# Patient Record
Sex: MALE | Race: BLACK OR AFRICAN AMERICAN | NOT HISPANIC OR LATINO | Employment: OTHER | URBAN - METROPOLITAN AREA
[De-identification: names, ages, dates, MRNs, and addresses within clinical notes are randomized per-mention and may not be internally consistent; named-entity substitution may affect disease eponyms.]

---

## 2019-12-18 ENCOUNTER — HOSPITAL ENCOUNTER (OUTPATIENT)
Dept: SURGERY | Facility: HOSPITAL | Age: 74
Setting detail: HOSPITAL OUTPATIENT SURGERY
Discharge: HOME | End: 2019-12-18
Attending: OPHTHALMOLOGY

## 2022-06-09 ENCOUNTER — APPOINTMENT (OUTPATIENT)
Dept: GENERAL RADIOLOGY | Facility: HOSPITAL | Age: 77
End: 2022-06-09

## 2022-06-09 ENCOUNTER — HOSPITAL ENCOUNTER (EMERGENCY)
Facility: HOSPITAL | Age: 77
Discharge: HOME | End: 2022-06-09
Attending: EMERGENCY MEDICINE | Admitting: EMERGENCY MEDICINE

## 2022-06-09 VITALS
HEIGHT: 68 IN | HEART RATE: 101 BPM | BODY MASS INDEX: 20.52 KG/M2 | RESPIRATION RATE: 20 BRPM | OXYGEN SATURATION: 98 % | WEIGHT: 135.36 LBS | SYSTOLIC BLOOD PRESSURE: 167 MMHG | DIASTOLIC BLOOD PRESSURE: 85 MMHG | TEMPERATURE: 98.3 F

## 2022-06-09 DIAGNOSIS — J40 BRONCHITIS: Primary | ICD-10-CM

## 2022-06-09 LAB
ALBUMIN SERPL-MCNC: 4.6 G/DL (ref 3.5–5.2)
ALBUMIN/GLOB SERPL: 1.1 G/DL
ALP SERPL-CCNC: 108 U/L (ref 39–117)
ALT SERPL W P-5'-P-CCNC: 12 U/L (ref 1–41)
ANION GAP SERPL CALCULATED.3IONS-SCNC: 12.6 MMOL/L (ref 5–15)
AST SERPL-CCNC: 16 U/L (ref 1–40)
BASOPHILS # BLD AUTO: 0.04 10*3/MM3 (ref 0–0.2)
BASOPHILS NFR BLD AUTO: 0.6 % (ref 0–1.5)
BILIRUB SERPL-MCNC: 0.5 MG/DL (ref 0–1.2)
BUN SERPL-MCNC: 17 MG/DL (ref 8–23)
BUN/CREAT SERPL: 18.5 (ref 7–25)
CALCIUM SPEC-SCNC: 9.9 MG/DL (ref 8.6–10.5)
CHLORIDE SERPL-SCNC: 93 MMOL/L (ref 98–107)
CO2 SERPL-SCNC: 24.4 MMOL/L (ref 22–29)
CREAT SERPL-MCNC: 0.92 MG/DL (ref 0.76–1.27)
DEPRECATED RDW RBC AUTO: 43.7 FL (ref 37–54)
EGFRCR SERPLBLD CKD-EPI 2021: 85.1 ML/MIN/1.73
EOSINOPHIL # BLD AUTO: 0.01 10*3/MM3 (ref 0–0.4)
EOSINOPHIL NFR BLD AUTO: 0.2 % (ref 0.3–6.2)
ERYTHROCYTE [DISTWIDTH] IN BLOOD BY AUTOMATED COUNT: 13.4 % (ref 12.3–15.4)
FLUAV AG NPH QL: NEGATIVE
FLUBV AG NPH QL IA: NEGATIVE
GLOBULIN UR ELPH-MCNC: 4.3 GM/DL
GLUCOSE SERPL-MCNC: 225 MG/DL (ref 65–99)
HCT VFR BLD AUTO: 39.6 % (ref 37.5–51)
HGB BLD-MCNC: 13.4 G/DL (ref 13–17.7)
HOLD SPECIMEN: NORMAL
HOLD SPECIMEN: NORMAL
IMM GRANULOCYTES # BLD AUTO: 0.01 10*3/MM3 (ref 0–0.05)
IMM GRANULOCYTES NFR BLD AUTO: 0.2 % (ref 0–0.5)
LYMPHOCYTES # BLD AUTO: 0.82 10*3/MM3 (ref 0.7–3.1)
LYMPHOCYTES NFR BLD AUTO: 12.9 % (ref 19.6–45.3)
MCH RBC QN AUTO: 29.9 PG (ref 26.6–33)
MCHC RBC AUTO-ENTMCNC: 33.8 G/DL (ref 31.5–35.7)
MCV RBC AUTO: 88.4 FL (ref 79–97)
MONOCYTES # BLD AUTO: 0.45 10*3/MM3 (ref 0.1–0.9)
MONOCYTES NFR BLD AUTO: 7.1 % (ref 5–12)
NEUTROPHILS NFR BLD AUTO: 5.01 10*3/MM3 (ref 1.7–7)
NEUTROPHILS NFR BLD AUTO: 79 % (ref 42.7–76)
NRBC BLD AUTO-RTO: 0 /100 WBC (ref 0–0.2)
NT-PROBNP SERPL-MCNC: 503.9 PG/ML (ref 0–1800)
PLATELET # BLD AUTO: 179 10*3/MM3 (ref 140–450)
PMV BLD AUTO: 10.5 FL (ref 6–12)
POTASSIUM SERPL-SCNC: 4.7 MMOL/L (ref 3.5–5.2)
PROT SERPL-MCNC: 8.9 G/DL (ref 6–8.5)
QT INTERVAL: 330 MS
RBC # BLD AUTO: 4.48 10*6/MM3 (ref 4.14–5.8)
SARS-COV-2 RNA PNL SPEC NAA+PROBE: NOT DETECTED
SODIUM SERPL-SCNC: 130 MMOL/L (ref 136–145)
TROPONIN T SERPL-MCNC: <0.01 NG/ML (ref 0–0.03)
WBC NRBC COR # BLD: 6.34 10*3/MM3 (ref 3.4–10.8)
WHOLE BLOOD HOLD COAG: NORMAL
WHOLE BLOOD HOLD SPECIMEN: NORMAL

## 2022-06-09 PROCEDURE — C9803 HOPD COVID-19 SPEC COLLECT: HCPCS | Performed by: EMERGENCY MEDICINE

## 2022-06-09 PROCEDURE — 83880 ASSAY OF NATRIURETIC PEPTIDE: CPT | Performed by: EMERGENCY MEDICINE

## 2022-06-09 PROCEDURE — 80053 COMPREHEN METABOLIC PANEL: CPT | Performed by: EMERGENCY MEDICINE

## 2022-06-09 PROCEDURE — 93005 ELECTROCARDIOGRAM TRACING: CPT | Performed by: EMERGENCY MEDICINE

## 2022-06-09 PROCEDURE — 87804 INFLUENZA ASSAY W/OPTIC: CPT | Performed by: EMERGENCY MEDICINE

## 2022-06-09 PROCEDURE — 84484 ASSAY OF TROPONIN QUANT: CPT | Performed by: EMERGENCY MEDICINE

## 2022-06-09 PROCEDURE — 71045 X-RAY EXAM CHEST 1 VIEW: CPT

## 2022-06-09 PROCEDURE — 93005 ELECTROCARDIOGRAM TRACING: CPT

## 2022-06-09 PROCEDURE — 93010 ELECTROCARDIOGRAM REPORT: CPT | Performed by: INTERNAL MEDICINE

## 2022-06-09 PROCEDURE — 85025 COMPLETE CBC W/AUTO DIFF WBC: CPT | Performed by: EMERGENCY MEDICINE

## 2022-06-09 PROCEDURE — U0004 COV-19 TEST NON-CDC HGH THRU: HCPCS | Performed by: EMERGENCY MEDICINE

## 2022-06-09 PROCEDURE — 99283 EMERGENCY DEPT VISIT LOW MDM: CPT

## 2022-06-09 PROCEDURE — 36415 COLL VENOUS BLD VENIPUNCTURE: CPT | Performed by: EMERGENCY MEDICINE

## 2022-06-09 RX ORDER — MULTIPLE VITAMINS W/ MINERALS TAB 9MG-400MCG
1 TAB ORAL 2 TIMES WEEKLY
COMMUNITY

## 2022-06-09 RX ORDER — AMLODIPINE BESYLATE 5 MG/1
5 TABLET ORAL DAILY
Qty: 20 TABLET | Refills: 0 | Status: SHIPPED | OUTPATIENT
Start: 2022-06-09 | End: 2023-03-06

## 2022-06-09 RX ORDER — SODIUM CHLORIDE 0.9 % (FLUSH) 0.9 %
10 SYRINGE (ML) INJECTION AS NEEDED
Status: DISCONTINUED | OUTPATIENT
Start: 2022-06-09 | End: 2022-06-09 | Stop reason: HOSPADM

## 2022-06-09 RX ORDER — AZITHROMYCIN 250 MG/1
TABLET, FILM COATED ORAL
Qty: 6 TABLET | Refills: 0 | Status: SHIPPED | OUTPATIENT
Start: 2022-06-09 | End: 2022-06-14

## 2022-06-09 NOTE — ED PROVIDER NOTES
"Time: 3:54 AM EDT  Arrived by: private car  Chief Complaint: SOB  History provided by: pt   History is limited by: N/A     History of Present Illness:  Patient is a 76 y.o. male that presents to the emergency department with SOB today. Pt mentions he feel like \"food stuck\" in his throat and has been coughing with sputum. Pt reports associated symptoms of sneezing, nasal congestion, hiccups, diaphoresis, and chronic cough of 1 year. Pt denies vomiting or diarrhea.   Pt is not vaccinated for COVID.       Shortness of Breath  Severity:  Moderate  Onset quality:  Sudden  Duration:  1 day  Timing:  Constant  Progression:  Unchanged  Chronicity:  New  Relieved by:  None tried  Worsened by:  Coughing  Ineffective treatments:  None tried  Associated symptoms: cough, diaphoresis and sputum production    Associated symptoms: no abdominal pain, no chest pain, no fever, no headaches, no neck pain, no rash, no sore throat and no vomiting        Similar Symptoms Previously: No   Recently seen: pt was not seen in this ED recently      Patient Care Team  Primary Care Provider: Provider, No Known    Past Medical History:     No Known Allergies  History reviewed. No pertinent past medical history.  Past Surgical History:   Procedure Laterality Date   • EYE SURGERY       History reviewed. No pertinent family history.    Home Medications:  Prior to Admission medications    Medication Sig Start Date End Date Taking? Authorizing Provider   multivitamin with minerals (CENTRUM ADULTS PO) Take 1 tablet by mouth Daily.    Provider, MD Rene        Social History:   Social History     Tobacco Use   • Smoking status: Former Smoker   Substance Use Topics   • Alcohol use: Never   • Drug use: Never     Recent travel: not applicable     Review of Systems:  Review of Systems   Constitutional: Positive for diaphoresis. Negative for chills and fever.   HENT: Positive for congestion and sneezing. Negative for postnasal drip, rhinorrhea and sore " "throat.         Hiccups     Eyes: Negative for photophobia, pain and visual disturbance.   Respiratory: Positive for cough, sputum production and shortness of breath. Negative for apnea and chest tightness.    Cardiovascular: Negative for chest pain, palpitations and leg swelling.   Gastrointestinal: Negative for abdominal pain, diarrhea, nausea and vomiting.   Genitourinary: Negative for difficulty urinating, dysuria, flank pain, frequency, hematuria and urgency.   Musculoskeletal: Negative for joint swelling, myalgias, neck pain and neck stiffness.   Skin: Negative for color change, pallor and rash.   Neurological: Negative for dizziness, seizures, syncope, weakness, numbness and headaches.   Hematological: Negative for adenopathy. Does not bruise/bleed easily.   Psychiatric/Behavioral: Negative.    All other systems reviewed and are negative.       Physical Exam:  /85   Pulse 101   Temp 98.3 °F (36.8 °C) (Oral)   Resp 20   Ht 172.7 cm (68\")   Wt 61.4 kg (135 lb 5.8 oz)   SpO2 98%   BMI 20.58 kg/m²     Physical Exam  Vitals and nursing note reviewed.   Constitutional:       General: He is not in acute distress.     Appearance: Normal appearance. He is not toxic-appearing.   HENT:      Head: Normocephalic and atraumatic.      Jaw: There is normal jaw occlusion.   Eyes:      General: Lids are normal.      Extraocular Movements: Extraocular movements intact.      Conjunctiva/sclera: Conjunctivae normal.      Pupils: Pupils are equal, round, and reactive to light.   Cardiovascular:      Rate and Rhythm: Normal rate and regular rhythm.      Pulses: Normal pulses.      Heart sounds: Murmur heard.   Pulmonary:      Effort: Pulmonary effort is normal. No respiratory distress.      Breath sounds: Normal breath sounds. No wheezing or rhonchi.   Abdominal:      General: Abdomen is flat.      Palpations: Abdomen is soft.      Tenderness: There is no abdominal tenderness. There is no guarding or rebound. "   Musculoskeletal:         General: Normal range of motion.      Cervical back: Normal range of motion and neck supple.      Right lower leg: No edema.      Left lower leg: No edema.   Skin:     General: Skin is warm and dry.   Neurological:      Mental Status: He is alert and oriented to person, place, and time. Mental status is at baseline.   Psychiatric:         Mood and Affect: Mood normal.                Medications in the Emergency Department:  Medications   sodium chloride 0.9 % flush 10 mL (has no administration in time range)        Labs  Lab Results (last 24 hours)     Procedure Component Value Units Date/Time    Influenza Antigen, Rapid - Swab, Nasopharynx [[id number]]  (Normal) Collected: 06/09/22 0200    Specimen: Swab from Nasopharynx Updated: 06/09/22 0235     Influenza A Ag, EIA Negative     Influenza B Ag, EIA Negative    COVID-19,APTIMA PANTHER(AHSAN),[facility]/ [facility]ICOLE, NP/OP SWAB IN UTM/VTM/SALINE TRANSPORT MEDIA,24 HR TAT - Swab, Nasopharynx [[id number]] Collected: 06/09/22 0200    Specimen: Swab from Nasopharynx Updated: 06/09/22 0212    CBC & Differential [[id number]]  (Abnormal) Collected: 06/09/22 0203    Specimen: Blood Updated: 06/09/22 0216    Narrative:      The following orders were created for panel order CBC & Differential.  Procedure                               Abnormality         Status                     ---------                               -----------         ------                     CBC Auto Differential[[id number]]        Abnormal            Final result                 Please view results for these tests on the individual orders.    Comprehensive Metabolic Panel [[id number]]  (Abnormal) Collected: 06/09/22 0203    Specimen: Blood Updated: 06/09/22 0235     Glucose 225 mg/dL      BUN 17 mg/dL      Creatinine 0.92 mg/dL      Sodium 130 mmol/L      Potassium 4.7 mmol/L      Chloride 93 mmol/L      CO2 24.4 mmol/L      Calcium 9.9 mg/dL      Total Protein 8.9 g/dL      Albumin 4.60  g/dL      ALT (SGPT) 12 U/L      AST (SGOT) 16 U/L      Alkaline Phosphatase 108 U/L      Total Bilirubin 0.5 mg/dL      Globulin 4.3 gm/dL      A/G Ratio 1.1 g/dL      BUN/Creatinine Ratio 18.5     Anion Gap 12.6 mmol/L      eGFR 85.1 mL/min/1.73      Comment: National Kidney Foundation and American Society of Nephrology (ASN) Task Force recommended calculation based on the Chronic Kidney Disease Epidemiology Collaboration (CKD-EPI) equation refit without adjustment for race.       Narrative:      GFR Normal >60  Chronic Kidney Disease <60  Kidney Failure <15      BNP [[id number]]  (Normal) Collected: 06/09/22 0203    Specimen: Blood Updated: 06/09/22 0233     proBNP 503.9 pg/mL     Narrative:      Among patients with dyspnea, NT-proBNP is highly sensitive for the detection of acute congestive heart failure. In addition NT-proBNP of <300 pg/ml effectively rules out acute congestive heart failure with 99% negative predictive value.    Results may be falsely decreased if patient taking Biotin.      Troponin [[id number]]  (Normal) Collected: 06/09/22 0203    Specimen: Blood Updated: 06/09/22 0235     Troponin T <0.010 ng/mL     Narrative:      Troponin T Reference Range:  <= 0.03 ng/mL-   Negative for AMI  >0.03 ng/mL-     Abnormal for myocardial necrosis.  Clinicians would have to utilize clinical acumen, EKG, Troponin and serial changes to determine if it is an Acute Myocardial Infarction or myocardial injury due to an underlying chronic condition.       Results may be falsely decreased if patient taking Biotin.      CBC Auto Differential [[id number]]  (Abnormal) Collected: 06/09/22 0203    Specimen: Blood Updated: 06/09/22 0216     WBC 6.34 10*3/mm3      RBC 4.48 10*6/mm3      Hemoglobin 13.4 g/dL      Hematocrit 39.6 %      MCV 88.4 fL      MCH 29.9 pg      MCHC 33.8 g/dL      RDW 13.4 %      RDW-SD 43.7 fl      MPV 10.5 fL      Platelets 179 10*3/mm3      Neutrophil % 79.0 %      Lymphocyte % 12.9 %      Monocyte %  7.1 %      Eosinophil % 0.2 %      Basophil % 0.6 %      Immature Grans % 0.2 %      Neutrophils, Absolute 5.01 10*3/mm3      Lymphocytes, Absolute 0.82 10*3/mm3      Monocytes, Absolute 0.45 10*3/mm3      Eosinophils, Absolute 0.01 10*3/mm3      Basophils, Absolute 0.04 10*3/mm3      Immature Grans, Absolute 0.01 10*3/mm3      nRBC 0.0 /100 WBC            Imaging:  XR Chest 1 View    Result Date: 6/9/2022  PROCEDURE: XR CHEST 1 VW  COMPARISON: None.  INDICATIONS: CHEST TIGHTNESS; SHORTNESS OF BREATH X COUPLE HOURS.  FINDINGS: A single AP upright portable chest radiograph was performed.  There is age-indeterminate blunting of the right lateral costophrenic sulcus.  It may represent chronic pleural-parenchymal scarring.  A tiny right pleural effusion is possible.  No acute infiltrate is seen.  No cardiac enlargement.  There are degenerative changes of the imaged spine and the bilateral shoulders.  The thoracic aorta is atherosclerotic and ectatic.       No acute infiltrate is seen.  There is age-indeterminate mild blunting of the right lateral costophrenic sulcus, as discussed.  No cardiac enlargement.  No pneumothorax.      COMMENT:  Part of this note is an electronic transcription of spoken language to printed text. The electronic translation/transcription may permit erroneous, or at times, nonsensical (or even sensical) words or phrases to be inadvertently transcribed or omitted; this  has reviewed the note for such errors (as well as additional errors); however, some may still exist.  DEBI KING JR, MD       Electronically Signed and Approved By: DEBI KING JR, MD on 6/09/2022 at 2:39                Procedures:  Procedures    Progress  ED Course as of 06/09/22 0414   Thu Jun 09, 2022   0413 EKG:    Rhythm: sinus  Rate: 105  Axis: normal  Intervals: normal  ST Segment: no elevations    EKG Comparison: no previous    Interpreted by me   [BN]      ED Course User Index  [BN] Emerita Muñiz MD                             Medical Decision Making:  MDM  Number of Diagnoses or Management Options  Bronchitis  Diagnosis management comments: The patient´s CBC was reviewed and shows no abnormalities of critical concern. Of note, there is no anemia requiring a blood transfusion and the platelet count is acceptable.  The patient´s CMP was reviewed and shows no abnormalities of critical concern. Of note, the patient´s sodium and potassium are acceptable. The patient´s liver enzymes are unremarkable. The patient´s renal function (creatinine) is preserved. The patient has a normal anion gap.  Troponin is negative.  Influenza swab is negative.  Chest x-ray is negative for pneumonia.  The patient presented with a productive cough. The patient is well-appearing and in no respiratory distress. The patient has a normal mental status and is neurologically intact. The patient appears well and is able to tolerate food for fluid by mouth and there's no significant dehydration. There is no respiratory distress and no signs of systemic toxicity. The history, exam, diagnostic testing and current condition do not demonstrate an infectious process such as meningitis, severe pneumonia, retropharyngeal abscess, epiglottitis, sepsis or other serious bacterial infection requiring further testing, treatment, consultation, or admission at this time. The patient has no additional oxygen requirement nor are there any signs of respiratory distress. The patient has a chest x-ray that is negative for pneumonia. Asthma, URI, GERD are also considered.  Patient was found to have a murmur.  Son is at the bedside and I advised him to follow-up with Dr. Hinojsoa concerning this murmur.  Patient also awaiting COVID-19 results.  The vital signs have been stable and the patient has had no hypoxia. The patient's condition is stable and appropriate for discharge. The son will pursue further outpatient evaluation with the primary care physician, or  designated physician. The son will expressed a clear and thorough understanding and agreed to follow-up as instructed.       Amount and/or Complexity of Data Reviewed  Clinical lab tests: reviewed  Tests in the radiology section of CPT®: reviewed  Tests in the medicine section of CPT®: reviewed  Independent visualization of images, tracings, or specimens: yes    Risk of Complications, Morbidity, and/or Mortality  Presenting problems: moderate  Management options: moderate    Patient Progress  Patient progress: stable       Final diagnoses:   Bronchitis        Disposition:  ED Disposition     ED Disposition   Discharge    Condition   Stable    Comment   --             Documentation assistance provided by SOFIA ADEN acting as scribe for Emerita Muñiz MD. Information recorded by the scribe was done at my direction and has been verified and validated by me.          Sofia Aden  06/09/22 0401       Sofia Aden  06/09/22 0404       Emerita Muñiz MD  06/09/22 0414

## 2023-03-06 ENCOUNTER — APPOINTMENT (OUTPATIENT)
Dept: CT IMAGING | Facility: HOSPITAL | Age: 78
DRG: 853 | End: 2023-03-06
Payer: COMMERCIAL

## 2023-03-06 ENCOUNTER — APPOINTMENT (OUTPATIENT)
Dept: CARDIOLOGY | Facility: HOSPITAL | Age: 78
DRG: 853 | End: 2023-03-06
Payer: COMMERCIAL

## 2023-03-06 ENCOUNTER — APPOINTMENT (OUTPATIENT)
Dept: GENERAL RADIOLOGY | Facility: HOSPITAL | Age: 78
DRG: 853 | End: 2023-03-06
Payer: COMMERCIAL

## 2023-03-06 ENCOUNTER — HOSPITAL ENCOUNTER (INPATIENT)
Facility: HOSPITAL | Age: 78
LOS: 23 days | Discharge: NURSING, REHAB OR LONG-TERM CARE | DRG: 853 | End: 2023-03-29
Attending: EMERGENCY MEDICINE | Admitting: STUDENT IN AN ORGANIZED HEALTH CARE EDUCATION/TRAINING PROGRAM
Payer: COMMERCIAL

## 2023-03-06 DIAGNOSIS — R26.2 DIFFICULTY WALKING: ICD-10-CM

## 2023-03-06 DIAGNOSIS — Z78.9 DECREASED ACTIVITIES OF DAILY LIVING (ADL): ICD-10-CM

## 2023-03-06 DIAGNOSIS — J18.9 PNEUMONIA OF RIGHT LOWER LOBE DUE TO INFECTIOUS ORGANISM: Primary | ICD-10-CM

## 2023-03-06 DIAGNOSIS — R13.12 OROPHARYNGEAL DYSPHAGIA: ICD-10-CM

## 2023-03-06 DIAGNOSIS — J96.01 SEPSIS WITH ACUTE HYPOXIC RESPIRATORY FAILURE WITHOUT SEPTIC SHOCK, DUE TO UNSPECIFIED ORGANISM: ICD-10-CM

## 2023-03-06 DIAGNOSIS — A41.9 SEPSIS WITH ACUTE HYPOXIC RESPIRATORY FAILURE WITHOUT SEPTIC SHOCK, DUE TO UNSPECIFIED ORGANISM: ICD-10-CM

## 2023-03-06 DIAGNOSIS — R65.20 SEPSIS WITH ACUTE HYPOXIC RESPIRATORY FAILURE WITHOUT SEPTIC SHOCK, DUE TO UNSPECIFIED ORGANISM: ICD-10-CM

## 2023-03-06 DIAGNOSIS — J96.01 ACUTE RESPIRATORY FAILURE WITH HYPOXIA: ICD-10-CM

## 2023-03-06 LAB
ALBUMIN SERPL-MCNC: 3.8 G/DL (ref 3.5–5.2)
ALBUMIN/GLOB SERPL: 0.9 G/DL
ALP SERPL-CCNC: 91 U/L (ref 39–117)
ALT SERPL W P-5'-P-CCNC: 13 U/L (ref 1–41)
ANION GAP SERPL CALCULATED.3IONS-SCNC: 16 MMOL/L (ref 5–15)
APTT PPP: 27.3 SECONDS (ref 78–95.9)
ARTERIAL PATENCY WRIST A: POSITIVE
AST SERPL-CCNC: 26 U/L (ref 1–40)
BASE EXCESS BLDA CALC-SCNC: -0.5 MMOL/L (ref -2–2)
BASOPHILS # BLD AUTO: 0.08 10*3/MM3 (ref 0–0.2)
BASOPHILS NFR BLD AUTO: 0.6 % (ref 0–1.5)
BDY SITE: ABNORMAL
BILIRUB SERPL-MCNC: 0.7 MG/DL (ref 0–1.2)
BUN SERPL-MCNC: 26 MG/DL (ref 8–23)
BUN/CREAT SERPL: 29.5 (ref 7–25)
BURR CELLS BLD QL SMEAR: NORMAL
CA-I BLDA-SCNC: 1.15 MMOL/L (ref 1.13–1.32)
CALCIUM SPEC-SCNC: 9.4 MG/DL (ref 8.6–10.5)
CHLORIDE BLDA-SCNC: 97 MMOL/L (ref 98–106)
CHLORIDE SERPL-SCNC: 93 MMOL/L (ref 98–107)
CO2 SERPL-SCNC: 24 MMOL/L (ref 22–29)
COHGB MFR BLD: 0.5 % (ref 0–1.5)
CREAT SERPL-MCNC: 0.88 MG/DL (ref 0.76–1.27)
D-LACTATE SERPL-SCNC: 1.8 MMOL/L (ref 0.5–2)
D-LACTATE SERPL-SCNC: 2 MMOL/L (ref 0.5–2)
D-LACTATE SERPL-SCNC: 2.5 MMOL/L (ref 0.5–2)
D-LACTATE SERPL-SCNC: 3.7 MMOL/L (ref 0.5–2)
DEPRECATED RDW RBC AUTO: 43 FL (ref 37–54)
EGFRCR SERPLBLD CKD-EPI 2021: 87.5 ML/MIN/1.73
EOSINOPHIL # BLD AUTO: 0.81 10*3/MM3 (ref 0–0.4)
EOSINOPHIL NFR BLD AUTO: 6.3 % (ref 0.3–6.2)
ERYTHROCYTE [DISTWIDTH] IN BLOOD BY AUTOMATED COUNT: 13.2 % (ref 12.3–15.4)
FHHB: 6.8 % (ref 0–5)
FLUAV AG NPH QL: NEGATIVE
FLUBV AG NPH QL IA: NEGATIVE
GAS FLOW AIRWAY: 4 LPM
GEN 5 2HR TROPONIN T REFLEX: 75 NG/L
GLOBULIN UR ELPH-MCNC: 4.4 GM/DL
GLUCOSE BLDA-MCNC: 293 MG/DL (ref 70–99)
GLUCOSE SERPL-MCNC: 282 MG/DL (ref 65–99)
HCO3 BLDA-SCNC: 25.4 MMOL/L (ref 22–26)
HCT VFR BLD AUTO: 39.9 % (ref 37.5–51)
HGB BLD-MCNC: 13.4 G/DL (ref 13–17.7)
HGB BLDA-MCNC: 13.5 G/DL (ref 13.8–16.4)
IMM GRANULOCYTES # BLD AUTO: 0.04 10*3/MM3 (ref 0–0.05)
IMM GRANULOCYTES NFR BLD AUTO: 0.3 % (ref 0–0.5)
INHALED O2 CONCENTRATION: 36 %
INR PPP: 1.23 (ref 0.86–1.15)
LACTATE BLDA-SCNC: 3.65 MMOL/L (ref 0.5–2)
LYMPHOCYTES # BLD AUTO: 0.22 10*3/MM3 (ref 0.7–3.1)
LYMPHOCYTES NFR BLD AUTO: 1.7 % (ref 19.6–45.3)
MCH RBC QN AUTO: 30 PG (ref 26.6–33)
MCHC RBC AUTO-ENTMCNC: 33.6 G/DL (ref 31.5–35.7)
MCV RBC AUTO: 89.5 FL (ref 79–97)
METHGB BLD QL: 0.2 % (ref 0–1.5)
MODALITY: ABNORMAL
MONOCYTES # BLD AUTO: 0.79 10*3/MM3 (ref 0.1–0.9)
MONOCYTES NFR BLD AUTO: 6.1 % (ref 5–12)
MRSA DNA SPEC QL NAA+PROBE: NORMAL
NEUTROPHILS NFR BLD AUTO: 10.96 10*3/MM3 (ref 1.7–7)
NEUTROPHILS NFR BLD AUTO: 85 % (ref 42.7–76)
NOTE: ABNORMAL
NRBC BLD AUTO-RTO: 0 /100 WBC (ref 0–0.2)
OXYHGB MFR BLDV: 92.5 % (ref 94–99)
PCO2 BLDA: 46.8 MM HG (ref 35–45)
PH BLDA: 7.35 PH UNITS (ref 7.35–7.45)
PLATELET # BLD AUTO: 169 10*3/MM3 (ref 140–450)
PMV BLD AUTO: 11.3 FL (ref 6–12)
PO2 BLD: 198 MM[HG] (ref 0–500)
PO2 BLDA: 71.3 MM HG (ref 80–100)
POTASSIUM BLDA-SCNC: 4.54 MMOL/L (ref 3.5–5)
POTASSIUM SERPL-SCNC: 4.6 MMOL/L (ref 3.5–5.2)
PROCALCITONIN SERPL-MCNC: 3.5 NG/ML (ref 0–0.25)
PROT SERPL-MCNC: 8.2 G/DL (ref 6–8.5)
PROTHROMBIN TIME: 15.6 SECONDS (ref 11.8–14.9)
RBC # BLD AUTO: 4.46 10*6/MM3 (ref 4.14–5.8)
SAO2 % BLDCOA: 93.2 % (ref 95–99)
SARS-COV-2 RNA RESP QL NAA+PROBE: NOT DETECTED
SMALL PLATELETS BLD QL SMEAR: ADEQUATE
SODIUM BLDA-SCNC: 134.4 MMOL/L (ref 136–146)
SODIUM SERPL-SCNC: 133 MMOL/L (ref 136–145)
TROPONIN T DELTA: 25 NG/L
TROPONIN T SERPL HS-MCNC: 50 NG/L
WBC MORPH BLD: NORMAL
WBC NRBC COR # BLD: 12.9 10*3/MM3 (ref 3.4–10.8)

## 2023-03-06 PROCEDURE — 99223 1ST HOSP IP/OBS HIGH 75: CPT | Performed by: STUDENT IN AN ORGANIZED HEALTH CARE EDUCATION/TRAINING PROGRAM

## 2023-03-06 PROCEDURE — 87040 BLOOD CULTURE FOR BACTERIA: CPT | Performed by: EMERGENCY MEDICINE

## 2023-03-06 PROCEDURE — 36415 COLL VENOUS BLD VENIPUNCTURE: CPT

## 2023-03-06 PROCEDURE — 94799 UNLISTED PULMONARY SVC/PX: CPT

## 2023-03-06 PROCEDURE — 25010000002 CEFTRIAXONE PER 250 MG: Performed by: EMERGENCY MEDICINE

## 2023-03-06 PROCEDURE — 93005 ELECTROCARDIOGRAM TRACING: CPT | Performed by: EMERGENCY MEDICINE

## 2023-03-06 PROCEDURE — 83735 ASSAY OF MAGNESIUM: CPT | Performed by: STUDENT IN AN ORGANIZED HEALTH CARE EDUCATION/TRAINING PROGRAM

## 2023-03-06 PROCEDURE — 36415 COLL VENOUS BLD VENIPUNCTURE: CPT | Performed by: EMERGENCY MEDICINE

## 2023-03-06 PROCEDURE — 84145 PROCALCITONIN (PCT): CPT | Performed by: NURSE PRACTITIONER

## 2023-03-06 PROCEDURE — 83050 HGB METHEMOGLOBIN QUAN: CPT | Performed by: EMERGENCY MEDICINE

## 2023-03-06 PROCEDURE — 85610 PROTHROMBIN TIME: CPT | Performed by: EMERGENCY MEDICINE

## 2023-03-06 PROCEDURE — 82962 GLUCOSE BLOOD TEST: CPT

## 2023-03-06 PROCEDURE — 87804 INFLUENZA ASSAY W/OPTIC: CPT | Performed by: STUDENT IN AN ORGANIZED HEALTH CARE EDUCATION/TRAINING PROGRAM

## 2023-03-06 PROCEDURE — 85730 THROMBOPLASTIN TIME PARTIAL: CPT | Performed by: EMERGENCY MEDICINE

## 2023-03-06 PROCEDURE — 85007 BL SMEAR W/DIFF WBC COUNT: CPT | Performed by: EMERGENCY MEDICINE

## 2023-03-06 PROCEDURE — 84484 ASSAY OF TROPONIN QUANT: CPT | Performed by: EMERGENCY MEDICINE

## 2023-03-06 PROCEDURE — 83605 ASSAY OF LACTIC ACID: CPT | Performed by: EMERGENCY MEDICINE

## 2023-03-06 PROCEDURE — 70450 CT HEAD/BRAIN W/O DYE: CPT

## 2023-03-06 PROCEDURE — 80061 LIPID PANEL: CPT | Performed by: STUDENT IN AN ORGANIZED HEALTH CARE EDUCATION/TRAINING PROGRAM

## 2023-03-06 PROCEDURE — 0 IOHEXOL 300 MG/ML SOLUTION: Performed by: STUDENT IN AN ORGANIZED HEALTH CARE EDUCATION/TRAINING PROGRAM

## 2023-03-06 PROCEDURE — 84100 ASSAY OF PHOSPHORUS: CPT | Performed by: STUDENT IN AN ORGANIZED HEALTH CARE EDUCATION/TRAINING PROGRAM

## 2023-03-06 PROCEDURE — 25010000002 AZITHROMYCIN PER 500 MG: Performed by: EMERGENCY MEDICINE

## 2023-03-06 PROCEDURE — 99223 1ST HOSP IP/OBS HIGH 75: CPT | Performed by: INTERNAL MEDICINE

## 2023-03-06 PROCEDURE — 80053 COMPREHEN METABOLIC PANEL: CPT | Performed by: STUDENT IN AN ORGANIZED HEALTH CARE EDUCATION/TRAINING PROGRAM

## 2023-03-06 PROCEDURE — 82375 ASSAY CARBOXYHB QUANT: CPT | Performed by: EMERGENCY MEDICINE

## 2023-03-06 PROCEDURE — 36600 WITHDRAWAL OF ARTERIAL BLOOD: CPT | Performed by: EMERGENCY MEDICINE

## 2023-03-06 PROCEDURE — 80053 COMPREHEN METABOLIC PANEL: CPT | Performed by: EMERGENCY MEDICINE

## 2023-03-06 PROCEDURE — 93010 ELECTROCARDIOGRAM REPORT: CPT | Performed by: INTERNAL MEDICINE

## 2023-03-06 PROCEDURE — 71260 CT THORAX DX C+: CPT

## 2023-03-06 PROCEDURE — 87641 MR-STAPH DNA AMP PROBE: CPT | Performed by: STUDENT IN AN ORGANIZED HEALTH CARE EDUCATION/TRAINING PROGRAM

## 2023-03-06 PROCEDURE — 71045 X-RAY EXAM CHEST 1 VIEW: CPT

## 2023-03-06 PROCEDURE — 82805 BLOOD GASES W/O2 SATURATION: CPT | Performed by: EMERGENCY MEDICINE

## 2023-03-06 PROCEDURE — 99285 EMERGENCY DEPT VISIT HI MDM: CPT

## 2023-03-06 PROCEDURE — 85025 COMPLETE CBC W/AUTO DIFF WBC: CPT | Performed by: EMERGENCY MEDICINE

## 2023-03-06 PROCEDURE — U0004 COV-19 TEST NON-CDC HGH THRU: HCPCS | Performed by: STUDENT IN AN ORGANIZED HEALTH CARE EDUCATION/TRAINING PROGRAM

## 2023-03-06 PROCEDURE — 25010000002 ENOXAPARIN PER 10 MG: Performed by: STUDENT IN AN ORGANIZED HEALTH CARE EDUCATION/TRAINING PROGRAM

## 2023-03-06 RX ORDER — ALBUTEROL SULFATE 2.5 MG/3ML
2.5 SOLUTION RESPIRATORY (INHALATION) EVERY 6 HOURS PRN
Status: DISCONTINUED | OUTPATIENT
Start: 2023-03-06 | End: 2023-03-07

## 2023-03-06 RX ORDER — SODIUM CHLORIDE 9 MG/ML
40 INJECTION, SOLUTION INTRAVENOUS AS NEEDED
Status: DISCONTINUED | OUTPATIENT
Start: 2023-03-06 | End: 2023-03-29 | Stop reason: HOSPADM

## 2023-03-06 RX ORDER — SODIUM CHLORIDE 0.9 % (FLUSH) 0.9 %
10 SYRINGE (ML) INJECTION EVERY 12 HOURS SCHEDULED
Status: DISCONTINUED | OUTPATIENT
Start: 2023-03-06 | End: 2023-03-29 | Stop reason: HOSPADM

## 2023-03-06 RX ORDER — SODIUM CHLORIDE 0.9 % (FLUSH) 0.9 %
10 SYRINGE (ML) INJECTION AS NEEDED
Status: DISCONTINUED | OUTPATIENT
Start: 2023-03-06 | End: 2023-03-29 | Stop reason: HOSPADM

## 2023-03-06 RX ORDER — CEFTRIAXONE SODIUM 1 G/50ML
1 INJECTION, SOLUTION INTRAVENOUS EVERY 24 HOURS
Status: COMPLETED | OUTPATIENT
Start: 2023-03-07 | End: 2023-03-10

## 2023-03-06 RX ORDER — PANTOPRAZOLE SODIUM 40 MG/1
40 TABLET, DELAYED RELEASE ORAL
Status: DISCONTINUED | OUTPATIENT
Start: 2023-03-07 | End: 2023-03-07 | Stop reason: CLARIF

## 2023-03-06 RX ORDER — ENOXAPARIN SODIUM 100 MG/ML
1 INJECTION SUBCUTANEOUS EVERY 12 HOURS
Status: DISCONTINUED | OUTPATIENT
Start: 2023-03-06 | End: 2023-03-08

## 2023-03-06 RX ORDER — ASPIRIN 81 MG/1
81 TABLET ORAL DAILY
Status: DISCONTINUED | OUTPATIENT
Start: 2023-03-06 | End: 2023-03-07 | Stop reason: CLARIF

## 2023-03-06 RX ORDER — CEFTRIAXONE SODIUM 1 G/50ML
1 INJECTION, SOLUTION INTRAVENOUS ONCE
Status: COMPLETED | OUTPATIENT
Start: 2023-03-06 | End: 2023-03-06

## 2023-03-06 RX ORDER — HEPARIN SODIUM 5000 [USP'U]/ML
5000 INJECTION, SOLUTION INTRAVENOUS; SUBCUTANEOUS EVERY 8 HOURS SCHEDULED
Status: CANCELLED | OUTPATIENT
Start: 2023-03-06

## 2023-03-06 RX ADMIN — AZITHROMYCIN 500 MG: 500 INJECTION, POWDER, LYOPHILIZED, FOR SOLUTION INTRAVENOUS at 11:04

## 2023-03-06 RX ADMIN — ENOXAPARIN SODIUM 60 MG: 60 INJECTION SUBCUTANEOUS at 16:15

## 2023-03-06 RX ADMIN — CEFTRIAXONE SODIUM 1 G: 1 INJECTION, SOLUTION INTRAVENOUS at 10:44

## 2023-03-06 RX ADMIN — IOHEXOL 63 ML: 300 INJECTION, SOLUTION INTRAVENOUS at 17:51

## 2023-03-06 RX ADMIN — ASPIRIN 81 MG: 81 TABLET, COATED ORAL at 14:03

## 2023-03-06 RX ADMIN — Medication 10 ML: at 16:15

## 2023-03-06 RX ADMIN — SODIUM CHLORIDE, POTASSIUM CHLORIDE, SODIUM LACTATE AND CALCIUM CHLORIDE 1000 ML: 600; 310; 30; 20 INJECTION, SOLUTION INTRAVENOUS at 10:43

## 2023-03-06 RX ADMIN — Medication 10 ML: at 21:02

## 2023-03-06 NOTE — PAYOR COMM NOTE
"    Ref# [id number]. UPDATE MEDICAL ADMIT TELE BED     Kristine MARQUIS  [facility] ,[facility] [phone]  F [phone]    Comfort Lopez (77 y.o. Male)     Date of Birth   1945    Social Security Number       Address   [address] [place] [address]    Home Phone   [phone]    MRN   [id number]       Zoroastrianism   Adventism    Marital Status                               Admission Date   3/6/23    Admission Type   Emergency    Admitting Provider   Chandu Villanueva MD    Attending Provider   Chandu Villanueva MD    Department, Room/Bed   [facility] PROGRESSIVE CARE UNIT, 215/1       Discharge Date       Discharge Disposition       Discharge Destination                               Attending Provider: Chandu Villanueva MD    Allergies: No Known Allergies    Isolation: None   Infection: COVID Screen (preop/placement) (03/06/23)   Code Status: CPR    Ht: 177.8 cm (70\")   Wt: 61.2 kg (135 lb)    Admission Cmt: None   Principal Problem: Pneumonia of right lower lobe due to infectious organism [J18.9]                 Active Insurance as of 3/6/2023     Primary Coverage     Payor Plan Insurance Group Employer/Plan Group    [facility] BLUE CROSS [id number]     Payor Plan Address Payor Plan Phone Number Payor Plan Fax Number Effective Dates    [address]   1/1/2006 - None Entered    [place] [address]       Subscriber Name Subscriber Birth Date Member ID       COMFORT LOPEZ 1945 [id number]                 Emergency Contacts      (Rel.) Home Phone Work Phone Mobile Phone    orlando gandara (Son) -- -- [phone]              Physician Progress Notes (last 48 hours)  Notes from 03/04/23 1406 through 03/06/23 1406   No notes of this type exist for this encounter.       Consult Notes (last 48 hours)  Notes from 03/04/23 1406 through 03/06/23 1406   No notes of this type exist for this encounter.        Chandu Villanueva MD "   Physician  Hospitalist  H&P      Addendum  Date of Service:  23 1237  Creation Time:  23     Expand All Collapse All     [facility]   HOSPITALIST HISTORY AND PHYSICAL  Date: 3/6/2023   Patient Name: Buzz Lopez  : 1945  MRN: [id number]  Primary Care Physician:  Provider, No Known  Date of admission: 3/6/2023        Subjective []Expand by Default     Subjective      Chief Complaint: Shortness of breath, confusion, possible syncope     HPI:     Bzuz Lopez is a 77 y.o. male with no significant past medical history has been brought into the ED with concerns for confusion, possible syncope, shortness of breath.  Patient states that for the past 1 to 2 weeks patient has been having difficulty breathing and subjective fevers, it has got worse and today and he has called his son to take him to the ED as he is having difficulty breathing.  By the time patient's son has arrived at the home, patient appeared to be confused, generalized weakness and unable to put his clothes on.  While his son was helping him to get the clothes on, patient appeared to be very confused and had a possible syncope episode where he was unresponsive.  Patient's son has looked for the pulse and they could not feel radial pulse and started chest compressions, EMS was called.  By the time EMS has arrived patient was receiving chest compressions from the family members.  EMS has evaluated the patient and they could not feel a pulse.  Patient was in respiratory distress, saturating around 70% on room air.  Received nebulizer treatment by the EMS and the patient has been brought into the ED.    During my examination, patient is alert and oriented x3 and able to answer questions appropriately.  States that he does not remember how he came to the hospital.  Denies any chest pain, nausea, vomiting, focal weakness, numbness, tingling.  States that he has been having difficulty breathing for the past 1 to 2 weeks.  A month  ago he had some sore throat.  Associated with cough, productive sputum. Patient is thin and frail.  Chronically has a poor p.o. intake.  Admits that he has low appetite.  No previously diagnosed history of dementia.  As per the family members, patient takes a lot of over-the-counter fiber supplement review does not gain weight despite he eats well.     In the ED, vital signs temperature 98.5, pulse 118, respiratory 20, blood pressure 134/67 on 2 L of nasal cannula saturating around 92%.  Labs showed ABG 7.3 5/46/71 on 4 L of nasal cannula, initial troponin was elevated at 50, repeat troponin after 2 hours was 75 with a delta of 25, sodium 133, potassium 4.6, anion gap 16, BUN 26, creatinine 0.88, lactic acid was elevated at 3.7, INR 1.23, WBC elevated 12.9, rest of the CBC is normal.  EKG showed sinus tachycardia with no significant ST-T wave changes.  Blood cultures were sent.  Chest x-ray showed right lower lobe pneumonia, typical pattern of pulmonary edema is in the differential.  Right pleural fluid versus right pleural thickening was noted.  Received IV fluids and ceftriaxone azithromycin in the ED     Patient has been admitted for further evaluation and management of sepsis, acute hypoxic respiratory failure, right lower lobe pneumonia, possible syncope     Personal History      Medical History   History reviewed. No pertinent past medical history.           Surgical History         Past Surgical History:   Procedure Laterality Date   • EYE SURGERY                   History reviewed. No pertinent family history.        Social History   Social History           Socioeconomic History   • Marital status:    Tobacco Use   • Smoking status: Former   Substance and Sexual Activity   • Alcohol use: Never   • Drug use: Never               Home Medications:  multivitamin with minerals     Allergies:  No Known Allergies     Review of Systems   All other systems reviewed and negative except as mentioned above in  the [facility]           Objective      Objective      Vitals:   Temp:  [98.5 °F (36.9 °C)] 98.5 °F (36.9 °C)  Heart Rate:  [] 100  Resp:  [22] 22  BP: (108-134)/() 120/104  Flow (L/min):  [5] 5     Physical Exam                         Constitutional: Awake, alert, thin, frail, mild distress              Eyes: Pupils equal, sclerae anicteric, no conjunctival injection              HENT: NCAT, mucous membranes moist              Neck: Supple, no thyromegaly, no lymphadenopathy, trachea midline              Respiratory: Bilateral air entry present, mild rhonchi in the right lower lobe, nonlabored respirations               Cardiovascular: RRR, no murmurs, rubs, or gallops              Gastrointestinal: Positive bowel sounds, soft, nontender, nondistended              Musculoskeletal: No bilateral ankle edema, no clubbing or cyanosis to extremities              Psychiatric: Appropriate affect, cooperative              Neurologic: Oriented x 3, strength symmetric 5/5 bilateral upper and lower extremities, cranial nerves II through XII intact, speech clear (as per the family aids at baseline)              Skin: No rashes            Result Review       Result Review:  I have personally reviewed the results from the time of this admission to 3/6/2023 13:18 EST and agree with these findings:  [x]?  Laboratory  [x]?  Microbiology  [x]?  Radiology  [x]?  EKG/Telemetry   [x]?  Cardiology/Vascular   []?  Pathology  []?  Old records  []?  Other:                   Imaging Results (Last 24 Hours)      Procedure Component Value Units Date/Time     XR Chest 1 View [[id number]] Collected: 03/06/23 0910       Updated: 03/06/23 0913     Narrative:       PROCEDURE:        XR CHEST 1 VW     COMPARISON:        [facility], , XR CHEST 1 VW, 6/09/2022, 2:16.     INDICATIONS:        Shortness of breath     FINDINGS:          The heart size is normal.  The pulmonary vascular markings are normal.  There is increasing    infiltrate present in the right lung with pleural thickening or pleural fluid on the right.  A   transdermal pacing electrodes projected over the left lung base.        Impression:                 1. Interval development of increasing infiltrate in the right lung base suggestive of pneumonia.    An atypical pattern of pulmonary edema is in the differential.    2.  Right pleural fluid versus right pleural thickening.                    Ben Samuel MD         Electronically Signed and Approved By: Ben Samuel MD on 3/06/2023 at 9:10                              aspirin, 81 mg, Oral, Daily                 Assessment & Plan     Assessment / Plan      Assessment/Plan:   Sepsis due to right lower lobe pneumonia  Acute hypoxic respiratory failure  Questionable cardiac arrest episode  NSTEMI/acute myocardial injury  Possible syncope  Lactic acidosis  Malnutrition     Plan  Admit inpatient, telemetry  Cardiology consulted  Started on aspirin, therapeutic Lovenox for possible ACS.  Elevated troponin could be secondary to acute myocardial injury from the chest compressions.    Unsure if the patient had a cardiac arrest or patient's family might not have clearly checked the pulse before starting the chest compressions.  There is a possibility patient might had a syncope likely due to sepsis, hypotension.  Patient is currently hemodynamically stable, so admitted the patient to the telemetry.  ED physician has discussed the case with intensivist and suggested to admit to telemetry.  Follow-up on the CT chest with contrast to evaluate for pneumonia as well as to rule out PE  Follow-up on the cardiac echo  Continue ceftriaxone and azithromycin  Follow-up on the blood cultures, urine Legionella, urine strep, MRSA swab, influenza, COVID-19 testing, sputum cultures  Albuterol nebulizer every 4 hours as needed  Bronchodilator protocol  Check lactic acid every 4 hours  Nutritionist consult  PT OT consult     DVT prophylaxis:  No  DVT prophylaxis order currently exists.     CODE STATUS:    Level Of Support Discussed With: Patient  Code Status (Patient has no pulse and is not breathing): CPR (Attempt to Resuscitate)  Medical Interventions (Patient has pulse or is breathing): Full Support        Admission Status:  I believe this patient meets inpatient status.     Part of this note may be an electronic transcription/translation of spoken language to printed text using the Dragon Dictation System     Chandu Villanueva MD                             Revision History

## 2023-03-06 NOTE — H&P
[facility]  Date: 3/6/2023   Patient Name: Buzz Lopez  : 1945  MRN: [id number]  Primary Care Physician:  Provider, No Known  Date of admission: 3/6/2023    Subjective   Subjective     Chief Complaint: Shortness of breath, confusion, possible syncope    HPI:    Buzz Lopez is a 77 y.o. male with no significant past medical history has been brought into the ED with concerns for confusion, possible syncope, shortness of breath.  Patient states that for the past 1 to 2 weeks patient has been having difficulty breathing and subjective fevers, it has got worse and today and he has called his son to take him to the ED as he is having difficulty breathing.  By the time patient's son has arrived at the home, patient appeared to be confused, generalized weakness and unable to put his clothes on.  While his son was helping him to get the clothes on, patient appeared to be very confused and had a possible syncope episode where he was unresponsive.  Patient's son has looked for the pulse and they could not feel radial pulse and started chest compressions, EMS was called.  By the time EMS has arrived patient was receiving chest compressions from the family members.  EMS has evaluated the patient and they could not feel a pulse.  Patient was in respiratory distress, saturating around 70% on room air.  Received nebulizer treatment by the EMS and the patient has been brought into the ED.    During my examination, patient is alert and oriented x3 and able to answer questions appropriately.  States that he does not remember how he came to the hospital.  Denies any chest pain, nausea, vomiting, focal weakness, numbness, tingling.  States that he has been having difficulty breathing for the past 1 to 2 weeks.  A month ago he had some sore throat.  Associated with cough, productive sputum. Patient is thin and frail.  Chronically has a poor p.o. intake.  Admits that he has low  appetite.  No previously diagnosed history of dementia.  As per the family members, patient takes a lot of over-the-counter fiber supplement review does not gain weight despite he eats well.    In the ED, vital signs temperature 98.5, pulse 118, respiratory 20, blood pressure 134/67 on 2 L of nasal cannula saturating around 92%.  Labs showed ABG 7.3 5/46/71 on 4 L of nasal cannula, initial troponin was elevated at 50, repeat troponin after 2 hours was 75 with a delta of 25, sodium 133, potassium 4.6, anion gap 16, BUN 26, creatinine 0.88, lactic acid was elevated at 3.7, INR 1.23, WBC elevated 12.9, rest of the CBC is normal.  EKG showed sinus tachycardia with no significant ST-T wave changes.  Blood cultures were sent.  Chest x-ray showed right lower lobe pneumonia, typical pattern of pulmonary edema is in the differential.  Right pleural fluid versus right pleural thickening was noted.  Received IV fluids and ceftriaxone azithromycin in the ED    Patient has been admitted for further evaluation and management of sepsis, acute hypoxic respiratory failure, right lower lobe pneumonia, possible syncope    Personal History     History reviewed. No pertinent past medical history.      Past Surgical History:   Procedure Laterality Date   • EYE SURGERY           History reviewed. No pertinent family history.      Social History     Socioeconomic History   • Marital status:    Tobacco Use   • Smoking status: Former   Substance and Sexual Activity   • Alcohol use: Never   • Drug use: Never         Home Medications:  multivitamin with minerals    Allergies:  No Known Allergies    Review of Systems   All other systems reviewed and negative except as mentioned above in the HPI    Objective   Objective     Vitals:   Temp:  [98.5 °F (36.9 °C)] 98.5 °F (36.9 °C)  Heart Rate:  [] 100  Resp:  [22] 22  BP: (108-134)/() 120/104  Flow (L/min):  [5] 5    Physical Exam    Constitutional: Awake, alert, thin, frail,  mild distress   Eyes: Pupils equal, sclerae anicteric, no conjunctival injection   HENT: NCAT, mucous membranes moist   Neck: Supple, no thyromegaly, no lymphadenopathy, trachea midline   Respiratory: Bilateral air entry present, mild rhonchi in the right lower lobe, nonlabored respirations    Cardiovascular: RRR, no murmurs, rubs, or gallops   Gastrointestinal: Positive bowel sounds, soft, nontender, nondistended   Musculoskeletal: No bilateral ankle edema, no clubbing or cyanosis to extremities   Psychiatric: Appropriate affect, cooperative   Neurologic: Oriented x 3, strength symmetric 5/5 bilateral upper and lower extremities, cranial nerves II through XII intact, speech clear (as per the family aids at baseline)   Skin: No rashes     Result Review    Result Review:  I have personally reviewed the results from the time of this admission to 3/6/2023 13:18 EST and agree with these findings:  [x]  Laboratory  [x]  Microbiology  [x]  Radiology  [x]  EKG/Telemetry   [x]  Cardiology/Vascular   []  Pathology  []  Old records  []  Other:        Imaging Results (Last 24 Hours)     Procedure Component Value Units Date/Time    XR Chest 1 View [[id number]] Collected: 03/06/23 0910     Updated: 03/06/23 0913    Narrative:      PROCEDURE: XR CHEST 1 VW     COMPARISON: [facility], , XR CHEST 1 VW, 6/09/2022, 2:16.     INDICATIONS: Shortness of breath     FINDINGS:   The heart size is normal.  The pulmonary vascular markings are normal.  There is increasing   infiltrate present in the right lung with pleural thickening or pleural fluid on the right.  A   transdermal pacing electrodes projected over the left lung base.       Impression:         1. Interval development of increasing infiltrate in the right lung base suggestive of pneumonia.    An atypical pattern of pulmonary edema is in the differential.    2.  Right pleural fluid versus right pleural thickening.                    Ben Samuel MD          Electronically Signed and Approved By: Ben Samuel MD on 3/06/2023 at 9:10                            aspirin, 81 mg, Oral, Daily         Assessment & Plan   Assessment / Plan     Assessment/Plan:   Sepsis due to right lower lobe pneumonia  Acute hypoxic respiratory failure  Questionable cardiac arrest episode  NSTEMI/acute myocardial injury  Possible syncope  Lactic acidosis  Malnutrition    Plan  Admit inpatient, telemetry  Cardiology consulted  Started on aspirin, therapeutic Lovenox for possible ACS.  Elevated troponin could be secondary to acute myocardial injury from the chest compressions.    Unsure if the patient had a cardiac arrest or patient's family might not have clearly checked the pulse before starting the chest compressions.  There is a possibility patient might had a syncope likely due to sepsis, hypotension.  Patient is currently hemodynamically stable, so admitted the patient to the telemetry.  ED physician has discussed the case with intensivist and suggested to admit to telemetry.  Follow-up on the CT chest with contrast to evaluate for pneumonia as well as to rule out PE  Follow-up on the cardiac echo  Continue ceftriaxone and azithromycin  Follow-up on the blood cultures, urine Legionella, urine strep, MRSA swab, influenza, COVID-19 testing, sputum cultures  Albuterol nebulizer every 4 hours as needed  Bronchodilator protocol  Check lactic acid every 4 hours  Nutritionist consult  PT OT consult    DVT prophylaxis:  No DVT prophylaxis order currently exists.    CODE STATUS:    Level Of Support Discussed With: Patient  Code Status (Patient has no pulse and is not breathing): CPR (Attempt to Resuscitate)  Medical Interventions (Patient has pulse or is breathing): Full Support      Admission Status:  I believe this patient meets inpatient status.    Part of this note may be an electronic transcription/translation of spoken language to printed text using the Dragon Dictation  System    Chandu Villanueva MD

## 2023-03-06 NOTE — ED PROVIDER NOTES
Time: 8:23 AM EST  Date of encounter:  3/6/2023  Independent Historian/Clinical History and Information was obtained by:   EMS  Chief Complaint: SOB    History is limited by: Acuity of condition    History of Present Illness:  Patient is a 77 y.o. year old male who presents to the emergency department from private residence  for evaluation of shortness of breath PTA. EMS states original call from family stated patient was in respiratory distress, but upgraded to cardiac arrest as they were en route to patient's home. EMS states family wa giving patient CPR when they arrived. EMS states patient was responsive and at no point did EMS witness a pulseless state. Upon arrival, patient in respiratory distress, hypoxic, and SPO2 was in the 70's%. EMS states they gave breathing treatment to patient  and established IV.    Talking with the patient's son he states that he got a call from his father that he was having hard time breathing.  He states that he was unresponsive and he could not feel pulse.  He got another family member to check and they also could not feel pulse and they began bystander CPR notified 9 1 one of the change in his condition.  The son does state that as you are doing CPR it became evident of a palpable pulse.  The patient now states that he has been short of breath and has had a cough.  She is mostly nonproductive but occasional have some clear or white mucus.  He denies any fevers.    History provided by:  EMS personnel  History limited by:  Acuity of condition   used: No        Patient Care Team  Primary Care Provider: Provider, No Known    Past Medical History:     No Known Allergies  History reviewed. No pertinent past medical history.  Past Surgical History:   Procedure Laterality Date   • EYE SURGERY       History reviewed. No pertinent family history.    Home Medications:  Prior to Admission medications    Medication Sig Start Date End Date Taking? Authorizing Provider  "  amLODIPine (NORVASC) 5 MG tablet Take 1 tablet by mouth Daily. 6/9/22   Emerita Muñiz MD   multivitamin with minerals (CENTRUM ADULTS PO) Take 1 tablet by mouth Daily.    Provider, MD Rene        Social History:   Social History     Tobacco Use   • Smoking status: Former   Substance Use Topics   • Alcohol use: Never   • Drug use: Never         Review of Systems:  Review of Systems   Constitutional: Negative for chills and fever.   HENT: Negative for congestion, ear pain and sore throat.    Eyes: Negative for pain.   Respiratory: Positive for cough and shortness of breath. Negative for chest tightness.    Cardiovascular: Negative for chest pain.   Gastrointestinal: Negative for abdominal pain, diarrhea, nausea and vomiting.   Genitourinary: Negative for flank pain and hematuria.   Musculoskeletal: Negative for joint swelling.   Skin: Negative for pallor.   Neurological: Negative for seizures and headaches.   All other systems reviewed and are negative.       Physical Exam:  /67 (BP Location: Right arm, Patient Position: Lying)   Pulse 107   Temp 98.5 °F (36.9 °C) (Oral)   Resp 22   Ht 177.8 cm (70\")   Wt 61.2 kg (135 lb)   SpO2 96%   BMI 19.37 kg/m²     Physical Exam  Vitals and nursing note reviewed.   Constitutional:       General: He is awake. He is in acute distress.      Appearance: Normal appearance.   HENT:      Head: Normocephalic and atraumatic.      Nose: Nose normal.      Mouth/Throat:      Mouth: Mucous membranes are moist.   Eyes:      Extraocular Movements: Extraocular movements intact.      Pupils: Pupils are equal, round, and reactive to light.   Cardiovascular:      Rate and Rhythm: Regular rhythm. Tachycardia present.      Heart sounds: Murmur (yong pitched left lower sternal border) heard.   Pulmonary:      Effort: Tachypnea and respiratory distress present.      Breath sounds: Rhonchi (acattered) present. No wheezing or rales.   Abdominal:      General: Bowel sounds are " normal.      Palpations: Abdomen is soft. There is no pulsatile mass.      Tenderness: There is no abdominal tenderness. There is no guarding or rebound.      Comments: No rigidity   Musculoskeletal:         General: No tenderness. Normal range of motion.      Cervical back: Normal range of motion and neck supple.      Right lower leg: No edema.      Left lower leg: No edema.   Skin:     General: Skin is warm and dry.      Coloration: Skin is not jaundiced.      Findings: Rash present.   Neurological:      General: No focal deficit present.      Mental Status: He is alert and oriented to person, place, and time.      Sensory: Sensation is intact.      Motor: Motor function is intact.      Coordination: Coordination is intact.      Comments: Moves all 4 extremities    Psychiatric:         Attention and Perception: Attention and perception normal.         Mood and Affect: Mood and affect normal.         Speech: Speech normal.         Behavior: Behavior normal.         Judgment: Judgment normal.                  Procedures:  Procedures      Medical Decision Making:      Comorbidities that affect care:    Hypertension    External Notes reviewed:    EMS Run sheet: EMS reports that patient had a pulse upon their arrival and during their transport.  Patient was found to be hypoxic was given bags assisted ventilations and breathing treatment in route.      The following orders were placed and all results were independently analyzed by me:  Orders Placed This Encounter   Procedures   • Blood Culture - Blood,   • Blood Culture - Blood,   • XR Chest 1 View   • ABG with Co-Ox and Electrolytes   • Comprehensive Metabolic Panel   • High Sensitivity Troponin T   • Protime-INR   • aPTT   • Lactic Acid, Plasma   • CBC Auto Differential   • Scan Slide   • High Sensitivity Troponin T 2Hr   • STAT Lactic Acid, Reflex   • Cardiac Monitoring   • Pulse Oximetry, Continuous   • IP General Consult (Use specialty-specific consult if known)    • Hospitalist (on-call MD unless specified)   • ECG 12 Lead Dyspnea   • Insert Peripheral IV   • CBC & Differential       Medications Given in the Emergency Department:  Medications   sodium chloride 0.9 % flush 10 mL (has no administration in time range)   lactated ringers bolus 1,000 mL (has no administration in time range)   cefTRIAXone (ROCEPHIN) IVPB 1 g (has no administration in time range)   AZITHROMYCIN 500 MG/250 ML 0.9% NS IVPB (vial-mate) (has no administration in time range)        ED Course:    ED Course as of 03/06/23 1042   Mon Mar 06, 2023   1041 EKG performed at 827 was inter by me to show a sinus tachycardia with a ventricular rate of 116 bpm.  The intervals 139 ms.  P waves are normal.  Patient has occasional premature atrial contraction.  QRS interval was 111 ms.  Axis is leftward -52 degrees.  There are some nonspecific ST-T wave change identified.  QT corrected is 463 ms. [TB]      ED Course User Index  [TB] Julius Galvez DO   The patient was seen upon arrival.  History and physical examination was performed as document.  Diagnostic data was obtained.  Results reviewed.  Patient has been hemodynamically stable during the ED course.  Patient is requiring supplemental O2 but only via nasal cannula.  I have also weaned his O2 to 3-1/2 L at time of dictation.  Patient does appear to have a pneumonia.  Patient be treated for pneumonia and sepsis.  I did discuss case with the intensivist and based on the patient's current condition does not feel the patient warrants ICU admission at this time.  Patient be admitted to a monitored floor.  Hospital service has been consulted.    Sepsis criteria was met in the emergency department and the Sepsis protocol (including antibiotic administration) was initiated.      SIRS criteria considered:   1.  Temperature > 100.4 or <98.6    2.  Heart Rate > 90    3.  Respiratory Rate > 22    4.  WBC > 12K or <4K.             Severe Sepsis:     Respiratory: Mechanical  Ventilation or Bipap  Hypotension: SBP > 90 or MAP < 65  Renal: Creatinine > 2  Metabolic: Lactic Acid > 2  Hematologic: Platelets < 100K or INR > 1.5  Hepatic: BILI  >  2  CNS: Sudden AMS     Septic Shock:     Severe Sepsis + Persistent hypotension or Lactic Acid > 4     Normal saline bolus, Antibiotics, and final disposition was based on these definitions.        Sepsis was recognized at 1010    Antibiotics were ordered.     30 cc/kg bolus was indicated.     Total Critical Care time of 35 minutes. Total critical care time documented does not include time spent on separately billed procedures for services of nurses or physician assistants. I personally saw and examined the patient. I have reviewed all diagnostic interpretations and treatment plans as written. I was present for the key portions of any procedures performed and the inclusive time noted in any critical care statement. Critical care time includes patient management by me, time spent at the patients bedside,  time to review lab and imaging results, discussing patient care, documentation in the medical record, and time spent with family or caregiver.      Labs:    Lab Results (last 24 hours)     Procedure Component Value Units Date/Time    ABG with Co-Ox and Electrolytes [[id number]]  (Abnormal) Collected: 03/06/23 0825    Specimen: Arterial Blood Updated: 03/06/23 0833     pH, Arterial 7.353 pH units      pCO2, Arterial 46.8 mm Hg      pO2, Arterial 71.3 mm Hg      HCO3, Arterial 25.4 mmol/L      Base Excess, Arterial -0.5 mmol/L      O2 Saturation, Arterial 93.2 %      Hemoglobin, Blood Gas 13.5 g/dL      Carboxyhemoglobin 0.5 %      Methemoglobin 0.20 %      Oxyhemoglobin 92.5 %      FHHB 6.8 %      Laureano's Test Positive     Note --     Site Arterial: left radial     Modality Cannula     FIO2 36 %      Flow Rate 4.0 lpm      Sodium, Arterial 134.4 mmol/L      Potassium, Arterial 4.54 mmol/L      Ionized Calcium, Arterial 1.15 mmol/L      Chloride,  Arterial 97 mmol/L      Glucose, Arterial 293 mg/dL      Lactate, Arterial 3.65 mmol/L      PO2/FIO2 198    CBC & Differential [[id number]]  (Abnormal) Collected: 03/06/23 0916    Specimen: Blood Updated: 03/06/23 1004    Narrative:      The following orders were created for panel order CBC & Differential.  Procedure                               Abnormality         Status                     ---------                               -----------         ------                     CBC Auto Differential[[id number]]        Abnormal            Final result               Scan Slide[[id number]]                                       Final result                 Please view results for these tests on the individual orders.    Comprehensive Metabolic Panel [[id number]]  (Abnormal) Collected: 03/06/23 0916    Specimen: Blood Updated: 03/06/23 0945     Glucose 282 mg/dL      BUN 26 mg/dL      Creatinine 0.88 mg/dL      Sodium 133 mmol/L      Potassium 4.6 mmol/L      Chloride 93 mmol/L      CO2 24.0 mmol/L      Calcium 9.4 mg/dL      Total Protein 8.2 g/dL      Albumin 3.8 g/dL      ALT (SGPT) 13 U/L      AST (SGOT) 26 U/L      Alkaline Phosphatase 91 U/L      Total Bilirubin 0.7 mg/dL      Globulin 4.4 gm/dL      A/G Ratio 0.9 g/dL      BUN/Creatinine Ratio 29.5     Anion Gap 16.0 mmol/L      eGFR 87.5 mL/min/1.73     Narrative:      GFR Normal >60  Chronic Kidney Disease <60  Kidney Failure <15    The GFR formula is only valid for adults with stable renal function between ages 18 and 70.    High Sensitivity Troponin T [[id number]]  (Abnormal) Collected: 03/06/23 0916    Specimen: Blood Updated: 03/06/23 0945     HS Troponin T 50 ng/L     Narrative:      High Sensitive Troponin T Reference Range:  <10.0 ng/L- Negative Female for AMI  <15.0 ng/L- Negative Male for AMI  >=10 - Abnormal Female indicating possible myocardial injury.  >=15 - Abnormal Male indicating possible myocardial injury.   Clinicians would have to utilize clinical  acumen, EKG, Troponin, and serial changes to determine if it is an Acute Myocardial Infarction or myocardial injury due to an underlying chronic condition.         Protime-INR [[id number]]  (Abnormal) Collected: 03/06/23 0916    Specimen: Blood Updated: 03/06/23 0943     Protime 15.6 Seconds      INR 1.23    Narrative:      Suggested Therapeutic Ranges For Oral Anticoagulant Therapy:  Level of Therapy                      INR Target Range  Standard Dose                            2.0-3.0  High Dose                                2.5-3.5  Patients not receiving anticoagulant  Therapy Normal Range                     0.86-1.15    aPTT [[id number]]  (Abnormal) Collected: 03/06/23 0916    Specimen: Blood Updated: 03/06/23 0943     PTT 27.3 seconds     Blood Culture - Blood, Arm, Left [[id number]] Collected: 03/06/23 0916    Specimen: Blood from Arm, Left Updated: 03/06/23 0922    Blood Culture - Blood, Arm, Left [[id number]] Collected: 03/06/23 0916    Specimen: Blood from Arm, Left Updated: 03/06/23 0922    Lactic Acid, Plasma [[id number]]  (Abnormal) Collected: 03/06/23 0916    Specimen: Blood Updated: 03/06/23 0956     Lactate 3.7 mmol/L     CBC Auto Differential [[id number]]  (Abnormal) Collected: 03/06/23 0916    Specimen: Blood Updated: 03/06/23 1004     WBC 12.90 10*3/mm3      RBC 4.46 10*6/mm3      Hemoglobin 13.4 g/dL      Hematocrit 39.9 %      MCV 89.5 fL      MCH 30.0 pg      MCHC 33.6 g/dL      RDW 13.2 %      RDW-SD 43.0 fl      MPV 11.3 fL      Platelets 169 10*3/mm3      Neutrophil % 85.0 %      Lymphocyte % 1.7 %      Monocyte % 6.1 %      Eosinophil % 6.3 %      Basophil % 0.6 %      Immature Grans % 0.3 %      Neutrophils, Absolute 10.96 10*3/mm3      Lymphocytes, Absolute 0.22 10*3/mm3      Monocytes, Absolute 0.79 10*3/mm3      Eosinophils, Absolute 0.81 10*3/mm3      Basophils, Absolute 0.08 10*3/mm3      Immature Grans, Absolute 0.04 10*3/mm3      nRBC 0.0 /100 WBC     Scan Slide [[id number]] Collected:  03/06/23 0916    Specimen: Blood Updated: 03/06/23 1004     Crenated RBC's Slight/1+     WBC Morphology Normal     Platelet Estimate Adequate           Imaging:    XR Chest 1 View    Result Date: 3/6/2023  PROCEDURE: XR CHEST 1 VW  COMPARISON: [facility], CR, XR CHEST 1 VW, 6/09/2022, 2:16.  INDICATIONS: Shortness of breath  FINDINGS:  The heart size is normal.  The pulmonary vascular markings are normal.  There is increasing infiltrate present in the right lung with pleural thickening or pleural fluid on the right.  A transdermal pacing electrodes projected over the left lung base.        1. Interval development of increasing infiltrate in the right lung base suggestive of pneumonia.  An atypical pattern of pulmonary edema is in the differential.  2.  Right pleural fluid versus right pleural thickening.        Ben Samuel MD       Electronically Signed and Approved By: Ben Samuel MD on 3/06/2023 at 9:10                 Differential Diagnosis and Discussion:    Dyspnea: Differential diagnosis includes but is not limited to metabolic acidosis, neurological disorders, psychogenic, asthma, pneumothorax, upper airway obstruction, COPD, pneumonia, noncardiogenic pulmonary edema, interstitial lung disease, anemia, congestive heart failure, and pulmonary embolism    All labs were reviewed and interpreted by me.  All X-rays were independently reviewed by me.  EKG was interpreted by me.    MDM         Patient Care Considerations:          Consultants/Shared Management Plan:    Hospitalist: I have discussed the case with Hospitalist who agrees to accept the patient for admission.  Consultant: I have discussed the case with Dr. Rodríguez who states Patient can go to a monitored floor and not the ICU with a pulmonary consult.    Social Determinants of Health:    Patient has presented with family members who are responsible, reliable and will ensure follow up care.      Disposition and Care  Coordination:    Admit:   Through independent evaluation of the patient's history, physical, and imperical data, the patient meets criteria for observation/admission to the hospital.        Final diagnoses:   Pneumonia of right lower lobe due to infectious organism   Sepsis with acute hypoxic respiratory failure without septic shock, due to unspecified organism (HCC)   Acute respiratory failure with hypoxia (HCC)        ED Disposition     ED Disposition   Decision to Admit    Condition   --    Comment   --             This medical record created using voice recognition software.        Documentation assistance provided by Mireille Mcghee acting as scribe for Julius Galvez DO. Information recorded by the scribe was done at my direction and has been verified and validated by me.          Mireille Mcghee  03/06/23 0829       Mireille Mcghee  03/06/23 0839       Julius Galvez DO  03/06/23 1041       uJlius Galvez DO  03/06/23 1042

## 2023-03-06 NOTE — PLAN OF CARE
Goal Outcome Evaluation:      Family states pt fell and hit his head on Friday. Pt is currently having hallucinations. Md aware and head ct ordered. Pending nasal swabs for mrsa, covid and flu

## 2023-03-06 NOTE — Clinical Note
The physician has confirmed that the patient has been reassessed and is appropriate for moderate sedation.

## 2023-03-06 NOTE — CONSULTS
Pulmonary / Critical Care Consult Note      Patient Name: Buzz Lopez  : 1945  MRN: [id number]  Primary Care Physician:  Provider, No Known  Referring Physician: No ref. provider found  Date of admission: 3/6/2023    Subjective   Subjective     Reason for Consult/ Chief Complaint: Right lower lobe pneumonia.    HPI:  Buzz Lopez is a 77 y.o. male with past medical history for remote tobacco use brought to the ED by EMS after being called for confusion, possible syncope, possible cardiac arrest.  In the ED, he was found to be hypoxic and placed on 5 L nasal cannula.  ABG 7.35/46.8/71.3/25.4.  Lactic acid was elevated at 3.7, WBC 12.9.  proBNP 503.  CXR with right lower lobe infiltrates.  Because the above our services was consulted for further evaluation and treatment.  Upon exam, patient is lying in bed on 2 L nasal cannula in no apparent distress.  Is awake and alert answering all questions appropriately.  He reports he has been feeling extremely fatigued with dry hacking cough over the last several weeks.  He denies any fever, chills, hemoptysis, chest pain, nausea, vomiting, or diarrhea.  He has no prior medical history.  He smoked for 2-1/2 years back in the 1960s.  Does report having some stomach issues and takes fiber and Pepto-Bismol daily.  He denies being around sick contacts.  He denies any issues with swallowing or strangling on food or drinks.    Review of Systems  General:  + Fatigue, No Fever.   HEENT: No dysphagia, No Visual Changes, no rhinorrhea  Respiratory:  + cough, + Dyspnea, no phlegm, No Pleuritic Pain, no wheezing, no hemoptysis  Cardiovascular: Denies chest pain, denies palpitations, + GARCIA, No Chest Pressure  Gastrointestinal:  No Abdominal Pain, No Nausea, No Vomiting, No Diarrhea  Genitourinary:  No Dysuria, No Frequency, No Hesitancy  Musculoskeletal: No muscle pain or swelling  Endocrine:  No Heat Intolerance, No Cold Intolerance  Hematologic:  No Bleeding, No  Bruising  Psychiatric:  No Anxiety, No Depression  Neurologic:  No Confusion, no Dysarthria, No Headaches  Skin:  No Rash, No Open Wounds    Personal History     History reviewed. No pertinent past medical history.    Past Surgical History:   Procedure Laterality Date   • EYE SURGERY         Family History: No family history of lung disease or cancer.    Social History:  reports that he has quit smoking. He has never been exposed to tobacco smoke. He does not have any smokeless tobacco history on file. He reports that he does not drink alcohol and does not use drugs.  Smoked for 2-1/2 years in the 1960s.    Home Medications:  multivitamin with minerals    Allergies:  No Known Allergies    Objective    Objective     Vitals:   Temp:  [97.6 °F (36.4 °C)-98.5 °F (36.9 °C)] 97.6 °F (36.4 °C)  Heart Rate:  [] 102  Resp:  [16-22] 16  BP: (108-177)/() 177/71  Flow (L/min):  [2-5] 2    Physical Exam:  Vital Signs Reviewed   General: Thin elderly male, Alert, NAD on 2 L NC  HEENT:  PERRL, EOMI.  OP, nares clear, no sinus tenderness  Neck:  Supple, no JVD, no thyromegaly  Lymph: no axillary, cervical, supraclavicular lymphadenopathy noted bilaterally  Chest:  good aeration, crackles to RLL, tympanic to percussion bilaterally, no work of breathing noted  CV: RRR, no MGR, pulses 2+, equal  Abd:  Soft, NT, ND, + BS, no HSM  EXT:  no clubbing, no cyanosis, no edema, no joint tenderness, muscle wasting all 4 extremities  Neuro:  A&Ox3, CN grossly intact, no focal deficits  Skin: No rashes or lesions noted    Result Review    Result Review:  I have personally reviewed the results from the time of this admission to 3/6/2023 16:00 EST and agree with these findings:  [x]  Laboratory  [x]  Microbiology  [x]  Radiology  []  EKG/Telemetry   []  Cardiology/Vascular   []  Pathology  [x]  Old records  []  Other:  Most notable findings include:     -proBNP 503  -Lactic 3.7  -ABG 7.35/46.8/71.3/25.4 on 4 L nasal cannula  -CXR with  right lower lobe infiltrate        Lab 03/06/23  0916 03/06/23  0825   WBC 12.90*  --    HEMOGLOBIN 13.4  --    HEMATOCRIT 39.9  --    PLATELETS 169  --    SODIUM 133*  --    SODIUM, ARTERIAL  --  134.4*   POTASSIUM 4.6  --    CHLORIDE 93*  --    CO2 24.0  --    BUN 26*  --    CREATININE 0.88  --    GLUCOSE 282*  --    GLUCOSE, ARTERIAL  --  293*   CALCIUM 9.4  --    TOTAL PROTEIN 8.2  --    ALBUMIN 3.8  --    GLOBULIN 4.4  --      Assessment & Plan   Assessment / Plan     Active Hospital Problems:  Active Hospital Problems    Diagnosis    • **Pneumonia of right lower lobe due to infectious organism      Impression:  Acute hypoxic respiratory failure  Community-acquired pneumonia of unspecified organism  Lactic acidosis  Possible syncope  Non-STEMI  Remote tobacco use    Plan:  -Continue wean O2 to keep sats greater than 90%.  -We will check Pro-Joe.  Pending flu and COVID swab, MRSA PCR, blood cultures, sputum cultures, urine for strep and Legionella.  -Continue azithromycin and ceftriaxone.  Can de-escalate based on cultures.  -Continue albuterol nebs as needed.  -We will start on bronchopulmonary hygiene.  We will add I-S and flutter valve.  -We will obtain noncontrast CT chest.  -Pending echocardiogram.  -Encourage mobilization.  Out of bed in chair.  -We will consult speech therapy to rule out aspiration.    Labs, microbiology, radiology, medications, and provider notes personally reviewed.  Discussed with primary services and bedside RN.    Thank you for this consult and allowing me to participate in the care of Mr. Lopez.    Electronically signed by GENE Parada, 03/06/23, 5:13 PM EST.    This visit was performed by BOTH a physician and an APC. I personally evaluated and examined the patient. I performed all aspects of MDM as documented. , I have reviewed and confirmed the accuracy of the patient's history as documented in this note. and I have reexamined the patient and the results are consistent  with the previously documented exam. I have updated the documentation as necessary.     Electronically signed by Jason Rowley MD, 03/06/23, 6:10 PM EST.   Electronically signed by Jason Rowley MD, 3/6/2023, 16:00 EST.

## 2023-03-06 NOTE — CASE MANAGEMENT/SOCIAL WORK
Discharge Planning Assessment   [place]     Patient Name: Buzz Lopez  MRN: [id number]  Today's Date: 3/6/2023    Admit Date: 3/6/2023    Plan: SW spoke with Pt's son at bedside, Pt down for testing. Son would like Healthcare surrogate completed. Pt not in room to complete. SW will follow up with Pt in the morning. SW will also follow up with Pt to confirm discharge needs at home. Per son Pt has not been vaccinated for Covid.  Pt does not have PCP at ths time, Pt's son states he would use [facility] for prescription needs, SW will confirm with Pt.SW will continue to follow for needs.   Discharge Needs Assessment     Row Name 03/06/23 1549       Living Environment    People in Home child(efraín), adult    Name(s) of People in Home Pt's son at bedside. Pt down for tests. Per son Pt lives in his home.    Current Living Arrangements home    Potentially Unsafe Housing Conditions none    Primary Care Provided by self    Provides Primary Care For no one    Family Caregiver if Needed child(efraín), adult    Quality of Family Relationships involved       Resource/Environmental Concerns    Resource/Environmental Concerns none       Food Insecurity    Within the past 12 months, you worried that your food would run out before you got the money to buy more. Never true    Within the past 12 months, the food you bought just didn't last and you didn't have money to get more. Never true       Transition Planning    Patient/Family Anticipates Transition to home with family    Patient/Family Anticipated Services at Transition rehabilitation services       Discharge Needs Assessment    Equipment Currently Used at Home none    Discharge Coordination/Progress SW spoke with Pt's son at bedside, Pt down for testing. Son would like Healthcare surrogate completed. Pt not in room to complete. SW will follow up with Pt in the morning. SW will also follow up with Pt to confirm discharge needs at home. Per son Pt has not been vaccinated  for Covid.  Pt does not have PCP at ths time, Pt's son states he would use [facility] for prescription needs, SW will confirm with Pt.SW will continue to follow for needs.               Discharge Plan     Row Name 03/06/23 6644       Plan    Plan SW spoke with Pt's son at bedside, Pt down for testing. Son would like Healthcare surrogate completed. Pt not in room to complete. SW will follow up with Pt in the morning. SW will also follow up with Pt to confirm discharge needs at home. Per son Pt has not been vaccinated for Covid.  Pt does not have PCP at ths time, Pt's son states he would use [facility] for prescription needs, SW will confirm with Pt.SW will continue to follow for needs.              Continued Care and Services - Admitted Since 3/6/2023    Coordination has not been started for this encounter.          Demographic Summary     Row Name 03/06/23 1546       General Information    Admission Type inpatient    Arrived From emergency department    Referral Source admission list    Reason for Consult discharge planning    Preferred Language English               Functional Status     Row Name 03/06/23 1547       Functional Status    Usual Activity Tolerance good    Current Activity Tolerance good       Physical Activity    On average, how many days per week do you engage in moderate to strenuous exercise (like a brisk walk)? 7 days    On average, how many minutes do you engage in exercise at this level? 30 min    Number of minutes of exercise per week 210       Assessment of Health Literacy    How often do you have someone help you read hospital materials? Never    How often do you have problems learning about your medical condition because of difficulty understanding written information? Never    How often do you have a problem understanding what is told to you about your medical condition? Never    How confident are you filling out medical forms by yourself? Quite a bit    Health  Literacy Good       Functional Status, IADL    Medications independent    Meal Preparation independent    Housekeeping independent    Laundry independent    Shopping independent       Mental Status Summary    Recent Changes in Mental Status/Cognitive Functioning no changes       Employment/    Employment Status retired               Psychosocial    No documentation.                Abuse/Neglect    No documentation.                Legal     Row Name 03/06/23 1548       Financial Resource Strain    How hard is it for you to pay for the very basics like food, housing, medical care, and heating? Not hard       Financial/Legal    Source of Income pension/FCI    Application for Public Assistance not applied       Legal    Criminal Activity/Legal Involvement none               Substance Abuse    No documentation.                Patient Forms    No documentation.                   Jennifer Cooper

## 2023-03-06 NOTE — CONSULTS
Date of consultation: 3/6/2023    Reason for consultation: Possible syncope    HPI: A 77-year-old male has had worsening dyspnea, sore throat, frequent cough and fatigue for for approximately 1 to 2 weeks.  The patient called his son to bring him to the ER.  However, the patient went into confusion, generalized weakness and and probable syncope.  His son stated that he felt his pulse but could not find it.  Then he called his daughter to help him to perform CPR.  Eventually he had a stronger pulse by the time EMT arrived.  They evaluated the patient and he was hypoxemic and according to his son, they put a tube in his throat.      In the ER, ECG showed sinus tachycardia with no ischemic changes.  Chest x-ray showed right lower lobe pneumonia.  He was started on IV fluids and IV antibiotics.  At this time, the patient is awake and denied having any chest pain or chest pressure.    Review of systems: Negative for fever, chills, nausea, vomiting, abdominal pain, TIA or CVA-like symptoms.    Past medical and surgical history:    1.  Eye surgery  2.  No DM or hypertension  3.  No history of rheumatic or scarlet fever    Medications: See the patient's medication list    Allergies: NKDA    Social history: A former smoker.  Quit 30 years ago.  No alcohol or illicit drug use.  Used to work in the post office.    Family history:    Physical examination: He was in no pain or respiratory distress.  Vital signs: Reviewed  HEENT: No JVD or carotid bruit.  No thyromegaly  Neck: No JVD or carotid bruit.  No thyromegaly.  Chest: Diffuse rhonchi bilaterally.  Cardiac: RRR.  II/IV systolic murmur over the right and left sternal border.  No S3 gallop.  Distant S1-S2.  Abdomen: Soft and nontender.  No megalies or masses.  Extremities: No edema, cyanosis or clubbing.  Pulse intact.  Neuro: Alert, oriented x3, no facial droop and speech was clear    Records: Reviewed    Assessment and plan:     1.  Syncope, may be vasovagal and/or aortic  stenosis.  Echocardiography is pending.  2.  Acute hypoxemic respiratory failure due to #3  3.  Bilateral pneumonia.  Continue IV antibiotics.  4.  Reactive sinus tachycardia, secondary to the above.  5.  Considering his clinical presentation, lacking of ischemic changes on the EKG or angina pectoralis, elevated troponin could be due to demand ischemia.  6.  Continue IV antibiotics and pulmonary management  7.  Continue telemetry to see whether he has any cardiac arrhythmia  8.  Any further management will be based on his clinical course.

## 2023-03-06 NOTE — PAYOR COMM NOTE
Buzz Lopez (77 y.o. Male)     PATIENT INFORMATION  Name:  Buzz Lopez  MRN#:     [id number]  :  1945     INSURANCE MEMBER ID:     [id number]        ADMISSION INFORMATION  CLASS: Inpatient   DOS:  3/6/2023        ADMISSION DIAGNOSIS AND HOSPITAL PROBLEMS    Problems Addressed this Visit        Other    * (Principal) Pneumonia of right lower lobe due to infectious organism - Primary   Other Visit Diagnoses     Sepsis with acute hypoxic respiratory failure without septic shock, due to unspecified organism (HCC)        Acute respiratory failure with hypoxia (HCC)          Diagnoses       Codes Comments    Pneumonia of right lower lobe due to infectious organism    -  Primary ICD-10-CM: J18.9  ICD-9-CM: 486     Sepsis with acute hypoxic respiratory failure without septic shock, due to unspecified organism (HCC)     ICD-10-CM: A41.9, R65.20, J96.01  ICD-9-CM: 038.9, 995.91, 518.81     Acute respiratory failure with hypoxia (HCC)     ICD-10-CM: J96.01  ICD-9-CM: 518.81            ADMITTING PROVIDER INFORMATION    DR. Chandu Villanueva MD  (NPI: [id number]    RENDERING FACILITY  Name:  [facility]   NPI:  [id number]  TID:  [id number]  Address:      [address] [place] [address]        CASE MANAGEMENT CONTACT INFORMATION  Phone:      [phone]  Fax:           [phone]      Buzz Lopez (77 y.o. Male)     Date of Birth   1945    Social Security Number       Address   [address] [place] [address]    Home Phone   [phone]    MRN   [id number]       Hoahaoism   Spiritism    Marital Status                               Admission Date   3/6/23    Admission Type   Emergency    Admitting Provider       Attending Provider   Julius Galvez DO    Department, Room/Bed   [facility] EMERGENCY ROOM,        Discharge Date       Discharge Disposition       Discharge Destination                               Attending Provider: Julius Galvez DO   "  Allergies: No Known Allergies    Isolation: None   Infection: None   Code Status: Not on file    Ht: 177.8 cm (70\")   Wt: 61.2 kg (135 lb)    Admission Cmt: None   Principal Problem: Pneumonia of right lower lobe due to infectious organism [J18.9]                 Active Insurance as of 3/6/2023     Primary Coverage     Payor Plan Insurance Group Employer/Plan Group    [employer]UE [employer]OSS [id number]     Payor Plan Address Payor Plan Phone Number Payor Plan Fax Number Effective Dates    [address]   1/1/2006 - None Entered    [place] [id number]       Subscriber Name Subscriber Birth Date Member ID       COMFORT MCMILLAN 1945 [id number]                 Emergency Contacts      (Rel.) Home Phone Work Phone Mobile Phone    orlando gandara (Son) -- -- [phone]            [place]: NPI [id number] Tax ID [id number]      Pneumonia RRG - Inpatient Care by Gauri Mejia RN       Met (Selected): Reviewed on 3/6/2023 by Gauri Mejia RN       Created Using Review Status Review Entered   Indicia® Completed 3/6/2023 1115       Created By   Gauri Mejia, RN       Criteria Set Name - Subset   Pneumonia RRG - Inpatient Care       Selected?   Yes - Inpatient Care is selected for the Pneumonia RRG criteria set.      Criteria Review      Inpatient Care    Most Recent : Gauri Mejia Most Recent Date: 3/6/2023 11:15:26 EST    (X) Admission is indicated for  1 or more  of the following  [D] [E]:       (X) Hypoxemia       3/6/2023 11:04:43 EST by Gauri Mejia         Needs 4L to keep > 90%       (X) Moderate-risk-category or high-risk-category patient [F] (Pneumonia Severity Index class IV       or V, or CURB-65 score of 3 or greater)       3/6/2023 11:04:43 EST by Gauri Mejia         76 yo male, Glucose 282, Possible pleural effusion on CXR.       Definitions    Hypoxemia    (X) Hypoxemia, as indicated by  1 or more  of the following  (1):       (X) Patient without baseline need for supplemental oxygen " who now requires supplemental oxygen       to keep SaO2 greater than 90% or PO2 greater than 60 mm Hg (8.0 kPa) [A]       Notes:    3/6/2023 11:15:26 EST by Gauri Mejia    Subject: Admission    Admit to telemetry. Sputum C&S, check flu, legionella, strep pneumo, other orders pending.       3/6/2023 11:04:43 EST by Gauri Mejia    Subject: Admission    To ED C/o severe SOA. Prior to EMS arrival, family reports pt lost pulse & unresponsive. However, had pulse when EMS arrived. However, sats were in the 70s on RA. Improved to > 90% on 4L.  + Rhonchi. -118/      PMHx: HTN      Results: HS trop 50; Glucose 282, Na+ 133, BUN 26, Lactate 3.7; PT/INR 15.6/1.23; WBC 12.9; BCx2 pending. CXR: + PNA. Possible pleural effusion.      In ED: IV LR 1000ml bolus; Rocephin IV; Zithromax IV.                     Emergency Department Notes      Julius Galvez, DO at 03/06/23 0823          Time: 8:23 AM EST  Date of encounter:  3/6/2023  Independent Historian/Clinical History and Information was obtained by:   EMS  Chief Complaint: SOB    History is limited by: Acuity of condition    History of Present Illness:  Patient is a 77 y.o. year old male who presents to the emergency department from private residence  for evaluation of shortness of breath PTA. EMS states original call from family stated patient was in respiratory distress, but upgraded to cardiac arrest as they were en route to patient's home. EMS states family wa giving patient CPR when they arrived. EMS states patient was responsive and at no point did EMS witness a pulseless state. Upon arrival, patient in respiratory distress, hypoxic, and SPO2 was in the 70's%. EMS states they gave breathing treatment to patient  and established IV.    Talking with the patient's son he states that he got a call from his father that he was having hard time breathing.  He states that he was unresponsive and he could not feel pulse.  He got another family member to check and they also  could not feel pulse and they began bystander CPR notified 9 1 one of the change in his condition.  The son does state that as you are doing CPR it became evident of a palpable pulse.  The patient now states that he has been short of breath and has had a cough.  She is mostly nonproductive but occasional have some clear or white mucus.  He denies any fevers.    History provided by:  EMS personnel  History limited by:  Acuity of condition   used: No        Patient Care Team  Primary Care Provider: Provider, No Known    Past Medical History:     No Known Allergies  History reviewed. No pertinent past medical history.  Past Surgical History:   Procedure Laterality Date   • EYE SURGERY       History reviewed. No pertinent family history.    Home Medications:  Prior to Admission medications    Medication Sig Start Date End Date Taking? Authorizing Provider   amLODIPine (NORVASC) 5 MG tablet Take 1 tablet by mouth Daily. 6/9/22   Emerita Muñiz MD   multivitamin with minerals (CENTRUM ADULTS PO) Take 1 tablet by mouth Daily.    Provider, MD Rene        Social History:   Social History     Tobacco Use   • Smoking status: Former   Substance Use Topics   • Alcohol use: Never   • Drug use: Never         Review of Systems:  Review of Systems   Constitutional: Negative for chills and fever.   HENT: Negative for congestion, ear pain and sore throat.    Eyes: Negative for pain.   Respiratory: Positive for cough and shortness of breath. Negative for chest tightness.    Cardiovascular: Negative for chest pain.   Gastrointestinal: Negative for abdominal pain, diarrhea, nausea and vomiting.   Genitourinary: Negative for flank pain and hematuria.   Musculoskeletal: Negative for joint swelling.   Skin: Negative for pallor.   Neurological: Negative for seizures and headaches.   All other systems reviewed and are negative.       Physical Exam:  /67 (BP Location: Right arm, Patient Position: Lying)    "Pulse 107   Temp 98.5 °F (36.9 °C) (Oral)   Resp 22   Ht 177.8 cm (70\")   Wt 61.2 kg (135 lb)   SpO2 96%   BMI 19.37 kg/m²     Physical Exam  Vitals and nursing note reviewed.   Constitutional:       General: He is awake. He is in acute distress.      Appearance: Normal appearance.   HENT:      Head: Normocephalic and atraumatic.      Nose: Nose normal.      Mouth/Throat:      Mouth: Mucous membranes are moist.   Eyes:      Extraocular Movements: Extraocular movements intact.      Pupils: Pupils are equal, round, and reactive to light.   Cardiovascular:      Rate and Rhythm: Regular rhythm. Tachycardia present.      Heart sounds: Murmur (yong pitched left lower sternal border) heard.   Pulmonary:      Effort: Tachypnea and respiratory distress present.      Breath sounds: Rhonchi (acattered) present. No wheezing or rales.   Abdominal:      General: Bowel sounds are normal.      Palpations: Abdomen is soft. There is no pulsatile mass.      Tenderness: There is no abdominal tenderness. There is no guarding or rebound.      Comments: No rigidity   Musculoskeletal:         General: No tenderness. Normal range of motion.      Cervical back: Normal range of motion and neck supple.      Right lower leg: No edema.      Left lower leg: No edema.   Skin:     General: Skin is warm and dry.      Coloration: Skin is not jaundiced.      Findings: Rash present.   Neurological:      General: No focal deficit present.      Mental Status: He is alert and oriented to person, place, and time.      Sensory: Sensation is intact.      Motor: Motor function is intact.      Coordination: Coordination is intact.      Comments: Moves all 4 extremities    Psychiatric:         Attention and Perception: Attention and perception normal.         Mood and Affect: Mood and affect normal.         Speech: Speech normal.         Behavior: Behavior normal.         Judgment: Judgment normal.                 Procedures:  Procedures      Medical " Decision Making:      Comorbidities that affect care:    Hypertension    External Notes reviewed:    EMS Run sheet: EMS reports that patient had a pulse upon their arrival and during their transport.  Patient was found to be hypoxic was given bags assisted ventilations and breathing treatment in route.      The following orders were placed and all results were independently analyzed by me:  Orders Placed This Encounter   Procedures   • Blood Culture - Blood,   • Blood Culture - Blood,   • XR Chest 1 View   • ABG with Co-Ox and Electrolytes   • Comprehensive Metabolic Panel   • High Sensitivity Troponin T   • Protime-INR   • aPTT   • Lactic Acid, Plasma   • CBC Auto Differential   • Scan Slide   • High Sensitivity Troponin T 2Hr   • STAT Lactic Acid, Reflex   • Cardiac Monitoring   • Pulse Oximetry, Continuous   • IP General Consult (Use specialty-specific consult if known)   • Hospitalist (on-call MD unless specified)   • ECG 12 Lead Dyspnea   • Insert Peripheral IV   • CBC & Differential       Medications Given in the Emergency Department:  Medications   sodium chloride 0.9 % flush 10 mL (has no administration in time range)   lactated ringers bolus 1,000 mL (has no administration in time range)   cefTRIAXone (ROCEPHIN) IVPB 1 g (has no administration in time range)   AZITHROMYCIN 500 MG/250 ML 0.9% NS IVPB (vial-mate) (has no administration in time range)        ED Course:    ED Course as of 03/06/23 1042   Mon Mar 06, 2023   1041 EKG performed at 827 was inter by me to show a sinus tachycardia with a ventricular rate of 116 bpm.  The intervals 139 ms.  P waves are normal.  Patient has occasional premature atrial contraction.  QRS interval was 111 ms.  Axis is leftward -52 degrees.  There are some nonspecific ST-T wave change identified.  QT corrected is 463 ms. [TB]      ED Course User Index  [TB] Julius Galvez DO   The patient was seen upon arrival.  History and physical examination was performed as document.   Diagnostic data was obtained.  Results reviewed.  Patient has been hemodynamically stable during the ED course.  Patient is requiring supplemental O2 but only via nasal cannula.  I have also weaned his O2 to 3-1/2 L at time of dictation.  Patient does appear to have a pneumonia.  Patient be treated for pneumonia and sepsis.  I did discuss case with the intensivist and based on the patient's current condition does not feel the patient warrants ICU admission at this time.  Patient be admitted to a monitored floor.  Hospital service has been consulted.    Sepsis criteria was met in the emergency department and the Sepsis protocol (including antibiotic administration) was initiated.      SIRS criteria considered:   1.  Temperature > 100.4 or <98.6    2.  Heart Rate > 90    3.  Respiratory Rate > 22    4.  WBC > 12K or <4K.             Severe Sepsis:     Respiratory: Mechanical Ventilation or Bipap  Hypotension: SBP > 90 or MAP < 65  Renal: Creatinine > 2  Metabolic: Lactic Acid > 2  Hematologic: Platelets < 100K or INR > 1.5  Hepatic: BILI  >  2  CNS: Sudden AMS     Septic Shock:     Severe Sepsis + Persistent hypotension or Lactic Acid > 4     Normal saline bolus, Antibiotics, and final disposition was based on these definitions.        Sepsis was recognized at 1010    Antibiotics were ordered.     30 cc/kg bolus was indicated.     Total Critical Care time of 35 minutes. Total critical care time documented does not include time spent on separately billed procedures for services of nurses or physician assistants. I personally saw and examined the patient. I have reviewed all diagnostic interpretations and treatment plans as written. I was present for the key portions of any procedures performed and the inclusive time noted in any critical care statement. Critical care time includes patient management by me, time spent at the patients bedside,  time to review lab and imaging results, discussing patient care,  documentation in the medical record, and time spent with family or caregiver.      Labs:    Lab Results (last 24 hours)     Procedure Component Value Units Date/Time    ABG with Co-Ox and Electrolytes [[id number]]  (Abnormal) Collected: 03/06/23 0825    Specimen: Arterial Blood Updated: 03/06/23 0833     pH, Arterial 7.353 pH units      pCO2, Arterial 46.8 mm Hg      pO2, Arterial 71.3 mm Hg      HCO3, Arterial 25.4 mmol/L      Base Excess, Arterial -0.5 mmol/L      O2 Saturation, Arterial 93.2 %      Hemoglobin, Blood Gas 13.5 g/dL      Carboxyhemoglobin 0.5 %      Methemoglobin 0.20 %      Oxyhemoglobin 92.5 %      FHHB 6.8 %      Laureano's Test Positive     Note --     Site Arterial: left radial     Modality Cannula     FIO2 36 %      Flow Rate 4.0 lpm      Sodium, Arterial 134.4 mmol/L      Potassium, Arterial 4.54 mmol/L      Ionized Calcium, Arterial 1.15 mmol/L      Chloride, Arterial 97 mmol/L      Glucose, Arterial 293 mg/dL      Lactate, Arterial 3.65 mmol/L      PO2/FIO2 198    CBC & Differential [[id number]]  (Abnormal) Collected: 03/06/23 0916    Specimen: Blood Updated: 03/06/23 1004    Narrative:      The following orders were created for panel order CBC & Differential.  Procedure                               Abnormality         Status                     ---------                               -----------         ------                     CBC Auto Differential[[id number]]        Abnormal            Final result               Scan Slide[[id number]]                                       Final result                 Please view results for these tests on the individual orders.    Comprehensive Metabolic Panel [[id number]]  (Abnormal) Collected: 03/06/23 0916    Specimen: Blood Updated: 03/06/23 0945     Glucose 282 mg/dL      BUN 26 mg/dL      Creatinine 0.88 mg/dL      Sodium 133 mmol/L      Potassium 4.6 mmol/L      Chloride 93 mmol/L      CO2 24.0 mmol/L      Calcium 9.4 mg/dL      Total Protein 8.2 g/dL       Albumin 3.8 g/dL      ALT (SGPT) 13 U/L      AST (SGOT) 26 U/L      Alkaline Phosphatase 91 U/L      Total Bilirubin 0.7 mg/dL      Globulin 4.4 gm/dL      A/G Ratio 0.9 g/dL      BUN/Creatinine Ratio 29.5     Anion Gap 16.0 mmol/L      eGFR 87.5 mL/min/1.73     Narrative:      GFR Normal >60  Chronic Kidney Disease <60  Kidney Failure <15    The GFR formula is only valid for adults with stable renal function between ages 18 and 70.    High Sensitivity Troponin T [[id number]]  (Abnormal) Collected: 03/06/23 0916    Specimen: Blood Updated: 03/06/23 0945     HS Troponin T 50 ng/L     Narrative:      High Sensitive Troponin T Reference Range:  <10.0 ng/L- Negative Female for AMI  <15.0 ng/L- Negative Male for AMI  >=10 - Abnormal Female indicating possible myocardial injury.  >=15 - Abnormal Male indicating possible myocardial injury.   Clinicians would have to utilize clinical acumen, EKG, Troponin, and serial changes to determine if it is an Acute Myocardial Infarction or myocardial injury due to an underlying chronic condition.         Protime-INR [[id number]]  (Abnormal) Collected: 03/06/23 0916    Specimen: Blood Updated: 03/06/23 0943     Protime 15.6 Seconds      INR 1.23    Narrative:      Suggested Therapeutic Ranges For Oral Anticoagulant Therapy:  Level of Therapy                      INR Target Range  Standard Dose                            2.0-3.0  High Dose                                2.5-3.5  Patients not receiving anticoagulant  Therapy Normal Range                     0.86-1.15    aPTT [[id number]]  (Abnormal) Collected: 03/06/23 0916    Specimen: Blood Updated: 03/06/23 0943     PTT 27.3 seconds     Blood Culture - Blood, Arm, Left [[id number]] Collected: 03/06/23 0916    Specimen: Blood from Arm, Left Updated: 03/06/23 0922    Blood Culture - Blood, Arm, Left [[id number]] Collected: 03/06/23 0916    Specimen: Blood from Arm, Left Updated: 03/06/23 0922    Lactic Acid, Plasma [[id number]]   (Abnormal) Collected: 03/06/23 0916    Specimen: Blood Updated: 03/06/23 0956     Lactate 3.7 mmol/L     CBC Auto Differential [[id number]]  (Abnormal) Collected: 03/06/23 0916    Specimen: Blood Updated: 03/06/23 1004     WBC 12.90 10*3/mm3      RBC 4.46 10*6/mm3      Hemoglobin 13.4 g/dL      Hematocrit 39.9 %      MCV 89.5 fL      MCH 30.0 pg      MCHC 33.6 g/dL      RDW 13.2 %      RDW-SD 43.0 fl      MPV 11.3 fL      Platelets 169 10*3/mm3      Neutrophil % 85.0 %      Lymphocyte % 1.7 %      Monocyte % 6.1 %      Eosinophil % 6.3 %      Basophil % 0.6 %      Immature Grans % 0.3 %      Neutrophils, Absolute 10.96 10*3/mm3      Lymphocytes, Absolute 0.22 10*3/mm3      Monocytes, Absolute 0.79 10*3/mm3      Eosinophils, Absolute 0.81 10*3/mm3      Basophils, Absolute 0.08 10*3/mm3      Immature Grans, Absolute 0.04 10*3/mm3      nRBC 0.0 /100 WBC     Scan Slide [[id number]] Collected: 03/06/23 0916    Specimen: Blood Updated: 03/06/23 1004     Crenated RBC's Slight/1+     WBC Morphology Normal     Platelet Estimate Adequate           Imaging:    XR Chest 1 View    Result Date: 3/6/2023  PROCEDURE: XR CHEST 1 VW  COMPARISON: [facility], , XR CHEST 1 VW, 6/09/2022, 2:16.  INDICATIONS: Shortness of breath  FINDINGS:  The heart size is normal.  The pulmonary vascular markings are normal.  There is increasing infiltrate present in the right lung with pleural thickening or pleural fluid on the right.  A transdermal pacing electrodes projected over the left lung base.        1. Interval development of increasing infiltrate in the right lung base suggestive of pneumonia.  An atypical pattern of pulmonary edema is in the differential.  2.  Right pleural fluid versus right pleural thickening.        Ben Samuel MD       Electronically Signed and Approved By: Ben Samuel MD on 3/06/2023 at 9:10                 Differential Diagnosis and Discussion:    Dyspnea: Differential diagnosis includes but is not  limited to metabolic acidosis, neurological disorders, psychogenic, asthma, pneumothorax, upper airway obstruction, COPD, pneumonia, noncardiogenic pulmonary edema, interstitial lung disease, anemia, congestive heart failure, and pulmonary embolism    All labs were reviewed and interpreted by me.  All X-rays were independently reviewed by me.  EKG was interpreted by me.    MDM         Patient Care Considerations:          Consultants/Shared Management Plan:    Hospitalist: I have discussed the case with Hospitalist who agrees to accept the patient for admission.  Consultant: I have discussed the case with Dr. Rodríguez who states Patient can go to a monitored floor and not the ICU with a pulmonary consult.    Social Determinants of Health:    Patient has presented with family members who are responsible, reliable and will ensure follow up care.      Disposition and Care Coordination:    Admit:   Through independent evaluation of the patient's history, physical, and imperical data, the patient meets criteria for observation/admission to the hospital.        Final diagnoses:   Pneumonia of right lower lobe due to infectious organism   Sepsis with acute hypoxic respiratory failure without septic shock, due to unspecified organism (HCC)   Acute respiratory failure with hypoxia (HCC)        ED Disposition     ED Disposition   Decision to Admit    Condition   --    Comment   --             This medical record created using voice recognition software.        Documentation assistance provided by Mireille Mcghee acting as scribe for Julius Galvez DO. Information recorded by the scribe was done at my direction and has been verified and validated by me.          Mireille Mcghee  03/06/23 0829       Mireille Mcghee  03/06/23 0839       Julius Galvez DO  03/06/23 1041       Julius Galvez DO  03/06/23 1042      Electronically signed by Julius Galvez DO at 03/06/23 1042     Fabi Alexander RN at 03/06/23 0853        [facility] AT  BEDSIDE    Electronically signed by Fabi Alexander, RN at 03/06/23 0853     Fabi Alexander, RN at 03/06/23 0938        Moved patient to room 31 at this time    Electronically signed by Fabi Alexander RN at 03/06/23 0938         Current Facility-Administered Medications   Medication Dose Route Frequency Provider Last Rate Last Admin   • AZITHROMYCIN 500 MG/250 ML 0.9% NS IVPB (vial-mate)  500 mg Intravenous Once Julius Galvez DO   500 mg at 03/06/23 1104   • sodium chloride 0.9 % flush 10 mL  10 mL Intravenous PRN Julius Galvez DO         Current Outpatient Medications   Medication Sig Dispense Refill   • amLODIPine (NORVASC) 5 MG tablet Take 1 tablet by mouth Daily. 20 tablet 0   • multivitamin with minerals (CENTRUM ADULTS PO) Take 1 tablet by mouth Daily.         Lab Results (last 24 hours)     Procedure Component Value Units Date/Time    CBC & Differential [[id number]]  (Abnormal) Collected: 03/06/23 0916    Specimen: Blood Updated: 03/06/23 1004    Narrative:      The following orders were created for panel order CBC & Differential.  Procedure                               Abnormality         Status                     ---------                               -----------         ------                     CBC Auto Differential[[id number]]        Abnormal            Final result               Scan Slide[[id number]]                                       Final result                 Please view results for these tests on the individual orders.    Scan Slide [[id number]] Collected: 03/06/23 0916    Specimen: Blood Updated: 03/06/23 1004     Crenated RBC's Slight/1+     WBC Morphology Normal     Platelet Estimate Adequate    CBC Auto Differential [[id number]]  (Abnormal) Collected: 03/06/23 0916    Specimen: Blood Updated: 03/06/23 1004     WBC 12.90 10*3/mm3      RBC 4.46 10*6/mm3      Hemoglobin 13.4 g/dL      Hematocrit 39.9 %      MCV 89.5 fL      MCH 30.0 pg      MCHC 33.6 g/dL      RDW 13.2 %      RDW-SD 43.0 fl       MPV 11.3 fL      Platelets 169 10*3/mm3      Neutrophil % 85.0 %      Lymphocyte % 1.7 %      Monocyte % 6.1 %      Eosinophil % 6.3 %      Basophil % 0.6 %      Immature Grans % 0.3 %      Neutrophils, Absolute 10.96 10*3/mm3      Lymphocytes, Absolute 0.22 10*3/mm3      Monocytes, Absolute 0.79 10*3/mm3      Eosinophils, Absolute 0.81 10*3/mm3      Basophils, Absolute 0.08 10*3/mm3      Immature Grans, Absolute 0.04 10*3/mm3      nRBC 0.0 /100 WBC     Lactic Acid, Plasma [[id number]]  (Abnormal) Collected: 03/06/23 0916    Specimen: Blood Updated: 03/06/23 0956     Lactate 3.7 mmol/L     Comprehensive Metabolic Panel [[id number]]  (Abnormal) Collected: 03/06/23 0916    Specimen: Blood Updated: 03/06/23 0945     Glucose 282 mg/dL      BUN 26 mg/dL      Creatinine 0.88 mg/dL      Sodium 133 mmol/L      Potassium 4.6 mmol/L      Chloride 93 mmol/L      CO2 24.0 mmol/L      Calcium 9.4 mg/dL      Total Protein 8.2 g/dL      Albumin 3.8 g/dL      ALT (SGPT) 13 U/L      AST (SGOT) 26 U/L      Alkaline Phosphatase 91 U/L      Total Bilirubin 0.7 mg/dL      Globulin 4.4 gm/dL      A/G Ratio 0.9 g/dL      BUN/Creatinine Ratio 29.5     Anion Gap 16.0 mmol/L      eGFR 87.5 mL/min/1.73     Narrative:      GFR Normal >60  Chronic Kidney Disease <60  Kidney Failure <15    The GFR formula is only valid for adults with stable renal function between ages 18 and 70.    High Sensitivity Troponin T [[id number]]  (Abnormal) Collected: 03/06/23 0916    Specimen: Blood Updated: 03/06/23 0945     HS Troponin T 50 ng/L     Narrative:      High Sensitive Troponin T Reference Range:  <10.0 ng/L- Negative Female for AMI  <15.0 ng/L- Negative Male for AMI  >=10 - Abnormal Female indicating possible myocardial injury.  >=15 - Abnormal Male indicating possible myocardial injury.   Clinicians would have to utilize clinical acumen, EKG, Troponin, and serial changes to determine if it is an Acute Myocardial Infarction or myocardial injury due to an  underlying chronic condition.         Protime-INR [[id number]]  (Abnormal) Collected: 03/06/23 0916    Specimen: Blood Updated: 03/06/23 0943     Protime 15.6 Seconds      INR 1.23    Narrative:      Suggested Therapeutic Ranges For Oral Anticoagulant Therapy:  Level of Therapy                      INR Target Range  Standard Dose                            2.0-3.0  High Dose                                2.5-3.5  Patients not receiving anticoagulant  Therapy Normal Range                     0.86-1.15    aPTT [[id number]]  (Abnormal) Collected: 03/06/23 0916    Specimen: Blood Updated: 03/06/23 0943     PTT 27.3 seconds     Blood Culture - Blood, Arm, Left [[id number]] Collected: 03/06/23 0916    Specimen: Blood from Arm, Left Updated: 03/06/23 0922    Blood Culture - Blood, Arm, Left [[id number]] Collected: 03/06/23 0916    Specimen: Blood from Arm, Left Updated: 03/06/23 0922    ABG with Co-Ox and Electrolytes [[id number]]  (Abnormal) Collected: 03/06/23 0825    Specimen: Arterial Blood Updated: 03/06/23 0833     pH, Arterial 7.353 pH units      pCO2, Arterial 46.8 mm Hg      pO2, Arterial 71.3 mm Hg      HCO3, Arterial 25.4 mmol/L      Base Excess, Arterial -0.5 mmol/L      O2 Saturation, Arterial 93.2 %      Hemoglobin, Blood Gas 13.5 g/dL      Carboxyhemoglobin 0.5 %      Methemoglobin 0.20 %      Oxyhemoglobin 92.5 %      FHHB 6.8 %      Laureano's Test Positive     Note --     Site Arterial: left radial     Modality Cannula     FIO2 36 %      Flow Rate 4.0 lpm      Sodium, Arterial 134.4 mmol/L      Potassium, Arterial 4.54 mmol/L      Ionized Calcium, Arterial 1.15 mmol/L      Chloride, Arterial 97 mmol/L      Glucose, Arterial 293 mg/dL      Lactate, Arterial 3.65 mmol/L      PO2/FIO2 198        Imaging Results (Last 24 Hours)     Procedure Component Value Units Date/Time    XR Chest 1 View [[id number]] Collected: 03/06/23 0910     Updated: 03/06/23 0913    Narrative:      PROCEDURE: XR CHEST 1 VW      COMPARISON: [facility], CR, XR CHEST 1 VW, 6/09/2022, 2:16.     INDICATIONS: Shortness of breath     FINDINGS:   The heart size is normal.  The pulmonary vascular markings are normal.  There is increasing   infiltrate present in the right lung with pleural thickening or pleural fluid on the right.  A   transdermal pacing electrodes projected over the left lung base.       Impression:         1. Interval development of increasing infiltrate in the right lung base suggestive of pneumonia.    An atypical pattern of pulmonary edema is in the differential.    2.  Right pleural fluid versus right pleural thickening.                    Ben Samuel MD         Electronically Signed and Approved By: Ben Samuel MD on 3/06/2023 at 9:10                         Orders (last 24 hrs)      Start     Ordered    03/06/23 1216  STAT Lactic Acid, Reflex  PROCEDURE ONCE         03/06/23 0956    03/06/23 1116  High Sensitivity Troponin T 2Hr  PROCEDURE ONCE         03/06/23 0945    03/06/23 1100  Legionella Antigen, Urine - Urine, Urine, Clean Catch  Once         03/06/23 1100    03/06/23 1100  S. Pneumo Ag Urine or CSF - Urine, Urine, Clean Catch  Once         03/06/23 1100    03/06/23 1100  MRSA Screen, PCR (Inpatient) - Swab, Nares  Once         03/06/23 1100    03/06/23 1100  Respiratory Culture - Sputum, Cough  Once         03/06/23 1100    03/06/23 1100  Influenza Antigen, Rapid - Swab, Nasopharynx  Once         03/06/23 1100    03/06/23 1059  Inpatient Admission  Once         03/06/23 1058    03/06/23 1031  Hospitalist (on-call MD unless specified)  Once        Specialty:  Hospitalist  Provider:  Chandu Villanueva MD    03/06/23 1030    03/06/23 1030  lactated ringers bolus 1,000 mL  Once         03/06/23 1014    03/06/23 1030  cefTRIAXone (ROCEPHIN) IVPB 1 g  Once         03/06/23 1014    03/06/23 1030  AZITHROMYCIN 500 MG/250 ML 0.9% NS IVPB (vial-mate)  Once         03/06/23 1014    03/06/23 1013  IP  General Consult (Use specialty-specific consult if known)  Once        Provider:  Shashi Rodríguez MD    03/06/23 1014    03/06/23 0927  Scan Slide  Once         03/06/23 0926    03/06/23 0826  Insert Peripheral IV  Once        See Hyperspace for full Linked Orders Report.    03/06/23 0825    03/06/23 0826  Cardiac Monitoring  Continuous        Comments: Follow Standing Orders As Outlined in Process Instructions (Open Order Report to View Full Instructions)    03/06/23 0825    03/06/23 0826  Pulse Oximetry, Continuous  Continuous         03/06/23 0825    03/06/23 0826  XR Chest 1 View  1 Time Imaging         03/06/23 0825    03/06/23 0826  CBC & Differential  Once         03/06/23 0825    03/06/23 0826  Comprehensive Metabolic Panel  Once         03/06/23 0825    03/06/23 0826  High Sensitivity Troponin T  Once         03/06/23 0825    03/06/23 0826  Protime-INR  Once         03/06/23 0825    03/06/23 0826  aPTT  Once         03/06/23 0825    03/06/23 0826  Blood Culture - Blood, Arm, Left  Once         03/06/23 0825    03/06/23 0826  Blood Culture - Blood, Arm, Left  Once         03/06/23 0825    03/06/23 0826  Lactic Acid, Plasma  Once         03/06/23 0825    03/06/23 0826  CBC Auto Differential  PROCEDURE ONCE         03/06/23 0826    03/06/23 0825  sodium chloride 0.9 % flush 10 mL  As Needed        See Hyperspace for full Linked Orders Report.    03/06/23 0825    03/06/23 0825  ECG 12 Lead Dyspnea  Once         03/06/23 0825    03/06/23 0824  ABG with Co-Ox and Electrolytes  Once         03/06/23 0824

## 2023-03-07 PROBLEM — R65.20 SEPSIS WITH ACUTE HYPOXIC RESPIRATORY FAILURE WITHOUT SEPTIC SHOCK: Status: ACTIVE | Noted: 2023-03-06

## 2023-03-07 PROBLEM — A41.9 SEPSIS WITH ACUTE HYPOXIC RESPIRATORY FAILURE WITHOUT SEPTIC SHOCK: Status: ACTIVE | Noted: 2023-03-06

## 2023-03-07 PROBLEM — J96.01 SEPSIS WITH ACUTE HYPOXIC RESPIRATORY FAILURE WITHOUT SEPTIC SHOCK: Status: ACTIVE | Noted: 2023-03-06

## 2023-03-07 LAB
ALBUMIN SERPL-MCNC: 2.8 G/DL (ref 3.5–5.2)
ALBUMIN/GLOB SERPL: 0.7 G/DL
ALP SERPL-CCNC: 77 U/L (ref 39–117)
ALT SERPL W P-5'-P-CCNC: 16 U/L (ref 1–41)
ANION GAP SERPL CALCULATED.3IONS-SCNC: 9.3 MMOL/L (ref 5–15)
AORTIC DIMENSIONLESS INDEX: 0.23 (DI)
AST SERPL-CCNC: 53 U/L (ref 1–40)
BASOPHILS # BLD AUTO: 0.01 10*3/MM3 (ref 0–0.2)
BASOPHILS NFR BLD AUTO: 0.1 % (ref 0–1.5)
BH CV ECHO MEAS - AO MAX PG: 68 MMHG
BH CV ECHO MEAS - AO MEAN PG: 39 MMHG
BH CV ECHO MEAS - AO ROOT DIAM: 2.9 CM
BH CV ECHO MEAS - AO V2 MAX: 412 CM/SEC
BH CV ECHO MEAS - AO V2 VTI: 0.7 CM
BH CV ECHO MEAS - EDV(MOD-SP2): 86.5 ML
BH CV ECHO MEAS - EDV(MOD-SP4): 51.3 ML
BH CV ECHO MEAS - ESV(MOD-SP2): 33.6 ML
BH CV ECHO MEAS - ESV(MOD-SP4): 27.3 ML
BH CV ECHO MEAS - IVSD: 0.8 CM
BH CV ECHO MEAS - LA A2CS (ATRIAL LENGTH): 3.7 CM
BH CV ECHO MEAS - LA A4C LENGTH: 5 CM
BH CV ECHO MEAS - LA DIMENSION: 2.8 CM
BH CV ECHO MEAS - LAT PEAK E' VEL: 8.8 CM/SEC
BH CV ECHO MEAS - LV MAX PG: 2 MMHG
BH CV ECHO MEAS - LV MEAN PG: 1 MMHG
BH CV ECHO MEAS - LV V1 MAX: 78.3 CM/SEC
BH CV ECHO MEAS - LV V1 VTI: 21.2 CM
BH CV ECHO MEAS - LVIDD: 7.9 CM
BH CV ECHO MEAS - LVIDS: 3.4 CM
BH CV ECHO MEAS - LVOT DIAM: 2.1 CM
BH CV ECHO MEAS - LVPWD: 0.9 CM
BH CV ECHO MEAS - MED PEAK E' VEL: 6.4 CM/SEC
BH CV ECHO MEAS - MV A MAX VEL: 98 CM/SEC
BH CV ECHO MEAS - MV DEC TIME: 173 MSEC
BH CV ECHO MEAS - MV E MAX VEL: 86 CM/SEC
BH CV ECHO MEAS - MV E/A: 0.9
BH CV ECHO MEAS - RVDD: 2.9 CM
BH CV ECHO MEAS - TR MAX PG: 26 MMHG
BH CV ECHO MEAS - TR MAX VEL: 254 CM/SEC
BH CV ECHO MEASUREMENTS AVERAGE E/E' RATIO: 11.32
BILIRUB SERPL-MCNC: 0.4 MG/DL (ref 0–1.2)
BUN SERPL-MCNC: 30 MG/DL (ref 8–23)
BUN/CREAT SERPL: 37 (ref 7–25)
CALCIUM SPEC-SCNC: 8.8 MG/DL (ref 8.6–10.5)
CHLORIDE SERPL-SCNC: 96 MMOL/L (ref 98–107)
CHOLEST SERPL-MCNC: 144 MG/DL (ref 0–200)
CO2 SERPL-SCNC: 23.7 MMOL/L (ref 22–29)
CREAT SERPL-MCNC: 0.81 MG/DL (ref 0.76–1.27)
DEPRECATED RDW RBC AUTO: 42.8 FL (ref 37–54)
EGFRCR SERPLBLD CKD-EPI 2021: 89.7 ML/MIN/1.73
EOSINOPHIL # BLD AUTO: 0 10*3/MM3 (ref 0–0.4)
EOSINOPHIL NFR BLD AUTO: 0 % (ref 0.3–6.2)
ERYTHROCYTE [DISTWIDTH] IN BLOOD BY AUTOMATED COUNT: 13.6 % (ref 12.3–15.4)
GLOBULIN UR ELPH-MCNC: 3.9 GM/DL
GLUCOSE SERPL-MCNC: 164 MG/DL (ref 65–99)
HCT VFR BLD AUTO: 34.3 % (ref 37.5–51)
HDLC SERPL-MCNC: 67 MG/DL (ref 40–60)
HGB BLD-MCNC: 11.8 G/DL (ref 13–17.7)
IMM GRANULOCYTES # BLD AUTO: 0.01 10*3/MM3 (ref 0–0.05)
IMM GRANULOCYTES NFR BLD AUTO: 0.1 % (ref 0–0.5)
L PNEUMO1 AG UR QL IA: NEGATIVE
LDLC SERPL CALC-MCNC: 66 MG/DL (ref 0–100)
LDLC/HDLC SERPL: 1 {RATIO}
LEFT ATRIUM VOLUME INDEX: 20 ML/M2
LYMPHOCYTES # BLD AUTO: 1.08 10*3/MM3 (ref 0.7–3.1)
LYMPHOCYTES NFR BLD AUTO: 11.8 % (ref 19.6–45.3)
MAGNESIUM SERPL-MCNC: 2.1 MG/DL (ref 1.6–2.4)
MAXIMAL PREDICTED HEART RATE: 141 BPM
MCH RBC QN AUTO: 30.1 PG (ref 26.6–33)
MCHC RBC AUTO-ENTMCNC: 34.4 G/DL (ref 31.5–35.7)
MCV RBC AUTO: 87.5 FL (ref 79–97)
MONOCYTES # BLD AUTO: 0.68 10*3/MM3 (ref 0.1–0.9)
MONOCYTES NFR BLD AUTO: 7.4 % (ref 5–12)
NEUTROPHILS NFR BLD AUTO: 7.41 10*3/MM3 (ref 1.7–7)
NEUTROPHILS NFR BLD AUTO: 80.6 % (ref 42.7–76)
NRBC BLD AUTO-RTO: 0 /100 WBC (ref 0–0.2)
PHOSPHATE SERPL-MCNC: 3.2 MG/DL (ref 2.5–4.5)
PLATELET # BLD AUTO: 186 10*3/MM3 (ref 140–450)
PMV BLD AUTO: 10.6 FL (ref 6–12)
POTASSIUM SERPL-SCNC: 4.8 MMOL/L (ref 3.5–5.2)
PROT SERPL-MCNC: 6.7 G/DL (ref 6–8.5)
RBC # BLD AUTO: 3.92 10*6/MM3 (ref 4.14–5.8)
S PNEUM AG SPEC QL LA: NEGATIVE
SODIUM SERPL-SCNC: 129 MMOL/L (ref 136–145)
STRESS TARGET HR: 120 BPM
TRIGL SERPL-MCNC: 50 MG/DL (ref 0–150)
VLDLC SERPL-MCNC: 11 MG/DL (ref 5–40)
WBC NRBC COR # BLD: 9.19 10*3/MM3 (ref 3.4–10.8)

## 2023-03-07 PROCEDURE — 94664 DEMO&/EVAL PT USE INHALER: CPT

## 2023-03-07 PROCEDURE — 92526 ORAL FUNCTION THERAPY: CPT

## 2023-03-07 PROCEDURE — 97165 OT EVAL LOW COMPLEX 30 MIN: CPT

## 2023-03-07 PROCEDURE — 94640 AIRWAY INHALATION TREATMENT: CPT

## 2023-03-07 PROCEDURE — 99233 SBSQ HOSP IP/OBS HIGH 50: CPT | Performed by: INTERNAL MEDICINE

## 2023-03-07 PROCEDURE — 25010000002 AZITHROMYCIN PER 500 MG: Performed by: STUDENT IN AN ORGANIZED HEALTH CARE EDUCATION/TRAINING PROGRAM

## 2023-03-07 PROCEDURE — 92610 EVALUATE SWALLOWING FUNCTION: CPT

## 2023-03-07 PROCEDURE — 25010000002 ENOXAPARIN PER 10 MG: Performed by: STUDENT IN AN ORGANIZED HEALTH CARE EDUCATION/TRAINING PROGRAM

## 2023-03-07 PROCEDURE — 94799 UNLISTED PULMONARY SVC/PX: CPT

## 2023-03-07 PROCEDURE — 87899 AGENT NOS ASSAY W/OPTIC: CPT | Performed by: STUDENT IN AN ORGANIZED HEALTH CARE EDUCATION/TRAINING PROGRAM

## 2023-03-07 PROCEDURE — 85025 COMPLETE CBC W/AUTO DIFF WBC: CPT | Performed by: STUDENT IN AN ORGANIZED HEALTH CARE EDUCATION/TRAINING PROGRAM

## 2023-03-07 PROCEDURE — 94667 MNPJ CHEST WALL 1ST: CPT

## 2023-03-07 PROCEDURE — 97161 PT EVAL LOW COMPLEX 20 MIN: CPT

## 2023-03-07 PROCEDURE — 87449 NOS EACH ORGANISM AG IA: CPT | Performed by: STUDENT IN AN ORGANIZED HEALTH CARE EDUCATION/TRAINING PROGRAM

## 2023-03-07 PROCEDURE — 25010000002 CEFTRIAXONE PER 250 MG: Performed by: STUDENT IN AN ORGANIZED HEALTH CARE EDUCATION/TRAINING PROGRAM

## 2023-03-07 PROCEDURE — 94668 MNPJ CHEST WALL SBSQ: CPT

## 2023-03-07 RX ORDER — IPRATROPIUM BROMIDE AND ALBUTEROL SULFATE 2.5; .5 MG/3ML; MG/3ML
3 SOLUTION RESPIRATORY (INHALATION)
Status: DISCONTINUED | OUTPATIENT
Start: 2023-03-07 | End: 2023-03-08

## 2023-03-07 RX ORDER — PANTOPRAZOLE SODIUM 40 MG/10ML
40 INJECTION, POWDER, LYOPHILIZED, FOR SOLUTION INTRAVENOUS
Status: DISCONTINUED | OUTPATIENT
Start: 2023-03-07 | End: 2023-03-16

## 2023-03-07 RX ORDER — ASPIRIN 81 MG/1
81 TABLET, CHEWABLE ORAL DAILY
Status: DISCONTINUED | OUTPATIENT
Start: 2023-03-08 | End: 2023-03-28

## 2023-03-07 RX ORDER — SODIUM CHLORIDE FOR INHALATION 3 %
4 VIAL, NEBULIZER (ML) INHALATION
Status: DISCONTINUED | OUTPATIENT
Start: 2023-03-07 | End: 2023-03-10

## 2023-03-07 RX ORDER — BUDESONIDE 0.5 MG/2ML
0.5 INHALANT ORAL
Status: DISCONTINUED | OUTPATIENT
Start: 2023-03-07 | End: 2023-03-29 | Stop reason: HOSPADM

## 2023-03-07 RX ORDER — ARFORMOTEROL TARTRATE 15 UG/2ML
15 SOLUTION RESPIRATORY (INHALATION)
Status: DISCONTINUED | OUTPATIENT
Start: 2023-03-07 | End: 2023-03-29 | Stop reason: HOSPADM

## 2023-03-07 RX ADMIN — Medication 4 ML: at 12:38

## 2023-03-07 RX ADMIN — Medication 10 ML: at 08:31

## 2023-03-07 RX ADMIN — ENOXAPARIN SODIUM 60 MG: 60 INJECTION SUBCUTANEOUS at 04:14

## 2023-03-07 RX ADMIN — IPRATROPIUM BROMIDE AND ALBUTEROL SULFATE 3 ML: .5; 2.5 SOLUTION RESPIRATORY (INHALATION) at 20:37

## 2023-03-07 RX ADMIN — PANTOPRAZOLE SODIUM 40 MG: 40 INJECTION, POWDER, FOR SOLUTION INTRAVENOUS at 22:10

## 2023-03-07 RX ADMIN — Medication 10 ML: at 20:42

## 2023-03-07 RX ADMIN — CEFTRIAXONE SODIUM 1 G: 1 INJECTION, SOLUTION INTRAVENOUS at 09:43

## 2023-03-07 RX ADMIN — IPRATROPIUM BROMIDE AND ALBUTEROL SULFATE 3 ML: .5; 2.5 SOLUTION RESPIRATORY (INHALATION) at 12:38

## 2023-03-07 RX ADMIN — ASPIRIN 81 MG: 81 TABLET, COATED ORAL at 08:29

## 2023-03-07 RX ADMIN — AZITHROMYCIN DIHYDRATE 500 MG: 500 INJECTION, POWDER, LYOPHILIZED, FOR SOLUTION INTRAVENOUS at 08:32

## 2023-03-07 RX ADMIN — ENOXAPARIN SODIUM 60 MG: 60 INJECTION SUBCUTANEOUS at 16:15

## 2023-03-07 NOTE — THERAPY EVALUATION
Patient Name: Buzz Lopez  : 1945    MRN: [id number]                              Today's Date: 3/7/2023       Admit Date: 3/6/2023    Visit Dx:     ICD-10-CM ICD-9-CM   1. Pneumonia of right lower lobe due to infectious organism  J18.9 486   2. Sepsis with acute hypoxic respiratory failure without septic shock, due to unspecified organism (HCC)  A41.9 038.9    R65.20 995.91    J96.01 518.81   3. Acute respiratory failure with hypoxia (HCC)  J96.01 518.81   4. Difficulty walking  R26.2 719.7   5. Oropharyngeal dysphagia  R13.12 787.22   6. Decreased activities of daily living (ADL)  Z78.9 V49.89     Patient Active Problem List   Diagnosis   • Pneumonia of right lower lobe due to infectious organism     History reviewed. No pertinent past medical history.  Past Surgical History:   Procedure Laterality Date   • EYE SURGERY        General Information     Row Name 23 1449          OT Time and Intention    Document Type evaluation  -ES     Mode of Treatment individual therapy;occupational therapy  -ES     Row Name 23 1449          General Information    Patient Profile Reviewed yes  -ES     Prior Level of Function independent:;ADL's;all household mobility  Patient reports independent with ADLs at baseline. No device for functional mobility. Tub/shower combo, standing shower completion. [place] in stance. No home O2 use. x1 fall in 3 months.  -ES     Existing Precautions/Restrictions fall;oxygen therapy device and L/min  -ES     Barriers to Rehab none identified  -ES     Row Name 23 1449          Occupational Profile    Reason for Services/Referral (Occupational Profile) Patient is 77 yr old male admitted to [facility] on 3/6/2023 with reports of shortness of air, confusion and possible syncopal episode. OT evaluation and treatment ordered d/t recent decline in ADLs/transfer ability and discharge planning recommendations. No previous OT services for current condition.  -ES     Row  Name 03/07/23 1449          Living Environment    People in Home child(efraín), adult  son  -ES     Row Name 03/07/23 1449          Cognition    Orientation Status (Cognition) oriented x 3  patient pleasant and cooperative, agreeable to therapy evaluation. Patient family present.  -ES     Row Name 03/07/23 1449          Safety Issues, Functional Mobility    Impairments Affecting Function (Mobility) balance;endurance/activity tolerance;shortness of breath;strength  -ES           User Key  (r) = Recorded By, (t) = Taken By, (c) = Cosigned By    Initials Name Provider Type    Mirna Larson, OTR/L, CSRS Occupational Therapist                 Mobility/ADL's     Row Name 03/07/23 1457          Bed Mobility    Comment, (Bed Mobility) patient met seated in recliner at therapy arrival to room  -     Row Name 03/07/23 1457          Transfers    Transfers sit-stand transfer;stand-sit transfer  -ES     Row Name 03/07/23 1457          Sit-Stand Transfer    Sit-Stand [place] (Transfers) contact guard;1 person assist  -ES     Assistive Device (Sit-Stand Transfers) other (see comments)  A x1  -ES     Row Name 03/07/23 1457          Stand-Sit Transfer    Stand-Sit [place] (Transfers) contact guard;1 person assist  -ES     Comment, (Stand-Sit Transfer) [facility] x1  -ES     Row Name 03/07/23 1457          Functional Mobility    Functional Mobility- Ind. Level contact guard assist;1 person  -ES     Functional Mobility- Device other (see comments)  [facility] x1  -ES     Row Name 03/07/23 1457          Activities of Daily Living    BADL Assessment/Intervention bathing;upper body dressing;lower body dressing;grooming;feeding;toileting  -ES     Row Name 03/07/23 1457          Bathing Assessment/Intervention    [place] Level (Bathing) bathing skills;minimum assist (75% patient effort)  -ES     Row Name 03/07/23 1457          Upper Body Dressing Assessment/Training    [place] Level (Upper Body Dressing) upper body dressing  skills;standby assist  -ES     Row Name 03/07/23 1457          Lower Body Dressing Assessment/Training    [place] Level (Lower Body Dressing) lower body dressing skills;minimum assist (75% patient effort)  -ES     Row Name 03/07/23 1457          Grooming Assessment/Training    [place] Level (Grooming) grooming skills;standby assist  -ES     Row Name 03/07/23 1457          Self-Feeding Assessment/Training    [place] Level (Feeding) feeding skills;set up  -ES     [facility] Name 03/07/23 1457          Toileting Assessment/Training    [place] Level (Toileting) toileting skills;contact guard assist  -ES           User Key  (r) = Recorded By, (t) = Taken By, (c) = Cosigned By    Initials Name Provider Type    ES Mirna Yeung, OTR/L, CSRS Occupational Therapist               Obj/Interventions     [facility] Name 03/07/23 1458          Sensory Assessment (Somatosensory)    Sensory Assessment (Somatosensory) sensation intact  -ES     Row Name 03/07/23 1458          Vision Assessment/Intervention    Visual Impairment/Limitations WFL  -ES     [facility] Name 03/07/23 1458          Range of Motion Comprehensive    General Range of Motion no range of motion deficits identified;bilateral upper extremity ROM WNL  -ES     [facility] Name 03/07/23 1458          Strength Comprehensive (MMT)    General Manual Muscle Testing (MMT) Assessment upper extremity strength deficits identified  -ES     Comment, General Manual Muscle Testing (MMT) Assessment bilateral upper extremities assessed 4-/5  -ES     [facility] Name 03/07/23 1458          Motor Skills    Motor Skills coordination;functional endurance  -ES     Coordination WFL;upper extremity  -ES     Functional Endurance fair minus. Patient demonstrates SOA with MMT and STS  -ES     [facility] Name 03/07/23 1458          Balance    Balance Assessment sitting dynamic balance;standing dynamic balance  -ES     Dynamic Sitting Balance standby assist  -ES     Position, Sitting Balance unsupported;sitting in  chair  -ES     Dynamic Standing Balance contact guard;1-person assist  -ES     Position/Device Used, Standing Balance supported;other (see comments)  HHA x1  -ES           User Key  (r) = Recorded By, (t) = Taken By, (c) = Cosigned By    Initials Name Provider Type    Mirna Larson, OTR/L, CSRS Occupational Therapist               Goals/Plan     Row Name 03/07/23 1502          Transfer Goal 1 (OT)    Activity/Assistive Device (Transfer Goal 1, OT) transfers, all;walker, rolling  -ES     [place] Level/Cues Needed (Transfer Goal 1, OT) modified independence  -ES     Time Frame (Transfer Goal 1, OT) long term goal (LTG);10 days  -ES     Row Name 03/07/23 1502          Bathing Goal 1 (OT)    Activity/Device (Bathing Goal 1, OT) bathing skills, all  -ES     [place] Level/Cues Needed (Bathing Goal 1, OT) modified independence  -ES     Time Frame (Bathing Goal 1, OT) long term goal (LTG);10 days  -ES     Row Name 03/07/23 1502          Dressing Goal 1 (OT)    Activity/Device (Dressing Goal 1, OT) dressing skills, all  -ES     [place]/Cues Needed (Dressing Goal 1, OT) modified independence  -ES     Time Frame (Dressing Goal 1, OT) long term goal (LTG);10 days  -ES     Row Name 03/07/23 1502          Toileting Goal 1 (OT)    Activity/Device (Toileting Goal 1, OT) toileting skills, all  -ES     [place] Level/Cues Needed (Toileting Goal 1, OT) modified independence  -ES     Time Frame (Toileting Goal 1, OT) long term goal (LTG);10 days  -ES     Row Name 03/07/23 1502          Grooming Goal 1 (OT)    Activity/Device (Grooming Goal 1, OT) grooming skills, all  -ES     [place] (Grooming Goal 1, OT) modified independence  -ES     Time Frame (Grooming Goal 1, OT) long term goal (LTG);10 days  -ES     Row Name 03/07/23 1502          Strength Goal 1 (OT)    Strength Goal 1 (OT) Pt will increase BUE strength 1/2 muscle grade for increased UE strength required for ADL transfers and task completion  -ES      Time Frame (Strength Goal 1, OT) long term goal (LTG);10 days  -ES     Row Name 03/07/23 1502          Problem Specific Goal 1 (OT)    Problem Specific Goal 1 (OT) patient will demonstrate fair plus activity tolerance in preperation for independent ADL routine completion at time of discharge  -ES     Time Frame (Problem Specific Goal 1, OT) long term goal (LTG);10 days  -ES     Row Name 03/07/23 1502          Therapy Assessment/Plan (OT)    Planned Therapy Interventions (OT) activity tolerance training;BADL retraining;functional balance retraining;occupation/activity based interventions;patient/caregiver education/training;strengthening exercise;transfer/mobility retraining  -ES           User Key  (r) = Recorded By, (t) = Taken By, (c) = Cosigned By    Initials Name Provider Type    ES Mirna Yeung, OTR/L, CSRS Occupational Therapist               Clinical Impression     Row Name 03/07/23 1501          Plan of Care Review    Plan of Care Reviewed With patient  -ES     Progress no change  -ES     Outcome Evaluation Patient has experienced decline in function from baseline status, presenting w/ deficits related to activity tolerance, strength, functional balance, transfers and mobility that impede patient independence with activities of daily living.  Patient would benefit from skilled Occupational Therapy intervention to maxamize patient safety, and promote return to baseline independence.  -ES     Row Name 03/07/23 1501          Therapy Assessment/Plan (OT)    Rehab Potential (OT) good, to achieve stated therapy goals  -ES     Criteria for Skilled Therapeutic Interventions Met (OT) yes;meets criteria  -ES     Therapy Frequency (OT) 5 times/wk  -ES     Row Name 03/07/23 1501          Therapy Plan Review/Discharge Plan (OT)    Anticipated Discharge Disposition (OT) home with home health  -ES     Row Name 03/07/23 1501          Vital Signs    O2 Delivery Pre Treatment nasal cannula  -ES     O2 Delivery Intra  Treatment nasal cannula  -ES     O2 Delivery Post Treatment nasal cannula  -ES     Row Name 03/07/23 1501          Positioning and Restraints    Pre-Treatment Position sitting in chair/recliner  -ES     Post Treatment Position chair  -ES           User Key  (r) = Recorded By, (t) = Taken By, (c) = Cosigned By    Initials Name Provider Type    ES Mirna Yeung, OTR/L, CSRS Occupational Therapist               Outcome Measures     Row Name 03/07/23 1505          How much help from another is currently needed...    Putting on and taking off regular lower body clothing? 3  -ES     Bathing (including washing, rinsing, and drying) 3  -ES     Toileting (which includes using toilet bed pan or urinal) 3  -ES     Putting on and taking off regular upper body clothing 4  -ES     Taking care of personal grooming (such as brushing teeth) 4  -ES     Eating meals 4  -ES     AM-PAC 6 Clicks Score (OT) 21  -ES     Row Name 03/07/23 0908 03/07/23 0700       How much help from another person do you currently need...    Turning from your back to your side while in flat bed without using bedrails? 3  -PAULO (r) JL (t) PAULO (c) 3  -JS    Moving from lying on back to sitting on the side of a flat bed without bedrails? 3  -PAULO (r) JL (t) PAULO (c) 3  -JS    Moving to and from a bed to a chair (including a wheelchair)? 3  -PAULO (r) JL (t) PAULO (c) 3  -JS    Standing up from a chair using your arms (e.g., wheelchair, bedside chair)? 3  -PAULO (r) JL (t) PAULO (c) 3  -JS    Climbing 3-5 steps with a railing? 2  -PAULO (r) JL (t) PAULO (c) 2  -JS    To walk in hospital room? 3  -PAULO (r) JL (t) PAULO (c) 3  -JS    AM-PAC 6 Clicks Score (PT) 17  -PAULO (r) JL (t) 17  -JS    Highest level of mobility 5 --> Static standing  -APULO (r) JL (t) 5 --> Static standing  -JS    Row Name 03/07/23 1505 03/07/23 0908       Functional Assessment    Outcome Measure Options AM-PAC 6 Clicks Daily Activity (OT);Optimal Instrument  -ES AM-PAC 6 Clicks Basic Mobility (PT)  -PAULO (r) POONAM (t) PAULO [place])     Row Name 03/07/23 1505          Optimal Instrument    Optimal Instrument Optimal - 3  -ES     Bending/Stooping 2  -ES     Standing 2  -ES     Reaching 1  -ES     From the list, choose the 3 activities you would most like to be able to do without any difficulty Bending/stooping;Standing;Reaching  -ES     Total Score Optimal - 3 5  -ES           User Key  (r) = Recorded By, (t) = Taken By, (c) = Cosigned By    Initials Name Provider Type    Heavenly Mitchell, RN Registered Nurse    Isai Wood, PT Physical Therapist    ES Mirna Yeung, OTR/L, CSRS Occupational Therapist    Ephraim Garcia, PT Student PT Student                  OT Recommendation and Plan  Planned Therapy Interventions (OT): activity tolerance training, BADL retraining, functional balance retraining, occupation/activity based interventions, patient/caregiver education/training, strengthening exercise, transfer/mobility retraining  Therapy Frequency (OT): 5 times/wk  Plan of Care Review  Plan of Care Reviewed With: patient  Progress: no change  Outcome Evaluation: Patient has experienced decline in function from baseline status, presenting w/ deficits related to activity tolerance, strength, functional balance, transfers and mobility that impede patient independence with activities of daily living.  Patient would benefit from skilled Occupational Therapy intervention to maxamize patient safety, and promote return to baseline independence.     Time Calculation:    Time Calculation- OT     Row Name 03/07/23 1506             Time Calculation- OT    OT Received On 03/07/23  -ES      OT Goal Re-Cert Due Date 03/16/23  -ES         Untimed Charges    OT Eval/Re-eval Minutes 32  -ES         Total Minutes    Untimed Charges Total Minutes 32  -ES       Total Minutes 32  -ES            User Key  (r) = Recorded By, (t) = Taken By, (c) = Cosigned By    Initials Name Provider Type    Mirna Larson, OTR/L, CSRS Occupational Therapist               Therapy Charges for Today     Code Description Service Date Service Provider Modifiers Qty    36562928280 HC OT EVAL LOW COMPLEXITY 3 3/7/2023 Mirna Yeung, OTR/L, CSRS GO 1               MARISOL Ramirez/L, CSRS  3/7/2023

## 2023-03-07 NOTE — PLAN OF CARE
Goal Outcome Evaluation:  Plan of Care Reviewed With: patient, family      ASSESSMENT/ PLAN OF CARE:  Pt presents with limitations, noted below, that impede his ability to swallow safely and maintain nutrition. The skills of a therapist will be required to safely and effectively implement the following treatment plan to restore maximal level of function.    PROBLEMS:  1.  Swallow delay, decreased general strength, signs of possible aspiration.                                             TREATMENT: Dysphagia therapy to address swallow function through exercises and education of strategies.     FREQUENCY/DURATION: Twice daily 5 times per week    REHAB POTENTIAL:  Pt has fair to good rehab potential.  The following limitations may influence improvement/ length of tx: Medical status.    RECOMMENDATIONS:   1.   DIET: Mechanical soft solids, ground meats and gravies, nectar thickened liquid.    2.  POSITION: Positioning fully upright for all p.o. intake and 30 minutes following.    3.  COMPENSATORY STRATEGIES: One-on-one supervision with feeding.  Alternate small bites and small single sips of liquid.  Medications whole in applesauce.    4.  May benefit from modified barium swallow study to rule out aspiration.

## 2023-03-07 NOTE — PROGRESS NOTES
[facility]   Hospitalist Progress Note  Date: 3/7/2023  Patient Name: Buzz Lopez  : 1945  MRN: [id number]  Date of admission: 3/6/2023    Subjective   Subjective     Chief Complaint:   Shortness of breath, confusion    Summary:   Buzz Lopez is a 77 y.o. male with no significant past medical history has been brought into the ED with concerns for confusion, possible syncope, shortness of breath.  Patient states that for the past 1 to 2 weeks patient has been having difficulty breathing and subjective fevers, it has got worse and today and he has called his son to take him to the ED as he is having difficulty breathing.  By the time patient's son has arrived at the home, patient appeared to be confused, generalized weakness and unable to put his clothes on.  While his son was helping him to get the clothes on, patient appeared to be very confused and had a possible syncope episode where he was unresponsive.  Patient's son has looked for the pulse and they could not feel radial pulse and started chest compressions, EMS was called.  By the time EMS has arrived patient was receiving chest compressions from the family members.  EMS has evaluated the patient and they could not feel a pulse.  Patient was in respiratory distress, saturating around 70% on room air.  Received nebulizer treatment by the EMS and the patient has been brought into the ED.    During my examination, patient is alert and oriented x3 and able to answer questions appropriately.  States that he does not remember how he came to the hospital.  Denies any chest pain, nausea, vomiting, focal weakness, numbness, tingling.  States that he has been having difficulty breathing for the past 1 to 2 weeks.  A month ago he had some sore throat.  Associated with cough, productive sputum. Patient is thin and frail.  Chronically has a poor p.o. intake.  Admits that he has low appetite.  No previously diagnosed history of dementia.  As per the  family members, patient takes a lot of over-the-counter fiber supplement review does not gain weight despite he eats well.    Interval Followup:   No acute events overnight, this morning patient began choking on breakfast, patient was evaluated by speech therapy, patient now n.p.o. as he is not safe to swallow, suspect pneumonia likely secondary to aspiration    Objective   Objective     Vitals:   Temp:  [97.2 °F (36.2 °C)-99.1 °F (37.3 °C)] 97.3 °F (36.3 °C)  Heart Rate:  [78-98] 84  Resp:  [16-18] 18  BP: (104-131)/(48-66) 130/54  Flow (L/min):  [2] 2    Physical Exam   General appearance: NAD, conversant   Eyes: anicteric sclerae, moist conjunctivae; no lid-lag; PERRLA  HENT: Atraumatic; oropharynx clear with moist mucous membranes and no mucosal ulcerations; normal hard and soft palate  Neck: Trachea midline; FROM, supple, no thyromegaly or lymphadenopathy  Lungs: Bilateral rhonchi, with normal respiratory effort and no intercostal retractions    Result Review    Result Review:  I have personally reviewed the results as below  [x]  Laboratory  CBC    CBC 6/9/22 3/6/23 3/7/23   WBC 6.34 12.90 (A) 9.19   RBC 4.48 4.46 3.92 (A)   Hemoglobin 13.4 13.4 11.8 (A)   Hematocrit 39.6 39.9 34.3 (A)   MCV 88.4 89.5 87.5   MCH 29.9 30.0 30.1   MCHC 33.8 33.6 34.4   RDW 13.4 13.2 13.6   Platelets 179 169 186   (A) Abnormal value            CMP    CMP 6/9/22 3/6/23 3/6/23 3/6/23     0825 0916 2353   Glucose 225 (A) 293 (A) 282 (A) 164 (A)   BUN 17  26 (A) 30 (A)   Creatinine 0.92  0.88 0.81   eGFR 85.1  87.5 89.7   Sodium 130 (A) 134.4 (A) 133 (A) 129 (A)   Potassium 4.7  4.6 4.8   Chloride 93 (A)  93 (A) 96 (A)   Calcium 9.9  9.4 8.8   Total Protein 8.9 (A)  8.2 6.7   Albumin 4.60  3.8 2.8 (A)   Globulin 4.3  4.4 3.9   Total Bilirubin 0.5  0.7 0.4   Alkaline Phosphatase 108  91 77   AST (SGOT) 16  26 53 (A)   ALT (SGPT) 12  13 16   Albumin/Globulin Ratio 1.1  0.9 0.7   BUN/Creatinine Ratio 18.5  29.5 (A) 37.0 (A)   Anion Gap  12.6  16.0 (A) 9.3   (A) Abnormal value       Comments are available for some flowsheets but are not being displayed.           []  Microbiology  []  Radiology  []  EKG/Telemetry   []  Cardiology/Vascular   []  Pathology  []  Old records  []  Other:    Assessment & Plan   Assessment / Plan     Assessment:  Sepsis due to right lower lobe pneumonia  Acute hypoxic respiratory failure  Questionable cardiac arrest episode  NSTEMI/acute myocardial injury  Possible syncope  Lactic acidosis  Malnutrition     Plan  Admit inpatient, telemetry  Cardiology consulted  Started on aspirin, therapeutic Lovenox for possible ACS.  Elevated troponin could be secondary to acute myocardial injury from the chest compressions.    Uncertain if patient ever lost pulse  CT scan of the chest with contrast personally reviewed, and interpreted by me, negative for pulmonary embolism, does have bilateral lower lobe pneumonias consistent with bacterial infection  Follow-up on the cardiac echo, this is pending   continue ceftriaxone and azithromycin  Follow-up on the blood cultures, urine Legionella, urine strep, MRSA swab, influenza, COVID-19 negative, influenza negative  Continue albuterol nebulizer every 4 hours as needed  Continue bronchodilator protocol  Lactic acid reviewed  N.p.o. as patient has continued aspiration, will place core track for nutrition  Nutritionist consult ordered for tube feeds  PT OT consult     Reviewed patients labs and imaging, and discussed with patient and nurse at bedside.    DVT prophylaxis:  Medical DVT prophylaxis orders are present.    CODE STATUS:   Level Of Support Discussed With: Patient  Code Status (Patient has no pulse and is not breathing): CPR (Attempt to Resuscitate)  Medical Interventions (Patient has pulse or is breathing): Full Support        Electronically signed by Black Capps MD, 03/07/23, 2:35 PM EST.

## 2023-03-07 NOTE — PLAN OF CARE
Goal Outcome Evaluation:  Plan of Care Reviewed With: (P) patient, son           Outcome Evaluation: (P) Pt. presents with balance and activity tolerance deficits that impair his functional independence. Recommend home health therapy services to improve balance and activity tolerance deficits and optimize safety with mobility.

## 2023-03-07 NOTE — PLAN OF CARE
Goal Outcome Evaluation:  Plan of Care Reviewed With: patient        Progress: no change  Outcome Evaluation: Patient has experienced decline in function from baseline status, presenting w/ deficits related to activity tolerance, strength, functional balance, transfers and mobility that impede patient independence with activities of daily living.  Patient would benefit from skilled Occupational Therapy intervention to maxamize patient safety, and promote return to baseline independence.    Recommend: home with home health and family assist

## 2023-03-07 NOTE — PLAN OF CARE
Goal Outcome Evaluation:   Pt A/OX4, difficult to understand at times. Pt NPO overnight with plan for speech consult today. Pt denies hallucinations overnight. Pt's granddaughter present at bedside overnight. No complaints, NAOE. Safety maintained.

## 2023-03-07 NOTE — THERAPY EVALUATION
Acute Care - Physical Therapy Initial Evaluation   [place]     Patient Name: Buzz Lopez  : 1945  MRN: [id number]  Today's Date: 3/7/2023      Visit Dx:     ICD-10-CM ICD-9-CM   1. Pneumonia of right lower lobe due to infectious organism  J18.9 486   2. Sepsis with acute hypoxic respiratory failure without septic shock, due to unspecified organism (HCC)  A41.9 038.9    R65.20 995.91    J96.01 518.81   3. Acute respiratory failure with hypoxia ([facility])  J96.01 518.81   4. Difficulty walking  R26.2 719.7     Patient Active Problem List   Diagnosis   • Pneumonia of right lower lobe due to infectious organism     History reviewed. No pertinent past medical history.  Past Surgical History:   Procedure Laterality Date   • EYE SURGERY       PT Assessment (last 12 hours)     PT Evaluation and Treatment     Row Name 23          Physical Therapy Time and Intention    Subjective Information no complaints (P)   -JL     Document Type evaluation (P)   -JL     Mode of Treatment individual therapy;physical therapy (P)   -JL     Patient Effort good (P)   -JL     Row Name 23          General Information    Patient Profile Reviewed yes (P)   -JL     Patient Observations alert;cooperative;agree to therapy (P)   -JL     Prior Level of Function independent: (P)   -JL     Equipment Currently Used at Home none (P)   -JL     Existing Precautions/Restrictions fall (P)   -JL     Barriers to Rehab none identified (P)   -JL     Row Name 23          Living Environment    Current Living Arrangements home (P)   -JL     People in Home child(efraín), adult (P)   -JL     Primary Care Provided by self (P)   -JL     Row Name 23 09          Home Use of Assistive/Adaptive Equipment    Equipment Currently Used at Home none (P)   -JL     Row Name 23          Range of Motion (ROM)    Range of Motion ROM is WFL;bilateral lower extremities (P)   -JL     Row Name 23          Strength (Manual  Muscle Testing)    Strength (Manual Muscle Testing) bilateral lower extremities (P)   4/5 all planes  -JL     Row Name 03/07/23 0902          Bed Mobility    Bed Mobility bed mobility (all) activities (P)   -JL     All Activities, [place] (Bed Mobility) contact guard;1 person assist (P)   -JL     Assistive Device (Bed Mobility) bed rails (P)   -JL     Row Name 03/07/23 0902          Transfers    Transfers sit-stand transfer;stand-sit transfer;bed-chair transfer (P)   -JL     Row Name 03/07/23 0902          Bed-Chair Transfer    Bed-Chair [place] (Transfers) contact guard (P)   -JL     Assistive Device (Bed-Chair Transfers) walker, front-wheeled (P)   -JL     Row Name 03/07/23 0902          Sit-Stand Transfer    Sit-Stand [place] (Transfers) contact guard (P)   -JL     Assistive Device (Sit-Stand Transfers) walker, front-wheeled (P)   -JL     Row Name 03/07/23 0902          Stand-Sit Transfer    Stand-Sit [place] (Transfers) contact guard (P)   -JL     Assistive Device (Stand-Sit Transfers) walker, front-wheeled (P)   -JL     Row Name 03/07/23 0902          Gait/Stairs (Locomotion)    Gait/Stairs Locomotion gait/ambulation assistive device (P)   -JL     [place] Level (Gait) contact guard (P)   -JL     Assistive Device (Gait) walker, front-wheeled (P)   -JL     Distance in Feet (Gait) 30 (P)   -JL     Pattern (Gait) step-through (P)   -JL     Deviations/Abnormal Patterns (Gait) stride length decreased;gait speed decreased;gypsy decreased;base of support, narrow (P)   Pt. dizzy with SOA, recovered in 30 seconds  -JL     Row Name 03/07/23 0902          Safety Issues, Functional Mobility    Impairments Affecting Function (Mobility) balance;endurance/activity tolerance;strength (P)   -JL     Row Name 03/07/23 0902          Balance    Balance Assessment standing dynamic balance (P)   -JL     Dynamic Standing Balance contact guard (P)   -JL     Position/Device Used, Standing Balance walker,  front-wheeled (P)   -JL     Row Name 03/07/23 0902          Plan of Care Review    Plan of Care Reviewed With patient;son (P)   -JL     Outcome Evaluation Pt. presents with balance and activity tolerance deficits that impair his functional independence. Recommend home health therapy services to improve balance and activity tolerance deficits and optimize safety with mobility. (P)   -JL     Row Name 03/07/23 0902          Positioning and Restraints    Pre-Treatment Position in bed (P)   -JL     Post Treatment Position chair (P)   -JL     In Chair reclined;call light within reach;with nsg;exit alarm on (P)   -JL     Row Name 03/07/23 0902          Therapy Assessment/Plan (PT)    Rehab Potential (PT) good, to achieve stated therapy goals (P)   -JL     Criteria for Skilled Interventions Met (PT) yes (P)   -JL     Therapy Frequency (PT) daily (P)   -JL     Predicted Duration of Therapy Intervention (PT) 10 days (P)   -JL     Problem List (PT) problems related to;balance;mobility;strength (P)   -JL     Activity Limitations Related to Problem List (PT) unable to ambulate safely;unable to transfer safely (P)   -JL     Row Name 03/07/23 0902          Therapy Plan Review/Discharge Plan (PT)    Therapy Plan Review (PT) evaluation/treatment results reviewed;patient;son (P)   -POONAM     Row Name 03/07/23 0902          Physical Therapy Goals    Transfer Goal Selection (PT) transfer, PT goal 1 (P)   -JL     Gait Training Goal Selection (PT) gait training, PT goal 1 (P)   -JL     Balance Goal Selection (PT) balance, PT goal 1 (P)   -JL     Row Name 03/07/23 0902          Transfer Goal 1 (PT)    Activity/Assistive Device (Transfer Goal 1, PT) sit-to-stand/stand-to-sit (P)   -JL     [place] Level/Cues Needed (Transfer Goal 1, PT) standby assist (P)   -JL     Time Frame (Transfer Goal 1, PT) long term goal (LTG);10 days (P)   -JL     Row Name 03/07/23 0902          Gait Training Goal 1 (PT)    Activity/Assistive Device (Gait  Training Goal 1, PT) gait (walking locomotion);walker, rolling (P)   -     [place] Level (Gait Training Goal 1, PT) standby assist (P)   -JL     Distance (Gait Training Goal 1, PT) 200 ft (P)   -JL     Time Frame (Gait Training Goal 1, PT) long term goal (LTG);10 days (P)   -POONAM     Row Name 03/07/23 0902          Balance Goal 1 (PT)    Activity/Assistive Device (Balance Goal 1, PT) standing, dynamic;walker, rolling (P)   -JL     [place] Level/Cues Needed (Balance Goal 1, PT) standby assist (P)   -JL     Time Frame (Balance Goal 1, PT) long term goal (LTG);10 days (P)   -POONAM           User Key  (r) = Recorded By, (t) = Taken By, (c) = Cosigned By    Initials Name Provider Type    Ephraim Garcia, PT Student PT Student                Physical Therapy Education     Title: PT OT SLP Therapies (Done)     Topic: Physical Therapy (Done)     Point: Mobility training (Done)     Learning Progress Summary           Patient Acceptance, E, VU by POONAM at 3/7/2023 0909                               User Key     Initials Effective Dates Name Provider Type Discipline    POONAM 01/11/23 -  Ephraim Juares, PT Student PT Student PT              PT Recommendation and Plan  Anticipated Discharge Disposition (PT): (P) home with home health  Planned Therapy Interventions (PT): (P) balance training, bed mobility training, gait training, patient/family education, strengthening, transfer training  Therapy Frequency (PT): (P) daily  Plan of Care Reviewed With: (P) patient, son  Outcome Evaluation: (P) Pt. presents with balance and activity tolerance deficits that impair his functional independence. Recommend home health therapy services to improve balance and activity tolerance deficits and optimize safety with mobility.   Outcome Measures     Row Name 03/07/23 0908             How much help from another person do you currently need...    Turning from your back to your side while in flat bed without using bedrails? 3 (P)   -POONAM       Moving from lying on back to sitting on the side of a flat bed without bedrails? 3 (P)   -JL      Moving to and from a bed to a chair (including a wheelchair)? 3 (P)   -JL      Standing up from a chair using your arms (e.g., wheelchair, bedside chair)? 3 (P)   -JL      Climbing 3-5 steps with a railing? 2 (P)   -JL      To walk in hospital room? 3 (P)   -JL      AM-PAC 6 Clicks Score (PT) 17 (P)   -JL         Functional Assessment    Outcome Measure Options AM-PAC 6 Clicks Basic Mobility (PT) (P)   -JL            User Key  (r) = Recorded By, (t) = Taken By, (c) = Cosigned By    Initials Name Provider Type    Ephraim Garcia PT Student PT Student                 Time Calculation:    PT Charges     Row Name 03/07/23 0902             Time Calculation    PT Received On 03/07/23 (P)   -JL      PT Goal Re-Cert Due Date 03/16/23 (P)   -JL         Untimed Charges    PT Eval/Re-eval Minutes 22 (P)   -JL         Total Minutes    Untimed Charges Total Minutes 22 (P)   -JL       Total Minutes 22 (P)   -JL            User Key  (r) = Recorded By, (t) = Taken By, (c) = Cosigned By    Initials Name Provider Type    Ephraim Garcia PT Student PT Student              Therapy Charges for Today     Code Description Service Date Service Provider Modifiers Qty    [id number] HC PT EVAL LOW COMPLEXITY 2 3/7/2023 Ephraim Juares PT Student GP 1          PT G-Codes  Outcome Measure Options: (P) AM-PAC 6 Clicks Basic Mobility (PT)  AM-PAC 6 Clicks Score (PT): (P) 17    Ephraim Juares PT Student  3/7/2023

## 2023-03-07 NOTE — PROGRESS NOTES
Pulmonary / Critical Care Progress Note      Patient Name: Buzz Lopez  : 1945  MRN: [id number]  Primary Care Physician:  Provider, No Known  Date of admission: 3/6/2023    Subjective   Subjective   Follow-up for pneumonia.    Over past 24 hours, will start on antibiotics with azithromycin and ceftriaxone.    No acute events overnight.     This morning,   Sitting up in chair on 2 L nasal cannula  Feeling a little better  Dyspnea unchanged  Dry hacking cough  Feels like secretions are stuck  Using I-S and flutter valve  No chest pain  No fever or chills    Review of Systems  General:  + Fatigue, No Fever.   HEENT: No dysphagia, No Visual Changes, no rhinorrhea  Respiratory:  + cough, + Dyspnea, no phlegm, No Pleuritic Pain, no wheezing, no hemoptysis  Cardiovascular: Denies chest pain, denies palpitations, + GARCIA, No Chest Pressure  Gastrointestinal:  No Abdominal Pain, No Nausea, No Vomiting, No Diarrhea  Genitourinary:  No Dysuria, No Frequency, No Hesitancy  Musculoskeletal: No muscle pain or swelling  Endocrine:  No Heat Intolerance, No Cold Intolerance  Hematologic:  No Bleeding, No Bruising  Psychiatric:  No Anxiety, No Depression  Neurologic:  No Confusion, no Dysarthria, No Headaches  Skin:  No Rash, No Open Wounds    Objective   Objective     Vitals:   Temp:  [97.4 °F (36.3 °C)-99.1 °F (37.3 °C)] 98.5 °F (36.9 °C)  Heart Rate:  [] 78  Resp:  [16-22] 16  BP: (104-177)/() 130/59  Flow (L/min):  [2-5] 2    Physical Exam   Vital Signs Reviewed   General: Thin elderly male, Alert, NAD on 2 L NC  HEENT:  PERRL, EOMI.  OP, nares clear, no sinus tenderness  Neck:  Supple, no JVD, no thyromegaly  Lymph: no axillary, cervical, supraclavicular lymphadenopathy noted bilaterally  Chest:  good aeration, crackles bibasilar, tympanic to percussion bilaterally, no work of breathing noted  CV: RRR, no MGR, pulses 2+, equal  Abd:  Soft, NT, ND, + BS, no HSM  EXT:  no clubbing, no cyanosis, no edema, no  joint tenderness, muscle wasting all 4 extremities  Neuro:  A&Ox3, CN grossly intact, no focal deficits  Skin: No rashes or lesions noted    Result Review    Result Review:  I have personally reviewed the results from the time of this admission to 3/7/2023 07:10 EST and agree with these findings:  [x]  Laboratory  [x]  Microbiology  [x]  Radiology  [x]  EKG/Telemetry   [x]  Cardiology/Vascular   []  Pathology  []  Old records  []  Other:  Most notable findings include:     3/6 cultures negative to date        Lab 03/07/23  0636 03/06/23  2353 03/06/23  0916 03/06/23  0825   WBC 9.19  --  12.90*  --    HEMOGLOBIN 11.8*  --  13.4  --    HEMATOCRIT 34.3*  --  39.9  --    PLATELETS 186  --  169  --    SODIUM  --  129* 133*  --    SODIUM, ARTERIAL  --   --   --  134.4*   POTASSIUM  --  4.8 4.6  --    CHLORIDE  --  96* 93*  --    CO2  --  23.7 24.0  --    BUN  --  30* 26*  --    CREATININE  --  0.81 0.88  --    GLUCOSE  --  164* 282*  --    GLUCOSE, ARTERIAL  --   --   --  293*   CALCIUM  --  8.8 9.4  --    PHOSPHORUS  --  3.2  --   --    TOTAL PROTEIN  --  6.7 8.2  --    ALBUMIN  --  2.8* 3.8  --    GLOBULIN  --  3.9 4.4  --      CT Chest With Contrast Diagnostic    Result Date: 3/6/2023  PROCEDURE: CT CHEST W CONTRAST DIAGNOSTIC  COMPARISON:  [facility], CR, XR CHEST 1 VW, 3/06/2023, 8:38.  INDICATIONS: DYSPNEA ON EXERTION, WINDED, AND HYPOXIA  TECHNIQUE: CT images were obtained with non-ionic intravenous contrast material.   PROTOCOL:   Pulmonary embolism imaging protocol performed    RADIATION:   DLP: 192 mGy*cm   Automated exposure control was utilized to minimize radiation dose. CONTRAST: 63 cc Omnipaque 300 I.V. LABS:   eGFR: 87.5 ml/min/1.73m2  FINDINGS:  The pulmonary arteries are well opacified.  No filling defects are seen.  There are no findings of PE.  Lung window images reveal moderate centrilobular emphysema.  Alveolar airspace disease is seen in the lower lobes bilaterally, with areas of  dense consolidation measuring up to 7 cm in greatest dimension.  Patchy alveolar airspace disease is seen in the upper lobes.  Mediastinal windows reveal no mediastinal, hilar, or axillary adenopathy.  Calcifications are seen in the aortic valve and in the coronary arteries.  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  CONTINUED ON NEXT PAGE...        Impression:   CT scan of the chest with IV contrast demonstrating no findings of PE.  Extensive alveolar airspace disease, with areas of dense consolidation in the lower lobes consistent with bacterial pneumonia.  Coronary artery calcifications.  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     BRADLEY BROWN MD       Electronically Signed and Approved By: BRADLEY BROWN MD on 3/06/2023 at 20:41             3/7 echocardiogram EF 56 to 60%, grade 1 diastolic dysfunction, severe aortic stenosis, RVSP less than 35    Assessment & Plan   Assessment / Plan     Active Hospital Problems:  Active Hospital Problems    Diagnosis    • **Pneumonia of right lower lobe due to infectious organism      Impression:   Acute hypoxic respiratory failure  Multifocal community-acquired pneumonia of unspecified organism  Issues with airway clearance/mucous plugging  Lactic acidosis  Possible syncope  NSTEMI: Likely demand ischemia  Severe aortic stenosis  Remote tobacco use    Plan:   -Continue wean O2 to keep sats greater than 90%.  -Pro-Joe 3.5.  Flu and COVID negative, MRSA PCR negative, blood cultures no growth at 24 hours. Pending sputum cultures, and urine for strep and Legionella.  Continue azithromycin and ceftriaxone.  Can de-escalate based on cultures.  -CT chest with dense basilar consolidation, atelectasis, mucous plugging.  Lidia these findings with patient and family at bedside. Discussed proceeding forward with bronchoscopy to send for cultures.  -Will take for bronchoscopy with airway inspection,  brushings, bronchoalveolar lavage.  I have discussed the risks of the procedure with the patient including pneumothorax, hemothorax, bleeding, hypoxia, required mechanical ventilation and death. The patient recognizes these findings, acknowledges these findings and is agreeable to the procedure.  -N.p.o. after midnight.  -We will start on aggressive airway clearance with chest percussion and 3% saline nebs.  -We will start on Brovana, Pulmicort, and DuoNebs.  Continue bronchopulmonary hygiene.  Encourage I-S and flutter valve.  -Echocardiogram EF 56 to 60%, grade 1 diastolic dysfunction, severe aortic stenosis, RVSP less than 35.  -Cardiology on board and appreciate assistance.  -Speech therapy on board appreciate assistance.  Delayed swallowing noted with increased risk of aspiration.  Has been cleared for mechanical soft diet with nectar thickened liquids.  -Started on aspiration precautions.  Keep head of bed elevated.  -Encourage mobilization.  Out of bed in chair.    DVT prophylaxis:  Medical DVT prophylaxis orders are present.    CODE STATUS:   Level Of Support Discussed With: Patient  Code Status (Patient has no pulse and is not breathing): CPR (Attempt to Resuscitate)  Medical Interventions (Patient has pulse or is breathing): Full Support    I personally reviewed pertinent labs, imaging and provider notes. Discussed with bedside nurse and will discuss with primary service.     Electronically signed by GENE Parada, 03/07/23, 12:00 PM EST.    This visit was performed by BOTH a physician and an APC. I personally evaluated and examined the patient. I performed all aspects of MDM as documented. , I have reviewed and confirmed the accuracy of the patient's history as documented in this note. and I have reexamined the patient and the results are consistent with the previously documented exam. I have updated the documentation as necessary.     Electronically signed by Jason Rowley MD, 03/07/23, 3:20 PM  EST.   Electronically signed by Jason Rowley MD, 3/7/2023, 07:10 EST.

## 2023-03-07 NOTE — PROGRESS NOTES
Date of service: 3/7/2023    Subjective: Breathing is better.  No chest pain, palpitation or lightheadedness.    Review of systems: Negative for headache, vertigo, nausea, vomiting, abdominal pain, fever, chills, TIA or CVA-like symptoms.    Physical examination: He was in no pain or respiratory distress.  Vital signs: Reviewed  HEENT: No JVD or carotid bruit.  No thyromegaly  Neck: No JVD or carotid bruit.  No thyromegaly.  Chest:  Decreased rhonchi bilaterally.  Cardiac: RRR.  II/IV systolic murmur over the right and left sternal border.  No S3 gallop.  Distant S1-S2.  Abdomen: Soft and nontender.  No megalies or masses.  Extremities: No edema, cyanosis or clubbing.  Pulse intact.  Neuro: Alert, oriented x3, no facial droop and speech was clear     Records: Reviewed     Assessment and plan:     1.  Syncope, may be vasovagal and/or severe aortic stenosis.   2.  Acute hypoxemic respiratory failure due to #3  3.  Bilateral pneumonia.  Continue IV antibiotics.  4.  Reactive sinus tachycardia, secondary to the above.  5.  Considering his clinical presentation, lacking of ischemic changes on the EKG or angina pectoralis, elevated troponin could be due to demand ischemia.  6.  Continue IV antibiotics and pulmonary management  7.  Discussed with the patient and family the echo results.  Further investigation for severe aortic stenosis such as LAI and cardiac catheterization prior to AVR was discussed with them.  This will be done after he clinically improves.

## 2023-03-07 NOTE — THERAPY TREATMENT NOTE
Acute Care - Speech Language Pathology   Swallow Treatment Note RYLIE Mcrae     Patient Name: Buzz Lopez  : 1945  MRN: [id number]  Today's Date: 3/7/2023               Admit Date: 3/6/2023    Visit Dx:     ICD-10-CM ICD-9-CM   1. Pneumonia of right lower lobe due to infectious organism  J18.9 486   2. Sepsis with acute hypoxic respiratory failure without septic shock, due to unspecified organism (HCC)  A41.9 038.9    R65.20 995.91    J96.01 518.81   3. Acute respiratory failure with hypoxia ([facility])  J96.01 518.81   4. Difficulty walking  R26.2 719.7   5. Oropharyngeal dysphagia  R13.12 787.22     Patient Active Problem List   Diagnosis   • Pneumonia of right lower lobe due to infectious organism     History reviewed. No pertinent past medical history.  Past Surgical History:   Procedure Laterality Date   • EYE SURGERY         SPEECH PATHOLOGY DYSPHAGIA TREATMENT    Subjective/Behavioral Observations: Alert and cooperative, sitting up in bed, feeding self.  Notes nursing and family report choking episode at a.m. meal.  Patient had ground sausage.       Day/time of Treatment: 3/7/2023      Current Diet: Mechanical soft, nectar thick      Current Strategies:One-on-one supervision with feeding.  Alternate small bites and small single sips of liquid.  Medications whole in applesauce.      Treatment received: Dysphagia therapy to address swallow function through exercises and education of strategies.      Results of treatment: Patient taking sips of nectar thickened liquid with delayed swallow, minimal vocal wetness x1.  Trials of thicker puréed solids with laryngeal congestion noted, patient noted with very weak cough.  Patient taking trial with cake with peaches, coughing noted, patient eventually bringing up small pieces of peach with encouragement to cough.  Patient denying difficulty wanting to continue eating.  Patient family educated for risk of aspiration with continued trials of p.o.      Progress toward  goals: Minimal      Barriers to Achieving goals: Goal status      Plan of care:/changes in plan: Cannot recommend further safe p.o.  Recommend n.p.o. with alternative feeding until p.o. diet can be reestablished.                                        Anticipated Discharge Disposition (SLP): skilled nursing facility (03/07/23 1000)                                                 Plan of Care Reviewed With: patient, family          EDUCATION  The patient has been educated in the following areas:   NPO rationale.              Time Calculation:    Time Calculation- SLP     Row Name 03/07/23 1412 03/07/23 1000          Time Calculation- SLP    SLP Start Time -- 0645  -TB     SLP Stop Time 1315  -TB 0745  -TB     SLP Time Calculation (min) -- 60 min  -TB     SLP Received On 03/07/23  -TB 03/07/23  -TB        Untimed Charges    SLP Eval/Re-eval  -- ST Eval Oral Pharyng Swallow - 49637  -TB     12358-AE Eval Oral Pharyng Swallow Minutes -- 60  -TB     38628-FT Treatment Swallow Minutes 40  -TB --        Total Minutes    Untimed Charges Total Minutes 40  -TB 60  -TB      Total Minutes 40  -TB 60  -TB           User Key  (r) = Recorded By, (t) = Taken By, (c) = Cosigned By    Initials Name Provider Type    TB Terri Gomez SLP Speech and Language Pathologist                Therapy Charges for Today     Code Description Service Date Service Provider Modifiers Qty    29146736136 HC ST EVAL ORAL PHARYNG SWALLOW 4 3/7/2023 Terri Gomez SLP GN 1    68873023520 HC ST TREATMENT SWALLOW 3 3/7/2023 Terri Gomez SLP GN 1               JANAY Julio  3/7/2023

## 2023-03-07 NOTE — CONSULTS
"Nutrition Services    Patient Name: Buzz Lopez  YOB: 1945  MRN: [id number]  Admission date: 3/6/2023      CLINICAL NUTRITION ASSESSMENT      Reason for Assessment  Tube feeding consult     H&P:    History reviewed. No pertinent past medical history.     Current Problems:   Active Hospital Problems    Diagnosis    • **Pneumonia of right lower lobe due to infectious organism    • Sepsis with acute hypoxic respiratory failure without septic shock (HCC)         Nutrition/Diet History         Narrative     Speech recommends pt to be NPO with alternative feeding. Pt noted to have sepsis with acute respiratory failure without septic shock, pneumonia. Glucose 164. Magnesium, phosphorus and potassium currently wnl. Sodium 129.  Skin notes reviewed. Visited pt. Performed a NFPE. Pt meets criteria for severe malnutrition. See results below. Possibly at risk for refeeding syndrome: monitor. Will order Diabetisource AC tube feeding since patient currently has elevated glucose (can re-evaluate tube feeding type later based on labs.). See tube feeding recommendations below.        Anthropometrics        Current Height, Weight Height: 177.8 cm (70\")  Weight: 61.2 kg (135 lb)   Current BMI Body mass index is 19.37 kg/m².       Weight Hx  Wt Readings from Last 30 Encounters:   03/06/23 0824 61.2 kg (135 lb)   06/09/22 0154 61.4 kg (135 lb 5.8 oz)            Wt Change Observation Wt stable 6  Months (135# per two recorded weights)     Estimated/Assessed Needs       Energy Requirements 30-35 kcal/kg actual wt   EST Needs (kcal/day) 0545-6121       Protein Requirements 1.2-1.5 g/kg    EST Daily Needs (g/day) 73-91 g       Fluid Requirements 1 ml/kcal    Estimated Needs (mL/day) 8745-5352 (adjust as needed:  Sodium lab is 129 currently)     Labs/Medications         Pertinent Labs Reviewed.   Results from last 7 days   Lab Units 03/06/23  2353 03/06/23  0916 03/06/23  0825   SODIUM mmol/L 129* 133*  --    SODIUM, " ARTERIAL mmol/L  --   --  134.4*   POTASSIUM mmol/L 4.8 4.6  --    CHLORIDE mmol/L 96* 93*  --    CO2 mmol/L 23.7 24.0  --    BUN mg/dL 30* 26*  --    CREATININE mg/dL 0.81 0.88  --    CALCIUM mg/dL 8.8 9.4  --    BILIRUBIN mg/dL 0.4 0.7  --    ALK PHOS U/L 77 91  --    ALT (SGPT) U/L 16 13  --    AST (SGOT) U/L 53* 26  --    GLUCOSE mg/dL 164* 282*  --    GLUCOSE, ARTERIAL mg/dL  --   --  293*     Results from last 7 days   Lab Units 03/07/23  0636 03/06/23  2353   MAGNESIUM mg/dL  --  2.1   PHOSPHORUS mg/dL  --  3.2   HEMOGLOBIN g/dL 11.8*  --    HEMATOCRIT % 34.3*  --    TRIGLYCERIDES mg/dL  --  50     COVID19   Date Value Ref Range Status   03/06/2023 Not Detected Not Detected - Ref. Range Final     No results found for: HGBA1C      Pertinent Medications Reviewed.     Current Nutrition Orders & Evaluation of Intake       Oral Nutrition     Current PO Diet NPO Diet NPO Type: Strict NPO  NPO Diet NPO Type: Strict NPO   Supplement Orders Placed This Encounter      Place Feeding Tube Per [employer] System       Malnutrition Severity Assessment      Patient meets criteria for : Severe Malnutrition  Malnutrition Type (last 8 hours)     Malnutrition Severity Assessment     Row Name 03/07/23 1636       Malnutrition Severity Assessment    Malnutrition Type Chronic Disease - Related Malnutrition    Row Name 03/07/23 1637       Muscle Loss    Loss of Muscle Mass Findings Severe    Rastafarian Region Severe - deep hollowing/scooping, lack of muscle to touch, facial bones well defined    Clavicle Bone Region Severe - protruding prominent bone    Acromion Bone Region Severe - squared shoulders, bones, and acromion process protrusion prominent    Scapular Bone Region Severe - prominent bones, depressions easily visible between ribs, scapula, spine, shoulders    Dorsal Hand Region Severe - prominent depression    Patellar Region Severe - prominent bone, square looking, very little muscle definition    Anterior Thigh Region Severe -  line/depression along thigh, obviously thin    Posterior Calf Region Severe - thin with very little definition/firmness    Row Name 03/07/23 1637       Fat Loss    Subcutaneous Fat Loss Findings Severe    Orbital Region  Severe - pronounced hollowness/depression, dark circles, loose saggy skin    Upper Arm Region Severe - mostly skin, very little space between folds, fingers touch    Row Name 03/07/23 1637       Criteria Met (Must meet criteria for severity in at least 2 of these categories: M Wasting, Fat Loss, Fluid, Secondary Signs, Wt. Status, Intake)    Patient meets criteria for  Severe Malnutrition                   Nutrition Diagnosis         Nutrition Dx Problem 1 Severe malnutrition related to Inability to consume sufficient energy as evidenced by body composition changes., SLP/swallow eval and NPO, to receive tube feeding       Nutrition Intervention         Diabetisource AC  Day 1: initiate at 25 ml/hr   Water flushes: 25 ml every 4 hours  Monitor for RFS    Day 2: advance to 50 ml/hr  Monitor for RFS    Day 3: advance to 65 ml/hr (goal rate)  Monitor for RFS    Can increase rate if labs are wnl    Adjust free water flushes based on labs/hydration needs.    Tube feeding goal rate will provide:  1872 calories, 102 g protein, 1279 ml free water plus flushes   150 ml = 1429 ml free water     Medical Nutrition Therapy/Nutrition Education          Learner     Readiness Patient  Acceptance     Method     Response Explanation  Verbalizes understanding     Monitor/Evaluation        Monitor I&O, Pertinent labs, EN delivery/tolerance, Weight, Skin status, GI status, Swallow function, Hemodynamic stability, RFS       Nutrition Discharge Plan         To be determined       Electronically signed by:  Hien Delvalle RD  03/07/23 16:39 EST

## 2023-03-08 ENCOUNTER — ANESTHESIA (OUTPATIENT)
Dept: GASTROENTEROLOGY | Facility: HOSPITAL | Age: 78
DRG: 853 | End: 2023-03-08
Payer: COMMERCIAL

## 2023-03-08 ENCOUNTER — ANESTHESIA EVENT (OUTPATIENT)
Dept: GASTROENTEROLOGY | Facility: HOSPITAL | Age: 78
DRG: 853 | End: 2023-03-08
Payer: COMMERCIAL

## 2023-03-08 PROBLEM — E43 SEVERE MALNUTRITION (HCC): Status: ACTIVE | Noted: 2023-03-08

## 2023-03-08 LAB
ACB CMPLX DNA BAL NAA+NON-PRB-NCNCRNG: NOT DETECTED
ALBUMIN SERPL-MCNC: 3.4 G/DL (ref 3.5–5.2)
ALBUMIN/GLOB SERPL: 0.9 G/DL
ALP SERPL-CCNC: 74 U/L (ref 39–117)
ALT SERPL W P-5'-P-CCNC: 27 U/L (ref 1–41)
ANION GAP SERPL CALCULATED.3IONS-SCNC: 11.4 MMOL/L (ref 5–15)
AST SERPL-CCNC: 54 U/L (ref 1–40)
BASOPHILS # BLD AUTO: 0.01 10*3/MM3 (ref 0–0.2)
BASOPHILS NFR BLD AUTO: 0.1 % (ref 0–1.5)
BILIRUB SERPL-MCNC: 0.3 MG/DL (ref 0–1.2)
BLACTX-M ISLT/SPM QL: ABNORMAL
BLAIMP ISLT/SPM QL: ABNORMAL
BLAKPC ISLT/SPM QL: ABNORMAL
BLAOXA-48-LIKE ISLT/SPM QL: ABNORMAL
BLAVIM ISLT/SPM QL: ABNORMAL
BUN SERPL-MCNC: 26 MG/DL (ref 8–23)
BUN/CREAT SERPL: 44.1 (ref 7–25)
C PNEUM DNA NPH QL NAA+NON-PROBE: NOT DETECTED
CALCIUM SPEC-SCNC: 9.2 MG/DL (ref 8.6–10.5)
CHLORIDE SERPL-SCNC: 98 MMOL/L (ref 98–107)
CO2 SERPL-SCNC: 27.6 MMOL/L (ref 22–29)
CREAT SERPL-MCNC: 0.59 MG/DL (ref 0.76–1.27)
DEPRECATED RDW RBC AUTO: 43.4 FL (ref 37–54)
E CLOAC COMP DNA BAL NAA+NON-PRB-NCNCRNG: NOT DETECTED
E COLI DNA BAL NAA+NON-PRB-NCNCRNG: NOT DETECTED
EGFRCR SERPLBLD CKD-EPI 2021: 98.7 ML/MIN/1.73
EOSINOPHIL # BLD AUTO: 0 10*3/MM3 (ref 0–0.4)
EOSINOPHIL NFR BLD AUTO: 0 % (ref 0.3–6.2)
ERYTHROCYTE [DISTWIDTH] IN BLOOD BY AUTOMATED COUNT: 13.4 % (ref 12.3–15.4)
FLUAV SUBTYP SPEC NAA+PROBE: NOT DETECTED
FLUBV RNA ISLT QL NAA+PROBE: NOT DETECTED
GLOBULIN UR ELPH-MCNC: 3.9 GM/DL
GLUCOSE BLDC GLUCOMTR-MCNC: 247 MG/DL (ref 70–99)
GLUCOSE SERPL-MCNC: 183 MG/DL (ref 65–99)
GP B STREP DNA BAL NAA+NON-PRB-NCNCRNG: NOT DETECTED
HADV DNA SPEC NAA+PROBE: NOT DETECTED
HAEM INFLU DNA BAL NAA+NON-PRB-NCNCRNG: NOT DETECTED
HCOV RNA LOWER RESP QL NAA+NON-PROBE: NOT DETECTED
HCT VFR BLD AUTO: 36 % (ref 37.5–51)
HGB BLD-MCNC: 12 G/DL (ref 13–17.7)
HMPV RNA NPH QL NAA+NON-PROBE: NOT DETECTED
HPIV RNA LOWER RESP QL NAA+NON-PROBE: NOT DETECTED
IMM GRANULOCYTES # BLD AUTO: 0.01 10*3/MM3 (ref 0–0.05)
IMM GRANULOCYTES NFR BLD AUTO: 0.1 % (ref 0–0.5)
K AEROGENES DNA BAL NAA+NON-PRB-NCNCRNG: NOT DETECTED
K OXYTOCA DNA BAL NAA+NON-PRB-NCNCRNG: NOT DETECTED
K PNEU GRP DNA BAL NAA+NON-PRB-NCNCRNG: NOT DETECTED
L PNEUMO DNA LOWER RESP QL NAA+NON-PROBE: NOT DETECTED
LYMPHOCYTES # BLD AUTO: 0.54 10*3/MM3 (ref 0.7–3.1)
LYMPHOCYTES NFR BLD AUTO: 7.9 % (ref 19.6–45.3)
LYMPHOCYTES NFR FLD MANUAL: 4 %
M CATARRHALIS DNA BAL NAA+NON-PRB-NCNCRNG: NOT DETECTED
M PNEUMO IGG SER IA-ACNC: NOT DETECTED
MAGNESIUM SERPL-MCNC: 2.1 MG/DL (ref 1.6–2.4)
MCH RBC QN AUTO: 29.3 PG (ref 26.6–33)
MCHC RBC AUTO-ENTMCNC: 33.3 G/DL (ref 31.5–35.7)
MCV RBC AUTO: 88 FL (ref 79–97)
MECA+MECC ISLT/SPM QL: ABNORMAL
MONOCYTES # BLD AUTO: 0.7 10*3/MM3 (ref 0.1–0.9)
MONOCYTES NFR BLD AUTO: 10.3 % (ref 5–12)
NDM GENE: ABNORMAL
NEUTROPHILS NFR BLD AUTO: 5.56 10*3/MM3 (ref 1.7–7)
NEUTROPHILS NFR BLD AUTO: 81.6 % (ref 42.7–76)
NEUTROPHILS NFR FLD MANUAL: 96 %
NRBC BLD AUTO-RTO: 0 /100 WBC (ref 0–0.2)
P AERUGINOSA DNA BAL NAA+NON-PRB-NCNCRNG: NOT DETECTED
PHOSPHATE SERPL-MCNC: 2.6 MG/DL (ref 2.5–4.5)
PLATELET # BLD AUTO: 184 10*3/MM3 (ref 140–450)
PMV BLD AUTO: 10.6 FL (ref 6–12)
POTASSIUM SERPL-SCNC: 4.1 MMOL/L (ref 3.5–5.2)
PROT SERPL-MCNC: 7.3 G/DL (ref 6–8.5)
PROTEUS SP DNA BAL NAA+NON-PRB-NCNCRNG: NOT DETECTED
RBC # BLD AUTO: 4.09 10*6/MM3 (ref 4.14–5.8)
RHINOVIRUS RNA SPEC NAA+PROBE: DETECTED
RSV RNA NPH QL NAA+NON-PROBE: NOT DETECTED
S AUREUS DNA BAL NAA+NON-PRB-NCNCRNG: NOT DETECTED
S MARCESCENS DNA BAL NAA+NON-PRB-NCNCRNG: NOT DETECTED
S PNEUM DNA BAL NAA+NON-PRB-NCNCRNG: DETECTED
S PYO DNA BAL NAA+NON-PRB-NCNCRNG: NOT DETECTED
SODIUM SERPL-SCNC: 137 MMOL/L (ref 136–145)
VISUAL PRESENCE OF BLOOD: PRESENT
WBC NRBC COR # BLD: 6.82 10*3/MM3 (ref 3.4–10.8)

## 2023-03-08 PROCEDURE — 25010000002 PROPOFOL 10 MG/ML EMULSION: Performed by: NURSE ANESTHETIST, CERTIFIED REGISTERED

## 2023-03-08 PROCEDURE — 87070 CULTURE OTHR SPECIMN AEROBIC: CPT | Performed by: INTERNAL MEDICINE

## 2023-03-08 PROCEDURE — 99233 SBSQ HOSP IP/OBS HIGH 50: CPT | Performed by: INTERNAL MEDICINE

## 2023-03-08 PROCEDURE — 89051 BODY FLUID CELL COUNT: CPT | Performed by: INTERNAL MEDICINE

## 2023-03-08 PROCEDURE — 88108 CYTOPATH CONCENTRATE TECH: CPT | Performed by: INTERNAL MEDICINE

## 2023-03-08 PROCEDURE — 87102 FUNGUS ISOLATION CULTURE: CPT | Performed by: INTERNAL MEDICINE

## 2023-03-08 PROCEDURE — 0B9F8ZX DRAINAGE OF RIGHT LOWER LUNG LOBE, VIA NATURAL OR ARTIFICIAL OPENING ENDOSCOPIC, DIAGNOSTIC: ICD-10-PCS | Performed by: INTERNAL MEDICINE

## 2023-03-08 PROCEDURE — 87116 MYCOBACTERIA CULTURE: CPT | Performed by: INTERNAL MEDICINE

## 2023-03-08 PROCEDURE — 87071 CULTURE AEROBIC QUANT OTHER: CPT | Performed by: INTERNAL MEDICINE

## 2023-03-08 PROCEDURE — 88312 SPECIAL STAINS GROUP 1: CPT | Performed by: INTERNAL MEDICINE

## 2023-03-08 PROCEDURE — 87633 RESP VIRUS 12-25 TARGETS: CPT | Performed by: INTERNAL MEDICINE

## 2023-03-08 PROCEDURE — 85025 COMPLETE CBC W/AUTO DIFF WBC: CPT | Performed by: INTERNAL MEDICINE

## 2023-03-08 PROCEDURE — 87205 SMEAR GRAM STAIN: CPT | Performed by: INTERNAL MEDICINE

## 2023-03-08 PROCEDURE — 92526 ORAL FUNCTION THERAPY: CPT

## 2023-03-08 PROCEDURE — 83735 ASSAY OF MAGNESIUM: CPT | Performed by: INTERNAL MEDICINE

## 2023-03-08 PROCEDURE — 94799 UNLISTED PULMONARY SVC/PX: CPT

## 2023-03-08 PROCEDURE — 80053 COMPREHEN METABOLIC PANEL: CPT | Performed by: INTERNAL MEDICINE

## 2023-03-08 PROCEDURE — 87206 SMEAR FLUORESCENT/ACID STAI: CPT | Performed by: INTERNAL MEDICINE

## 2023-03-08 PROCEDURE — 87205 SMEAR GRAM STAIN: CPT | Performed by: STUDENT IN AN ORGANIZED HEALTH CARE EDUCATION/TRAINING PROGRAM

## 2023-03-08 PROCEDURE — 87070 CULTURE OTHR SPECIMN AEROBIC: CPT | Performed by: STUDENT IN AN ORGANIZED HEALTH CARE EDUCATION/TRAINING PROGRAM

## 2023-03-08 PROCEDURE — 94668 MNPJ CHEST WALL SBSQ: CPT

## 2023-03-08 PROCEDURE — 25010000002 AZITHROMYCIN PER 500 MG: Performed by: STUDENT IN AN ORGANIZED HEALTH CARE EDUCATION/TRAINING PROGRAM

## 2023-03-08 PROCEDURE — 25010000002 CEFTRIAXONE PER 250 MG: Performed by: STUDENT IN AN ORGANIZED HEALTH CARE EDUCATION/TRAINING PROGRAM

## 2023-03-08 PROCEDURE — 94664 DEMO&/EVAL PT USE INHALER: CPT

## 2023-03-08 PROCEDURE — 31645 BRNCHSC W/THER ASPIR 1ST: CPT | Performed by: INTERNAL MEDICINE

## 2023-03-08 PROCEDURE — 31624 DX BRONCHOSCOPE/LAVAGE: CPT | Performed by: INTERNAL MEDICINE

## 2023-03-08 PROCEDURE — 84100 ASSAY OF PHOSPHORUS: CPT | Performed by: INTERNAL MEDICINE

## 2023-03-08 PROCEDURE — 0BCF8ZZ EXTIRPATION OF MATTER FROM RIGHT LOWER LUNG LOBE, VIA NATURAL OR ARTIFICIAL OPENING ENDOSCOPIC: ICD-10-PCS | Performed by: INTERNAL MEDICINE

## 2023-03-08 RX ORDER — SODIUM CHLORIDE, SODIUM LACTATE, POTASSIUM CHLORIDE, CALCIUM CHLORIDE 600; 310; 30; 20 MG/100ML; MG/100ML; MG/100ML; MG/100ML
INJECTION, SOLUTION INTRAVENOUS CONTINUOUS PRN
Status: DISCONTINUED | OUTPATIENT
Start: 2023-03-08 | End: 2023-03-08 | Stop reason: SURG

## 2023-03-08 RX ORDER — LIDOCAINE HYDROCHLORIDE 40 MG/ML
INJECTION, SOLUTION RETROBULBAR; TOPICAL AS NEEDED
Status: DISCONTINUED | OUTPATIENT
Start: 2023-03-08 | End: 2023-03-08 | Stop reason: HOSPADM

## 2023-03-08 RX ORDER — SODIUM CHLORIDE, SODIUM LACTATE, POTASSIUM CHLORIDE, CALCIUM CHLORIDE 600; 310; 30; 20 MG/100ML; MG/100ML; MG/100ML; MG/100ML
30 INJECTION, SOLUTION INTRAVENOUS CONTINUOUS
Status: DISCONTINUED | OUTPATIENT
Start: 2023-03-08 | End: 2023-03-10

## 2023-03-08 RX ORDER — KETAMINE HCL IN NACL, ISO-OSM 100MG/10ML
SYRINGE (ML) INJECTION AS NEEDED
Status: DISCONTINUED | OUTPATIENT
Start: 2023-03-08 | End: 2023-03-08 | Stop reason: SURG

## 2023-03-08 RX ORDER — PROPOFOL 10 MG/ML
VIAL (ML) INTRAVENOUS AS NEEDED
Status: DISCONTINUED | OUTPATIENT
Start: 2023-03-08 | End: 2023-03-08 | Stop reason: SURG

## 2023-03-08 RX ORDER — PHENYLEPHRINE HCL IN 0.9% NACL 1 MG/10 ML
SYRINGE (ML) INTRAVENOUS AS NEEDED
Status: DISCONTINUED | OUTPATIENT
Start: 2023-03-08 | End: 2023-03-08 | Stop reason: SURG

## 2023-03-08 RX ORDER — IPRATROPIUM BROMIDE AND ALBUTEROL SULFATE 2.5; .5 MG/3ML; MG/3ML
3 SOLUTION RESPIRATORY (INHALATION) EVERY 4 HOURS PRN
Status: DISCONTINUED | OUTPATIENT
Start: 2023-03-08 | End: 2023-03-08

## 2023-03-08 RX ORDER — LIDOCAINE HYDROCHLORIDE 20 MG/ML
INJECTION, SOLUTION EPIDURAL; INFILTRATION; INTRACAUDAL; PERINEURAL AS NEEDED
Status: DISCONTINUED | OUTPATIENT
Start: 2023-03-08 | End: 2023-03-08 | Stop reason: SURG

## 2023-03-08 RX ORDER — IPRATROPIUM BROMIDE AND ALBUTEROL SULFATE 2.5; .5 MG/3ML; MG/3ML
3 SOLUTION RESPIRATORY (INHALATION)
Status: DISCONTINUED | OUTPATIENT
Start: 2023-03-08 | End: 2023-03-10

## 2023-03-08 RX ADMIN — Medication 100 MCG: at 09:22

## 2023-03-08 RX ADMIN — Medication 5 MG: at 09:09

## 2023-03-08 RX ADMIN — BUDESONIDE 0.5 MG: 0.5 INHALANT ORAL at 18:43

## 2023-03-08 RX ADMIN — ARFORMOTEROL TARTRATE 15 MCG: 15 SOLUTION RESPIRATORY (INHALATION) at 06:51

## 2023-03-08 RX ADMIN — Medication 100 MCG: at 09:19

## 2023-03-08 RX ADMIN — PANTOPRAZOLE SODIUM 40 MG: 40 INJECTION, POWDER, FOR SOLUTION INTRAVENOUS at 05:48

## 2023-03-08 RX ADMIN — Medication 4 ML: at 18:43

## 2023-03-08 RX ADMIN — IPRATROPIUM BROMIDE AND ALBUTEROL SULFATE 3 ML: .5; 2.5 SOLUTION RESPIRATORY (INHALATION) at 06:51

## 2023-03-08 RX ADMIN — BUDESONIDE 0.5 MG: 0.5 INHALANT ORAL at 06:51

## 2023-03-08 RX ADMIN — Medication 10 MG: at 09:06

## 2023-03-08 RX ADMIN — CEFTRIAXONE SODIUM 1 G: 1 INJECTION, SOLUTION INTRAVENOUS at 10:24

## 2023-03-08 RX ADMIN — ASPIRIN 81 MG 81 MG: 81 TABLET ORAL at 10:24

## 2023-03-08 RX ADMIN — Medication 4 ML: at 06:51

## 2023-03-08 RX ADMIN — AZITHROMYCIN DIHYDRATE 500 MG: 500 INJECTION, POWDER, LYOPHILIZED, FOR SOLUTION INTRAVENOUS at 08:01

## 2023-03-08 RX ADMIN — IPRATROPIUM BROMIDE AND ALBUTEROL SULFATE 3 ML: .5; 2.5 SOLUTION RESPIRATORY (INHALATION) at 18:43

## 2023-03-08 RX ADMIN — PROPOFOL 50 MG: 10 INJECTION, EMULSION INTRAVENOUS at 09:09

## 2023-03-08 RX ADMIN — SODIUM CHLORIDE, POTASSIUM CHLORIDE, SODIUM LACTATE AND CALCIUM CHLORIDE: 600; 310; 30; 20 INJECTION, SOLUTION INTRAVENOUS at 09:00

## 2023-03-08 RX ADMIN — Medication 10 ML: at 08:02

## 2023-03-08 RX ADMIN — LIDOCAINE HYDROCHLORIDE 100 MG: 20 INJECTION, SOLUTION EPIDURAL; INFILTRATION; INTRACAUDAL; PERINEURAL at 09:09

## 2023-03-08 RX ADMIN — ARFORMOTEROL TARTRATE 15 MCG: 15 SOLUTION RESPIRATORY (INHALATION) at 18:43

## 2023-03-08 RX ADMIN — Medication 5 MG: at 09:13

## 2023-03-08 RX ADMIN — IPRATROPIUM BROMIDE AND ALBUTEROL SULFATE 3 ML: .5; 2.5 SOLUTION RESPIRATORY (INHALATION) at 12:47

## 2023-03-08 RX ADMIN — IPRATROPIUM BROMIDE AND ALBUTEROL SULFATE 3 ML: .5; 2.5 SOLUTION RESPIRATORY (INHALATION) at 01:08

## 2023-03-08 RX ADMIN — PROPOFOL 120 MCG/KG/MIN: 10 INJECTION, EMULSION INTRAVENOUS at 09:09

## 2023-03-08 RX ADMIN — Medication 10 ML: at 21:34

## 2023-03-08 NOTE — OP NOTE
Bronchoscopy Procedure Note    Procedure:  Bronchoscopy with mucous plug removal  Bronchoscopy with bronchoalveolar lavage right lower lobe  Bronchoscopy with bronchial washings tracheobronchial tree  Airway inspection    Pre-Operative Diagnosis: Mucous plugging, difficulty with airway clearance, pneumonia    Post-Operative Diagnosis: Bilateral mucous plugging, pneumonia    Indication:  Mucous plugging with pneumonia, difficulty with airway clearance    Anesthesia: MAC anesthesia    Procedure Details: Patient was consented for the procedure with all risks and benefits of the procedure explained in detail. Patient was given the opportunity to ask questions and all concerns were answered.    The bronchoscope was inserted into the main airway via the mouth. An anatomical survey was done of the main airways and the subsegmental bronchus. The findings are reported below.  Significant secretions seen on vocal cord, some old food particles were seen.  It was suctioned out.  Once patient was intubated, was found to have significant mucus plugging bilaterally, more so in lower lobes, suctioned out in multiple attempts.  Some old food particles were seen in left lower lobe as well.  A bronchoalveolar lavage was performed using 60 mL aliquots of normal saline times instilled into the airways then aspirated back from right lower lobe, intersegment of lung with 50 mL serosanguineous fluid in return.  Bronchial washing was obtained from entire tracheobronchial tree.    Findings:  Bilateral significant mucus plugging, suctioned out in multiple attempts  Old food particles seen around back of vocal cord and a piece in left lower lobe  Friable mucosa, easy bleeding with suction  Scattered purulent secretions    Estimated Blood Loss: Insignificant    Specimens:  BAL right lower lobe  Bronchial washings tracheobronchial tree    Complications: None; patient tolerated the procedure well.    Disposition: To floor, once stable in  recovery.    Patient tolerated the procedure well.      Electronically signed by Jason Rowley MD, 3/8/2023, 09:20 EST.

## 2023-03-08 NOTE — THERAPY TREATMENT NOTE
Acute Care - Speech Language Pathology   Swallow Treatment Note RYLIE Mcrae     Patient Name: Buzz Lopez  : 1945  MRN: [id number]  Today's Date: 3/8/2023               Admit Date: 3/6/2023    Visit Dx:     ICD-10-CM ICD-9-CM   1. Pneumonia of right lower lobe due to infectious organism  J18.9 486   2. Sepsis with acute hypoxic respiratory failure without septic shock, due to unspecified organism (HCC)  A41.9 038.9    R65.20 995.91    J96.01 518.81   3. Acute respiratory failure with hypoxia (HCC)  J96.01 518.81   4. Difficulty walking  R26.2 719.7   5. Oropharyngeal dysphagia  R13.12 787.22   6. Decreased activities of daily living (ADL)  Z78.9 V49.89     Patient Active Problem List   Diagnosis   • Pneumonia of right lower lobe due to infectious organism   • Sepsis with acute hypoxic respiratory failure without septic shock (HCC)   • Severe malnutrition (HCC)     History reviewed. No pertinent past medical history.  Past Surgical History:   Procedure Laterality Date   • EYE SURGERY         SPEECH PATHOLOGY DYSPHAGIA TREATMENT     Subjective/Behavioral Observations: Alert and cooperative, sitting up in bed.    From interviewing with patient dysphagia appears to be with gradual onset.  Note bronchoscopy completed this date.     Day/time of Treatment: 3/8/2023        Current Diet:  N.p.o. with core track        Current Strategies: N/A     Treatment received: Dysphagia therapy to address swallow function through exercises and education of strategies.        Results of treatment:   Lingual/tongue base exercises completed with 2 sets each.  Lingual strength/range of motion 3 -/5.  Laryngeal ovation exercises with effortful swallow, 3 -/5.  At times with decreased coordination.  Exercises for improved cough with mod max assist.  At close of session patient noted with spontaneous cough which appears very weak with decreased vocal closure.  Patient did demonstrate good voicing when cued.  Trials of ice chips x10,  small sips of water x2.  Patient frequently noted with throat clear/vocal wetness.  Patient did appear to clear vocal wetness with cueing.  Patient not distinguishing change in voice and not associating cough/throat clear with intake of water.        Progress toward goals:  Patient actively participating in treatment.        Barriers to Achieving goals: Goal status        Plan of care:/changes in plan: Continue with current plan with patient to be n.p.o. with alternative feeding.                                                                                 Plan of Care Reviewed With: patient, family          EDUCATION  The patient has been educated in the following areas:   Dysphagia (Swallowing Impairment) NPO rationale.              Time Calculation:    Time Calculation- SLP     Row Name 03/08/23 1503 03/08/23 0947          Time Calculation- SLP    SLP Stop Time 1503  -TB --     SLP Received On 03/08/23  -TB --     SLP - Next Appointment -- 03/09/23  -TB        Untimed Charges    06833-PZ Treatment Swallow Minutes 45  -TB --        Total Minutes    Untimed Charges Total Minutes 45  -TB --      Total Minutes 45  -TB --           User Key  (r) = Recorded By, (t) = Taken By, (c) = Cosigned By    Initials Name Provider Type    TB Terri Gomez SLP Speech and Language Pathologist                Therapy Charges for Today     Code Description Service Date Service Provider Modifiers Qty    50346432162 HC ST EVAL ORAL PHARYNG SWALLOW 4 3/7/2023 Terri Gomez SLP GN 1    22058041270 HC ST TREATMENT SWALLOW 3 3/7/2023 Terri Gomez SLP GN 1    20031942676 HC ST TREATMENT SWALLOW 3 3/8/2023 Terri Gomez SLP GN 1               JANAY Julio  3/8/2023

## 2023-03-08 NOTE — CONSULTS
"Nutrition Services    Patient Name: Buzz Lopez  YOB: 1945  MRN: [id number]  Admission date: 3/6/2023      CLINICAL NUTRITION ASSESSMENT      Reason for Assessment  Tube feeding consult     H&P:    History reviewed. No pertinent past medical history.     Current Problems:   Active Hospital Problems    Diagnosis    • **Pneumonia of right lower lobe due to infectious organism    • Sepsis with acute hypoxic respiratory failure without septic shock (HCC)         Nutrition/Diet History         Narrative     Nutrition follow up      Cortrak placed yesterday, 3/7/23, and patient started on tube feeds.  Advancing slowly per RFS recommendations.      Patient tolerating enteral nutrition well at 25 ml/hr.  Electrolytes ok today.  Phos is down 18% from yesterday.  Will continue to monitor closely.      Will advance tube feeds today.       Anthropometrics        Current Height, Weight Height: 177.8 cm (70\")  Weight: 61.2 kg (135 lb)   Current BMI Body mass index is 19.37 kg/m².       Weight Hx  Wt Readings from Last 30 Encounters:   03/06/23 0824 61.2 kg (135 lb)   06/09/22 0154 61.4 kg (135 lb 5.8 oz)            Wt Change Observation Wt stable 6  Months (135# per two recorded weights)     Estimated/Assessed Needs       Energy Requirements 30-35 kcal/kg actual wt   EST Needs (kcal/day) 9669-4282       Protein Requirements 1.2-1.5 g/kg    EST Daily Needs (g/day) 73-91 g       Fluid Requirements 1 ml/kcal    Estimated Needs (mL/day) 1221-3853 (adjust as needed:  Sodium lab is 129 currently)     Labs/Medications         Pertinent Labs Reviewed.   Results from last 7 days   Lab Units 03/08/23  0406 03/06/23  2353 03/06/23  0916   SODIUM mmol/L 137 129* 133*   POTASSIUM mmol/L 4.1 4.8 4.6   CHLORIDE mmol/L 98 96* 93*   CO2 mmol/L 27.6 23.7 24.0   BUN mg/dL 26* 30* 26*   CREATININE mg/dL 0.59* 0.81 0.88   CALCIUM mg/dL 9.2 8.8 9.4   BILIRUBIN mg/dL 0.3 0.4 0.7   ALK PHOS U/L 74 77 91   ALT (SGPT) U/L 27 16 13   AST " (SGOT) U/L 54* 53* 26   GLUCOSE mg/dL 183* 164* 282*     Results from last 7 days   Lab Units 03/08/23  0406 03/07/23  0636 03/06/23  2353   MAGNESIUM mg/dL 2.1  --  2.1   PHOSPHORUS mg/dL 2.6  --  3.2   HEMOGLOBIN g/dL 12.0*   < >  --    HEMATOCRIT % 36.0*   < >  --    TRIGLYCERIDES mg/dL  --   --  50    < > = values in this interval not displayed.     COVID19   Date Value Ref Range Status   03/06/2023 Not Detected Not Detected - Ref. Range Final     No results found for: HGBA1C      Pertinent Medications Reviewed.     Current Nutrition Orders & Evaluation of Intake       Oral Nutrition     Current PO Diet NPO Diet NPO Type: Strict NPO   Supplement Orders Placed This Encounter      Place Feeding Tube Per [employer] System      Diet, Tube Feeding Tube Feeding Formula: Diabetisource AC (Glucerna 1.2); Tube Feeding Type: Continuous; Continuous Tube Feeding Start Rate (mL/hr): 25; Then Advance Rate By (mL/hr): RD To Manage; Every __ Hours: Patient at Goal Rate; To Goal Rate...       Malnutrition Severity Assessment      Patient meets criteria for : Severe Malnutrition         Nutrition Diagnosis         Nutrition Dx Problem 1 Severe malnutrition related to Inability to consume sufficient energy as evidenced by body composition changes., SLP/swallow eval and NPO, to receive tube feeding       Nutrition Intervention         Day 2:   Diabetisource AC @ 50 ml/hr  Free water flushes 25 ml every 4 hours  Provides 1440 kcal, 72 g pro, 1134 ml fluid     Day 3/Goal Rate:   Diabetisource AC @ 65 ml/hr (goal rate)  Free water flushes 25 ml every 4 hours.  Provides 1872 kcal, 94 g pro, 1429 ml fluid      Medical Nutrition Therapy/Nutrition Education          Learner     Readiness Patient  Acceptance     Method     Response Explanation  Verbalizes understanding     Monitor/Evaluation        Monitor I&O, Pertinent labs, EN delivery/tolerance, Weight, Skin status, GI status, Swallow function, Hemodynamic stability, RFS       Nutrition  Discharge Plan         To be determined       Electronically signed by:  Gauri Arana, JOHNATHAN  03/08/23 07:12 EST

## 2023-03-08 NOTE — ANESTHESIA PREPROCEDURE EVALUATION
Anesthesia Evaluation     Patient summary reviewed and Nursing notes reviewed   no history of anesthetic complications:  NPO Solid Status: > 8 hours  NPO Liquid Status: > 2 hours           Airway   Mallampati: II  TM distance: >3 FB  Neck ROM: full  No difficulty expected  Dental      Pulmonary - normal exam    breath sounds clear to auscultation  (+) pneumonia , a smoker Former,   Cardiovascular - negative cardio ROS  Exercise tolerance: good (4-7 METS)    Rhythm: irregular  Rate: normal      ROS comment: Normal echo    Neuro/Psych- negative ROS  GI/Hepatic/Renal/Endo - negative ROS     Musculoskeletal (-) negative ROS    Abdominal    Substance History - negative use     OB/GYN negative ob/gyn ROS         Other - negative ROS       ROS/Med Hx Other: PAT Nursing Notes unavailable.                   Anesthesia Plan    ASA 4     general       Anesthetic plan, risks, benefits, and alternatives have been provided, discussed and informed consent has been obtained with: patient.        CODE STATUS:    Level Of Support Discussed With: Patient  Code Status (Patient has no pulse and is not breathing): CPR (Attempt to Resuscitate)  Medical Interventions (Patient has pulse or is breathing): Full Support

## 2023-03-08 NOTE — ACP (ADVANCE CARE PLANNING)
discussed advanced care planning with patient.  Patient has completed advanced care plan.  Patient received original and copies.   has sent copy to medical records.

## 2023-03-08 NOTE — PROGRESS NOTES
Date of service: 3/8/2023    Subjective: His son is here.  Seen after coming back from bronchoscopy.  He is in no apparent distress or pain.    Physical examination: He was in no pain or respiratory distress.  Vital signs: Reviewed  HEENT: No JVD or carotid bruit.    Neck: No JVD or carotid bruit.    Chest:   Clearer.  Much less rhonchi.  Cardiac: RRR.  II/IV systolic murmur over the right and left sternal border.  No S3 gallop.  Distant S1-S2.  Abdomen: Soft and nontender.  No megalies or masses.  Extremities: No edema, cyanosis or clubbing.  Pulse intact.  Neuro: Still sedated.     Records: Reviewed.    Assessment and plan:      1.  Syncope, may be due to vasovagal etiology and/or severe aortic stenosis.   2.  Acute hypoxemic respiratory failure due to #3  3.  Bilateral pneumonia.  Continue IV antibiotics.  4.  Reactive sinus tachycardia, secondary to the above.  5.  Considering his clinical presentation, lacking of ischemic changes on the EKG or angina pectoralis, elevated troponin could be due to demand ischemia.  6.  Telemetry: Sinus rhythm.  He had an episode of possible atrial flutter       2-1 AV block at a fast ventricular rate.    7.  Status post bronchoscopy  8.  Discussed with his son his cardiac status.  The benefits and risks of LAI were explained to him.  He agreed to proceed.  We will discontinue Lovenox for the procedure

## 2023-03-08 NOTE — PROGRESS NOTES
[facility]   Hospitalist Progress Note  Date: 3/8/2023  Patient Name: Buzz Lopez  : 1945  MRN: [id number]  Date of admission: 3/6/2023    Subjective   Subjective     Chief Complaint:   Shortness of breath, confusion    Summary:   Buzz Lopez is a 77 y.o. male with no significant past medical history has been brought into the ED with concerns for confusion, possible syncope, shortness of breath.  Patient states that for the past 1 to 2 weeks patient has been having difficulty breathing and subjective fevers, it has got worse and today and he has called his son to take him to the ED as he is having difficulty breathing.  By the time patient's son has arrived at the home, patient appeared to be confused, generalized weakness and unable to put his clothes on.  While his son was helping him to get the clothes on, patient appeared to be very confused and had a possible syncope episode where he was unresponsive.  Patient's son has looked for the pulse and they could not feel radial pulse and started chest compressions, EMS was called.  By the time EMS has arrived patient was receiving chest compressions from the family members.  EMS has evaluated the patient and they could not feel a pulse.  Patient was in respiratory distress, saturating around 70% on room air.  Received nebulizer treatment by the EMS and the patient has been brought into the ED.    During my examination, patient is alert and oriented x3 and able to answer questions appropriately.  States that he does not remember how he came to the hospital.  Denies any chest pain, nausea, vomiting, focal weakness, numbness, tingling.  States that he has been having difficulty breathing for the past 1 to 2 weeks.  A month ago he had some sore throat.  Associated with cough, productive sputum. Patient is thin and frail.  Chronically has a poor p.o. intake.  Admits that he has low appetite.  No previously diagnosed history of dementia.  As per the  family members, patient takes a lot of over-the-counter fiber supplement review does not gain weight despite he eats well.    Interval Followup:   No acute events overnight, patient status post bronchoscopy with thick mucus removal today, food particles seen in airway.  Discussed at length with patient's son who is at bedside    Objective   Objective     Vitals:   Temp:  [96.7 °F (35.9 °C)-98.6 °F (37 °C)] 97.5 °F (36.4 °C)  Heart Rate:  [83-99] 88  Resp:  [15-20] 16  BP: ()/(50-70) 149/60  Flow (L/min):  [2-3] 2    Physical Exam   General appearance: NAD, conversant   Eyes: anicteric sclerae, moist conjunctivae; no lid-lag; PERRLA  HENT: Atraumatic; oropharynx clear with moist mucous membranes and no mucosal ulcerations; normal hard and soft palate  Neck: Trachea midline; FROM, supple, no thyromegaly or lymphadenopathy  Lungs: Bilateral rhonchi, with normal respiratory effort and no intercostal retractions    Result Review    Result Review:  I have personally reviewed the results as below  [x]  Laboratory  CBC    CBC 3/6/23 3/7/23 3/8/23   WBC 12.90 (A) 9.19 6.82   RBC 4.46 3.92 (A) 4.09 (A)   Hemoglobin 13.4 11.8 (A) 12.0 (A)   Hematocrit 39.9 34.3 (A) 36.0 (A)   MCV 89.5 87.5 88.0   MCH 30.0 30.1 29.3   MCHC 33.6 34.4 33.3   RDW 13.2 13.6 13.4   Platelets 169 186 184   (A) Abnormal value            CMP    CMP 6/9/22 3/6/23 3/6/23 3/6/23 3/8/23     0825 0916 2353    Glucose 225 (A) 293 (A) 282 (A) 164 (A) 183 (A)   BUN 17  26 (A) 30 (A) 26 (A)   Creatinine 0.92  0.88 0.81 0.59 (A)   eGFR 85.1  87.5 89.7 98.7   Sodium 130 (A) 134.4 (A) 133 (A) 129 (A) 137   Potassium 4.7  4.6 4.8 4.1   Chloride 93 (A)  93 (A) 96 (A) 98   Calcium 9.9  9.4 8.8 9.2   Total Protein 8.9 (A)  8.2 6.7 7.3   Albumin 4.60  3.8 2.8 (A) 3.4 (A)   Globulin 4.3  4.4 3.9 3.9   Total Bilirubin 0.5  0.7 0.4 0.3   Alkaline Phosphatase 108  91 77 74   AST (SGOT) 16  26 53 (A) 54 (A)   ALT (SGPT) 12  13 16 27   Albumin/Globulin Ratio 1.1  0.9  0.7 0.9   BUN/Creatinine Ratio 18.5  29.5 (A) 37.0 (A) 44.1 (A)   Anion Gap 12.6  16.0 (A) 9.3 11.4   (A) Abnormal value       Comments are available for some flowsheets but are not being displayed.           []  Microbiology  []  Radiology  []  EKG/Telemetry   []  Cardiology/Vascular   []  Pathology  []  Old records  []  Other:    Assessment & Plan   Assessment / Plan     Assessment:  Sepsis due to right lower lobe pneumonia  Acute hypoxic respiratory failure  Questionable cardiac arrest episode  NSTEMI/acute myocardial injury  Possible syncope  Lactic acidosis  Malnutrition     Plan  Admit inpatient, telemetry  Cardiology consulted  Continue aspirin, monitor troponin  Uncertain if patient ever lost pulse  CT scan of the chest with contrast personally reviewed, and interpreted by me, negative for pulmonary embolism, does have bilateral lower lobe pneumonias consistent with bacterial infection  Patient status post bronchoscopy today with thick mucus removal, food particles seen around the vocal cords, culture sent, positive for strep pneumo  Discussed management plan with pulmonology  Follow-up on the cardiac echo, this is pending   continue ceftriaxone and azithromycin  Follow-up on the blood cultures, urine Legionella, urine strep, MRSA swab, influenza, COVID-19 negative, influenza negative  Continue albuterol nebulizer every 4 hours as needed  Continue bronchodilator protocol  Lactic acid reviewed  N.p.o. as patient has continued aspiration, core track in place, continue tube feeds, continue speech therapy consultation  Nutritionist consult ordered for tube feeds  Check CBC, CMP, mag and Phos in a.m., CBC, CMP reviewed today  PT OT consult     Reviewed patients labs and imaging, and discussed with patient and nurse at bedside.    DVT prophylaxis:  No DVT prophylaxis order currently exists.    CODE STATUS:   Level Of Support Discussed With: Patient  Code Status (Patient has no pulse and is not breathing): CPR  (Attempt to Resuscitate)  Medical Interventions (Patient has pulse or is breathing): Full Support        Electronically signed by Black Capps MD, 03/08/23, 4:33 PM EST.

## 2023-03-08 NOTE — ANESTHESIA POSTPROCEDURE EVALUATION
Patient: Buzz Lopez    Procedure Summary     Date: 03/08/23 Room / Location: [facility] ENDOSCOPY 3 / [facility] ENDOSCOPY    Anesthesia Start: 0900 Anesthesia Stop: 0929    Procedure: BRONCHOSCOPY WITH BRONCHOALVEOLAR LAVAGE, WASHINGS (Bronchus) Diagnosis:       Pneumonia of right lower lobe due to infectious organism      Sepsis with acute hypoxic respiratory failure without septic shock, due to unspecified organism (HCC)      (Pneumonia of right lower lobe due to infectious organism [J18.9])      (Sepsis with acute hypoxic respiratory failure without septic shock, due to unspecified organism (HCC) [A41.9, R65.20, J96.01])    Surgeons: Jason Rowley MD Provider: Vitaliy Yap MD    Anesthesia Type: general ASA Status: 4          Anesthesia Type: general    Vitals  Vitals Value Taken Time   BP 90/51 03/08/23 0945   Temp 36.3 °C (97.3 °F) 03/08/23 0945   Pulse 89 03/08/23 0946   Resp 18 03/08/23 0945   SpO2 98 % 03/08/23 0946   Vitals shown include unvalidated device data.        Post Anesthesia Care and Evaluation    Patient location during evaluation: bedside  Patient participation: complete - patient participated  Level of consciousness: awake  Pain management: adequate    Airway patency: patent  Anesthetic complications: No anesthetic complications  PONV Status: none  Cardiovascular status: acceptable and stable  Respiratory status: acceptable  Hydration status: acceptable    Comments: An Anesthesiologist personally participated in the most demanding procedures (including induction and emergence if applicable) in the anesthesia plan, monitored the course of anesthesia administration at frequent intervals and remained physically present and available for immediate diagnosis and treatment of emergencies.

## 2023-03-08 NOTE — PROGRESS NOTES
Pulmonary / Critical Care Progress Note      Patient Name: Buzz Lopez  : 1945  MRN: [id number]  Primary Care Physician:  Provider, No Known  Date of admission: 3/6/2023    Subjective   Subjective   Follow-up for pneumonia.    Over past 24 hours, continued on antibiotics with azithromycin and ceftriaxone.  Had a speech and swallow evaluation.  Patient remain NPO.  Cortrak was placed.  Agreeable for bronchoscopy.    No acute events overnight.     This morning,   Sleeping, arousable.  Has Corpak in place.  Tube feeds on hold.  Feels about the same  Shortness of breath is unchanged.  Dry hacking cough  Still has issues with clearing secretions.  Using I-S and flutter valve  No chest pain  No fever or chills    Review of Systems  General:  + Fatigue, No Fever.   HEENT: No dysphagia, No Visual Changes, no rhinorrhea  Respiratory:  + cough, + Dyspnea, no phlegm, No Pleuritic Pain, no wheezing, no hemoptysis  Cardiovascular: Denies chest pain, denies palpitations, + GARCIA, No Chest Pressure  Gastrointestinal:  No Abdominal Pain, No Nausea, No Vomiting, No Diarrhea  Genitourinary:  No Dysuria, No Frequency, No Hesitancy  Musculoskeletal: No muscle pain or swelling  Endocrine:  No Heat Intolerance, No Cold Intolerance  Hematologic:  No Bleeding, No Bruising  Neurologic:  No Confusion, No Headaches  Skin:  No Rash, No Open Wounds    Objective   Objective     Vitals:   Temp:  [96.7 °F (35.9 °C)-98.6 °F (37 °C)] 98.6 °F (37 °C)  Heart Rate:  [81-99] 83  Resp:  [16-18] 16  BP: (128-165)/(54-70) 143/59  Flow (L/min):  [2-3] 2    Physical Exam   Vital Signs Reviewed   General: Thin elderly male, Alert, NAD on 2 L NC, core track in place  HEENT:  PERRL, EOMI.  OP, nares clear, no sinus tenderness  Neck:  Supple, no JVD, no thyromegaly  Lymph: no axillary, cervical, supraclavicular lymphadenopathy noted bilaterally  Chest:  good aeration, crackles bibasilar, tympanic to percussion bilaterally, no work of breathing  noted  CV: RRR, no MGR, pulses 2+, equal  Abd:  Soft, NT, ND, + BS, no HSM  EXT:  no clubbing, no cyanosis, no edema, no joint tenderness, muscle wasting all 4 extremities  Neuro:  A&Ox3, CN grossly intact, no focal deficits  Skin: No rashes or lesions noted    Result Review    Result Review:  I have personally reviewed the results from the time of this admission to 3/8/2023 07:22 EST and agree with these findings:  [x]  Laboratory  [x]  Microbiology  [x]  Radiology  [x]  EKG/Telemetry   [x]  Cardiology/Vascular   []  Pathology  []  Old records  []  Other:  Most notable findings include:     3/6 cultures negative to date        Lab 03/08/23  0406 03/07/23  0636 03/06/23  2353 03/06/23  0916 03/06/23  0825   WBC 6.82 9.19  --  12.90*  --    HEMOGLOBIN 12.0* 11.8*  --  13.4  --    HEMATOCRIT 36.0* 34.3*  --  39.9  --    PLATELETS 184 186  --  169  --    SODIUM 137  --  129* 133*  --    SODIUM, ARTERIAL  --   --   --   --  134.4*   POTASSIUM 4.1  --  4.8 4.6  --    CHLORIDE 98  --  96* 93*  --    CO2 27.6  --  23.7 24.0  --    BUN 26*  --  30* 26*  --    CREATININE 0.59*  --  0.81 0.88  --    GLUCOSE 183*  --  164* 282*  --    GLUCOSE, ARTERIAL  --   --   --   --  293*   CALCIUM 9.2  --  8.8 9.4  --    PHOSPHORUS 2.6  --  3.2  --   --    TOTAL PROTEIN 7.3  --  6.7 8.2  --    ALBUMIN 3.4*  --  2.8* 3.8  --    GLOBULIN 3.9  --  3.9 4.4  --      CT Chest With Contrast Diagnostic    Result Date: 3/6/2023  PROCEDURE: CT CHEST W CONTRAST DIAGNOSTIC  COMPARISON:  [facility], CR, XR CHEST 1 VW, 3/06/2023, 8:38.  INDICATIONS: DYSPNEA ON EXERTION, WINDED, AND HYPOXIA  TECHNIQUE: CT images were obtained with non-ionic intravenous contrast material.   PROTOCOL:   Pulmonary embolism imaging protocol performed    RADIATION:   DLP: 192 mGy*cm   Automated exposure control was utilized to minimize radiation dose. CONTRAST: 63 cc Omnipaque 300 I.V. LABS:   eGFR: 87.5 ml/min/1.73m2  FINDINGS:  The pulmonary arteries are  well opacified.  No filling defects are seen.  There are no findings of PE.  Lung window images reveal moderate centrilobular emphysema.  Alveolar airspace disease is seen in the lower lobes bilaterally, with areas of dense consolidation measuring up to 7 cm in greatest dimension.  Patchy alveolar airspace disease is seen in the upper lobes.  Mediastinal windows reveal no mediastinal, hilar, or axillary adenopathy.  Calcifications are seen in the aortic valve and in the coronary arteries.  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  CONTINUED ON NEXT PAGE...        Impression:   CT scan of the chest with IV contrast demonstrating no findings of PE.  Extensive alveolar airspace disease, with areas of dense consolidation in the lower lobes consistent with bacterial pneumonia.  Coronary artery calcifications.  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     BRADLEY BROWN MD       Electronically Signed and Approved By: BRADLEY BROWN MD on 3/06/2023 at 20:41             3/7 echocardiogram EF 56 to 60%, grade 1 diastolic dysfunction, severe aortic stenosis, RVSP less than 35    Assessment & Plan   Assessment / Plan     Active Hospital Problems:  Active Hospital Problems    Diagnosis    • **Pneumonia of right lower lobe due to infectious organism    • Sepsis with acute hypoxic respiratory failure without septic shock (HCC)      Impression:   Acute hypoxic respiratory failure  Multifocal community-acquired pneumonia of unspecified organism  Issues with airway clearance/mucous plugging  Lactic acidosis  Possible syncope  NSTEMI: Likely demand ischemia  Severe aortic stenosis  Remote tobacco use    Plan:   -Continue wean O2 to keep sats greater than 90%.  -Pro-Joe 3.5.  Flu and COVID negative, MRSA PCR negative, blood cultures no growth at 24 hours. Pending sputum cultures, and urine for strep and Legionella.  Continue azithromycin and ceftriaxone.   Can de-escalate based on cultures.  -CT chest with dense basilar consolidation, atelectasis, mucous plugging.  Lidia these findings with patient and family at bedside. Discussed proceeding forward with bronchoscopy to send for cultures.  Proceed with bronchoscopy with airway inspection, brushings, bronchoalveolar lavage.  I have discussed the risks of the procedure with the patient including pneumothorax, hemothorax, bleeding, hypoxia, required mechanical ventilation and death. The patient recognizes these findings, acknowledges these findings and is agreeable to the procedure.  -Continue aggressive airway clearance with chest percussion and 3% saline nebs.  -Continue Brovana, Pulmicort, and DuoNebs.  Continue bronchopulmonary hygiene.  Encourage I-S and flutter valve.  -Echocardiogram EF 56 to 60%, grade 1 diastolic dysfunction, severe aortic stenosis, RVSP less than 35.  -Cardiology on board and appreciate assistance.  -Speech therapy on board appreciate assistance.  Delayed swallowing noted with increased risk of aspiration.  Has been cleared for mechanical soft diet with nectar thickened liquids.  -Started on aspiration precautions.  Keep head of bed elevated.  -Encourage mobilization.  Out of bed in chair.    DVT prophylaxis:  Medical DVT prophylaxis orders are present.    CODE STATUS:   Level Of Support Discussed With: Patient  Code Status (Patient has no pulse and is not breathing): CPR (Attempt to Resuscitate)  Medical Interventions (Patient has pulse or is breathing): Full Support    I personally reviewed pertinent labs, imaging and provider notes.   Discussed with bedside nurse and will discuss with primary service.     Electronically signed by Jason Rowley MD, 3/8/2023, 07:22 EST.

## 2023-03-09 ENCOUNTER — APPOINTMENT (OUTPATIENT)
Dept: CARDIOLOGY | Facility: HOSPITAL | Age: 78
DRG: 853 | End: 2023-03-09
Payer: COMMERCIAL

## 2023-03-09 LAB
ALBUMIN SERPL-MCNC: 3 G/DL (ref 3.5–5.2)
ALBUMIN/GLOB SERPL: 0.8 G/DL
ALP SERPL-CCNC: 72 U/L (ref 39–117)
ALT SERPL W P-5'-P-CCNC: 29 U/L (ref 1–41)
ANION GAP SERPL CALCULATED.3IONS-SCNC: 7.4 MMOL/L (ref 5–15)
AST SERPL-CCNC: 40 U/L (ref 1–40)
BASOPHILS # BLD AUTO: 0.01 10*3/MM3 (ref 0–0.2)
BASOPHILS NFR BLD AUTO: 0.2 % (ref 0–1.5)
BILIRUB SERPL-MCNC: 0.3 MG/DL (ref 0–1.2)
BUN SERPL-MCNC: 19 MG/DL (ref 8–23)
BUN/CREAT SERPL: 33.9 (ref 7–25)
CALCIUM SPEC-SCNC: 9.2 MG/DL (ref 8.6–10.5)
CHLORIDE SERPL-SCNC: 102 MMOL/L (ref 98–107)
CO2 SERPL-SCNC: 31.6 MMOL/L (ref 22–29)
CREAT SERPL-MCNC: 0.56 MG/DL (ref 0.76–1.27)
CYTO UR: NORMAL
DEPRECATED RDW RBC AUTO: 43.5 FL (ref 37–54)
EGFRCR SERPLBLD CKD-EPI 2021: 100.3 ML/MIN/1.73
EOSINOPHIL # BLD AUTO: 0 10*3/MM3 (ref 0–0.4)
EOSINOPHIL NFR BLD AUTO: 0 % (ref 0.3–6.2)
ERYTHROCYTE [DISTWIDTH] IN BLOOD BY AUTOMATED COUNT: 13.4 % (ref 12.3–15.4)
GLOBULIN UR ELPH-MCNC: 4 GM/DL
GLUCOSE BLDC GLUCOMTR-MCNC: 201 MG/DL (ref 70–99)
GLUCOSE SERPL-MCNC: 222 MG/DL (ref 65–99)
HCT VFR BLD AUTO: 34.3 % (ref 37.5–51)
HGB BLD-MCNC: 11.3 G/DL (ref 13–17.7)
IMM GRANULOCYTES # BLD AUTO: 0.02 10*3/MM3 (ref 0–0.05)
IMM GRANULOCYTES NFR BLD AUTO: 0.4 % (ref 0–0.5)
LAB AP CASE REPORT: NORMAL
LAB AP CLINICAL INFORMATION: NORMAL
LAB AP SPECIAL STAINS: NORMAL
LYMPHOCYTES # BLD AUTO: 0.57 10*3/MM3 (ref 0.7–3.1)
LYMPHOCYTES NFR BLD AUTO: 10.2 % (ref 19.6–45.3)
MAGNESIUM SERPL-MCNC: 2 MG/DL (ref 1.6–2.4)
MCH RBC QN AUTO: 29.4 PG (ref 26.6–33)
MCHC RBC AUTO-ENTMCNC: 32.9 G/DL (ref 31.5–35.7)
MCV RBC AUTO: 89.3 FL (ref 79–97)
MONOCYTES # BLD AUTO: 0.7 10*3/MM3 (ref 0.1–0.9)
MONOCYTES NFR BLD AUTO: 12.5 % (ref 5–12)
NEUTROPHILS NFR BLD AUTO: 4.3 10*3/MM3 (ref 1.7–7)
NEUTROPHILS NFR BLD AUTO: 76.7 % (ref 42.7–76)
NRBC BLD AUTO-RTO: 0 /100 WBC (ref 0–0.2)
PATH REPORT.FINAL DX SPEC: NORMAL
PATH REPORT.GROSS SPEC: NORMAL
PHOSPHATE SERPL-MCNC: 1.8 MG/DL (ref 2.5–4.5)
PLATELET # BLD AUTO: 211 10*3/MM3 (ref 140–450)
PMV BLD AUTO: 10.4 FL (ref 6–12)
POTASSIUM SERPL-SCNC: 4.2 MMOL/L (ref 3.5–5.2)
PROCALCITONIN SERPL-MCNC: 1.63 NG/ML (ref 0–0.25)
PROT SERPL-MCNC: 7 G/DL (ref 6–8.5)
RBC # BLD AUTO: 3.84 10*6/MM3 (ref 4.14–5.8)
SODIUM SERPL-SCNC: 141 MMOL/L (ref 136–145)
STAT OF ADQ CVX/VAG CYTO-IMP: NORMAL
WBC NRBC COR # BLD: 5.6 10*3/MM3 (ref 3.4–10.8)

## 2023-03-09 PROCEDURE — 0 MEPERIDINE PER 100 MG: Performed by: SPECIALIST

## 2023-03-09 PROCEDURE — 85025 COMPLETE CBC W/AUTO DIFF WBC: CPT | Performed by: INTERNAL MEDICINE

## 2023-03-09 PROCEDURE — 97116 GAIT TRAINING THERAPY: CPT

## 2023-03-09 PROCEDURE — 94760 N-INVAS EAR/PLS OXIMETRY 1: CPT

## 2023-03-09 PROCEDURE — 25010000002 CEFTRIAXONE PER 250 MG: Performed by: INTERNAL MEDICINE

## 2023-03-09 PROCEDURE — 99233 SBSQ HOSP IP/OBS HIGH 50: CPT | Performed by: INTERNAL MEDICINE

## 2023-03-09 PROCEDURE — 94668 MNPJ CHEST WALL SBSQ: CPT

## 2023-03-09 PROCEDURE — 84145 PROCALCITONIN (PCT): CPT | Performed by: NURSE PRACTITIONER

## 2023-03-09 PROCEDURE — 80053 COMPREHEN METABOLIC PANEL: CPT | Performed by: INTERNAL MEDICINE

## 2023-03-09 PROCEDURE — 25010000002 MIDAZOLAM PER 1MG: Performed by: SPECIALIST

## 2023-03-09 PROCEDURE — 94799 UNLISTED PULMONARY SVC/PX: CPT

## 2023-03-09 PROCEDURE — 83735 ASSAY OF MAGNESIUM: CPT | Performed by: INTERNAL MEDICINE

## 2023-03-09 PROCEDURE — 82962 GLUCOSE BLOOD TEST: CPT

## 2023-03-09 PROCEDURE — 93312 ECHO TRANSESOPHAGEAL: CPT

## 2023-03-09 PROCEDURE — 84100 ASSAY OF PHOSPHORUS: CPT | Performed by: INTERNAL MEDICINE

## 2023-03-09 PROCEDURE — 94664 DEMO&/EVAL PT USE INHALER: CPT

## 2023-03-09 RX ORDER — SODIUM PHOSPHATE IN D5W 15MMOL/250
15 PLASTIC BAG, INJECTION (ML) INTRAVENOUS ONCE
Status: COMPLETED | OUTPATIENT
Start: 2023-03-09 | End: 2023-03-09

## 2023-03-09 RX ORDER — MIDAZOLAM HYDROCHLORIDE 2 MG/2ML
INJECTION, SOLUTION INTRAMUSCULAR; INTRAVENOUS
Status: COMPLETED | OUTPATIENT
Start: 2023-03-09 | End: 2023-03-09

## 2023-03-09 RX ORDER — MEPERIDINE HYDROCHLORIDE 25 MG/ML
INJECTION INTRAMUSCULAR; INTRAVENOUS; SUBCUTANEOUS
Status: COMPLETED | OUTPATIENT
Start: 2023-03-09 | End: 2023-03-09

## 2023-03-09 RX ADMIN — MEPERIDINE HYDROCHLORIDE 25 MG: 25 INJECTION INTRAMUSCULAR; INTRAVENOUS; SUBCUTANEOUS at 09:36

## 2023-03-09 RX ADMIN — MIDAZOLAM HYDROCHLORIDE 2 MG: 1 INJECTION, SOLUTION INTRAMUSCULAR; INTRAVENOUS at 09:33

## 2023-03-09 RX ADMIN — PANTOPRAZOLE SODIUM 40 MG: 40 INJECTION, POWDER, FOR SOLUTION INTRAVENOUS at 06:39

## 2023-03-09 RX ADMIN — SODIUM PHOSPHATE, MONOBASIC, MONOHYDRATE 15 MMOL: 276; 142 INJECTION, SOLUTION INTRAVENOUS at 06:40

## 2023-03-09 RX ADMIN — ARFORMOTEROL TARTRATE 15 MCG: 15 SOLUTION RESPIRATORY (INHALATION) at 06:24

## 2023-03-09 RX ADMIN — ARFORMOTEROL TARTRATE 15 MCG: 15 SOLUTION RESPIRATORY (INHALATION) at 18:43

## 2023-03-09 RX ADMIN — METOPROLOL TARTRATE 5 MG: 1 INJECTION, SOLUTION INTRAVENOUS at 11:01

## 2023-03-09 RX ADMIN — CEFTRIAXONE SODIUM 1 G: 1 INJECTION, SOLUTION INTRAVENOUS at 11:12

## 2023-03-09 RX ADMIN — IPRATROPIUM BROMIDE AND ALBUTEROL SULFATE 3 ML: .5; 2.5 SOLUTION RESPIRATORY (INHALATION) at 18:43

## 2023-03-09 RX ADMIN — BUDESONIDE 0.5 MG: 0.5 INHALANT ORAL at 18:43

## 2023-03-09 RX ADMIN — IPRATROPIUM BROMIDE AND ALBUTEROL SULFATE 3 ML: .5; 2.5 SOLUTION RESPIRATORY (INHALATION) at 00:06

## 2023-03-09 RX ADMIN — IPRATROPIUM BROMIDE AND ALBUTEROL SULFATE 3 ML: .5; 2.5 SOLUTION RESPIRATORY (INHALATION) at 06:24

## 2023-03-09 RX ADMIN — BUDESONIDE 0.5 MG: 0.5 INHALANT ORAL at 06:24

## 2023-03-09 RX ADMIN — METOPROLOL TARTRATE 12.5 MG: 25 TABLET, FILM COATED ORAL at 21:29

## 2023-03-09 RX ADMIN — Medication 4 ML: at 18:43

## 2023-03-09 RX ADMIN — Medication 4 ML: at 06:24

## 2023-03-09 RX ADMIN — Medication 10 ML: at 21:30

## 2023-03-09 RX ADMIN — ASPIRIN 81 MG 81 MG: 81 TABLET ORAL at 11:12

## 2023-03-09 RX ADMIN — TOPICAL ANESTHETIC 1 SPRAY: 200 SPRAY DENTAL; PERIODONTAL at 09:33

## 2023-03-09 NOTE — PROGRESS NOTES
Date of service: 3/9/2023    Subjective: No chest pain, shortness of breath at rest, palpitations or lightheadedness.    Review of systems: No headaches, vertigo, fever, chills, nausea, vomiting, abdominal pain, TIA or CVA-like symptoms.    Physical examination: He was in no pain or respiratory distress.  Vital signs: Reviewed  HEENT: No JVD or carotid bruit.    Neck: No JVD or carotid bruit.    Chest:   Clearer.  Much less rhonchi.  Cardiac: RRR.  II/IV systolic murmur over the right and left sternal border.  No S3 gallop.  Distant S1-S2.  Abdomen: Soft and nontender.  No megalies or masses.  Extremities: No edema, cyanosis or clubbing.  Pulse intact.  Neuro: Still sedated.     Records: Reviewed.     Assessment and plan:     1.  Syncope, may be vasovagal and/or severe aortic stenosis.   2.  Acute hypoxemic respiratory failure due to #3, improving  3.  Bilateral pneumonia.  Continue IV antibiotics.  4.  Reactive sinus tachycardia, secondary to the above, resolved.  5.  Considering his clinical presentation, lacking of ischemic changes on the EKG or angina pectoralis, elevated troponin could be due to demand ischemia.  6. Telemetry: Sinus rhythm.    7. An attempt to pass the LAI probe through the oropharynx was unsuccessful as the patient has NG tube in place and he was coughing constantly.  The procedure was aborted and will try later on when the patient becomes more stable

## 2023-03-09 NOTE — PROGRESS NOTES
Pulmonary / Critical Care Progress Note      Patient Name: Buzz Lopez  : 1945  MRN: [id number]  Primary Care Physician:  Provider, No Known  Date of admission: 3/6/2023    Subjective   Subjective   Follow-up for pneumonia.    Over past 24 hours, underwent bronchoscopy for mucous plugging.  Found to have food particles on the back of vocal cords and in left lower lung.  BAL positive for strep pneumonia and rhinovirus.  Cytology pending.  Had core track placed for aspiration.    No acute events overnight.     This morning,   Lying in bed on 1 L nasal cannula  Took O2 off  Overall feeling better since bronchoscopy  Continues to have coarse nonproductive cough  Sat up in chair yesterday  Continues with sarah  N.p.o. for LAI today  Family to discuss possible PEG tube placement today  No chest pain  No fever or chills    Review of Systems  General:  + Fatigue, No Fever.   HEENT: No dysphagia, No Visual Changes, no rhinorrhea  Respiratory:  + cough, + Dyspnea, no phlegm, No Pleuritic Pain, no wheezing, no hemoptysis  Cardiovascular: Denies chest pain, denies palpitations, + GARCIA, No Chest Pressure  Gastrointestinal:  No Abdominal Pain, No Nausea, No Vomiting, No Diarrhea  Genitourinary:  No Dysuria, No Frequency, No Hesitancy  Musculoskeletal: No muscle pain or swelling  Endocrine:  No Heat Intolerance, No Cold Intolerance  Hematologic:  No Bleeding, No Bruising  Neurologic:  No Confusion, No Headaches  Skin:  No Rash, No Open Wounds    Objective   Objective     Vitals:   Temp:  [97.3 °F (36.3 °C)-98.7 °F (37.1 °C)] 98.5 °F (36.9 °C)  Heart Rate:  [] 81  Resp:  [15-20] 18  BP: ()/(50-72) 146/62  Flow (L/min):  [1-2] 1    Physical Exam   Vital Signs Reviewed   General: Thin elderly male, Alert, NAD on room air  HEENT:  PERRL, EOMI.  OP, nares clear, no sinus tenderness, right cortrak   Neck:  Supple, no JVD, no thyromegaly  Lymph: no axillary, cervical, supraclavicular lymphadenopathy noted  bilaterally  Chest:  good aeration, crackles bibasilar, tympanic to percussion bilaterally, no work of breathing noted  CV: RRR, no MGR, pulses 2+, equal  Abd:  Soft, NT, ND, + BS, no HSM  EXT:  no clubbing, no cyanosis, no edema, no joint tenderness, muscle wasting all 4 extremities  Neuro:  A&Ox3, CN grossly intact, no focal deficits  Skin: No rashes or lesions noted    Result Review    Result Review:  I have personally reviewed the results from the time of this admission to 3/9/2023 07:18 EST and agree with these findings:  [x]  Laboratory  [x]  Microbiology  [x]  Radiology  [x]  EKG/Telemetry   [x]  Cardiology/Vascular   []  Pathology  []  Old records  []  Other:  Most notable findings include:     3/9 BAL strep pneumonia and rhinovirus  Cytology pending    3/7 echocardiogram EF 56 to 60%, grade 1 diastolic dysfunction, severe aortic stenosis, RVSP less than 35        Lab 03/09/23  0410 03/08/23  0406 03/07/23  0636 03/06/23  2353 03/06/23  0916 03/06/23  0825   WBC 5.60 6.82 9.19  --  12.90*  --    HEMOGLOBIN 11.3* 12.0* 11.8*  --  13.4  --    HEMATOCRIT 34.3* 36.0* 34.3*  --  39.9  --    PLATELETS 211 184 186  --  169  --    SODIUM 141 137  --  129* 133*  --    SODIUM, ARTERIAL  --   --   --   --   --  134.4*   POTASSIUM 4.2 4.1  --  4.8 4.6  --    CHLORIDE 102 98  --  96* 93*  --    CO2 31.6* 27.6  --  23.7 24.0  --    BUN 19 26*  --  30* 26*  --    CREATININE 0.56* 0.59*  --  0.81 0.88  --    GLUCOSE 222* 183*  --  164* 282*  --    GLUCOSE, ARTERIAL  --   --   --   --   --  293*   CALCIUM 9.2 9.2  --  8.8 9.4  --    PHOSPHORUS 1.8* 2.6  --  3.2  --   --    TOTAL PROTEIN 7.0 7.3  --  6.7 8.2  --    ALBUMIN 3.0* 3.4*  --  2.8* 3.8  --    GLOBULIN 4.0 3.9  --  3.9 4.4  --      Assessment & Plan   Assessment / Plan     Active Hospital Problems:  Active Hospital Problems    Diagnosis    • **Pneumonia of right lower lobe due to infectious organism    • Severe malnutrition (HCC)    • Sepsis with acute hypoxic  respiratory failure without septic shock (HCC)      Impression:   Acute hypoxic respiratory failure  Streptococcal pneumonia  Rhinovirus  Issues with airway clearance/mucous plugging  Aspiration into the lungs  Lactic acidosis-resolved  Possible syncope  NSTEMI: Likely demand ischemia  Severe aortic stenosis  Remote tobacco use    Plan:   -On room air.  -BAL pneumonia panel positive for streptococcal pneumonia and rhinovirus.  Continue ceftriaxone.  -We will trend Pro-Joe.  -Continue aggressive airway clearance with chest percussion and 3% saline nebs.  -Continue Brovana, Pulmicort, and DuoNebs.  Continue bronchopulmonary hygiene.  Encourage I-S and flutter valve.  -Speech therapy on board appreciate assistance.  Delayed swallowing noted with increased risk of aspiration.    -Core track has been placed.  Continue tube feedings per dietitian recommendations.  May need PEG tube placement.  Family to discuss today.  -Continue  aspiration precautions.  Keep head of bed elevated.  -Cardiology on board and appreciate assistance.  To have LAI today.  Echocardiogram EF 56 to 60%, grade 1 diastolic dysfunction, severe aortic stenosis, RVSP less than 35.  -Encourage mobilization.  Out of bed in chair.  -PT/OT on board.    DVT prophylaxis:  No DVT prophylaxis order currently exists.    CODE STATUS:   Level Of Support Discussed With: Patient  Code Status (Patient has no pulse and is not breathing): CPR (Attempt to Resuscitate)  Medical Interventions (Patient has pulse or is breathing): Full Support    I personally reviewed pertinent labs, imaging and provider notes.   Discussed with bedside nurse and will discuss with primary service.     Electronically signed by GENE Parada, 03/09/23, 11:25 AM EST.    This visit was performed by BOTH a physician and an APC. I personally evaluated and examined the patient. I performed all aspects of MDM as documented. , I have reviewed and confirmed the accuracy of the patient's history  as documented in this note. and I have reexamined the patient and the results are consistent with the previously documented exam. I have updated the documentation as necessary.     Electronically signed by Jason Rowley MD, 03/09/23, 2:09 PM EST.   Electronically signed by Jason Rowley MD, 3/9/2023, 07:18 EST.

## 2023-03-09 NOTE — PLAN OF CARE
Goal Outcome Evaluation:      Pt rested well with no complaints of pain.  Tolerated tube feedings well.  NPO after midnight for LAI.  AUSTIN MARQUIS RN

## 2023-03-09 NOTE — PROGRESS NOTES
[facility]   Hospitalist Progress Note  Date: 3/9/2023  Patient Name: Buzz Lopez  : 1945  MRN: [id number]  Date of admission: 3/6/2023    Subjective   Subjective     Chief Complaint:   Shortness of breath, confusion    Summary:   Buzz Lopez is a 77 y.o. male with no significant past medical history has been brought into the ED with concerns for confusion, possible syncope, shortness of breath.  Patient states that for the past 1 to 2 weeks patient has been having difficulty breathing and subjective fevers, it has got worse and today and he has called his son to take him to the ED as he is having difficulty breathing.  By the time patient's son has arrived at the home, patient appeared to be confused, generalized weakness and unable to put his clothes on.  While his son was helping him to get the clothes on, patient appeared to be very confused and had a possible syncope episode where he was unresponsive.  Patient's son has looked for the pulse and they could not feel radial pulse and started chest compressions, EMS was called.  By the time EMS has arrived patient was receiving chest compressions from the family members.  EMS has evaluated the patient and they could not feel a pulse.  Patient was in respiratory distress, saturating around 70% on room air.  Received nebulizer treatment by the EMS and the patient has been brought into the ED.    During my examination, patient is alert and oriented x3 and able to answer questions appropriately.  States that he does not remember how he came to the hospital.  Denies any chest pain, nausea, vomiting, focal weakness, numbness, tingling.  States that he has been having difficulty breathing for the past 1 to 2 weeks.  A month ago he had some sore throat.  Associated with cough, productive sputum. Patient is thin and frail.  Chronically has a poor p.o. intake.  Admits that he has low appetite.  No previously diagnosed history of dementia.  As per the  family members, patient takes a lot of over-the-counter fiber supplement review does not gain weight despite he eats well.    Interval Followup:     3/9/2023    · No acute events overnight  · Feeling better after bronchoscopy  · LAI was technically difficult this a.m. and not able to complete  · Patient went into rapid Afib later this a.m.  Rate 150s bpm.  Patient unaware.  IV metoprolol 5mg given with subsequent return to NSR.  · Tolerating Core Track tube feedings  · Discussed with speech.  Will need alternative feeding and prolonged speech therapy plan to improve dysphagia    Objective   Objective     Vitals:   Temp:  [97.5 °F (36.4 °C)-98.7 °F (37.1 °C)] 97.9 °F (36.6 °C)  Heart Rate:  [] 79  Resp:  [16-18] 18  BP: ()/(58-77) 115/60  Flow (L/min):  [1-2] 2    Physical Exam   General appearance: NAD, conversant. Cacechtic  Eyes: anicteric sclerae, moist conjunctivae; no lid-lag; PERRLA  HENT: Atraumatic; oropharynx clear with moist mucous membranes and no mucosal ulcerations; normal hard and soft palate  Neck: Trachea midline; FROM, supple, no thyromegaly or lymphadenopathy  Lungs: Bilateral rhonchi, with normal respiratory effort and no intercostal retractions    Result Review    Result Review:  I have personally reviewed the results as below  [x]  Laboratory  CBC    CBC 3/7/23 3/8/23 3/9/23   WBC 9.19 6.82 5.60   RBC 3.92 (A) 4.09 (A) 3.84 (A)   Hemoglobin 11.8 (A) 12.0 (A) 11.3 (A)   Hematocrit 34.3 (A) 36.0 (A) 34.3 (A)   MCV 87.5 88.0 89.3   MCH 30.1 29.3 29.4   MCHC 34.4 33.3 32.9   RDW 13.6 13.4 13.4   Platelets 186 184 211   (A) Abnormal value            CMP    CMP 3/6/23 3/6/23 3/6/23 3/8/23 3/9/23    0825 0916 2353     Glucose 293 (A) 282 (A) 164 (A) 183 (A) 222 (A)   BUN  26 (A) 30 (A) 26 (A) 19   Creatinine  0.88 0.81 0.59 (A) 0.56 (A)   eGFR  87.5 89.7 98.7 100.3   Sodium 134.4 (A) 133 (A) 129 (A) 137 141   Potassium  4.6 4.8 4.1 4.2   Chloride  93 (A) 96 (A) 98 102   Calcium  9.4 8.8 9.2  9.2   Total Protein  8.2 6.7 7.3 7.0   Albumin  3.8 2.8 (A) 3.4 (A) 3.0 (A)   Globulin  4.4 3.9 3.9 4.0   Total Bilirubin  0.7 0.4 0.3 0.3   Alkaline Phosphatase  91 77 74 72   AST (SGOT)  26 53 (A) 54 (A) 40   ALT (SGPT)  13 16 27 29   Albumin/Globulin Ratio  0.9 0.7 0.9 0.8   BUN/Creatinine Ratio  29.5 (A) 37.0 (A) 44.1 (A) 33.9 (A)   Anion Gap  16.0 (A) 9.3 11.4 7.4   (A) Abnormal value       Comments are available for some flowsheets but are not being displayed.           []  Microbiology  []  Radiology  []  EKG/Telemetry   []  Cardiology/Vascular   []  Pathology  []  Old records  []  Other:    Assessment & Plan   Assessment / Plan     Assessment:  Sepsis due to right lower lobe pneumonia (Strep Pneumo)  Acute hypoxic respiratory failure  Questionable cardiac arrest episode  NSTEMI/acute myocardial injury  Possible syncope  Lactic acidosis  Malnutrition  S/P Bronchoscopy 3/8/23 with mucous plugging removed  Dysphagia requiring Core Track placed this admission  Aspiration     Plan  Admit inpatient, telemetry  Pulmonology consulted  Cardiology consulted ..... LAI attempted 3/9/23 but unable to complete  CT scan chest .... No PE.  Bilat pneumonia noted.  Continue aspirin, monitor troponin  Status post bronchoscopy with thick mucus removal, food particles seen around the vocal cords, culture sent, positive for strep pneumo  Follow-up on the cardiac echo, this is pending   continue ceftriaxone and azithromycin  Follow-up on the blood cultures, urine Legionella, urine strep, MRSA swab, influenza, COVID-19 negative, influenza negative  Continue albuterol nebulizer every 4 hours as needed  Continue bronchodilator protocol  Lactic acid reviewed  N.p.o. as patient has continued aspiration, core track in place, continue tube feeds, continue speech therapy consultation  Nutritionist consult ordered for tube feeds  Check CBC, CMP, mag and Phos in a.m., CBC, CMP reviewed today  PT OT consult     Reviewed patients labs and  imaging, and discussed with patient and nurse at bedside.    DVT prophylaxis:  No DVT prophylaxis order currently exists.    CODE STATUS:   Level Of Support Discussed With: Patient  Code Status (Patient has no pulse and is not breathing): CPR (Attempt to Resuscitate)  Medical Interventions (Patient has pulse or is breathing): Full Support    Patient independently seen and evaluated, agree with assessment and plan, above documentation reflects plan put forth during bedside rounds.  More than 51% of the time of this patient encounter was performed by me.    Interval history: Patient status post attempted LAI today to assess aortic valve which was ordered to evaluate for worsening stenosis, patient drowsy arousable following procedure, procedure was aborted as the cardiologist was not able to get images with core track tube in place.  Additionally after returning to the room patient went into atrial fibrillation with rapid ventricular response    Exam:  GEN: Drowsy, arousable to verbal stimuli  HEENT: Moist mucous membranes, core track in place  LUNGS: Clear to auscultation bilaterally  CARDIAC: Irregularly irregular rate and rhythm  NEURO: Globally weak    Plan:  Agree with assessment plan as above  We will give 5 mg of IV metoprolol now for rate control  Continue on telemetry  Continue core track, continue n.p.o. status as patient is not safe to swallow, continue tube feed  CBC, CMP reviewed  Check CBC, CMP, mag and Phos tomorrow  PT/OT following  Discussed with pulmonology  We will need rehab at discharge  Clinical course will dictate further management    Reviewed patients labs and imaging, and discussed with patient and nurse at bedside.      Electronically signed by Black Capps MD, 03/09/23, 12:37 PM EST.

## 2023-03-09 NOTE — CONSULTS
"Nutrition Services    Patient Name: Buzz Lopez  YOB: 1945  MRN: [id number]  Admission date: 3/6/2023      CLINICAL NUTRITION ASSESSMENT      Reason for Assessment  Follow-up protocol    H&P:    History reviewed. No pertinent past medical history.     Current Problems:   Active Hospital Problems    Diagnosis    • **Pneumonia of right lower lobe due to infectious organism    • Severe malnutrition (HCC)    • Sepsis with acute hypoxic respiratory failure without septic shock (HCC)         Nutrition/Diet History         Narrative     Nutrition follow up      Pt's enteral nutrition running at 25 ml/hr. Phos is down 30% from yesterday.  Will not advance rate today. Phos being replaced. Other electrolytes ok. Will continue to monitor closely.     Anthropometrics        Current Height, Weight Height: 177.8 cm (70\")  Weight: 61.2 kg (135 lb)   Current BMI Body mass index is 19.37 kg/m².       Weight Hx  Wt Readings from Last 30 Encounters:   03/06/23 0824 61.2 kg (135 lb)   06/09/22 0154 61.4 kg (135 lb 5.8 oz)            Wt Change Observation Wt stable 6  Months (135# per two recorded weights)     Estimated/Assessed Needs       Energy Requirements 30-35 kcal/kg actual wt   EST Needs (kcal/day) 1486-9827       Protein Requirements 1.2-1.5 g/kg    EST Daily Needs (g/day) 73-91 g       Fluid Requirements 1 ml/kcal    Estimated Needs (mL/day) 3526-2282 (adjust as needed:  Sodium lab is 129 currently)     Labs/Medications         Pertinent Labs Reviewed.   Results from last 7 days   Lab Units 03/09/23  0410 03/08/23  0406 03/06/23  2353   SODIUM mmol/L 141 137 129*   POTASSIUM mmol/L 4.2 4.1 4.8   CHLORIDE mmol/L 102 98 96*   CO2 mmol/L 31.6* 27.6 23.7   BUN mg/dL 19 26* 30*   CREATININE mg/dL 0.56* 0.59* 0.81   CALCIUM mg/dL 9.2 9.2 8.8   BILIRUBIN mg/dL 0.3 0.3 0.4   ALK PHOS U/L 72 74 77   ALT (SGPT) U/L 29 27 16   AST (SGOT) U/L 40 54* 53*   GLUCOSE mg/dL 222* 183* 164*     Results from last 7 days   Lab " Units 03/09/23  0410 03/08/23  0406 03/07/23  0636 03/06/23  2353   MAGNESIUM mg/dL 2.0 2.1  --  2.1   PHOSPHORUS mg/dL 1.8* 2.6  --  3.2   HEMOGLOBIN g/dL 11.3* 12.0*   < >  --    HEMATOCRIT % 34.3* 36.0*   < >  --    TRIGLYCERIDES mg/dL  --   --   --  50    < > = values in this interval not displayed.     COVID19   Date Value Ref Range Status   03/06/2023 Not Detected Not Detected - Ref. Range Final     No results found for: HGBA1C      Pertinent Medications Reviewed.     Current Nutrition Orders & Evaluation of Intake       Oral Nutrition     Current PO Diet No diet orders on file   Supplement Orders Placed This Encounter      Place Feeding Tube Per [employer] [facility]      Diet, Tube Feeding Tube Feeding Formula: Diabetisource AC (Glucerna 1.2); Tube Feeding Type: Continuous; Continuous Tube Feeding Start Rate (mL/hr): 25; Then Advance Rate By (mL/hr): 25; Every __ Hours: 4; To Goal Rate of (mL/hr): 50       Malnutrition Severity Assessment      Patient meets criteria for : Severe Malnutrition         Nutrition Diagnosis         Nutrition Dx Problem 1 Severe malnutrition related to Inability to consume sufficient energy as evidenced by body composition changes., SLP/swallow eval and NPO, to receive tube feeding     Nutrition Intervention         Day 2:   Diabetisource AC @ 25 ml/hr  Free water flushes 25 ml every 4 hours  Provides 720 kcal, 36 g pro, 642 ml fluid     Day 3:  Diabetisource AC @ 50 ml/hr  Free water flushes 25 ml every 4 hours  Provides: 1440 kcal, 72 g pro, 1134 ml fluid    Day 3/Goal Rate:   Diabetisource AC @ 65 ml/hr (goal rate)  Free water flushes 25 ml every 4 hours.  Provides 1872 kcal, 94 g pro, 1429 ml fluid      Medical Nutrition Therapy/Nutrition Education          Learner     Readiness Patient  Acceptance     Method     Response Explanation  Verbalizes understanding     Monitor/Evaluation        Monitor I&O, Pertinent labs, EN delivery/tolerance, Weight, Skin status, GI status, Swallow  function, Hemodynamic stability, RFS     Nutrition Discharge Plan         To be determined     Electronically signed by:  Heavenly Olvera RD  03/09/23 15:27 EST

## 2023-03-09 NOTE — PLAN OF CARE
Goal Outcome Evaluation:  Plan of Care Reviewed With: patient, family   Pt went for LAI today, but MD unable to complete testing so it was cancelled. After returning to the floor, pt very lethargic, then converted into Afib RVR. IV Lopressor 5 mg given, and pt converted back to SR. Pt was up in chair this evening. Tolerated well.

## 2023-03-09 NOTE — THERAPY TREATMENT NOTE
Acute Care - Physical Therapy Treatment Note   [facility]     Patient Name: Buzz Lopez  : 1945  MRN: [id number]  Today's Date: 3/9/2023      Visit Dx:     ICD-10-CM ICD-9-CM   1. Pneumonia of right lower lobe due to infectious organism  J18.9 486   2. Sepsis with acute hypoxic respiratory failure without septic shock, due to unspecified organism (HCC)  A41.9 038.9    R65.20 995.91    J96.01 518.81   3. Acute respiratory failure with hypoxia (HCC)  J96.01 518.81   4. Difficulty walking  R26.2 719.7   5. Oropharyngeal dysphagia  R13.12 787.22   6. Decreased activities of daily living (ADL)  Z78.9 V49.89     Patient Active Problem List   Diagnosis   • Pneumonia of right lower lobe due to infectious organism   • Sepsis with acute hypoxic respiratory failure without septic shock (HCC)   • Severe malnutrition (HCC)     History reviewed. No pertinent past medical history.  Past Surgical History:   Procedure Laterality Date   • BRONCHOSCOPY N/A 3/8/2023    Procedure: BRONCHOSCOPY WITH BRONCHOALVEOLAR LAVAGE, WASHINGS;  Surgeon: Jason Rowley MD;  Location: [facility] ENDOSCOPY;  Service: Pulmonary;  Laterality: N/A;  MUCOUS PLUGGING   • EYE SURGERY       PT Assessment (last 12 hours)     PT Evaluation and Treatment     Row Name 23 1400          Physical Therapy Time and Intention    Subjective Information complains of;fatigue  -PAULO     Document Type therapy note (daily note)  -PAULO     Mode of Treatment individual therapy;physical therapy  -PAULO     Patient Effort adequate  -PAULO     Symptoms Noted During/After Treatment fatigue  -PAULO     Row Name 23 1400          Bed Mobility    Bed Mobility bed mobility (all) activities  -PAULO     All Activities, [place] (Bed Mobility) contact guard  -PAULO     Assistive Device (Bed Mobility) bed rails  -PAULO     Row Name 23 1400          Bed-Chair Transfer    Bed-Chair [place] (Transfers) contact guard  -PAULO     Assistive Device (Bed-Chair Transfers) walker,  front-wheeled  -PAULO     Row Name 03/09/23 1400          Sit-Stand Transfer    Sit-Stand [place] (Transfers) contact guard  -PAULO     Assistive Device (Sit-Stand Transfers) walker, front-wheeled  -PAULO     Row Name 03/09/23 1400          Stand-Sit Transfer    Stand-Sit [place] (Transfers) contact guard  -PAULO     Assistive Device (Stand-Sit Transfers) walker, front-wheeled  -PAULO     Row Name 03/09/23 1400          Gait/Stairs (Locomotion)    Gait/Stairs Locomotion gait/ambulation assistive device  -PAULO     [place] Level (Gait) contact guard  -PAULO     Assistive Device (Gait) walker, front-wheeled  -PAULO     Distance in Feet (Gait) 15  -PAULO     Pattern (Gait) step-through  -PAULO     Row Name 03/09/23 1400          Safety Issues, Functional Mobility    Impairments Affecting Function (Mobility) coordination;endurance/activity tolerance;strength;range of motion (ROM)  -PAULO     Row Name 03/09/23 1400          Balance    Balance Assessment standing dynamic balance  -PAULO     Dynamic Standing Balance contact guard  -PAULO     Position/Device Used, Standing Balance walker, front-wheeled  -PAULO     Row Name 03/09/23 1400          Positioning and Restraints    Pre-Treatment Position in bed  -PAULO     Post Treatment Position chair  -PAULO     In Chair reclined;call light within reach  -PAULO     Row Name 03/09/23 1400          Progress Summary (PT)    Progress Toward Functional Goals (PT) progress toward functional goals is gradual  -PAULO     Daily Progress Summary (PT) Pt was very groggy and fatigued today. Recommend discharge to subacute rehab.  -PAULO           User Key  (r) = Recorded By, (t) = Taken By, (c) = Cosigned By    Initials Name Provider Type    PAULO Isai Castellano, PT Physical Therapist                Physical Therapy Education     Title: PT OT SLP Therapies (Done)     Topic: Physical Therapy (Done)     Point: Mobility training (Done)     Learning Progress Summary           Patient Acceptance, E, VU by ALO at 3/8/2023 8223     Acceptance, E, VU by POONAM at 3/7/2023 0909                               User Key     Initials Effective Dates Name Provider Type Discipline    JS 09/21/21 -  Haevenly Quach RN Registered Nurse Nurse    POONAM 01/11/23 -  Ephraim Juares, PT Student PT Student PT              PT Recommendation and Plan     Progress Summary (PT)  Progress Toward Functional Goals (PT): progress toward functional goals is gradual  Daily Progress Summary (PT): Pt was very groggy and fatigued today. Recommend discharge to subacute rehab.   Outcome Measures     Row Name 03/09/23 1400 03/07/23 0908          How much help from another person do you currently need...    Turning from your back to your side while in flat bed without using bedrails? 4  -PAULO 3  -PAULO (r) JL (t) PAULO (c)     Moving from lying on back to sitting on the side of a flat bed without bedrails? 4  -PAULO 3  -PAULO (r) JL (t) PAULO (c)     Moving to and from a bed to a chair (including a wheelchair)? 3  -PAULO 3  -PAULO (r) JL (t) PAULO (c)     Standing up from a chair using your arms (e.g., wheelchair, bedside chair)? 4  -PAULO 3  -PAULO (r) JL (t) PAULO (c)     Climbing 3-5 steps with a railing? 3  -PAULO 2  -PAULO (r) JL (t) PAULO (c)     To walk in hospital room? 3  -PAULO 3  -PAULO (r) JL (t) PAULO (c)     AM-PAC 6 Clicks Score (PT) 21  -PAULO 17  -PAULO (r) JL (t)        Functional Assessment    Outcome Measure Options AM-PAC 6 Clicks Basic Mobility (PT)  -PAULO AM-PAC 6 Clicks Basic Mobility (PT)  -PAULO (r) JL (t) PAULO (c)           User Key  (r) = Recorded By, (t) = Taken By, (c) = Cosigned By    Initials Name Provider Type    PAULO Isai Castellano, PT Physical Therapist    Ephraim Garcia, PT Student PT Student                 Time Calculation:    PT Charges     Row Name 03/09/23 1402             Time Calculation    PT Received On 03/09/23  -PAULO         Timed Charges    89890 - Gait Training Minutes  8  -PAULO         Total Minutes    Timed Charges Total Minutes 8  -PAULO       Total Minutes 8  -PAULO            User Key  (r) = Recorded  By, (t) = Taken By, (c) = Cosigned By    Initials Name Provider Type    Isai Wood, PT Physical Therapist              Therapy Charges for Today     Code Description Service Date Service Provider Modifiers Qty    25214551466 HC GAIT TRAINING EA 15 MIN 3/9/2023 Isai Castellano, PT GP 1          PT G-Codes  Outcome Measure Options: AM-PAC 6 Clicks Basic Mobility (PT)  AM-PAC 6 Clicks Score (PT): 21  AM-PAC 6 Clicks Score (OT): 21    Isia Castellano PT  3/9/2023

## 2023-03-10 ENCOUNTER — APPOINTMENT (OUTPATIENT)
Dept: CT IMAGING | Facility: HOSPITAL | Age: 78
DRG: 853 | End: 2023-03-10
Payer: COMMERCIAL

## 2023-03-10 ENCOUNTER — APPOINTMENT (OUTPATIENT)
Dept: GENERAL RADIOLOGY | Facility: HOSPITAL | Age: 78
DRG: 853 | End: 2023-03-10
Payer: COMMERCIAL

## 2023-03-10 LAB
ALBUMIN SERPL-MCNC: 3.1 G/DL (ref 3.5–5.2)
ALBUMIN/GLOB SERPL: 0.7 G/DL
ALP SERPL-CCNC: 75 U/L (ref 39–117)
ALT SERPL W P-5'-P-CCNC: 25 U/L (ref 1–41)
ANION GAP SERPL CALCULATED.3IONS-SCNC: 8.8 MMOL/L (ref 5–15)
AST SERPL-CCNC: 32 U/L (ref 1–40)
BACTERIA SPEC AEROBE CULT: NORMAL
BACTERIA SPEC RESP CULT: NORMAL
BACTERIA SPEC RESP CULT: NORMAL
BASOPHILS # BLD AUTO: 0.01 10*3/MM3 (ref 0–0.2)
BASOPHILS NFR BLD AUTO: 0.1 % (ref 0–1.5)
BILIRUB SERPL-MCNC: 0.4 MG/DL (ref 0–1.2)
BUN SERPL-MCNC: 15 MG/DL (ref 8–23)
BUN/CREAT SERPL: 26.8 (ref 7–25)
CALCIUM SPEC-SCNC: 9.2 MG/DL (ref 8.6–10.5)
CHLORIDE SERPL-SCNC: 101 MMOL/L (ref 98–107)
CO2 SERPL-SCNC: 32.2 MMOL/L (ref 22–29)
CREAT SERPL-MCNC: 0.56 MG/DL (ref 0.76–1.27)
DEPRECATED RDW RBC AUTO: 44.4 FL (ref 37–54)
EGFRCR SERPLBLD CKD-EPI 2021: 100.3 ML/MIN/1.73
EOSINOPHIL # BLD AUTO: 0 10*3/MM3 (ref 0–0.4)
EOSINOPHIL NFR BLD AUTO: 0 % (ref 0.3–6.2)
ERYTHROCYTE [DISTWIDTH] IN BLOOD BY AUTOMATED COUNT: 13.3 % (ref 12.3–15.4)
GLOBULIN UR ELPH-MCNC: 4.3 GM/DL
GLUCOSE BLDC GLUCOMTR-MCNC: 269 MG/DL (ref 70–99)
GLUCOSE SERPL-MCNC: 243 MG/DL (ref 65–99)
GRAM STN SPEC: NORMAL
HCT VFR BLD AUTO: 36.4 % (ref 37.5–51)
HGB BLD-MCNC: 12.1 G/DL (ref 13–17.7)
IMM GRANULOCYTES # BLD AUTO: 0.03 10*3/MM3 (ref 0–0.05)
IMM GRANULOCYTES NFR BLD AUTO: 0.4 % (ref 0–0.5)
LYMPHOCYTES # BLD AUTO: 0.65 10*3/MM3 (ref 0.7–3.1)
LYMPHOCYTES NFR BLD AUTO: 7.7 % (ref 19.6–45.3)
MAGNESIUM SERPL-MCNC: 2 MG/DL (ref 1.6–2.4)
MCH RBC QN AUTO: 30 PG (ref 26.6–33)
MCHC RBC AUTO-ENTMCNC: 33.2 G/DL (ref 31.5–35.7)
MCV RBC AUTO: 90.1 FL (ref 79–97)
MONOCYTES # BLD AUTO: 1.08 10*3/MM3 (ref 0.1–0.9)
MONOCYTES NFR BLD AUTO: 12.7 % (ref 5–12)
NEUTROPHILS NFR BLD AUTO: 6.71 10*3/MM3 (ref 1.7–7)
NEUTROPHILS NFR BLD AUTO: 79.1 % (ref 42.7–76)
NRBC BLD AUTO-RTO: 0 /100 WBC (ref 0–0.2)
NT-PROBNP SERPL-MCNC: 6189 PG/ML (ref 0–1800)
PHOSPHATE SERPL-MCNC: 2.2 MG/DL (ref 2.5–4.5)
PLATELET # BLD AUTO: 187 10*3/MM3 (ref 140–450)
PMV BLD AUTO: 11.1 FL (ref 6–12)
POTASSIUM SERPL-SCNC: 4.1 MMOL/L (ref 3.5–5.2)
PROT SERPL-MCNC: 7.4 G/DL (ref 6–8.5)
RBC # BLD AUTO: 4.04 10*6/MM3 (ref 4.14–5.8)
SODIUM SERPL-SCNC: 142 MMOL/L (ref 136–145)
WBC NRBC COR # BLD: 8.48 10*3/MM3 (ref 3.4–10.8)

## 2023-03-10 PROCEDURE — 92526 ORAL FUNCTION THERAPY: CPT

## 2023-03-10 PROCEDURE — 99233 SBSQ HOSP IP/OBS HIGH 50: CPT | Performed by: INTERNAL MEDICINE

## 2023-03-10 PROCEDURE — 94760 N-INVAS EAR/PLS OXIMETRY 1: CPT

## 2023-03-10 PROCEDURE — 25010000002 FUROSEMIDE PER 20 MG: Performed by: INTERNAL MEDICINE

## 2023-03-10 PROCEDURE — 25010000002 CEFTRIAXONE PER 250 MG: Performed by: INTERNAL MEDICINE

## 2023-03-10 PROCEDURE — 94799 UNLISTED PULMONARY SVC/PX: CPT

## 2023-03-10 PROCEDURE — 83735 ASSAY OF MAGNESIUM: CPT | Performed by: PHYSICIAN ASSISTANT

## 2023-03-10 PROCEDURE — 63710000001 INSULIN REGULAR HUMAN PER 5 UNITS: Performed by: INTERNAL MEDICINE

## 2023-03-10 PROCEDURE — 80053 COMPREHEN METABOLIC PANEL: CPT | Performed by: PHYSICIAN ASSISTANT

## 2023-03-10 PROCEDURE — 94668 MNPJ CHEST WALL SBSQ: CPT

## 2023-03-10 PROCEDURE — 70490 CT SOFT TISSUE NECK W/O DYE: CPT

## 2023-03-10 PROCEDURE — 84100 ASSAY OF PHOSPHORUS: CPT | Performed by: INTERNAL MEDICINE

## 2023-03-10 PROCEDURE — 82962 GLUCOSE BLOOD TEST: CPT

## 2023-03-10 PROCEDURE — 25010000002 FUROSEMIDE PER 20 MG: Performed by: SPECIALIST

## 2023-03-10 PROCEDURE — 71045 X-RAY EXAM CHEST 1 VIEW: CPT

## 2023-03-10 PROCEDURE — 83880 ASSAY OF NATRIURETIC PEPTIDE: CPT | Performed by: INTERNAL MEDICINE

## 2023-03-10 PROCEDURE — 25010000002 ENOXAPARIN PER 10 MG: Performed by: INTERNAL MEDICINE

## 2023-03-10 PROCEDURE — 85025 COMPLETE CBC W/AUTO DIFF WBC: CPT | Performed by: PHYSICIAN ASSISTANT

## 2023-03-10 PROCEDURE — 94664 DEMO&/EVAL PT USE INHALER: CPT

## 2023-03-10 PROCEDURE — 97110 THERAPEUTIC EXERCISES: CPT

## 2023-03-10 RX ORDER — FUROSEMIDE 10 MG/ML
40 INJECTION INTRAMUSCULAR; INTRAVENOUS ONCE
Status: COMPLETED | OUTPATIENT
Start: 2023-03-10 | End: 2023-03-10

## 2023-03-10 RX ORDER — ENOXAPARIN SODIUM 100 MG/ML
40 INJECTION SUBCUTANEOUS EVERY 24 HOURS
Status: DISCONTINUED | OUTPATIENT
Start: 2023-03-10 | End: 2023-03-29 | Stop reason: HOSPADM

## 2023-03-10 RX ORDER — SODIUM PHOSPHATE IN D5W 15MMOL/250
15 PLASTIC BAG, INJECTION (ML) INTRAVENOUS EVERY 4 HOURS
Status: COMPLETED | OUTPATIENT
Start: 2023-03-10 | End: 2023-03-10

## 2023-03-10 RX ORDER — IPRATROPIUM BROMIDE AND ALBUTEROL SULFATE 2.5; .5 MG/3ML; MG/3ML
3 SOLUTION RESPIRATORY (INHALATION) EVERY 6 HOURS PRN
Status: DISCONTINUED | OUTPATIENT
Start: 2023-03-10 | End: 2023-03-29 | Stop reason: HOSPADM

## 2023-03-10 RX ORDER — DEXTROSE MONOHYDRATE 25 G/50ML
25 INJECTION, SOLUTION INTRAVENOUS
Status: DISCONTINUED | OUTPATIENT
Start: 2023-03-10 | End: 2023-03-29 | Stop reason: HOSPADM

## 2023-03-10 RX ORDER — NICOTINE POLACRILEX 4 MG
15 LOZENGE BUCCAL
Status: DISCONTINUED | OUTPATIENT
Start: 2023-03-10 | End: 2023-03-29 | Stop reason: HOSPADM

## 2023-03-10 RX ADMIN — Medication 10 ML: at 10:12

## 2023-03-10 RX ADMIN — METOPROLOL TARTRATE 12.5 MG: 25 TABLET, FILM COATED ORAL at 23:53

## 2023-03-10 RX ADMIN — ARFORMOTEROL TARTRATE 15 MCG: 15 SOLUTION RESPIRATORY (INHALATION) at 07:53

## 2023-03-10 RX ADMIN — IPRATROPIUM BROMIDE AND ALBUTEROL SULFATE 3 ML: .5; 2.5 SOLUTION RESPIRATORY (INHALATION) at 07:53

## 2023-03-10 RX ADMIN — CEFTRIAXONE SODIUM 1 G: 1 INJECTION, SOLUTION INTRAVENOUS at 08:55

## 2023-03-10 RX ADMIN — FUROSEMIDE 40 MG: 10 INJECTION, SOLUTION INTRAMUSCULAR; INTRAVENOUS at 16:14

## 2023-03-10 RX ADMIN — INSULIN HUMAN 4 UNITS: 100 INJECTION, SOLUTION PARENTERAL at 17:34

## 2023-03-10 RX ADMIN — METOPROLOL TARTRATE 12.5 MG: 25 TABLET, FILM COATED ORAL at 08:56

## 2023-03-10 RX ADMIN — ARFORMOTEROL TARTRATE 15 MCG: 15 SOLUTION RESPIRATORY (INHALATION) at 18:39

## 2023-03-10 RX ADMIN — IPRATROPIUM BROMIDE AND ALBUTEROL SULFATE 3 ML: .5; 2.5 SOLUTION RESPIRATORY (INHALATION) at 01:11

## 2023-03-10 RX ADMIN — INSULIN HUMAN 3 UNITS: 100 INJECTION, SOLUTION PARENTERAL at 23:53

## 2023-03-10 RX ADMIN — ENOXAPARIN SODIUM 40 MG: 100 INJECTION SUBCUTANEOUS at 16:13

## 2023-03-10 RX ADMIN — BUDESONIDE 0.5 MG: 0.5 INHALANT ORAL at 18:39

## 2023-03-10 RX ADMIN — ASPIRIN 81 MG 81 MG: 81 TABLET ORAL at 08:55

## 2023-03-10 RX ADMIN — FUROSEMIDE 40 MG: 10 INJECTION, SOLUTION INTRAMUSCULAR; INTRAVENOUS at 08:56

## 2023-03-10 RX ADMIN — BUDESONIDE 0.5 MG: 0.5 INHALANT ORAL at 07:53

## 2023-03-10 RX ADMIN — SODIUM PHOSPHATE, MONOBASIC, MONOHYDRATE 15 MMOL: 276; 142 INJECTION, SOLUTION INTRAVENOUS at 10:11

## 2023-03-10 RX ADMIN — SODIUM PHOSPHATE, MONOBASIC, MONOHYDRATE 15 MMOL: 276; 142 INJECTION, SOLUTION INTRAVENOUS at 14:07

## 2023-03-10 RX ADMIN — PANTOPRAZOLE SODIUM 40 MG: 40 INJECTION, POWDER, FOR SOLUTION INTRAVENOUS at 05:09

## 2023-03-10 NOTE — PROGRESS NOTES
Date of service: 3/10/2023    Subjective: More awake.  No chest pain, shortness of breath, palpitations or lightheadedness.     Review of systems: No headaches, vertigo, fever, chills, nausea, vomiting, abdominal pain, TIA or CVA-like symptoms.     Physical examination: He was in no pain or respiratory distress.  Vital signs: Reviewed  HEENT: No JVD or carotid bruit.    Neck: No JVD or carotid bruit.    Chest:   Clearer.  Much less rhonchi.  Cardiac: RRR.  II/IV SM over the right and LSB.  No S3 gallop.  Distant S1-S2.  Abdomen: Soft and nontender.  No megalies or masses.  Extremities: No edema, cyanosis or clubbing.  Pulse intact.  Neuro: Still sedated.     Records: Reviewed.    1.  Acute diastolic CHF.  IV diuresis.  Keep lites within normal limits.  2.  Syncope, may be vasovagal and/or severe aortic stenosis.   3.  Acute hypoxemic respiratory failure.  4.  Bilateral aspiration pneumonia.    5.  Reactive sinus tachycardia, secondary to the above, resolved.  6.  Considering his clinical presentation, lacking of ischemic changes on the EKG or angina pectoralis, elevated troponin could be due to demand ischemia.  7.  A.m. labs.  8.  Further cardiac evaluation with a LAI and cardiac catheterization prior to AVR will be later on when he improves clinically.

## 2023-03-10 NOTE — THERAPY RE-EVALUATION
Respiratory Therapist Broncho-Pulmonary Hygiene Progress Note      Patient Name:  Buzz Lopez  YOB: 1945    Buzz Lopez meets the qualification for Level 2 of the Bronco-Pulmonary Hygiene Protocol. This was based on my daily patient assessment and includes review of chest x-ray results, cough ability and quality, oxygenation, secretions or risk for secretion development and patient mobility.     Broncho-Pulmonary Hygiene Assessment:    Level of Movement: Actively changing positions without assistance  Alert/ oriented/ cooperative    Breath Sounds: Clear to slightly diminished    Cough: Strong, effective    Chest X-Ray: No CXR available - Pt has not had a CXR since 3/6.  Patient had a bronchoscopy on 3/8 and is to have a follow up xray today.  Lungs are clear and slightly diminished.     Sputum Productions: None or small amount of thin or watery secretions with effective cough    History and Physical: None    SpO2 to Oxygen Need: greater than 92% on room air or  less than 3L nasal canula    Current SpO2 is: 100% on 3.5 lpm - titrated down to 2 lpm at that time     Based on this information, I have completed the following interventions: Aerobika with bronchodialtor medication or TID      Electronically signed by Lucy Gunn RRT, 03/10/23, 9:54 AM EST.

## 2023-03-10 NOTE — PROGRESS NOTES
Pulmonary / Critical Care Progress Note      Patient Name: Buzz Lopez  : 1945  MRN: [id number]  Primary Care Physician:  Provider, No Known  Date of admission: 3/6/2023    Subjective   Subjective   Follow-up for pneumonia.    On 2 L of oxygen  Tolerating tube feeds  Receiving speech therapy  Resting in bed comfortably  Coarse cough decreased productive of thick cloudy secretions  Dyspnea better  Is weak and fatigue  Chest x-ray with decreasing right greater than left bilateral lung infiltrates  Family still discussing possible PEG tube  No nausea, fevers or chills    Review of Systems  General:  + Fatigue, No Fever.   HEENT: No dysphagia, No Visual Changes, no rhinorrhea  Respiratory:  + cough, + Dyspnea, no phlegm, No Pleuritic Pain, no wheezing, no hemoptysis  Cardiovascular: Denies chest pain, denies palpitations, + GARCIA, No Chest Pressure  Gastrointestinal:  No Abdominal Pain, No Nausea, No Vomiting, No Diarrhea      Objective   Objective     Vitals:   Temp:  [97.9 °F (36.6 °C)-98.2 °F (36.8 °C)] 98.2 °F (36.8 °C)  Heart Rate:  [] 86  Resp:  [16-18] 16  BP: ()/(58-77) 138/58  Flow (L/min):  [1-2] 2    Physical Exam   Vital Signs Reviewed   General: Thin elderly male, Alert, NAD on room air  HEENT:  PERRL, EOMI.  OP, nares clear, right cortrak   Neck:  Supple, no JVD, no thyromegaly  Chest:  good aeration, decreased bibasilar crackles and rhonchi, tympanic to percussion bilaterally, no work of breathing noted  CV: RRR, no MGR, pulses 2+, equal  Abd:  Soft, NT, ND, + BS, no HSM  EXT:  no clubbing, no cyanosis, no edema, no joint tenderness, muscle wasting all 4 extremities  Neuro:  A&Ox3, CN grossly intact, no focal deficits  Skin: No rashes or lesions noted    Result Review    Result Review:  I have personally reviewed the results from the time of this admission to 3/10/2023 07:35 EST and agree with these findings:  [x]  Laboratory  [x]  Microbiology  [x]  Radiology  [x]  EKG/Telemetry    [x]  Cardiology/Vascular   []  Pathology  []  Old records  []  Other:  Most notable findings include:   3/9 BAL strep pneumonia and rhinovirus  Cytology with acute inflammation  Chest x-ray this morning with decreased right greater than left bilateral airspace disease    3/7 echocardiogram EF 56 to 60%, grade 1 diastolic dysfunction, severe aortic stenosis, RVSP less than 35        Lab 03/10/23  0407 03/09/23  0410 03/08/23  0406 03/07/23  0636 03/06/23  2353 03/06/23  0916 03/06/23  0825   WBC 8.48 5.60 6.82 9.19  --  12.90*  --    HEMOGLOBIN 12.1* 11.3* 12.0* 11.8*  --  13.4  --    HEMATOCRIT 36.4* 34.3* 36.0* 34.3*  --  39.9  --    PLATELETS 187 211 184 186  --  169  --    SODIUM 142 141 137  --  129* 133*  --    SODIUM, ARTERIAL  --   --   --   --   --   --  134.4*   POTASSIUM 4.1 4.2 4.1  --  4.8 4.6  --    CHLORIDE 101 102 98  --  96* 93*  --    CO2 32.2* 31.6* 27.6  --  23.7 24.0  --    BUN 15 19 26*  --  30* 26*  --    CREATININE 0.56* 0.56* 0.59*  --  0.81 0.88  --    GLUCOSE 243* 222* 183*  --  164* 282*  --    GLUCOSE, ARTERIAL  --   --   --   --   --   --  293*   CALCIUM 9.2 9.2 9.2  --  8.8 9.4  --    PHOSPHORUS 2.2* 1.8* 2.6  --  3.2  --   --    TOTAL PROTEIN 7.4 7.0 7.3  --  6.7 8.2  --    ALBUMIN 3.1* 3.0* 3.4*  --  2.8* 3.8  --    GLOBULIN 4.3 4.0 3.9  --  3.9 4.4  --      Assessment & Plan   Assessment / Plan     Active Hospital Problems:  Active Hospital Problems    Diagnosis    • **Pneumonia of right lower lobe due to infectious organism    • Severe malnutrition (HCC)    • Sepsis with acute hypoxic respiratory failure without septic shock (HCC)        Impression:   Acute hypoxic respiratory failure  Streptococcal pneumonia  Aspiration pneumonia from unspecified organism  Aspiration and airways, initial encounter  Severe protein calorie malnutrition with muscle wasting  Upper respiratory infection due to rhinovirus  Mucous plugging/issues with airway clearance  Lactic acidosis-resolved  Possible  syncope  NSTEMI: Likely demand ischemia  Severe aortic stenosis  Remote tobacco use    Plan:   Wean O2 to keep SPO2 greater than 90%  Chest x-ray this morning with decreasing bilateral airspace disease  Spot dose 40 mg IV Lasix x1.  Trend renal panel and electrolytes  NT BNP elevated at 6189  Continue nebulizers and aggressive bronchopulmonary hygiene  Continue speech therapy.  Aspiration precautions per them  Continue tube feedings per dietitian recommendations.  May need PEG tube placement.  Family to discuss today.  Cardiology on board and appreciate assistance.  If needs LAI, can remove core track to perform  Echocardiogram EF 56 to 60%, grade 1 diastolic dysfunction, severe aortic stenosis, RVSP less than 35.  Continue rehab services    DVT prophylaxis:  No DVT prophylaxis order currently exists.    CODE STATUS:   Level Of Support Discussed With: Patient  Code Status (Patient has no pulse and is not breathing): CPR (Attempt to Resuscitate)  Medical Interventions (Patient has pulse or is breathing): Full Support      I personally reviewed pertinent labs, imaging and provider notes.   Discussed with primary service.     Electronically signed by Floyd Wyatt MD, 3/10/2023, 07:35 EST.

## 2023-03-10 NOTE — THERAPY TREATMENT NOTE
Acute Care - Physical Therapy Treatment Note   [facility]     Patient Name: Buzz Lopez  : 1945  MRN: [id number]  Today's Date: 3/10/2023      Visit Dx:     ICD-10-CM ICD-9-CM   1. Pneumonia of right lower lobe due to infectious organism  J18.9 486   2. Sepsis with acute hypoxic respiratory failure without septic shock, due to unspecified organism (HCC)  A41.9 038.9    R65.20 995.91    J96.01 518.81   3. Acute respiratory failure with hypoxia (HCC)  J96.01 518.81   4. Difficulty walking  R26.2 719.7   5. Oropharyngeal dysphagia  R13.12 787.22   6. Decreased activities of daily living (ADL)  Z78.9 V49.89     Patient Active Problem List   Diagnosis   • Pneumonia of right lower lobe due to infectious organism   • Sepsis with acute hypoxic respiratory failure without septic shock (HCC)   • Severe malnutrition (HCC)     History reviewed. No pertinent past medical history.  Past Surgical History:   Procedure Laterality Date   • BRONCHOSCOPY N/A 3/8/2023    Procedure: BRONCHOSCOPY WITH BRONCHOALVEOLAR LAVAGE, WASHINGS;  Surgeon: Jason Rowley MD;  Location: [facility] ENDOSCOPY;  Service: Pulmonary;  Laterality: N/A;  MUCOUS PLUGGING   • EYE SURGERY       PT Assessment (last 12 hours)     PT Evaluation and Treatment     Row Name 03/10/23 1055          Physical Therapy Time and Intention    Subjective Information --  Pt reports amb earlier in the day.  -RH     Document Type therapy note (daily note)  -     Mode of Treatment physical therapy;individual therapy  -RH     Patient Effort good  -     Row Name 03/10/23 9980          Pain    Additional Documentation Pain Scale: FACES Pre/Post-Treatment (Group)  -     Row Name 03/10/23 1052          Pain Scale: FACES Pre/Post-Treatment    Pain: FACES Scale, Pretreatment 0-->no hurt  -RH     Posttreatment Pain Rating 0-->no hurt  -     Row Name 03/10/23 1607          Strength (Manual Muscle Testing)    Strength (Manual Muscle Testing) --  Pt B LE strength grossly 4-/5  t0 4/5.  -RH     Row Name 03/10/23 1100          Vital Signs    O2 Delivery Intra Treatment --  Pt on supplemental oxygen with nasal cannula.  -RH     Post SpO2 (%) 98  -RH           User Key  (r) = Recorded By, (t) = Taken By, (c) = Cosigned By    Initials Name Provider Type    RH Mahad Whaley PTA Physical Therapist Assistant               Bilateral Lower Extremity   Exercise  Reps  Sets    Short arc quads   10 2   Heel slides  10 2   Ankle pumps  10 2   Quad sets  10 2   Straight leg raise  10 2   Hip ab/adduction 10 2        Physical Therapy Education     Title: PT OT SLP Therapies (Done)     Topic: Physical Therapy (Done)     Point: Mobility training (Done)     Learning Progress Summary           Patient Acceptance, E, VU by ALO at 3/8/2023 1531    Acceptance, E, VU by POONAM at 3/7/2023 0909                               User Key     Initials Effective Dates Name Provider Type Discipline     09/21/21 -  Heavenly Quach, RN Registered Nurse Nurse    POONAM 01/11/23 -  Ephraim Juares PT Student PT Student PT              PT Recommendation and Plan         Outcome Measures     Row Name 03/10/23 1100 03/09/23 1400          How much help from another person do you currently need...    Turning from your back to your side while in flat bed without using bedrails? 4  -RH 4  -PAULO     Moving from lying on back to sitting on the side of a flat bed without bedrails? 4  -RH 4  -PAULO     Moving to and from a bed to a chair (including a wheelchair)? 3  -RH 3  -PAULO     Standing up from a chair using your arms (e.g., wheelchair, bedside chair)? 4  -RH 4  -PAULO     Climbing 3-5 steps with a railing? 3  -RH 3  -PAULO     To walk in hospital room? 3  -RH 3  -PAULO     AM-PAC 6 Clicks Score (PT) 21  -RH 21  -PAULO        Functional Assessment    Outcome Measure Options -- AM-PAC 6 Clicks Basic Mobility (PT)  -PAULO           User Key  (r) = Recorded By, (t) = Taken By, (c) = Cosigned By    Initials Name Provider Type    RH Mahad Whaley PTA Physical  Therapist Assistant    Isai Wood, PT Physical Therapist                 Time Calculation:    PT Charges     Row Name 03/10/23 1056             Time Calculation    PT Received On 03/10/23  -RH         Timed Charges    83987 - PT Therapeutic Exercise Minutes 12  -RH         Total Minutes    Timed Charges Total Minutes 12  -RH       Total Minutes 12  -RH            User Key  (r) = Recorded By, (t) = Taken By, (c) = Cosigned By    Initials Name Provider Type     Mahad Whaley PTA Physical Therapist Assistant              Therapy Charges for Today     Code Description Service Date Service Provider Modifiers Qty    22999577096 HC PT THER PROC EA 15 MIN 3/10/2023 Mahad Whaley PTA GP 1          PT G-Codes  Outcome Measure Options: AM-PAC 6 Clicks Basic Mobility (PT)  AM-PAC 6 Clicks Score (PT): 21  AM-PAC 6 Clicks Score (OT): 21    Mahad Whaley PTA  3/10/2023

## 2023-03-10 NOTE — PLAN OF CARE
Goal Outcome Evaluation:              Outcome Evaluation: Patient NPO for CT of the neck. Speech came and evaluated patient and swallowing abilities.  VSS.  No acute changes this shift.  No signs of distress noted at this time.

## 2023-03-10 NOTE — THERAPY TREATMENT NOTE
Acute Care - Speech Language Pathology   Swallow Treatment Note  [place]     Patient Name: Buzz Lopez  : 1945  MRN: [id number]  Today's Date: 3/10/2023               Admit Date: 3/6/2023    Visit Dx:     ICD-10-CM ICD-9-CM   1. Pneumonia of right lower lobe due to infectious organism  J18.9 486   2. Sepsis with acute hypoxic respiratory failure without septic shock, due to unspecified organism (HCC)  A41.9 038.9    R65.20 995.91    J96.01 518.81   3. Acute respiratory failure with hypoxia (HCC)  J96.01 518.81   4. Difficulty walking  R26.2 719.7   5. Oropharyngeal dysphagia  R13.12 787.22   6. Decreased activities of daily living (ADL)  Z78.9 V49.89     Patient Active Problem List   Diagnosis   • Pneumonia of right lower lobe due to infectious organism   • Sepsis with acute hypoxic respiratory failure without septic shock (HCC)   • Severe malnutrition (HCC)     History reviewed. No pertinent past medical history.  Past Surgical History:   Procedure Laterality Date   • BRONCHOSCOPY N/A 3/8/2023    Procedure: BRONCHOSCOPY WITH BRONCHOALVEOLAR LAVAGE, WASHINGS;  Surgeon: Jason Rowley MD;  Location: [facility] ENDOSCOPY;  Service: Pulmonary;  Laterality: N/A;  MUCOUS PLUGGING   • EYE SURGERY       SPEECH PATHOLOGY DYSPHAGIA TREATMENT     Subjective/Behavioral Observations: Alert and cooperative, sitting up in bed.        Day/time of Treatment: 3/10/2023        Current Diet:  N.p.o. with core track        Current Strategies: N/A     Treatment received: Dysphagia therapy to address swallow function through exercises and education of strategies.        Results of treatment:   Lingual/tongue base exercises completed with 2 sets each.  Lingual strength/range of motion 3 /5.  Laryngeal elevation exercises with effortful swallow, 3 -/5.  At times with apparent improved excursion, likely decreased strength.   Exercises for improved cough with mod max assist.  Patient did demonstrate good voicing with ability to  niro.  Trials of ice chips x10, initially clear, increasing wetness/laryngeal congestion with additional trials.  Patient producing weak cough/throat clear.  Patient does not appear to associate this with difficulty swallowing.   Small controlled sip of water x1 with patient producing multiple swallows x5, vocal wetness noted.  Nectar liquid by spoon 1/2 to 1/4 teaspoon, multiple swallow with increasing vocal wetness.       Progress toward goals:  Patient actively participating in treatment.  Appears with some improvement in strength voicing.        Barriers to Achieving goals: Goal status        Plan of care:/changes in plan: Continue with current plan with patient to be n.p.o. with alternative feeding.  Okay for nursing to conservatively give small ice chips from time to time.  Family educated for oral care and drops of water to tongue, avoid sucking on wet swab.                                                                              Plan of Care Reviewed With: patient, family          EDUCATION  The patient has been educated in the following areas:   NPO rationale.              Time Calculation:    Time Calculation- SLP     Row Name 03/10/23 1315             Time Calculation- SLP    SLP Stop Time 1300  -TB      SLP Received On 03/10/23  -TB         Untimed Charges    71133-VK Treatment Swallow Minutes 60  -TB         Total Minutes    Untimed Charges Total Minutes 60  -TB       Total Minutes 60  -TB            User Key  (r) = Recorded By, (t) = Taken By, (c) = Cosigned By    Initials Name Provider Type    TB Terri Gomez SLP Speech and Language Pathologist                Therapy Charges for Today     Code Description Service Date Service Provider Modifiers Qty    [id number]  ST TREATMENT SWALLOW 4 3/10/2023 Terri Gomez SLP GN 1               JANAY Julio  3/10/2023

## 2023-03-10 NOTE — PROGRESS NOTES
[facility]   Hospitalist Progress Note  Date: 3/10/2023  Patient Name: Buzz Lopez  : 1945  MRN: [id number]  Date of admission: 3/6/2023    Subjective   Subjective     Chief Complaint:   Shortness of breath, confusion    Summary:   Buzz Lopez is a 77 y.o. male with no significant past medical history has been brought into the ED with concerns for confusion, possible syncope, shortness of breath.  Patient states that for the past 1 to 2 weeks patient has been having difficulty breathing and subjective fevers, it has got worse and today and he has called his son to take him to the ED as he is having difficulty breathing.  By the time patient's son has arrived at the home, patient appeared to be confused, generalized weakness and unable to put his clothes on.  While his son was helping him to get the clothes on, patient appeared to be very confused and had a possible syncope episode where he was unresponsive.  Patient's son has looked for the pulse and they could not feel radial pulse and started chest compressions, EMS was called.  By the time EMS has arrived patient was receiving chest compressions from the family members.  EMS has evaluated the patient and they could not feel a pulse.  Patient was in respiratory distress, saturating around 70% on room air.  Received nebulizer treatment by the EMS and the patient has been brought into the ED.    During my examination, patient is alert and oriented x3 and able to answer questions appropriately.  States that he does not remember how he came to the hospital.  Denies any chest pain, nausea, vomiting, focal weakness, numbness, tingling.  States that he has been having difficulty breathing for the past 1 to 2 weeks.  A month ago he had some sore throat.  Associated with cough, productive sputum. Patient is thin and frail.  Chronically has a poor p.o. intake.  Admits that he has low appetite.  No previously diagnosed history of dementia.  As per the  family members, patient takes a lot of over-the-counter fiber supplement review does not gain weight despite he eats well.    Interval Followup:     3/10/2023    · No acute events overnight  · Alert and interactive today.  Much more spry than previous days.  · No tremor noted  · No further A-fib  · Tolerating low-dose beta-blocker  · Tolerating tube feeds  · Experiencing less shortness of breath symptoms      Objective   Objective     Vitals:   Temp:  [97.5 °F (36.4 °C)-98.2 °F (36.8 °C)] 97.5 °F (36.4 °C)  Heart Rate:  [77-90] 77  Resp:  [16-18] 18  BP: (134-149)/(57-63) 137/57  Flow (L/min):  [2-3.5] 3.5    Physical Exam   General appearance: NAD, conversant. Cacechtic  Eyes: anicteric sclerae, moist conjunctivae; no lid-lag; PERRLA  HENT: Atraumatic; oropharynx clear with moist mucous membranes and no mucosal ulcerations; normal hard and soft palate  Neck: Trachea midline; FROM, supple, no thyromegaly or lymphadenopathy  Lungs: Bilateral rhonchi, with normal respiratory effort and no intercostal retractions    Result Review    Result Review:  I have personally reviewed the results as below  [x]  Laboratory  CBC    CBC 3/8/23 3/9/23 3/10/23   WBC 6.82 5.60 8.48   RBC 4.09 (A) 3.84 (A) 4.04 (A)   Hemoglobin 12.0 (A) 11.3 (A) 12.1 (A)   Hematocrit 36.0 (A) 34.3 (A) 36.4 (A)   MCV 88.0 89.3 90.1   MCH 29.3 29.4 30.0   MCHC 33.3 32.9 33.2   RDW 13.4 13.4 13.3   Platelets 184 211 187   (A) Abnormal value            CMP    CMP 3/8/23 3/9/23 3/10/23   Glucose 183 (A) 222 (A) 243 (A)   BUN 26 (A) 19 15   Creatinine 0.59 (A) 0.56 (A) 0.56 (A)   eGFR 98.7 100.3 100.3   Sodium 137 141 142   Potassium 4.1 4.2 4.1   Chloride 98 102 101   Calcium 9.2 9.2 9.2   Total Protein 7.3 7.0 7.4   Albumin 3.4 (A) 3.0 (A) 3.1 (A)   Globulin 3.9 4.0 4.3   Total Bilirubin 0.3 0.3 0.4   Alkaline Phosphatase 74 72 75   AST (SGOT) 54 (A) 40 32   ALT (SGPT) 27 29 25   Albumin/Globulin Ratio 0.9 0.8 0.7   BUN/Creatinine Ratio 44.1 (A) 33.9 (A)  26.8 (A)   Anion Gap 11.4 7.4 8.8   (A) Abnormal value            []  Microbiology  []  Radiology  []  EKG/Telemetry   []  Cardiology/Vascular   []  Pathology  []  Old records  []  Other:    Assessment & Plan   Assessment / Plan   Assessment:    · Sepsis due to right lower lobe pneumonia (Streptococcus Pneumo)  · Acute hypoxic respiratory failure  · Questionable cardiac arrest episode  · NSTEMI/acute myocardial injury  · Possible syncope  · Lactic acidosis  · Malnutrition  · S/P Bronchoscopy 3/8/23 with mucous plugging removed  · Dysphagia requiring Core Track placed this admission  · Aspiration  · Severe aortic stenosis      Plan:    · Patient with ongoing dysphagia, not safe for oral intake.  Discussed with speech, he will likely need prolonged course of speech therapy to improve his swallowing function.  Would favor PEG tube in this situation.  · Check CT neck to rule out any structural abnormalities  · Pulmonology consulted  · Cardiology consulted   · LAI attempted 3/9/23 but unable to complete  · CT scan chest .... No PE.  Bilat pneumonia noted.  · Status post bronchoscopy   · Thick mucus removal  · Food particles seen around the vocal cords  · Positive for strep pneumo  · Speech consulted  · Transthoracic echo suggesting normal EF% and severe AS  · Complete ceftriaxone   · Continue albuterol nebulizer every 4 hours as needed  · Continue bronchodilator protocol  · Continue aspirin, monitor troponin  · PT OT consult     Reviewed patients labs and imaging, and discussed with patient and nurse at bedside.    DVT prophylaxis:  No DVT prophylaxis order currently exists.    CODE STATUS:   Level Of Support Discussed With: Patient  Code Status (Patient has no pulse and is not breathing): CPR (Attempt to Resuscitate)  Medical Interventions (Patient has pulse or is breathing): Full Support         Patient independently seen and evaluated, agree with assessment and plan, above documentation reflects plan put forth during  bedside rounds.  More than 51% of the time of this patient encounter was performed by me.    Interval history:  No acute events overnight, patient is improving.     Exam:  GEN: Drowsy, arousable to verbal stimuli  HEENT: Moist mucous membranes, core track in place  LUNGS: Clear to auscultation bilaterally  CARDIAC: Irregularly irregular rate and rhythm  NEURO: Globally weak     Plan:  Agree with assessment plan as above  Continue oral metoprolol  Continue on telemetry  Continue to core track for nutrition patient is at risk for aspiration and not safe for oral intake currently  CBC, CMP reviewed  Check CT scan of the neck to rule out any obvious obstruction or reversible factors for patient's difficulty swallowing  Check CBC, CMP, mag and Phos tomorrow  PT/OT following  Discussed with pulmonology  We will need rehab at discharge  Clinical course will dictate further management     Reviewed patients labs and imaging, and discussed with patient and nurse at bedside.      Electronically signed by Black Capps MD, 03/10/23, 1:20 PM EST.

## 2023-03-10 NOTE — CONSULTS
"Nutrition Services    Patient Name: Buzz Lopez  YOB: 1945  MRN: [id number]  Admission date: 3/6/2023      CLINICAL NUTRITION ASSESSMENT      Reason for Assessment  Follow-up protocol    H&P:    History reviewed. No pertinent past medical history.     Current Problems:   Active Hospital Problems    Diagnosis    • **Pneumonia of right lower lobe due to infectious organism    • Severe malnutrition (HCC)    • Sepsis with acute hypoxic respiratory failure without septic shock (HCC)         Nutrition/Diet History         Narrative     Nutrition follow up      Pt's enteral nutrition running at 25 ml/hr. Phos: 2.2, improved from 1.8. Receives replacement meds. Sodium, magnesium and potassium labs currently wnl. Speech continues to recommend pt be NPO with alternative feeding. Per MD notes, pt may require a  PEG tube: expected to need TF for longer. Pt has elevated glucose over 200 the last 2 days receives Diabetisource TF to help manage glucose levels. No insulin ordered at this time. Notified MD re: elevated glucose.  Since phosphorus is improved, would like to advance TF rate:  to 50 ml/hr, and increase after four hours to goal rate of 65 ml/hr. (monitor labs, tolerance).      Anthropometrics        Current Height, Weight Height: 177.8 cm (70\")  Weight: 61.2 kg (135 lb)   Current BMI Body mass index is 19.37 kg/m².       Weight Hx  Wt Readings from Last 30 Encounters:   03/06/23 0824 61.2 kg (135 lb)   06/09/22 0154 61.4 kg (135 lb 5.8 oz)            Wt Change Observation Wt stable 6  Months (135# per two recorded weights)     Estimated/Assessed Needs       Energy Requirements 30-35 kcal/kg actual wt   EST Needs (kcal/day) 9747-1945       Protein Requirements 1.2-1.5 g/kg    EST Daily Needs (g/day) 73-91 g       Fluid Requirements 1 ml/kcal    Estimated Needs (mL/day) 7778-9975 (adjust as needed)     Labs/Medications         Pertinent Labs Reviewed.   Results from last 7 days   Lab Units 03/10/23  0407 " 03/09/23 0410 03/08/23  0406   SODIUM mmol/L 142 141 137   POTASSIUM mmol/L 4.1 4.2 4.1   CHLORIDE mmol/L 101 102 98   CO2 mmol/L 32.2* 31.6* 27.6   BUN mg/dL 15 19 26*   CREATININE mg/dL 0.56* 0.56* 0.59*   CALCIUM mg/dL 9.2 9.2 9.2   BILIRUBIN mg/dL 0.4 0.3 0.3   ALK PHOS U/L 75 72 74   ALT (SGPT) U/L 25 29 27   AST (SGOT) U/L 32 40 54*   GLUCOSE mg/dL 243* 222* 183*     Results from last 7 days   Lab Units 03/10/23  0407 03/09/23 0410 03/08/23  0406 03/07/23  0636 03/06/23  2353   MAGNESIUM mg/dL 2.0 2.0 2.1  --  2.1   PHOSPHORUS mg/dL 2.2* 1.8* 2.6  --  3.2   HEMOGLOBIN g/dL 12.1* 11.3* 12.0*   < >  --    HEMATOCRIT % 36.4* 34.3* 36.0*   < >  --    TRIGLYCERIDES mg/dL  --   --   --   --  50    < > = values in this interval not displayed.     COVID19   Date Value Ref Range Status   03/06/2023 Not Detected Not Detected - Ref. Range Final     No results found for: HGBA1C      Pertinent Medications Reviewed.     Current Nutrition Orders & Evaluation of Intake       Oral Nutrition     Current PO Diet NPO Diet NPO Type: Strict NPO   Supplement Orders Placed This Encounter      Place Feeding Tube Per [employer] System      Diet, Tube Feeding Tube Feeding Formula: Diabetisource AC (Glucerna 1.2); Tube Feeding Type: Continuous; Continuous Tube Feeding Start Rate (mL/hr): 25; Then Advance Rate By (mL/hr): Do Not Advance; Every __ Hours: Patient at Goal Rate; To Goal Ra...       Malnutrition Severity Assessment      Patient meets criteria for : Severe Malnutrition         Nutrition Diagnosis         Nutrition Dx Problem 1 Severe malnutrition related to Inability to consume sufficient energy as evidenced by body composition changes., SLP/swallow eval and NPO, to receive tube feeding     Nutrition Intervention         Day 1:  Diabetisource AC @ 25 ml/hr  Free water flushes 25 ml every 4 hours  Provides 720 kcal, 36 g pro, 642 ml fluid     Day 2:  3/10/23  Diabetisource AC @ 50 ml/hr  Free water flushes 25 ml every 4  hours  Provides: 1440 kcal, 72 g pro, 1134 ml fluid    Day 3/Goal Rate:   Diabetisource AC @ 65 ml/hr (goal rate)  Free water flushes 25 ml every 4 hours.  Provides 1872 kcal, 94 g pro, 1429 ml fluid      Medical Nutrition Therapy/Nutrition Education          Learner     Readiness N/A  N/A     Method     Response N/A  N/A     Monitor/Evaluation        Monitor I&O, Pertinent labs, EN delivery/tolerance, Weight, Skin status, GI status, Swallow function, Hemodynamic stability, RFS     Nutrition Discharge Plan         To be determined     Electronically signed by:  Hien Delvalle RD  03/10/23 14:24 EST

## 2023-03-11 LAB
ANION GAP SERPL CALCULATED.3IONS-SCNC: 9.3 MMOL/L (ref 5–15)
BACTERIA SPEC AEROBE CULT: NORMAL
BACTERIA SPEC AEROBE CULT: NORMAL
BASOPHILS # BLD AUTO: 0.01 10*3/MM3 (ref 0–0.2)
BASOPHILS NFR BLD AUTO: 0.1 % (ref 0–1.5)
BUN SERPL-MCNC: 24 MG/DL (ref 8–23)
BUN/CREAT SERPL: 35.3 (ref 7–25)
CALCIUM SPEC-SCNC: 9.1 MG/DL (ref 8.6–10.5)
CHLORIDE SERPL-SCNC: 93 MMOL/L (ref 98–107)
CO2 SERPL-SCNC: 34.7 MMOL/L (ref 22–29)
CREAT SERPL-MCNC: 0.68 MG/DL (ref 0.76–1.27)
DEPRECATED RDW RBC AUTO: 43.3 FL (ref 37–54)
EGFRCR SERPLBLD CKD-EPI 2021: 94.6 ML/MIN/1.73
EOSINOPHIL # BLD AUTO: 0.01 10*3/MM3 (ref 0–0.4)
EOSINOPHIL NFR BLD AUTO: 0.1 % (ref 0.3–6.2)
ERYTHROCYTE [DISTWIDTH] IN BLOOD BY AUTOMATED COUNT: 13.2 % (ref 12.3–15.4)
GLUCOSE BLDC GLUCOMTR-MCNC: 143 MG/DL (ref 70–99)
GLUCOSE BLDC GLUCOMTR-MCNC: 210 MG/DL (ref 70–99)
GLUCOSE BLDC GLUCOMTR-MCNC: 218 MG/DL (ref 70–99)
GLUCOSE BLDC GLUCOMTR-MCNC: 262 MG/DL (ref 70–99)
GLUCOSE BLDC GLUCOMTR-MCNC: 313 MG/DL (ref 70–99)
GLUCOSE SERPL-MCNC: 297 MG/DL (ref 65–99)
HCT VFR BLD AUTO: 38.6 % (ref 37.5–51)
HGB BLD-MCNC: 12.9 G/DL (ref 13–17.7)
IMM GRANULOCYTES # BLD AUTO: 0.02 10*3/MM3 (ref 0–0.05)
IMM GRANULOCYTES NFR BLD AUTO: 0.2 % (ref 0–0.5)
LYMPHOCYTES # BLD AUTO: 1.01 10*3/MM3 (ref 0.7–3.1)
LYMPHOCYTES NFR BLD AUTO: 12.5 % (ref 19.6–45.3)
MAGNESIUM SERPL-MCNC: 2 MG/DL (ref 1.6–2.4)
MAXIMAL PREDICTED HEART RATE: 141 BPM
MCH RBC QN AUTO: 29.9 PG (ref 26.6–33)
MCHC RBC AUTO-ENTMCNC: 33.4 G/DL (ref 31.5–35.7)
MCV RBC AUTO: 89.4 FL (ref 79–97)
MONOCYTES # BLD AUTO: 0.8 10*3/MM3 (ref 0.1–0.9)
MONOCYTES NFR BLD AUTO: 9.9 % (ref 5–12)
NEUTROPHILS NFR BLD AUTO: 6.2 10*3/MM3 (ref 1.7–7)
NEUTROPHILS NFR BLD AUTO: 77.2 % (ref 42.7–76)
NRBC BLD AUTO-RTO: 0 /100 WBC (ref 0–0.2)
PLATELET # BLD AUTO: 241 10*3/MM3 (ref 140–450)
PMV BLD AUTO: 10.7 FL (ref 6–12)
POTASSIUM SERPL-SCNC: 3.6 MMOL/L (ref 3.5–5.2)
RBC # BLD AUTO: 4.32 10*6/MM3 (ref 4.14–5.8)
SODIUM SERPL-SCNC: 137 MMOL/L (ref 136–145)
STRESS TARGET HR: 120 BPM
WBC NRBC COR # BLD: 8.05 10*3/MM3 (ref 3.4–10.8)

## 2023-03-11 PROCEDURE — 82962 GLUCOSE BLOOD TEST: CPT

## 2023-03-11 PROCEDURE — 94664 DEMO&/EVAL PT USE INHALER: CPT

## 2023-03-11 PROCEDURE — 63710000001 INSULIN REGULAR HUMAN PER 5 UNITS: Performed by: INTERNAL MEDICINE

## 2023-03-11 PROCEDURE — 99233 SBSQ HOSP IP/OBS HIGH 50: CPT | Performed by: STUDENT IN AN ORGANIZED HEALTH CARE EDUCATION/TRAINING PROGRAM

## 2023-03-11 PROCEDURE — 99233 SBSQ HOSP IP/OBS HIGH 50: CPT | Performed by: INTERNAL MEDICINE

## 2023-03-11 PROCEDURE — 94799 UNLISTED PULMONARY SVC/PX: CPT

## 2023-03-11 PROCEDURE — 83735 ASSAY OF MAGNESIUM: CPT | Performed by: PHYSICIAN ASSISTANT

## 2023-03-11 PROCEDURE — 25010000002 ENOXAPARIN PER 10 MG: Performed by: INTERNAL MEDICINE

## 2023-03-11 PROCEDURE — 80048 BASIC METABOLIC PNL TOTAL CA: CPT | Performed by: SPECIALIST

## 2023-03-11 PROCEDURE — 85025 COMPLETE CBC W/AUTO DIFF WBC: CPT | Performed by: PHYSICIAN ASSISTANT

## 2023-03-11 RX ADMIN — Medication 10 ML: at 08:06

## 2023-03-11 RX ADMIN — INSULIN HUMAN 4 UNITS: 100 INJECTION, SOLUTION PARENTERAL at 17:50

## 2023-03-11 RX ADMIN — METOPROLOL TARTRATE 5 MG: 1 INJECTION, SOLUTION INTRAVENOUS at 04:51

## 2023-03-11 RX ADMIN — METOPROLOL TARTRATE 12.5 MG: 25 TABLET, FILM COATED ORAL at 08:06

## 2023-03-11 RX ADMIN — INSULIN HUMAN 5 UNITS: 100 INJECTION, SOLUTION PARENTERAL at 05:49

## 2023-03-11 RX ADMIN — Medication 10 ML: at 20:38

## 2023-03-11 RX ADMIN — ENOXAPARIN SODIUM 40 MG: 100 INJECTION SUBCUTANEOUS at 16:04

## 2023-03-11 RX ADMIN — BUDESONIDE 0.5 MG: 0.5 INHALANT ORAL at 06:57

## 2023-03-11 RX ADMIN — PANTOPRAZOLE SODIUM 40 MG: 40 INJECTION, POWDER, FOR SOLUTION INTRAVENOUS at 05:48

## 2023-03-11 RX ADMIN — METOPROLOL TARTRATE 12.5 MG: 25 TABLET, FILM COATED ORAL at 20:37

## 2023-03-11 RX ADMIN — Medication 10 ML: at 00:22

## 2023-03-11 RX ADMIN — ARFORMOTEROL TARTRATE 15 MCG: 15 SOLUTION RESPIRATORY (INHALATION) at 06:57

## 2023-03-11 RX ADMIN — ARFORMOTEROL TARTRATE 15 MCG: 15 SOLUTION RESPIRATORY (INHALATION) at 19:03

## 2023-03-11 RX ADMIN — BUDESONIDE 0.5 MG: 0.5 INHALANT ORAL at 19:03

## 2023-03-11 RX ADMIN — ASPIRIN 81 MG 81 MG: 81 TABLET ORAL at 08:06

## 2023-03-11 NOTE — CONSULTS
"Nutrition Services    Patient Name: Buzz Lopez  YOB: 1945  MRN: [id number]  Admission date: 3/6/2023      CLINICAL NUTRITION ASSESSMENT      Reason for Assessment  Follow-up protocol    H&P:    History reviewed. No pertinent past medical history.     Current Problems:   Active Hospital Problems    Diagnosis    • **Pneumonia of right lower lobe due to infectious organism    • Severe malnutrition (HCC)    • Sepsis with acute hypoxic respiratory failure without septic shock (HCC)         Nutrition/Diet History         Narrative     Nutrition follow up.  Enteral nutrition on hold at time of RD visit (patient had been up out of bed and was temporarily disconnected).  RN reports rate was not increased overnight.  Plan to advance toward goal rate today.      NFPE performed and consistent with previous noted signs and symptoms of severe malnutrition.      Discussed nutrition plan with patient and son at bedside.  Patient is complaining of being thirsty and requesting water.  Explained that patient will receive more water volume as tube feeds are advanced toward goal rate.  Son had good understand of reasoning for slow advancement of enteral nutrition due to risk of refeeding syndrome.  Will adjust free water flushes some as well to provide additional fluid for patient.       Anthropometrics        Current Height, Weight Height: 177.8 cm (70\")  Weight: 61.2 kg (135 lb)   Current BMI Body mass index is 19.37 kg/m².       Weight Hx  Wt Readings from Last 30 Encounters:   03/06/23 0824 61.2 kg (135 lb)   06/09/22 0154 61.4 kg (135 lb 5.8 oz)            Wt Change Observation Wt stable 6  Months     Estimated/Assessed Needs       Energy Requirements 30-35 kcal/kg actual wt   EST Needs (kcal/day) 5879-9162       Protein Requirements 1.2-1.5 g/kg    EST Daily Needs (g/day) 73-91 g       Fluid Requirements 1 ml/kcal    Estimated Needs (mL/day) 0279-8987 (adjust as needed)     Labs/Medications         Pertinent " Labs Reviewed.   Results from last 7 days   Lab Units 03/11/23  0436 03/10/23  0407 03/09/23  0410 03/08/23  0406   SODIUM mmol/L 137 142 141 137   POTASSIUM mmol/L 3.6 4.1 4.2 4.1   CHLORIDE mmol/L 93* 101 102 98   CO2 mmol/L 34.7* 32.2* 31.6* 27.6   BUN mg/dL 24* 15 19 26*   CREATININE mg/dL 0.68* 0.56* 0.56* 0.59*   CALCIUM mg/dL 9.1 9.2 9.2 9.2   BILIRUBIN mg/dL  --  0.4 0.3 0.3   ALK PHOS U/L  --  75 72 74   ALT (SGPT) U/L  --  25 29 27   AST (SGOT) U/L  --  32 40 54*   GLUCOSE mg/dL 297* 243* 222* 183*     Results from last 7 days   Lab Units 03/11/23 0436 03/10/23  0407 03/09/23 0410 03/07/23  0636 03/06/23  2353   MAGNESIUM mg/dL 2.0 2.0 2.0   < > 2.1   PHOSPHORUS mg/dL  --  2.2* 1.8*   < > 3.2   HEMOGLOBIN g/dL 12.9* 12.1* 11.3*   < >  --    HEMATOCRIT % 38.6 36.4* 34.3*   < >  --    TRIGLYCERIDES mg/dL  --   --   --   --  50    < > = values in this interval not displayed.     COVID19   Date Value Ref Range Status   03/06/2023 Not Detected Not Detected - Ref. Range Final     No results found for: HGBA1C      Pertinent Medications Reviewed.     Current Nutrition Orders & Evaluation of Intake       Oral Nutrition     Current PO Diet No diet orders on file   Supplement Orders Placed This Encounter      Place Feeding Tube Per [email or web address] System      Diet, Tube Feeding Tube Feeding Formula: Diabetisource AC (Glucerna 1.2); Tube Feeding Type: Continuous; Continuous Tube Feeding Start Rate (mL/hr): 25; Then Advance Rate By (mL/hr): 25; Every __ Hours: 4; To Goal Rate of (mL/hr): 65       Malnutrition Severity Assessment      Patient meets criteria for : Severe Malnutrition  Malnutrition Type (last 8 hours)     Malnutrition Severity Assessment     Row Name 03/11/23 0935       Insufficient Energy Intake     Insufficient Energy Intake  < or equal to 50% of est. energy requirement for > or equal to 5d)    Row Name 03/11/23 0935       Muscle Loss    Loss of Muscle Mass Findings Severe    [place] Severe - deep  hollowing/scooping, lack of muscle to touch, facial bones well defined    Clavicle Bone Region Severe - protruding prominent bone    Acromion Bone Region Severe - squared shoulders, bones, and acromion process protrusion prominent    Patellar Region Severe - prominent bone, square looking, very little muscle definition    Anterior Thigh Region Severe - line/depression along thigh, obviously thin    Posterior Calf Region Severe - thin with very little definition/firmness    Row Name 03/11/23 0935       Fat Loss    Subcutaneous Fat Loss Findings Severe    Orbital Region  Severe - pronounced hollowness/depression, dark circles, loose saggy skin    Upper Arm Region Severe - mostly skin, very little space between folds, fingers touch    Row Name 03/11/23 0935       Criteria Met (Must meet criteria for severity in at least 2 of these categories: M Wasting, Fat Loss, Fluid, Secondary Signs, Wt. Status, Intake)    Patient meets criteria for  Severe Malnutrition                   Nutrition Diagnosis         Nutrition Dx Problem 1 Severe malnutrition related to Inability to consume sufficient energy as evidenced by body composition changes., NPO and SLP/swallow eval     Nutrition Intervention         Diabetisource AC @ 65 ml/hr   Free water flushes 50 ml every 4 hours.  Provides 1872 kcal, 94 g pro, 1679 ml fluid      Medical Nutrition Therapy/Nutrition Education          Learner     Readiness N/A  N/A     Method     Response N/A  N/A     Monitor/Evaluation        Monitor I&O, Pertinent labs, EN delivery/tolerance, Weight, Skin status, GI status, Swallow function, Hemodynamic stability, RFS     Nutrition Discharge Plan         To be determined     Electronically signed by:  Gauri Arana RD  03/11/23 09:36 EST

## 2023-03-11 NOTE — PLAN OF CARE
Goal Outcome Evaluation:               Pt. Had A-fib with RVR in 140-150-s at 0308 which converted to Sinus Rhythm after had pt. Cough twice. Later pt. Had high heart rate but converted again in the 70's. Noted is a new Living Will scanned 3/10 which Code status needs clarified. Son has further questions and Palliative Care consult has been ordered and  provided information for pt to read about Hospice and Palliative Care.

## 2023-03-11 NOTE — PLAN OF CARE
Goal Outcome Evaluation:  Plan of Care Reviewed With: patient           Outcome Evaluation: Patient out of the bed this morning to wheelchair and son pushed patient around the unit.  Patient tube feeds are at goal of 65 now.  Patient resting comfortably in bed with call light within reach.  No signs of distress noted at this time.

## 2023-03-11 NOTE — PROGRESS NOTES
[facility]   Hospitalist Progress Note  Date: 3/11/2023  Patient Name: Buzz Lopez  : 1945  MRN: [id number]  Date of admission: 3/6/2023    Subjective   Subjective     Chief Complaint:   Shortness of breath, confusion    Summary:   Buzz Lopez is a 77 y.o. male with no significant past medical history has been brought into the ED with concerns for confusion, possible syncope, shortness of breath.  Patient states that for the past 1 to 2 weeks patient has been having difficulty breathing and subjective fevers, it has got worse and today and he has called his son to take him to the ED as he is having difficulty breathing.  By the time patient's son has arrived at the home, patient appeared to be confused, generalized weakness and unable to put his clothes on.  While his son was helping him to get the clothes on, patient appeared to be very confused and had a possible syncope episode where he was unresponsive.  Patient's son has looked for the pulse and they could not feel radial pulse and started chest compressions, EMS was called.  By the time EMS has arrived patient was receiving chest compressions from the family members.  EMS has evaluated the patient and they could not feel a pulse.  Patient was in respiratory distress, saturating around 70% on room air.  Received nebulizer treatment by the EMS and the patient has been brought into the ED.    During my examination, patient is alert and oriented x3 and able to answer questions appropriately.  States that he does not remember how he came to the hospital.  Denies any chest pain, nausea, vomiting, focal weakness, numbness, tingling.  States that he has been having difficulty breathing for the past 1 to 2 weeks.  A month ago he had some sore throat.  Associated with cough, productive sputum. Patient is thin and frail.  Chronically has a poor p.o. intake.  Admits that he has low appetite.  No previously diagnosed history of dementia.  As per the  family members, patient takes a lot of over-the-counter fiber supplement review does not gain weight despite he eats well.    Interval Followup:     3/11/2023    · No acute events overnight or new issues  · Alert and interactive today.   · No tremor noted  · No further A-fib  · Tolerating low-dose beta-blocker  · Tolerating tube feeds  · GARCIA better    Objective   Objective     Vitals:   Temp:  [97.5 °F (36.4 °C)-98.2 °F (36.8 °C)] 97.5 °F (36.4 °C)  Heart Rate:  [65-85] 69  Resp:  [16-18] 17  BP: (106-146)/(42-73) 116/44  Flow (L/min):  [2-3.5] 2    Physical Exam   General appearance: NAD, conversant. Cacechtic  Eyes: anicteric sclerae, moist conjunctivae; no lid-lag; PERRLA  HENT: Atraumatic; oropharynx clear with moist mucous membranes and no mucosal ulcerations; normal hard and soft palate  Neck: Trachea midline; FROM, supple, no thyromegaly or lymphadenopathy  Lungs: Bilateral rhonchi, with normal respiratory effort and no intercostal retractions    Result Review    Result Review:  I have personally reviewed the results as below  [x]  Laboratory  CBC    CBC 3/9/23 3/10/23 3/11/23   WBC 5.60 8.48 8.05   RBC 3.84 (A) 4.04 (A) 4.32   Hemoglobin 11.3 (A) 12.1 (A) 12.9 (A)   Hematocrit 34.3 (A) 36.4 (A) 38.6   MCV 89.3 90.1 89.4   MCH 29.4 30.0 29.9   MCHC 32.9 33.2 33.4   RDW 13.4 13.3 13.2   Platelets 211 187 241   (A) Abnormal value            CMP    CMP 3/9/23 3/10/23 3/11/23   Glucose 222 (A) 243 (A) 297 (A)   BUN 19 15 24 (A)   Creatinine 0.56 (A) 0.56 (A) 0.68 (A)   eGFR 100.3 100.3 94.6   Sodium 141 142 137   Potassium 4.2 4.1 3.6   Chloride 102 101 93 (A)   Calcium 9.2 9.2 9.1   Total Protein 7.0 7.4    Albumin 3.0 (A) 3.1 (A)    Globulin 4.0 4.3    Total Bilirubin 0.3 0.4    Alkaline Phosphatase 72 75    AST (SGOT) 40 32    ALT (SGPT) 29 25    Albumin/Globulin Ratio 0.8 0.7    BUN/Creatinine Ratio 33.9 (A) 26.8 (A) 35.3 (A)   Anion Gap 7.4 8.8 9.3   (A) Abnormal value            []  Microbiology  []   Radiology  []  EKG/Telemetry   []  Cardiology/Vascular   []  Pathology  []  Old records  []  Other:    Assessment & Plan   Assessment / Plan   Assessment:    · Sepsis due to right lower lobe pneumonia (Streptococcus Pneumo)  · Acute hypoxic respiratory failure  · Questionable cardiac arrest episode  · NSTEMI/acute myocardial injury  · Possible syncope  · Lactic acidosis, resolved  · Malnutrition  · S/P Bronchoscopy 3/8/23 with mucous plugging removed  · Dysphagia requiring Core Track placed this admission  · Aspiration  · Severe aortic stenosis   · Degenerative changes cervical spine / anterior osteophytes  · CODE STATUS:  DNR/DNI     Plan:    · Continue core track. Speech re-eval on Monday.  If need be, then will arrange for PEG   · Patient with ongoing dysphagia, not safe for oral intake.    · Check CT neck ... anterior osteophytes w mod mass effect  · Pulmonology consulted  · Cardiology consulted   · LAI attempted 3/9/23 but unable to complete  · CT scan chest .... No PE.  Bilat pneumonia noted.  · Status post bronchoscopy   · Thick mucus removal  · Food particles seen around the vocal cords  · Positive for strep pneumo  · Speech consulted  · Transthoracic echo suggesting normal EF% and severe AS  · Complete ceftriaxone   · Continue albuterol nebulizer every 4 hours as needed  · Continue bronchodilator protocol  · Continue aspirin, monitor troponin  · PT OT consult  · Discussed CODE STATUS w pt & son.  Pt is DNR/DNI.     Reviewed patients labs and imaging, and discussed with patient and nurse at bedside.    DVT prophylaxis:  Medical DVT prophylaxis orders are present.    CODE STATUS:   Medical Intervention Limits: NO intubation (DNI)  Level Of Support Discussed With: Patient  Code Status (Patient has no pulse and is not breathing): No CPR (Do Not Attempt to Resuscitate)  Medical Interventions (Patient has pulse or is breathing): Limited Support         Patient independently seen and evaluated, agree with  assessment and plan, above documentation reflects plan put forth during bedside rounds.  More than 51% of the time of this patient encounter was performed by me.     Interval history:   No acute events overnight, weakness improved     Exam:  GEN: Drowsy, arousable to verbal stimuli  HEENT: Moist mucous membranes, core track in place  LUNGS: Clear to auscultation bilaterally  CARDIAC: Irregularly irregular rate and rhythm  NEURO: Globally weak     Plan:  Agree with assessment plan as above  Continue oral metoprolol  Continue on telemetry  Continue to core track for nutrition patient is at risk for aspiration and not safe for oral intake currently  CBC, CMP reviewed  Check CT scan of the neck to rule out any obvious obstruction or reversible factors for patient's difficulty swallowing, personally reviewed, moderate displacement with osteophytes  Check CBC, CMP, mag and Phos tomorrow  PT/OT following  Discussed with pulmonology  We will need rehab at discharge  Clinical course will dictate further management     Reviewed patients labs and imaging, and discussed with patient and nurse at bedside.      Electronically signed by Black Capps MD, 03/11/23, 2:36 PM EST.

## 2023-03-11 NOTE — PROGRESS NOTES
Pulmonary / Critical Care Progress Note      Patient Name: Buzz Lopez  : 1945  MRN: [id number]  Primary Care Physician:  Provider, No Known  Date of admission: 3/6/2023    Subjective   Subjective   Follow-up for pneumonia.    Continues on supplemental oxygen, wean down to 1 L  Continue to diurese, spot dose Lasix given yesterday  Urine output 1625 mL past 24 hours  Family discussions ongoing regarding PEG tube  Patient unable to pass swallow study and continues on NG tube feeds  Palliative and hospice information given to the family      Review of Systems  General:  + Fatigue, No Fever.   HEENT: No dysphagia, No Visual Changes, no rhinorrhea  Respiratory:  + cough, no Dyspnea, no phlegm, No Pleuritic Pain, no wheezing, no hemoptysis  Cardiovascular: Denies chest pain, denies palpitations, no GARCIA, No Chest Pressure  Gastrointestinal:  No Abdominal Pain, No Nausea, No Vomiting, No Diarrhea      Objective   Objective     Vitals:   Temp:  [97.5 °F (36.4 °C)-98.2 °F (36.8 °C)] 97.5 °F (36.4 °C)  Heart Rate:  [65-85] 69  Resp:  [16-18] 17  BP: (106-146)/(42-73) 116/44  Flow (L/min):  [2-3.5] 2    Physical Exam   Vital Signs Reviewed   General: Frail elderly male, no acute distress  HEENT:  PERRL, EOMI.    Neck:  No JVD, no thyromegaly  Lymph: no axillary, cervical, supraclavicular lymphadenopathy noted bilaterally  Chest:  Clear to auscultation bilaterally, no work of breathing noted on room air  CV: RRR, no M/G/R, pulses 2+  Abd:  Soft, NT, ND, +BS  EXT:  no clubbing, no cyanosis, no edema  Neuro:  A&Ox3, CN grossly intact, no focal deficits.  Skin: No rashes or lesions noted    Result Review    Result Review:  I have personally reviewed the results from the time of this admission to 3/11/2023 10:41 EST and agree with these findings:  [x]  Laboratory  [x]  Microbiology  [x]  Radiology  [x]  EKG/Telemetry   [x]  Cardiology/Vascular   []  Pathology  []  Old records  []  Other:  Most notable findings include:  proBNP 6000 yesterday , CT soft tissue neck 3/10 with anterior osteophyte formation throughout the cervical spine producing moderate mass effect upon the cervical esophagus  3/9 BAL strep pneumonia and rhinovirus  Cytology with acute inflammation  Chest x-ray 3/10 with decreased right greater than left bilateral airspace disease    3/7 echocardiogram EF 56 to 60%, grade 1 diastolic dysfunction, severe aortic stenosis, RVSP less than 35        Lab 03/11/23  0436 03/10/23  0407 03/09/23  0410 03/08/23  0406 03/07/23  0636 03/06/23  2353 03/06/23  0916 03/06/23  0825   WBC 8.05 8.48 5.60 6.82 9.19  --  12.90*  --    HEMOGLOBIN 12.9* 12.1* 11.3* 12.0* 11.8*  --  13.4  --    HEMATOCRIT 38.6 36.4* 34.3* 36.0* 34.3*  --  39.9  --    PLATELETS 241 187 211 184 186  --  169  --    SODIUM 137 142 141 137  --  129* 133*  --    SODIUM, ARTERIAL  --   --   --   --   --   --   --  134.4*   POTASSIUM 3.6 4.1 4.2 4.1  --  4.8 4.6  --    CHLORIDE 93* 101 102 98  --  96* 93*  --    CO2 34.7* 32.2* 31.6* 27.6  --  23.7 24.0  --    BUN 24* 15 19 26*  --  30* 26*  --    CREATININE 0.68* 0.56* 0.56* 0.59*  --  0.81 0.88  --    GLUCOSE 297* 243* 222* 183*  --  164* 282*  --    GLUCOSE, ARTERIAL  --   --   --   --   --   --   --  293*   CALCIUM 9.1 9.2 9.2 9.2  --  8.8 9.4  --    PHOSPHORUS  --  2.2* 1.8* 2.6  --  3.2  --   --    TOTAL PROTEIN  --  7.4 7.0 7.3  --  6.7 8.2  --    ALBUMIN  --  3.1* 3.0* 3.4*  --  2.8* 3.8  --    GLOBULIN  --  4.3 4.0 3.9  --  3.9 4.4  --      Assessment & Plan   Assessment / Plan     Active Hospital Problems:  Active Hospital Problems    Diagnosis    • **Pneumonia of right lower lobe due to infectious organism    • Severe malnutrition (HCC)    • Sepsis with acute hypoxic respiratory failure without septic shock (HCC)        Impression:   Acute hypoxic respiratory failure  Streptococcal pneumonia  Aspiration pneumonia from unspecified organism  Aspiration and airways, initial encounter  Severe protein calorie  malnutrition with muscle wasting  Upper respiratory infection due to rhinovirus  Mucous plugging/issues with airway clearance  Lactic acidosis-resolved  Possible syncope  NSTEMI: Likely demand ischemia  Severe aortic stenosis  Remote tobacco use    Plan:   -Wean O2 to keep SPO2 greater than 90%, weaned off supplemental oxygen and is on room air  -Serial chest x-ray shows improvement  -Adequate diuresis with spot dose Lasix as needed, no more diuresis at this time; assess daily  -Continue nebulizers and aggressive bronchopulmonary hygiene  -Continue speech therapy.  Aspiration precautions per them  -Continue tube feedings per dietitian recommendations.  May need PEG tube placement.  Family discussions being held, palliative and hospice information given  -Cardiology on board and appreciate assistance.  If needs LAI, can remove core track to perform  -Echocardiogram EF 56 to 60%, grade 1 diastolic dysfunction, severe aortic stenosis, RVSP less than 35.  -Continue rehab services    DVT prophylaxis:  Medical DVT prophylaxis orders are present.    CODE STATUS:   Medical Intervention Limits: NO intubation (DNI)  Level Of Support Discussed With: Patient  Code Status (Patient has no pulse and is not breathing): No CPR (Do Not Attempt to Resuscitate)  Medical Interventions (Patient has pulse or is breathing): Limited Support    I, Dr. Shashi Rodríguez, spent >50% of time in accordance with split shared billing. This included personally reviewing all pertinent labs, imaging, microbiology and documentation. Also discussing the case with the patient and any available family, the admitting physician and any available ancillary staff.     Electronically signed by Shashi Rodríguez MD, 03/11/23, 5:12 PM EST.

## 2023-03-11 NOTE — PROGRESS NOTES
Date of service: 3/11/2023    Subjective: Using the spirometry.  No chest pain, shortness of breath, palpitations or lightheadedness.     Review of systems: No headaches, vertigo, fever, chills, nausea, vomiting, abdominal pain, TIA or CVA-like symptoms.     Physical examination: He was in no pain or distress.  Vital signs: Reviewed  HEENT: No JVD or carotid bruit.    Neck: No JVD or carotid bruit.    Chest:   CTA anteriorly.  Cardiac: RRR.  II/IV SM over the right and LSB.  No S3 gallop.  Distant S1-S2.  Abdomen: Soft and nontender.  No megalies or masses.  Extremities: No edema, cyanosis or clubbing.  Pulse intact.  Neuro: Alert, oriented x3, no facial droop and speech was clear.    Records: Reviewed.    Assessment and plan:    1.  Acute diastolic CHF.    2.  Syncope, consider vasovagal etiology and/or due to severe aortic stenosis.   3.  Acute hypoxemic respiratory failure.  4.  Bilateral aspiration pneumonia due to abnormal swallowing reflex.  PEG placement is planned.  5.  Reactive sinus tachycardia, secondary to the a the yon, resolved.  6.  Considering his clinical presentation, lacking of ischemic changes on the EKG or angina pectoralis, elevated troponin could be due to demand ischemia.    7.  A.m. labs.  8. LAI and cardiac catheterization prior to AVR will be later on when he improves clinically

## 2023-03-12 LAB
ANION GAP SERPL CALCULATED.3IONS-SCNC: 6.9 MMOL/L (ref 5–15)
BASOPHILS # BLD AUTO: 0.01 10*3/MM3 (ref 0–0.2)
BASOPHILS NFR BLD AUTO: 0.1 % (ref 0–1.5)
BUN SERPL-MCNC: 32 MG/DL (ref 8–23)
BUN/CREAT SERPL: 47.1 (ref 7–25)
CALCIUM SPEC-SCNC: 9.2 MG/DL (ref 8.6–10.5)
CHLORIDE SERPL-SCNC: 95 MMOL/L (ref 98–107)
CO2 SERPL-SCNC: 38.1 MMOL/L (ref 22–29)
CREAT SERPL-MCNC: 0.68 MG/DL (ref 0.76–1.27)
DEPRECATED RDW RBC AUTO: 43.8 FL (ref 37–54)
EGFRCR SERPLBLD CKD-EPI 2021: 94.6 ML/MIN/1.73
EOSINOPHIL # BLD AUTO: 0.03 10*3/MM3 (ref 0–0.4)
EOSINOPHIL NFR BLD AUTO: 0.4 % (ref 0.3–6.2)
ERYTHROCYTE [DISTWIDTH] IN BLOOD BY AUTOMATED COUNT: 13.2 % (ref 12.3–15.4)
GLUCOSE BLDC GLUCOMTR-MCNC: 196 MG/DL (ref 70–99)
GLUCOSE BLDC GLUCOMTR-MCNC: 233 MG/DL (ref 70–99)
GLUCOSE BLDC GLUCOMTR-MCNC: 243 MG/DL (ref 70–99)
GLUCOSE BLDC GLUCOMTR-MCNC: 252 MG/DL (ref 70–99)
GLUCOSE BLDC GLUCOMTR-MCNC: 285 MG/DL (ref 70–99)
GLUCOSE BLDC GLUCOMTR-MCNC: 295 MG/DL (ref 70–99)
GLUCOSE SERPL-MCNC: 251 MG/DL (ref 65–99)
HCT VFR BLD AUTO: 37.9 % (ref 37.5–51)
HGB BLD-MCNC: 12.2 G/DL (ref 13–17.7)
IMM GRANULOCYTES # BLD AUTO: 0.02 10*3/MM3 (ref 0–0.05)
IMM GRANULOCYTES NFR BLD AUTO: 0.3 % (ref 0–0.5)
LYMPHOCYTES # BLD AUTO: 1.31 10*3/MM3 (ref 0.7–3.1)
LYMPHOCYTES NFR BLD AUTO: 19.2 % (ref 19.6–45.3)
MAGNESIUM SERPL-MCNC: 2.2 MG/DL (ref 1.6–2.4)
MCH RBC QN AUTO: 28.9 PG (ref 26.6–33)
MCHC RBC AUTO-ENTMCNC: 32.2 G/DL (ref 31.5–35.7)
MCV RBC AUTO: 89.8 FL (ref 79–97)
MONOCYTES # BLD AUTO: 0.7 10*3/MM3 (ref 0.1–0.9)
MONOCYTES NFR BLD AUTO: 10.3 % (ref 5–12)
NEUTROPHILS NFR BLD AUTO: 4.74 10*3/MM3 (ref 1.7–7)
NEUTROPHILS NFR BLD AUTO: 69.7 % (ref 42.7–76)
NRBC BLD AUTO-RTO: 0 /100 WBC (ref 0–0.2)
PLATELET # BLD AUTO: 257 10*3/MM3 (ref 140–450)
PMV BLD AUTO: 11 FL (ref 6–12)
POTASSIUM SERPL-SCNC: 3.4 MMOL/L (ref 3.5–5.2)
RBC # BLD AUTO: 4.22 10*6/MM3 (ref 4.14–5.8)
SODIUM SERPL-SCNC: 140 MMOL/L (ref 136–145)
WBC NRBC COR # BLD: 6.81 10*3/MM3 (ref 3.4–10.8)

## 2023-03-12 PROCEDURE — 94664 DEMO&/EVAL PT USE INHALER: CPT

## 2023-03-12 PROCEDURE — 99232 SBSQ HOSP IP/OBS MODERATE 35: CPT | Performed by: INTERNAL MEDICINE

## 2023-03-12 PROCEDURE — 83735 ASSAY OF MAGNESIUM: CPT | Performed by: PHYSICIAN ASSISTANT

## 2023-03-12 PROCEDURE — 85025 COMPLETE CBC W/AUTO DIFF WBC: CPT | Performed by: PHYSICIAN ASSISTANT

## 2023-03-12 PROCEDURE — 63710000001 INSULIN REGULAR HUMAN PER 5 UNITS: Performed by: INTERNAL MEDICINE

## 2023-03-12 PROCEDURE — 82962 GLUCOSE BLOOD TEST: CPT

## 2023-03-12 PROCEDURE — 94799 UNLISTED PULMONARY SVC/PX: CPT

## 2023-03-12 PROCEDURE — 80048 BASIC METABOLIC PNL TOTAL CA: CPT | Performed by: PHYSICIAN ASSISTANT

## 2023-03-12 PROCEDURE — 25010000002 ENOXAPARIN PER 10 MG: Performed by: INTERNAL MEDICINE

## 2023-03-12 RX ORDER — POTASSIUM CHLORIDE 1.5 G/1.77G
40 POWDER, FOR SOLUTION ORAL ONCE
Status: COMPLETED | OUTPATIENT
Start: 2023-03-12 | End: 2023-03-12

## 2023-03-12 RX ADMIN — BUDESONIDE 0.5 MG: 0.5 INHALANT ORAL at 20:05

## 2023-03-12 RX ADMIN — INSULIN HUMAN 4 UNITS: 100 INJECTION, SOLUTION PARENTERAL at 00:31

## 2023-03-12 RX ADMIN — ASPIRIN 81 MG 81 MG: 81 TABLET ORAL at 08:33

## 2023-03-12 RX ADMIN — METOPROLOL TARTRATE 12.5 MG: 25 TABLET, FILM COATED ORAL at 20:51

## 2023-03-12 RX ADMIN — INSULIN HUMAN 3 UNITS: 100 INJECTION, SOLUTION PARENTERAL at 17:44

## 2023-03-12 RX ADMIN — BUDESONIDE 0.5 MG: 0.5 INHALANT ORAL at 06:40

## 2023-03-12 RX ADMIN — ENOXAPARIN SODIUM 40 MG: 100 INJECTION SUBCUTANEOUS at 15:58

## 2023-03-12 RX ADMIN — METOPROLOL TARTRATE 12.5 MG: 25 TABLET, FILM COATED ORAL at 08:33

## 2023-03-12 RX ADMIN — ARFORMOTEROL TARTRATE 15 MCG: 15 SOLUTION RESPIRATORY (INHALATION) at 20:05

## 2023-03-12 RX ADMIN — Medication 10 ML: at 08:37

## 2023-03-12 RX ADMIN — Medication 10 ML: at 20:52

## 2023-03-12 RX ADMIN — POTASSIUM CHLORIDE 40 MEQ: 1.5 POWDER, FOR SOLUTION ORAL at 08:33

## 2023-03-12 RX ADMIN — INSULIN HUMAN 4 UNITS: 100 INJECTION, SOLUTION PARENTERAL at 12:00

## 2023-03-12 RX ADMIN — PANTOPRAZOLE SODIUM 40 MG: 40 INJECTION, POWDER, FOR SOLUTION INTRAVENOUS at 05:27

## 2023-03-12 RX ADMIN — INSULIN HUMAN 3 UNITS: 100 INJECTION, SOLUTION PARENTERAL at 05:27

## 2023-03-12 RX ADMIN — ARFORMOTEROL TARTRATE 15 MCG: 15 SOLUTION RESPIRATORY (INHALATION) at 06:40

## 2023-03-12 NOTE — NURSING NOTE
Pulse ox taken and charted between 1900 and 1915 were on room air, not able to edit as someone else charted. 2L NC was applied and Tube Feed started at 1950.

## 2023-03-12 NOTE — PROGRESS NOTES
[facility]   Hospitalist Progress Note  Date: 3/12/2023  Patient Name: Buzz Lopez  : 1945  MRN: [id number]  Date of admission: 3/6/2023    Subjective   Subjective     Chief Complaint:   Shortness of breath, confusion    Summary:   Buzz Lopez is a 77 y.o. male with no significant past medical history has been brought into the ED with concerns for confusion, possible syncope, shortness of breath.  Patient states that for the past 1 to 2 weeks patient has been having difficulty breathing and subjective fevers, it has got worse and today and he has called his son to take him to the ED as he is having difficulty breathing.  By the time patient's son has arrived at the home, patient appeared to be confused, generalized weakness and unable to put his clothes on.  While his son was helping him to get the clothes on, patient appeared to be very confused and had a possible syncope episode where he was unresponsive.  Patient's son has looked for the pulse and they could not feel radial pulse and started chest compressions, EMS was called.  By the time EMS has arrived patient was receiving chest compressions from the family members.  EMS has evaluated the patient and they could not feel a pulse.  Patient was in respiratory distress, saturating around 70% on room air.  Received nebulizer treatment by the EMS and the patient has been brought into the ED.    During my examination, patient is alert and oriented x3 and able to answer questions appropriately.  States that he does not remember how he came to the hospital.  Denies any chest pain, nausea, vomiting, focal weakness, numbness, tingling.  States that he has been having difficulty breathing for the past 1 to 2 weeks.  A month ago he had some sore throat.  Associated with cough, productive sputum. Patient is thin and frail.  Chronically has a poor p.o. intake.  Admits that he has low appetite.  No previously diagnosed history of dementia.  As per the  family members, patient takes a lot of over-the-counter fiber supplement review does not gain weight despite he eats well.    Interval Followup:     3/12/2023    · No acute events overnight or new issues  · Alert and interactive this morning  · Normal sinus rhythm currently.  Episode A-fib/fl yesterday.  · Tolerating low-dose beta-blocker  · Tolerating tube feeds  · GARCIA better     Objective   Objective     Vitals:   Temp:  [97.5 °F (36.4 °C)-98.4 °F (36.9 °C)] 97.9 °F (36.6 °C)  Heart Rate:  [67-82] 75  Resp:  [16-18] 18  BP: (116-145)/(44-71) 145/54  Flow (L/min):  [2] 2    Physical Exam   General appearance: NAD, conversant. Cacechtic  Eyes: anicteric sclerae, moist conjunctivae; no lid-lag; PERRLA  HENT: Atraumatic; oropharynx clear with moist mucous membranes and no mucosal ulcerations; normal hard and soft palate  Neck: Trachea midline; FROM, supple, no thyromegaly or lymphadenopathy  Lungs: Bilateral rhonchi, with normal respiratory effort and no intercostal retractions    Result Review    Result Review:  I have personally reviewed the results as below  [x]  Laboratory  CBC    CBC 3/10/23 3/11/23 3/12/23   WBC 8.48 8.05 6.81   RBC 4.04 (A) 4.32 4.22   Hemoglobin 12.1 (A) 12.9 (A) 12.2 (A)   Hematocrit 36.4 (A) 38.6 37.9   MCV 90.1 89.4 89.8   MCH 30.0 29.9 28.9   MCHC 33.2 33.4 32.2   RDW 13.3 13.2 13.2   Platelets 187 241 257   (A) Abnormal value            CMP    CMP 3/10/23 3/11/23 3/12/23   Glucose 243 (A) 297 (A) 251 (A)   BUN 15 24 (A) 32 (A)   Creatinine 0.56 (A) 0.68 (A) 0.68 (A)   eGFR 100.3 94.6 94.6   Sodium 142 137 140   Potassium 4.1 3.6 3.4 (A)   Chloride 101 93 (A) 95 (A)   Calcium 9.2 9.1 9.2   Total Protein 7.4     Albumin 3.1 (A)     Globulin 4.3     Total Bilirubin 0.4     Alkaline Phosphatase 75     AST (SGOT) 32     ALT (SGPT) 25     Albumin/Globulin Ratio 0.7     BUN/Creatinine Ratio 26.8 (A) 35.3 (A) 47.1 (A)   Anion Gap 8.8 9.3 6.9   (A) Abnormal value            []  Microbiology  []   Radiology  []  EKG/Telemetry   []  Cardiology/Vascular   []  Pathology  []  Old records  []  Other:    Assessment & Plan   Assessment / Plan   Assessment:    · Sepsis due to right lower lobe pneumonia (Streptococcus Pneumo)  · Acute hypoxic respiratory failure  · Questionable cardiac arrest episode  · NSTEMI/acute myocardial injury  · Possible syncope  · Lactic acidosis, resolved  · Malnutrition  · S/P Bronchoscopy 3/8/23 with mucous plugging removed  · Dysphagia requiring Core Track placed this admission  · Aspiration  · Severe aortic stenosis   · Degenerative changes cervical spine / anterior osteophytes  · CODE STATUS:  DNR/DNI     Plan:    · Continue core track. Speech re-eval on Monday.  If need be, then will arrange for PEG   · Patient with ongoing dysphagia, not safe for oral intake.    · Check CT neck ... anterior osteophytes w mod mass effect  · Pulmonology consulted  · Cardiology consulted   · LAI attempted 3/9/23 but unable to complete  · CT scan chest .... No PE.  Bilat pneumonia noted.  · Status post bronchoscopy   · Thick mucus removal  · Food particles seen around the vocal cords  · Positive for strep pneumo  · Speech consulted  · Transthoracic echo suggesting normal EF% and severe AS  · Complete ceftriaxone   · Continue albuterol nebulizer every 4 hours as needed  · Continue bronchodilator protocol  · Continue aspirin, monitor troponin  · PT OT consult  · Discussed CODE STATUS w pt & son.  Pt is DNR/DNI.     Reviewed patients labs and imaging, and discussed with patient and nurse at bedside.    DVT prophylaxis:  Medical DVT prophylaxis orders are present.    CODE STATUS:   Medical Intervention Limits: NO intubation (DNI)  Level Of Support Discussed With: Patient  Code Status (Patient has no pulse and is not breathing): No CPR (Do Not Attempt to Resuscitate)  Medical Interventions (Patient has pulse or is breathing): Limited Support         Patient independently seen and evaluated, agree with  assessment and plan, above documentation reflects plan put forth during bedside rounds.  More than 51% of the time of this patient encounter was performed by me.     Interval history: No acute events overnight, patient resting comfortably     Exam:  GEN: Resting comfortably  HEENT: Moist mucous membranes, core track in place  LUNGS: Clear to auscultation bilaterally  CARDIAC: Irregularly irregular rate and rhythm  NEURO: Globally weak     Plan:  Agree with assessment plan as above  Continue oral metoprolol  Continue on telemetry  Continue to core track for nutrition patient is at risk for aspiration and not safe for oral intake currently  CBC, CMP reviewed  Check CT scan of the neck to rule out any obvious obstruction or reversible factors for patient's difficulty swallowing, personally reviewed, moderate displacement with osteophytes  Check CBC, CMP, mag and Phos tomorrow  PT/OT following  Discussed with pulmonology  We will need rehab at discharge  Clinical course will dictate further management     Reviewed patients labs and imaging, and discussed with patient and nurse at bedside.      Electronically signed by Black Capps MD, 03/12/23, 1:07 PM EDT.

## 2023-03-12 NOTE — PROGRESS NOTES
Date of service: 3/12/2023    Date of service: 3/12/2023    Subjective: Awake, no chest pain, SOB, palpitations or dizziness    Review of systems: No headaches, vertigo, nausea, vomiting, abdominal pain, fever, chills, TIA or CVA-like symptoms.    Physical examination: He was in no pain or distress.  Vital signs: Reviewed  HEENT: No JVD or carotid bruit.    Neck: No JVD or carotid bruit.    Chest:   CTA anteriorly.  Cardiac: RRR.  II/IV SM over the right and LSB.  No S3 gallop.   Abdomen: Soft and nontender.  No megalies or masses.  Extremities: No edema, cyanosis or clubbing.  Pulse intact.  Neuro: Alert, oriented x3, no facial droop and speech was clear.     Records: Reviewed.     Assessment and plan:    1.  Acute diastolic CHF.    2.  Syncope, may be vasovagal and/or due to severe aortic stenosis.   3.  Acute hypoxemic respiratory failure.  4.  Bilateral aspiration pneumonia due to abnormal swallowing reflex.  PEG placement is planned.  5.  Reactive sinus tachycardia, secondary to the a the yon, resolved.  6.  Elevated troponin, mostly due to demand ischemia due to the above.    7. LAI and cardiac cardiac catheterization will be later on after he clinically improves.  These procedures can be arranged as outpatient.

## 2023-03-12 NOTE — PLAN OF CARE
Goal Outcome Evaluation:  Plan of Care Reviewed With: patient           Outcome Evaluation: Patient resting comfortably in bed. Call light within reach.  VSS.  No acute changes this shift.  No signs of distress noted at this time.

## 2023-03-12 NOTE — PLAN OF CARE
Goal Outcome Evaluation:               Pt. Slept well throughout night. Improved mentation, voice is more clear and stronger and pt. Tolerated ice chips without difficulty this early am, giving one at a time and sitting straight up fully alert.

## 2023-03-13 LAB
ANION GAP SERPL CALCULATED.3IONS-SCNC: 5.4 MMOL/L (ref 5–15)
BASOPHILS # BLD AUTO: 0.02 10*3/MM3 (ref 0–0.2)
BASOPHILS NFR BLD AUTO: 0.4 % (ref 0–1.5)
BUN SERPL-MCNC: 26 MG/DL (ref 8–23)
BUN/CREAT SERPL: 47.3 (ref 7–25)
CALCIUM SPEC-SCNC: 8.9 MG/DL (ref 8.6–10.5)
CHLORIDE SERPL-SCNC: 98 MMOL/L (ref 98–107)
CO2 SERPL-SCNC: 35.6 MMOL/L (ref 22–29)
CREAT SERPL-MCNC: 0.55 MG/DL (ref 0.76–1.27)
DEPRECATED RDW RBC AUTO: 42.8 FL (ref 37–54)
EGFRCR SERPLBLD CKD-EPI 2021: 100.8 ML/MIN/1.73
EOSINOPHIL # BLD AUTO: 0.05 10*3/MM3 (ref 0–0.4)
EOSINOPHIL NFR BLD AUTO: 0.9 % (ref 0.3–6.2)
ERYTHROCYTE [DISTWIDTH] IN BLOOD BY AUTOMATED COUNT: 13.1 % (ref 12.3–15.4)
GLUCOSE BLDC GLUCOMTR-MCNC: 186 MG/DL (ref 70–99)
GLUCOSE BLDC GLUCOMTR-MCNC: 199 MG/DL (ref 70–99)
GLUCOSE BLDC GLUCOMTR-MCNC: 263 MG/DL (ref 70–99)
GLUCOSE BLDC GLUCOMTR-MCNC: 269 MG/DL (ref 70–99)
GLUCOSE SERPL-MCNC: 252 MG/DL (ref 65–99)
HCT VFR BLD AUTO: 36.9 % (ref 37.5–51)
HGB BLD-MCNC: 12.4 G/DL (ref 13–17.7)
IMM GRANULOCYTES # BLD AUTO: 0.01 10*3/MM3 (ref 0–0.05)
IMM GRANULOCYTES NFR BLD AUTO: 0.2 % (ref 0–0.5)
LYMPHOCYTES # BLD AUTO: 1.26 10*3/MM3 (ref 0.7–3.1)
LYMPHOCYTES NFR BLD AUTO: 22.7 % (ref 19.6–45.3)
MAGNESIUM SERPL-MCNC: 2 MG/DL (ref 1.6–2.4)
MCH RBC QN AUTO: 29.9 PG (ref 26.6–33)
MCHC RBC AUTO-ENTMCNC: 33.6 G/DL (ref 31.5–35.7)
MCV RBC AUTO: 88.9 FL (ref 79–97)
MONOCYTES # BLD AUTO: 0.65 10*3/MM3 (ref 0.1–0.9)
MONOCYTES NFR BLD AUTO: 11.7 % (ref 5–12)
NEUTROPHILS NFR BLD AUTO: 3.55 10*3/MM3 (ref 1.7–7)
NEUTROPHILS NFR BLD AUTO: 64.1 % (ref 42.7–76)
NRBC BLD AUTO-RTO: 0 /100 WBC (ref 0–0.2)
PLATELET # BLD AUTO: 267 10*3/MM3 (ref 140–450)
PMV BLD AUTO: 10.2 FL (ref 6–12)
POTASSIUM SERPL-SCNC: 4 MMOL/L (ref 3.5–5.2)
RBC # BLD AUTO: 4.15 10*6/MM3 (ref 4.14–5.8)
SODIUM SERPL-SCNC: 139 MMOL/L (ref 136–145)
WBC NRBC COR # BLD: 5.54 10*3/MM3 (ref 3.4–10.8)

## 2023-03-13 PROCEDURE — 97530 THERAPEUTIC ACTIVITIES: CPT

## 2023-03-13 PROCEDURE — 94799 UNLISTED PULMONARY SVC/PX: CPT

## 2023-03-13 PROCEDURE — 97110 THERAPEUTIC EXERCISES: CPT

## 2023-03-13 PROCEDURE — 80048 BASIC METABOLIC PNL TOTAL CA: CPT | Performed by: PHYSICIAN ASSISTANT

## 2023-03-13 PROCEDURE — 92526 ORAL FUNCTION THERAPY: CPT

## 2023-03-13 PROCEDURE — 99232 SBSQ HOSP IP/OBS MODERATE 35: CPT | Performed by: INTERNAL MEDICINE

## 2023-03-13 PROCEDURE — 82962 GLUCOSE BLOOD TEST: CPT

## 2023-03-13 PROCEDURE — 25010000002 ENOXAPARIN PER 10 MG: Performed by: INTERNAL MEDICINE

## 2023-03-13 PROCEDURE — 83735 ASSAY OF MAGNESIUM: CPT | Performed by: PHYSICIAN ASSISTANT

## 2023-03-13 PROCEDURE — 94664 DEMO&/EVAL PT USE INHALER: CPT

## 2023-03-13 PROCEDURE — 99233 SBSQ HOSP IP/OBS HIGH 50: CPT | Performed by: INTERNAL MEDICINE

## 2023-03-13 PROCEDURE — 85025 COMPLETE CBC W/AUTO DIFF WBC: CPT | Performed by: PHYSICIAN ASSISTANT

## 2023-03-13 PROCEDURE — 63710000001 INSULIN REGULAR HUMAN PER 5 UNITS: Performed by: INTERNAL MEDICINE

## 2023-03-13 RX ORDER — POLYETHYLENE GLYCOL 3350 17 G/17G
17 POWDER, FOR SOLUTION ORAL 2 TIMES DAILY PRN
Status: DISCONTINUED | OUTPATIENT
Start: 2023-03-13 | End: 2023-03-28

## 2023-03-13 RX ORDER — BISACODYL 10 MG
10 SUPPOSITORY, RECTAL RECTAL DAILY PRN
Status: DISCONTINUED | OUTPATIENT
Start: 2023-03-13 | End: 2023-03-29 | Stop reason: HOSPADM

## 2023-03-13 RX ORDER — BISACODYL 5 MG/1
10 TABLET, DELAYED RELEASE ORAL DAILY PRN
Status: DISCONTINUED | OUTPATIENT
Start: 2023-03-13 | End: 2023-03-13

## 2023-03-13 RX ORDER — POLYETHYLENE GLYCOL 3350 17 G/17G
17 POWDER, FOR SOLUTION ORAL 2 TIMES DAILY PRN
Status: DISCONTINUED | OUTPATIENT
Start: 2023-03-13 | End: 2023-03-13

## 2023-03-13 RX ADMIN — INSULIN HUMAN 2 UNITS: 100 INJECTION, SOLUTION PARENTERAL at 17:46

## 2023-03-13 RX ADMIN — METOPROLOL TARTRATE 12.5 MG: 25 TABLET, FILM COATED ORAL at 10:14

## 2023-03-13 RX ADMIN — BUDESONIDE 0.5 MG: 0.5 INHALANT ORAL at 06:50

## 2023-03-13 RX ADMIN — ARFORMOTEROL TARTRATE 15 MCG: 15 SOLUTION RESPIRATORY (INHALATION) at 06:50

## 2023-03-13 RX ADMIN — POLYETHYLENE GLYCOL 3350 17 G: 17 POWDER, FOR SOLUTION ORAL at 18:19

## 2023-03-13 RX ADMIN — INSULIN HUMAN 4 UNITS: 100 INJECTION, SOLUTION PARENTERAL at 06:12

## 2023-03-13 RX ADMIN — BUDESONIDE 0.5 MG: 0.5 INHALANT ORAL at 18:38

## 2023-03-13 RX ADMIN — METOPROLOL TARTRATE 12.5 MG: 25 TABLET, FILM COATED ORAL at 21:14

## 2023-03-13 RX ADMIN — INSULIN HUMAN 4 UNITS: 100 INJECTION, SOLUTION PARENTERAL at 12:54

## 2023-03-13 RX ADMIN — PANTOPRAZOLE SODIUM 40 MG: 40 INJECTION, POWDER, FOR SOLUTION INTRAVENOUS at 06:12

## 2023-03-13 RX ADMIN — ASPIRIN 81 MG 81 MG: 81 TABLET ORAL at 10:14

## 2023-03-13 RX ADMIN — ARFORMOTEROL TARTRATE 15 MCG: 15 SOLUTION RESPIRATORY (INHALATION) at 18:37

## 2023-03-13 RX ADMIN — Medication 10 ML: at 21:14

## 2023-03-13 RX ADMIN — ENOXAPARIN SODIUM 40 MG: 100 INJECTION SUBCUTANEOUS at 15:30

## 2023-03-13 RX ADMIN — INSULIN HUMAN 2 UNITS: 100 INJECTION, SOLUTION PARENTERAL at 00:32

## 2023-03-13 RX ADMIN — Medication 10 ML: at 10:14

## 2023-03-13 RX ADMIN — INSULIN HUMAN 2 UNITS: 100 INJECTION, SOLUTION PARENTERAL at 23:50

## 2023-03-13 NOTE — CONSULTS
"Nutrition Services    Patient Name: Buzz Lopez  YOB: 1945  MRN: [id number]  Admission date: 3/6/2023      CLINICAL NUTRITION ASSESSMENT      Reason for Assessment  Follow-up protocol    H&P:    History reviewed. No pertinent past medical history.     Current Problems:   Active Hospital Problems    Diagnosis    • **Pneumonia of right lower lobe due to infectious organism    • Severe malnutrition (HCC)    • Sepsis with acute hypoxic respiratory failure without septic shock (HCC)         Nutrition/Diet History         Narrative     Nutrition follow up.  Tolerating enteral nutrition well at goal rate.  Glucose is elevated.  K/Na+ WDL.  No repeat Phos available at time of RD assessment.      Palliative care notes indicate family/patient still undetermined on plan for PEG placement.  Patient working with SLP and making progress.  Remains NPO at this time.      No new weight since admission.  Will order weekly weights.       Anthropometrics        Current Height, Weight Height: 177.8 cm (70\")  Weight: 61.2 kg (135 lb)   Current BMI Body mass index is 19.37 kg/m².       Weight Hx  Wt Readings from Last 30 Encounters:   03/06/23 0824 61.2 kg (135 lb)   06/09/22 0154 61.4 kg (135 lb 5.8 oz)            Wt Change Observation Wt stable 6  Months     Estimated/Assessed Needs       Energy Requirements 30-35 kcal/kg actual wt   EST Needs (kcal/day) 3028-2755       Protein Requirements 1.2-1.5 g/kg    EST Daily Needs (g/day) 73-91 g       Fluid Requirements 1 ml/kcal    Estimated Needs (mL/day) 9957-5025 (adjust as needed)     Labs/Medications         Pertinent Labs Reviewed.   Results from last 7 days   Lab Units 03/13/23  0429 03/12/23  0411 03/11/23  0436 03/10/23  0407 03/09/23  0410 03/08/23  0406   SODIUM mmol/L 139 140 137 142 141 137   POTASSIUM mmol/L 4.0 3.4* 3.6 4.1 4.2 4.1   CHLORIDE mmol/L 98 95* 93* 101 102 98   CO2 mmol/L 35.6* 38.1* 34.7* 32.2* 31.6* 27.6   BUN mg/dL 26* 32* 24* 15 19 26* "   CREATININE mg/dL 0.55* 0.68* 0.68* 0.56* 0.56* 0.59*   CALCIUM mg/dL 8.9 9.2 9.1 9.2 9.2 9.2   BILIRUBIN mg/dL  --   --   --  0.4 0.3 0.3   ALK PHOS U/L  --   --   --  75 72 74   ALT (SGPT) U/L  --   --   --  25 29 27   AST (SGOT) U/L  --   --   --  32 40 54*   GLUCOSE mg/dL 252* 251* 297* 243* 222* 183*     Results from last 7 days   Lab Units 03/13/23  0429 03/12/23  0411 03/11/23  0436 03/10/23  0407 03/07/23  0636 03/06/23  2353   MAGNESIUM mg/dL 2.0 2.2 2.0 2.0   < > 2.1   PHOSPHORUS mg/dL  --   --   --  2.2*   < > 3.2   HEMOGLOBIN g/dL 12.4* 12.2* 12.9* 12.1*   < >  --    HEMATOCRIT % 36.9* 37.9 38.6 36.4*   < >  --    TRIGLYCERIDES mg/dL  --   --   --   --   --  50    < > = values in this interval not displayed.     COVID19   Date Value Ref Range Status   03/06/2023 Not Detected Not Detected - Ref. Range Final     No results found for: HGBA1C      Pertinent Medications Reviewed.     Current Nutrition Orders & Evaluation of Intake       Oral Nutrition     Current PO Diet No diet orders on file   Supplement Orders Placed This Encounter      Place Feeding Tube Per [employer]      Diet, Tube Feeding Tube Feeding Formula: Diabetisource AC (Glucerna 1.2); Tube Feeding Type: Continuous; Continuous Tube Feeding Start Rate (mL/hr): 25; Then Advance Rate By (mL/hr): 25; Every __ Hours: 4; To Goal Rate of (mL/hr): 65       Malnutrition Severity Assessment      Patient meets criteria for : Severe Malnutrition         Nutrition Diagnosis         Nutrition Dx Problem 1 Severe malnutrition related to Inability to consume sufficient energy as evidenced by body composition changes., NPO and SLP/swallow eval     Nutrition Intervention         Diabetisource AC @ 65 ml/hr   Free water flushes 50 ml every 4 hours.  Provides 1872 kcal, 94 g pro, 1679 ml fluid      Medical Nutrition Therapy/Nutrition Education          Learner     Readiness N/A  N/A     Method     Response N/A  N/A     Monitor/Evaluation        Monitor I&O,  Pertinent labs, EN delivery/tolerance, Weight, Skin status, GI status, Swallow function, Hemodynamic stability, RFS     Nutrition Discharge Plan         To be determined     Electronically signed by:  Gauri Arana RD  03/13/23 13:23 EDT

## 2023-03-13 NOTE — PLAN OF CARE
Goal Outcome Evaluation:  Plan of Care Reviewed With: patient        Progress: no change       Ice chips one at a time given ok per SLP. Tolerating tube feeds well. VSS. Up in chair most of shift, encouraged frequent weight shift. Miralax given x1 via cortrak to promote BM

## 2023-03-13 NOTE — THERAPY TREATMENT NOTE
Acute Care - Speech Language Pathology   Swallow Treatment Note  [facility]     Patient Name: Buzz Lopez  : 1945  MRN: [id number]  Today's Date: 3/13/2023               Admit Date: 3/6/2023    Visit Dx:     ICD-10-CM ICD-9-CM   1. Pneumonia of right lower lobe due to infectious organism  J18.9 486   2. Sepsis with acute hypoxic respiratory failure without septic shock, due to unspecified organism (HCC)  A41.9 038.9    R65.20 995.91    J96.01 518.81   3. Acute respiratory failure with hypoxia (HCC)  J96.01 518.81   4. Difficulty walking  R26.2 719.7   5. Oropharyngeal dysphagia  R13.12 787.22   6. Decreased activities of daily living (ADL)  Z78.9 V49.89     Patient Active Problem List   Diagnosis   • Pneumonia of right lower lobe due to infectious organism   • Sepsis with acute hypoxic respiratory failure without septic shock (HCC)   • Severe malnutrition (HCC)     History reviewed. No pertinent past medical history.  Past Surgical History:   Procedure Laterality Date   • BRONCHOSCOPY N/A 3/8/2023    Procedure: BRONCHOSCOPY WITH BRONCHOALVEOLAR LAVAGE, WASHINGS;  Surgeon: Jason Rowley MD;  Location: [facility] ENDOSCOPY;  Service: Pulmonary;  Laterality: N/A;  MUCOUS PLUGGING   • EYE SURGERY         SPEECH PATHOLOGY DYSPHAGIA TREATMENT     Subjective/Behavioral Observations: Alert and cooperative, sitting up in chair.        Day/time of Treatment: 3/13/2023        Current Diet:  N.p.o. with core track        Current Strategies: N/A     Treatment received: Dysphagia therapy to address swallow function through exercises and education of strategies.        Results of treatment:   Lingual/tongue base exercises completed with 2 sets each.  Lingual strength/range of motion 3+ /5.  Laryngeal elevation exercises with effortful swallow, 3 +/5.    Improved cough with mod assist.  Patient did demonstrate good voicing with ability to crescendo.  Trials of ice chips x10, patient keeping voice clear.   Small controlled  sip of water x5 with patient producing multiple swallows.  Increasing throat clear with subsequent trials.  Nectar liquid by spoon 1/2 to 1/4 teaspoon, multiple swallow produced, patient keeping voice clear.   Throat clear/cough noted at close of session.       Progress toward goals:  Patient actively participating in treatment.    Improving general strength noted.     Barriers to Achieving goals: Goal status        Plan of care:/changes in plan: Continue with current plan with patient to be n.p.o. with alternative feeding.  Okay for nursing to conservatively give small ice chips from time to time.   Patient may be ready for modified barium swallow study soon.  Will likely still need ongoing alternative feeding.    .                                                                          Plan of Care Reviewed With: patient, family          EDUCATION  The patient has been educated in the following areas:   NPO rationale.              Time Calculation:    Time Calculation- SLP     Row Name 03/13/23 1119             Time Calculation- SLP    SLP Stop Time 0945  -TB      SLP Received On 03/13/23  -TB         Untimed Charges    79800-CA Treatment Swallow Minutes 40  -TB         Total Minutes    Untimed Charges Total Minutes 40  -TB       Total Minutes 40  -TB            User Key  (r) = Recorded By, (t) = Taken By, (c) = Cosigned By    Initials Name Provider Type    TB Terri Gomez SLP Speech and Language Pathologist                Therapy Charges for Today     Code Description Service Date Service Provider Modifiers Qty    17322601086  ST TREATMENT SWALLOW 3 3/13/2023 Terri Gomez SLP GN 1               JANAY Julio  3/13/2023

## 2023-03-13 NOTE — THERAPY TREATMENT NOTE
Acute Care - Physical Therapy Treatment Note   [facility]     Patient Name: Buzz Lopez  : 1945  MRN: [id number]  Today's Date: 3/13/2023      Visit Dx:     ICD-10-CM ICD-9-CM   1. Pneumonia of right lower lobe due to infectious organism  J18.9 486   2. Sepsis with acute hypoxic respiratory failure without septic shock, due to unspecified organism (HCC)  A41.9 038.9    R65.20 995.91    J96.01 518.81   3. Acute respiratory failure with hypoxia (HCC)  J96.01 518.81   4. Difficulty walking  R26.2 719.7   5. Oropharyngeal dysphagia  R13.12 787.22   6. Decreased activities of daily living (ADL)  Z78.9 V49.89     Patient Active Problem List   Diagnosis   • Pneumonia of right lower lobe due to infectious organism   • Sepsis with acute hypoxic respiratory failure without septic shock (HCC)   • Severe malnutrition (HCC)     History reviewed. No pertinent past medical history.  Past Surgical History:   Procedure Laterality Date   • BRONCHOSCOPY N/A 3/8/2023    Procedure: BRONCHOSCOPY WITH BRONCHOALVEOLAR LAVAGE, WASHINGS;  Surgeon: Jason Rowley MD;  Location: [facility] ENDOSCOPY;  Service: Pulmonary;  Laterality: N/A;  MUCOUS PLUGGING   • EYE SURGERY       PT Assessment (last 12 hours)     PT Evaluation and Treatment     Row Name 23 1150          Physical Therapy Time and Intention    Subjective Information complains of;weakness;dizziness  -RH     Document Type therapy note (daily note)  -RH     Mode of Treatment physical therapy;individual therapy  -RH     Patient Effort fair  -RH     Row Name 23 1150          Pain Scale: FACES Pre/Post-Treatment    Pain: FACES Scale, Pretreatment 0-->no hurt  -RH     Posttreatment Pain Rating 0-->no hurt  -RH     Row Name 23 1150          Bed Mobility    Bed Mobility supine-sit;scooting/bridging  -RH     Scooting/Bridging [place] (Bed Mobility) minimum assist (75% patient effort)  -     Supine-Sit [place] (Bed Mobility) minimum assist (75% patient  effort)  -     Bed Mobility, Safety Issues decreased use of legs for bridging/pushing  -     Assistive Device (Bed Mobility) bed rails  -RH     Comment, (Bed Mobility) Pt maintains sitting with CGA.  Pt initially with c/o dizziness upon sitting.  -     Row Name 03/13/23 1150          Transfers    Transfers sit-stand transfer;stand-sit transfer  -     Row Name 03/13/23 1150          Sit-Stand Transfer    Sit-Stand [place] (Transfers) contact guard;minimum assist (75% patient effort)  -     Assistive Device (Sit-Stand Transfers) walker, front-wheeled  -     Row Name 03/13/23 1150          Stand-Sit Transfer    Stand-Sit [place] (Transfers) contact guard  -     Assistive Device (Stand-Sit Transfers) walker, front-wheeled  -     Row Name 03/13/23 1150          Gait/Stairs (Locomotion)    Gait/Stairs Locomotion gait/ambulation independence;gait/ambulation assistive device;distance ambulated;gait pattern;gait deviations  -     [place] Level (Gait) contact guard;minimum assist (75% patient effort)  -     Assistive Device (Gait) walker, front-wheeled  -     Distance in Feet (Gait) 15  -RH     Pattern (Gait) 3-point;step-to  -RH     Deviations/Abnormal Patterns (Gait) gait speed decreased;festinating/shuffling;base of support, wide  -RH     Bilateral Gait Deviations heel strike decreased  -RH     Left Sided Gait Deviations decreased knee extension  -RH     Right Sided Gait Deviations decreased knee extension  -RH     Gait Assessment/Intervention Pt amb with RW and CGA/min with decreased bilateral knee extension and heel strike.  -     Row Name 03/13/23 1150          Balance    Dynamic Standing Balance contact guard;minimal assist  -     Row Name 03/13/23 1150          Vital Signs    Intra SpO2 (%) 92  -RH     O2 Delivery Intra Treatment room air  -     Row Name 03/13/23 1150          Progress Summary (PT)    Progress Toward Functional Goals (PT) progress toward functional goals is  fair  -RH           User Key  (r) = Recorded By, (t) = Taken By, (c) = Cosigned By    Initials Name Provider Type     Mahad Whaley PTA Physical Therapist Assistant               Bilateral Lower Extremity   Exercise  Reps  Sets    Short arc quads   10 2   Heel slides  10 2   Ankle pumps  10 2   Quad sets  10 2   Straight leg raise  10 2   Hip ab/adduction 10 2        Physical Therapy Education     Title: PT OT SLP Therapies (Done)     Topic: Physical Therapy (Done)     Point: Mobility training (Done)     Learning Progress Summary           Patient Acceptance, E, VU by ALO at 3/8/2023 1531    Acceptance, E, VU by POONAM at 3/7/2023 0909                               User Key     Initials Effective Dates Name Provider Type Discipline     09/21/21 -  Heavenly Quach, RN Registered Nurse Nurse    POONAM 01/11/23 -  Ephraim Juares PT Student PT Student PT              PT Recommendation and Plan     Progress Summary (PT)  Progress Toward Functional Goals (PT): progress toward functional goals is fair   Outcome Measures     Row Name 03/13/23 1100             How much help from another person do you currently need...    Turning from your back to your side while in flat bed without using bedrails? 4  -RH      Moving from lying on back to sitting on the side of a flat bed without bedrails? 4  -RH      Moving to and from a bed to a chair (including a wheelchair)? 3  -RH      Standing up from a chair using your arms (e.g., wheelchair, bedside chair)? 4  -RH      Climbing 3-5 steps with a railing? 3  -RH      To walk in hospital room? 3  -RH      AM-PAC 6 Clicks Score (PT) 21  -RH            User Key  (r) = Recorded By, (t) = Taken By, (c) = Cosigned By    Initials Name Provider Type    RH aMhad Whaley PTA Physical Therapist Assistant                 Time Calculation:    PT Charges     Row Name 03/13/23 1149             Time Calculation    PT Received On 03/13/23  -         Timed Charges    91199 - PT Therapeutic Exercise  Minutes 19  -RH      27953 - Gait Training Minutes  4  -RH      98012 - PT Therapeutic Activity Minutes 15  -RH         Total Minutes    Timed Charges Total Minutes 38  -RH       Total Minutes 38  -RH            User Key  (r) = Recorded By, (t) = Taken By, (c) = Cosigned By    Initials Name Provider Type    Mahad Blum PTA Physical Therapist Assistant              Therapy Charges for Today     Code Description Service Date Service Provider Modifiers Qty    65596710496 HC PT THER PROC EA 15 MIN 3/13/2023 Mahad Whaley PTA GP 2    96095992890 HC PT THERAPEUTIC ACT EA 15 MIN 3/13/2023 Mahad Whaley PTA GP 1          PT G-Codes  Outcome Measure Options: AM-PAC 6 Clicks Basic Mobility (PT)  AM-PAC 6 Clicks Score (PT): 21  AM-PAC 6 Clicks Score (OT): 21    Mahad Whaley PTA  3/13/2023

## 2023-03-13 NOTE — PROGRESS NOTES
[facility]   Hospitalist Progress Note  Date: 3/13/2023  Patient Name: Buzz Lopez  : 1945  MRN: [id number]  Date of admission: 3/6/2023    Subjective   Subjective     Chief Complaint:   Shortness of breath, confusion    Summary:   Buzz Lopez is a 77 y.o. male with no significant past medical history has been brought into the ED with concerns for confusion, possible syncope, shortness of breath.  Patient states that for the past 1 to 2 weeks patient has been having difficulty breathing and subjective fevers, it has got worse and today and he has called his son to take him to the ED as he is having difficulty breathing.  By the time patient's son has arrived at the home, patient appeared to be confused, generalized weakness and unable to put his clothes on.  While his son was helping him to get the clothes on, patient appeared to be very confused and had a possible syncope episode where he was unresponsive.  Patient's son has looked for the pulse and they could not feel radial pulse and started chest compressions, EMS was called.  By the time EMS has arrived patient was receiving chest compressions from the family members.  EMS has evaluated the patient and they could not feel a pulse.  Patient was in respiratory distress, saturating around 70% on room air.  Received nebulizer treatment by the EMS and the patient has been brought into the ED.    During my examination, patient is alert and oriented x3 and able to answer questions appropriately.  States that he does not remember how he came to the hospital.  Denies any chest pain, nausea, vomiting, focal weakness, numbness, tingling.  States that he has been having difficulty breathing for the past 1 to 2 weeks.  A month ago he had some sore throat.  Associated with cough, productive sputum. Patient is thin and frail.  Chronically has a poor p.o. intake.  Admits that he has low appetite.  No previously diagnosed history of dementia.  As per the  family members, patient takes a lot of over-the-counter fiber supplement review does not gain weight despite he eats well.    Interval Followup:     3/13/2023    · No acute events overnight or new issues  · Alert and interactive this morning  · Tolerating low-dose beta-blocker  · Tolerating tube feeds  · Sinus rhythm.  Atrial bigeminy at times.  No further A-fib.  · Per speech, patient's swallowing has improved.  Possibly pursue barium swallow study in next few days if continues to make progress.    Objective   Objective     Vitals:   Temp:  [97.6 °F (36.4 °C)-98.1 °F (36.7 °C)] 97.6 °F (36.4 °C)  Heart Rate:  [68-73] 72  Resp:  [18-22] 20  BP: (126-150)/(54-61) 137/57  Flow (L/min):  [1-2] 2    Physical Exam   General appearance: NAD, conversant. Cacechtic  Eyes: anicteric sclerae, moist conjunctivae; no lid-lag; PERRLA  HENT: Atraumatic; oropharynx clear with moist mucous membranes and no mucosal ulcerations; normal hard and soft palate  Neck: Trachea midline; FROM, supple, no thyromegaly or lymphadenopathy  Lungs: Bilateral rhonchi, with normal respiratory effort and no intercostal retractions    Result Review    Result Review:  I have personally reviewed the results as below  [x]  Laboratory  CBC    CBC 3/11/23 3/12/23 3/13/23   WBC 8.05 6.81 5.54   RBC 4.32 4.22 4.15   Hemoglobin 12.9 (A) 12.2 (A) 12.4 (A)   Hematocrit 38.6 37.9 36.9 (A)   MCV 89.4 89.8 88.9   MCH 29.9 28.9 29.9   MCHC 33.4 32.2 33.6   RDW 13.2 13.2 13.1   Platelets 241 257 267   (A) Abnormal value            CMP    CMP 3/11/23 3/12/23 3/13/23   Glucose 297 (A) 251 (A) 252 (A)   BUN 24 (A) 32 (A) 26 (A)   Creatinine 0.68 (A) 0.68 (A) 0.55 (A)   eGFR 94.6 94.6 100.8   Sodium 137 140 139   Potassium 3.6 3.4 (A) 4.0   Chloride 93 (A) 95 (A) 98   Calcium 9.1 9.2 8.9   BUN/Creatinine Ratio 35.3 (A) 47.1 (A) 47.3 (A)   Anion Gap 9.3 6.9 5.4   (A) Abnormal value            []  Microbiology  []  Radiology  []  EKG/Telemetry   []  Cardiology/Vascular    []  Pathology  []  Old records  []  Other:    Assessment & Plan   Assessment / Plan   Assessment:    · Sepsis due to right lower lobe pneumonia (Streptococcus Pneumo)  · Acute hypoxic respiratory failure  · Questionable cardiac arrest episode  · NSTEMI/acute myocardial injury  · Possible syncope  · Lactic acidosis, resolved  · Malnutrition  · S/P Bronchoscopy 3/8/23 with mucous plugging removed  · Dysphagia requiring Core Track placed this admission  · Aspiration  · Severe aortic stenosis   · Degenerative changes cervical spine / anterior osteophytes  · CODE STATUS:  DNR/DNI     Plan:    · Per speech, patient's swallowing has improved.  Possibly pursue barium swallow study in next few days if continues to make progress.  · Continue core track.   · Check CT neck ... anterior osteophytes w mod mass effect  · Pulmonology consulted  · Cardiology consulted   · LAI attempted 3/9/23 but unable to complete  · CT scan chest .... No PE.  Bilat pneumonia noted.  · Status post bronchoscopy   · Thick mucus removal  · Food particles seen around the vocal cords  · Positive for strep pneumo  · Transthoracic echo suggesting normal EF% and severe AS  · Complete ceftriaxone   · Continue albuterol nebulizer every 4 hours as needed  · Continue bronchodilator protocol  · Continue aspirin, monitor troponin  · PT OT consult  · Discussed CODE STATUS w pt & son.  Pt is DNR/DNI.     Reviewed patients labs and imaging, and discussed with patient and nurse at bedside.    DVT prophylaxis:  Medical DVT prophylaxis orders are present.    CODE STATUS:   Medical Intervention Limits: NO intubation (DNI)  Level Of Support Discussed With: Patient  Code Status (Patient has no pulse and is not breathing): No CPR (Do Not Attempt to Resuscitate)  Medical Interventions (Patient has pulse or is breathing): Limited Support         Patient independently seen and evaluated, agree with assessment and plan, above documentation reflects plan put forth during  bedside rounds.  More than 51% of the time of this patient encounter was performed by me.     Interval history: No acute events overnight, patient resting comfortably, patient's son at bedside     Exam:  GEN: Resting comfortably  HEENT: Moist mucous membranes, core track in place  LUNGS: Clear to auscultation bilaterally  CARDIAC: Irregularly irregular rate and rhythm  NEURO: Globally weak     Plan:  Agree with assessment plan as above  Continue oral metoprolol  Continue on telemetry  Continue to core track for nutrition patient is at risk for aspiration and not safe for oral intake currently  Potential modified barium tomorrow  CBC, CMP reviewed  Check CT scan of the neck to rule out any obvious obstruction or reversible factors for patient's difficulty swallowing, personally reviewed, moderate displacement with osteophytes  Check CBC, CMP, mag and Phos tomorrow  PT/OT following  Discussed with pulmonology  We will need rehab at discharge  Clinical course will dictate further management     Reviewed patients labs and imaging, and discussed with patient and nurse at bedside.      Electronically signed by Black Capps MD, 03/13/23, 12:30 PM EDT.

## 2023-03-13 NOTE — CONSULTS
"Purpose of the visit was to evaluate for: goals of care/advanced care planning, support for patient/family and hospice referral/discussion. Spoke with RN, patient, family and PA and discussed palliative care, goals of care, care options, [facility] and discharge options.      Assessment: Pt presented sitting up in chair with son at bedside. Speech had just left and reported pt was making improvements with swallowing. Son had requested palliative care per report.   Explained the role of palliative care and discussed goals of care and care options. Son also requested information on body donation per pt request.   Provided additional information via booklet on long-term feeding tube care with goal to return home with home health. Pt stated multiple times he did not want the PEG placed. Explained alternative options, such as [facility], since he has a diagnosis of severe aortic stenosis per cardiology. Son believes pt wants to go home due to \"cabin fever\", but would benefit from continue care. Encouraged family to discuss options and recommended [facility] education. Pt and son open to education, even if needed for the future.      Recommendations/Plan: Clinical care will determine future goals. Pt's progress with speech will determine discharge plan PEG/HHC or [facility].    Tasks Completed: Emotional Support and Provided educational materials on long term feeding tube, [facility] and body donation.    Other Comments: Son requested visit from MD. MD notified and met with son.     RN and PA updated.     DONALD Duong  "

## 2023-03-13 NOTE — PLAN OF CARE
Goal Outcome Evaluation:               Pt. Slept well throughout night. No complaints. Pt. And son verbalized that they would like to have physical therapy work with him more on ambulating. Pt. Was up in wheelchair with son earlier in shift.

## 2023-03-13 NOTE — PROGRESS NOTES
Pulmonary / Critical Care Progress Note      Patient Name: Buzz Lopez  : 1945  MRN: [id number]  Primary Care Physician:  Provider, No Known  Date of admission: 3/6/2023    Subjective   Subjective   Follow-up for pneumonia.    Feels better  On room air  States that he does not want to get a feeding tube  Currently tolerating tube feeds through NG tube      Review of Systems  General:  + Fatigue, No Fever.   HEENT: No dysphagia, No Visual Changes, no rhinorrhea  Respiratory:  + cough, no Dyspnea, no phlegm, No Pleuritic Pain, no wheezing, no hemoptysis  Cardiovascular: Denies chest pain, denies palpitations, no GARCIA, No Chest Pressure  Gastrointestinal:  No Abdominal Pain, No Nausea, No Vomiting, No Diarrhea      Objective   Objective     Vitals:   Temp:  [97.6 °F (36.4 °C)-98.4 °F (36.9 °C)] 98.4 °F (36.9 °C)  Heart Rate:  [68-73] 71  Resp:  [18-22] 20  BP: (113-150)/(53-59) 113/53  Flow (L/min):  [1-2] 2    Physical Exam   Vital Signs Reviewed   General: Frail elderly male, no acute distress  HEENT:  PERRL, EOMI.    Neck:  No JVD, no thyromegaly  Lymph: no axillary, cervical, supraclavicular lymphadenopathy noted bilaterally  Chest:  Clear to auscultation bilaterally, no work of breathing noted on room air  CV: RRR, no M/G/R, pulses 2+  Abd:  Soft, NT, ND, +BS  EXT:  no clubbing, no cyanosis, no edema  Neuro:  A&Ox3, CN grossly intact, no focal deficits.  Skin: No rashes or lesions noted    Result Review    Result Review:  I have personally reviewed the results from the time of this admission to 3/13/2023 16:28 EDT and agree with these findings:  [x]  Laboratory  [x]  Microbiology  [x]  Radiology  [x]  EKG/Telemetry   [x]  Cardiology/Vascular   []  Pathology  []  Old records  []  Other:  Most notable findings include: proBNP 6000 yesterday , CT soft tissue neck 3/10 with anterior osteophyte formation throughout the cervical spine producing moderate mass effect upon the cervical esophagus  3/9 BAL strep  pneumonia and rhinovirus  Cytology with acute inflammation  Chest x-ray 3/10 with decreased right greater than left bilateral airspace disease    3/7 echocardiogram EF 56 to 60%, grade 1 diastolic dysfunction, severe aortic stenosis, RVSP less than 35        Lab 03/13/23  0429 03/12/23  0411 03/11/23  0436 03/10/23  0407 03/09/23  0410 03/08/23  0406 03/07/23  0636 03/06/23  2353   WBC 5.54 6.81 8.05 8.48 5.60 6.82 9.19  --    HEMOGLOBIN 12.4* 12.2* 12.9* 12.1* 11.3* 12.0* 11.8*  --    HEMATOCRIT 36.9* 37.9 38.6 36.4* 34.3* 36.0* 34.3*  --    PLATELETS 267 257 241 187 211 184 186  --    SODIUM 139 140 137 142 141 137  --  129*   POTASSIUM 4.0 3.4* 3.6 4.1 4.2 4.1  --  4.8   CHLORIDE 98 95* 93* 101 102 98  --  96*   CO2 35.6* 38.1* 34.7* 32.2* 31.6* 27.6  --  23.7   BUN 26* 32* 24* 15 19 26*  --  30*   CREATININE 0.55* 0.68* 0.68* 0.56* 0.56* 0.59*  --  0.81   GLUCOSE 252* 251* 297* 243* 222* 183*  --  164*   CALCIUM 8.9 9.2 9.1 9.2 9.2 9.2  --  8.8   PHOSPHORUS  --   --   --  2.2* 1.8* 2.6  --  3.2   TOTAL PROTEIN  --   --   --  7.4 7.0 7.3  --  6.7   ALBUMIN  --   --   --  3.1* 3.0* 3.4*  --  2.8*   GLOBULIN  --   --   --  4.3 4.0 3.9  --  3.9     Assessment & Plan   Assessment / Plan     Active Hospital Problems:  Active Hospital Problems    Diagnosis    • **Pneumonia of right lower lobe due to infectious organism    • Severe malnutrition (HCC)    • Sepsis with acute hypoxic respiratory failure without septic shock (HCC)        Impression:   Acute hypoxic respiratory failure  Streptococcal pneumonia  Aspiration pneumonia from unspecified organism  Aspiration and airways, initial encounter  Severe protein calorie malnutrition with muscle wasting  Upper respiratory infection due to rhinovirus  Mucous plugging/issues with airway clearance  Lactic acidosis-resolved  Possible syncope  NSTEMI: Likely demand ischemia  Severe aortic stenosis  Remote tobacco use    Plan:   Continue bronchodilator therapies  Out of bed in  chair  Continue continue bronchopulmonary hygiene  X-ray shows improvement  Currently on room air  .  -Continue rehab services    We will sign off this time please call with questions    DVT prophylaxis:  Medical DVT prophylaxis orders are present.    CODE STATUS:   Medical Intervention Limits: NO intubation (DNI)  Level Of Support Discussed With: Patient  Code Status (Patient has no pulse and is not breathing): No CPR (Do Not Attempt to Resuscitate)  Medical Interventions (Patient has pulse or is breathing): Limited Support    Electronically signed by Fer Owens DO, 03/13/23, 4:29 PM EDT.

## 2023-03-14 LAB
ALBUMIN SERPL-MCNC: 2.6 G/DL (ref 3.5–5.2)
ALBUMIN/GLOB SERPL: 0.6 G/DL
ALP SERPL-CCNC: 84 U/L (ref 39–117)
ALT SERPL W P-5'-P-CCNC: 26 U/L (ref 1–41)
ANION GAP SERPL CALCULATED.3IONS-SCNC: 8.4 MMOL/L (ref 5–15)
AST SERPL-CCNC: 37 U/L (ref 1–40)
BASOPHILS # BLD AUTO: 0.03 10*3/MM3 (ref 0–0.2)
BASOPHILS NFR BLD AUTO: 0.6 % (ref 0–1.5)
BILIRUB SERPL-MCNC: 0.4 MG/DL (ref 0–1.2)
BUN SERPL-MCNC: 25 MG/DL (ref 8–23)
BUN/CREAT SERPL: 46.3 (ref 7–25)
CALCIUM SPEC-SCNC: 9 MG/DL (ref 8.6–10.5)
CHLORIDE SERPL-SCNC: 97 MMOL/L (ref 98–107)
CO2 SERPL-SCNC: 28.6 MMOL/L (ref 22–29)
CREAT SERPL-MCNC: 0.54 MG/DL (ref 0.76–1.27)
DEPRECATED RDW RBC AUTO: 43.9 FL (ref 37–54)
EGFRCR SERPLBLD CKD-EPI 2021: 101.4 ML/MIN/1.73
EOSINOPHIL # BLD AUTO: 0.08 10*3/MM3 (ref 0–0.4)
EOSINOPHIL NFR BLD AUTO: 1.7 % (ref 0.3–6.2)
ERYTHROCYTE [DISTWIDTH] IN BLOOD BY AUTOMATED COUNT: 13.3 % (ref 12.3–15.4)
GLOBULIN UR ELPH-MCNC: 4.6 GM/DL
GLUCOSE BLDC GLUCOMTR-MCNC: 219 MG/DL (ref 70–99)
GLUCOSE BLDC GLUCOMTR-MCNC: 241 MG/DL (ref 70–99)
GLUCOSE BLDC GLUCOMTR-MCNC: 258 MG/DL (ref 70–99)
GLUCOSE SERPL-MCNC: 227 MG/DL (ref 65–99)
HCT VFR BLD AUTO: 41.2 % (ref 37.5–51)
HGB BLD-MCNC: 13.2 G/DL (ref 13–17.7)
IMM GRANULOCYTES # BLD AUTO: 0.01 10*3/MM3 (ref 0–0.05)
IMM GRANULOCYTES NFR BLD AUTO: 0.2 % (ref 0–0.5)
LYMPHOCYTES # BLD AUTO: 1.19 10*3/MM3 (ref 0.7–3.1)
LYMPHOCYTES NFR BLD AUTO: 25.3 % (ref 19.6–45.3)
MAGNESIUM SERPL-MCNC: 2 MG/DL (ref 1.6–2.4)
MCH RBC QN AUTO: 29.1 PG (ref 26.6–33)
MCHC RBC AUTO-ENTMCNC: 32 G/DL (ref 31.5–35.7)
MCV RBC AUTO: 90.7 FL (ref 79–97)
MONOCYTES # BLD AUTO: 0.5 10*3/MM3 (ref 0.1–0.9)
MONOCYTES NFR BLD AUTO: 10.6 % (ref 5–12)
NEUTROPHILS NFR BLD AUTO: 2.89 10*3/MM3 (ref 1.7–7)
NEUTROPHILS NFR BLD AUTO: 61.6 % (ref 42.7–76)
NRBC BLD AUTO-RTO: 0 /100 WBC (ref 0–0.2)
PHOSPHATE SERPL-MCNC: 3.3 MG/DL (ref 2.5–4.5)
PLATELET # BLD AUTO: 181 10*3/MM3 (ref 140–450)
PMV BLD AUTO: 12.3 FL (ref 6–12)
POTASSIUM SERPL-SCNC: 4.2 MMOL/L (ref 3.5–5.2)
PROT SERPL-MCNC: 7.2 G/DL (ref 6–8.5)
RBC # BLD AUTO: 4.54 10*6/MM3 (ref 4.14–5.8)
SODIUM SERPL-SCNC: 134 MMOL/L (ref 136–145)
WBC NRBC COR # BLD: 4.7 10*3/MM3 (ref 3.4–10.8)

## 2023-03-14 PROCEDURE — 84100 ASSAY OF PHOSPHORUS: CPT | Performed by: INTERNAL MEDICINE

## 2023-03-14 PROCEDURE — 94799 UNLISTED PULMONARY SVC/PX: CPT

## 2023-03-14 PROCEDURE — 94664 DEMO&/EVAL PT USE INHALER: CPT

## 2023-03-14 PROCEDURE — 82962 GLUCOSE BLOOD TEST: CPT

## 2023-03-14 PROCEDURE — 80053 COMPREHEN METABOLIC PANEL: CPT | Performed by: INTERNAL MEDICINE

## 2023-03-14 PROCEDURE — 97110 THERAPEUTIC EXERCISES: CPT

## 2023-03-14 PROCEDURE — 92526 ORAL FUNCTION THERAPY: CPT

## 2023-03-14 PROCEDURE — 99232 SBSQ HOSP IP/OBS MODERATE 35: CPT | Performed by: INTERNAL MEDICINE

## 2023-03-14 PROCEDURE — 83735 ASSAY OF MAGNESIUM: CPT | Performed by: INTERNAL MEDICINE

## 2023-03-14 PROCEDURE — 97116 GAIT TRAINING THERAPY: CPT

## 2023-03-14 PROCEDURE — 85025 COMPLETE CBC W/AUTO DIFF WBC: CPT | Performed by: INTERNAL MEDICINE

## 2023-03-14 PROCEDURE — 25010000002 ENOXAPARIN PER 10 MG: Performed by: INTERNAL MEDICINE

## 2023-03-14 PROCEDURE — 63710000001 INSULIN REGULAR HUMAN PER 5 UNITS: Performed by: INTERNAL MEDICINE

## 2023-03-14 RX ADMIN — ASPIRIN 81 MG 81 MG: 81 TABLET ORAL at 08:04

## 2023-03-14 RX ADMIN — BUDESONIDE 0.5 MG: 0.5 INHALANT ORAL at 19:34

## 2023-03-14 RX ADMIN — ARFORMOTEROL TARTRATE 15 MCG: 15 SOLUTION RESPIRATORY (INHALATION) at 19:34

## 2023-03-14 RX ADMIN — INSULIN HUMAN 3 UNITS: 100 INJECTION, SOLUTION PARENTERAL at 05:36

## 2023-03-14 RX ADMIN — METOPROLOL TARTRATE 12.5 MG: 25 TABLET, FILM COATED ORAL at 20:18

## 2023-03-14 RX ADMIN — INSULIN HUMAN 3 UNITS: 100 INJECTION, SOLUTION PARENTERAL at 13:40

## 2023-03-14 RX ADMIN — INSULIN HUMAN 4 UNITS: 100 INJECTION, SOLUTION PARENTERAL at 17:32

## 2023-03-14 RX ADMIN — BUDESONIDE 0.5 MG: 0.5 INHALANT ORAL at 06:25

## 2023-03-14 RX ADMIN — ARFORMOTEROL TARTRATE 15 MCG: 15 SOLUTION RESPIRATORY (INHALATION) at 06:25

## 2023-03-14 RX ADMIN — ENOXAPARIN SODIUM 40 MG: 100 INJECTION SUBCUTANEOUS at 16:29

## 2023-03-14 RX ADMIN — METOPROLOL TARTRATE 12.5 MG: 25 TABLET, FILM COATED ORAL at 08:04

## 2023-03-14 RX ADMIN — PANTOPRAZOLE SODIUM 40 MG: 40 INJECTION, POWDER, FOR SOLUTION INTRAVENOUS at 05:36

## 2023-03-14 RX ADMIN — Medication 10 ML: at 20:18

## 2023-03-14 RX ADMIN — Medication 10 ML: at 08:05

## 2023-03-14 NOTE — THERAPY TREATMENT NOTE
Acute Care - Speech Language Pathology   Swallow Treatment Note  [facility]     Patient Name: Buzz Lopez  : 1945  MRN: [id number]  Today's Date: 3/14/2023               Admit Date: 3/6/2023    Visit Dx:     ICD-10-CM ICD-9-CM   1. Pneumonia of right lower lobe due to infectious organism  J18.9 486   2. Sepsis with acute hypoxic respiratory failure without septic shock, due to unspecified organism (HCC)  A41.9 038.9    R65.20 995.91    J96.01 518.81   3. Acute respiratory failure with hypoxia (HCC)  J96.01 518.81   4. Difficulty walking  R26.2 719.7   5. Oropharyngeal dysphagia  R13.12 787.22   6. Decreased activities of daily living (ADL)  Z78.9 V49.89     Patient Active Problem List   Diagnosis   • Pneumonia of right lower lobe due to infectious organism   • Sepsis with acute hypoxic respiratory failure without septic shock (HCC)   • Severe malnutrition (HCC)     History reviewed. No pertinent past medical history.  Past Surgical History:   Procedure Laterality Date   • BRONCHOSCOPY N/A 3/8/2023    Procedure: BRONCHOSCOPY WITH BRONCHOALVEOLAR LAVAGE, WASHINGS;  Surgeon: Jason Rowley MD;  Location: [facility] ENDOSCOPY;  Service: Pulmonary;  Laterality: N/A;  MUCOUS PLUGGING   • EYE SURGERY         SPEECH PATHOLOGY DYSPHAGIA TREATMENT     Subjective/Behavioral Observations: Alert and cooperative, sitting up in chair.        Day/time of Treatment: 3/14/2023        Current Diet:  N.p.o. with core track        Current Strategies: N/A     Treatment received: Dysphagia therapy to address swallow function through exercises and education of strategies.        Results of treatment:   Lingual/tongue base exercises completed with 2 sets each.  Lingual strength/range of motion 3+ /5.  Laryngeal elevation exercises with effortful swallow, 3 +/5.     Patient demonstrates good voicing with ability to crescendo.  Trials of ice chips x10, patient keeping voice clear, frequent throat clear.   Small controlled sip of water  x3 with patient producing multiple swallows.   Throat clearing noted.  Patient not associating this with swallow difficulty.  Nectar liquid by spoon x2, multiple swallow produced, throat clear, cough, vocal wetness.  Patient denying difficulty clearing material.       Progress toward goals: Adequate     Barriers to Achieving goals: Goal status        Plan of care:/changes in plan: Continue with current plan with patient to be n.p.o. with alternative feeding.  Okay for nursing to conservatively give small ice chips from time to time.   Patient ready for modified barium swallow study soon.  Will likely still need ongoing alternative feeding.                                                                               Plan of Care Reviewed With: patient, family          EDUCATION  The patient has been educated in the following areas:   NPO rationale.              Time Calculation:    Time Calculation- SLP     Row Name 03/14/23 1000             Time Calculation- SLP    SLP Stop Time 0930  -TB      SLP Received On 03/14/23  -TB         Untimed Charges    46998-BO Treatment Swallow Minutes 40  -TB         Total Minutes    Untimed Charges Total Minutes 40  -TB       Total Minutes 40  -TB            User Key  (r) = Recorded By, (t) = Taken By, (c) = Cosigned By    Initials Name Provider Type    TB Terri Gomez SLP Speech and Language Pathologist                Therapy Charges for Today     Code Description Service Date Service Provider Modifiers Qty    56810836118 HC ST TREATMENT SWALLOW 3 3/13/2023 Terri Gomez SLP GN 1    45719286138 HC ST TREATMENT SWALLOW 3 3/14/2023 Terri Gomez SLP GN 1               JANAY Julio  3/14/2023

## 2023-03-14 NOTE — PLAN OF CARE
Goal Outcome Evaluation:      VS stable. Frequent turning provided. Pt tolerating tube feeds well.

## 2023-03-14 NOTE — SIGNIFICANT NOTE
03/14/23 1100   Coping/Psychosocial   Observed Emotional State calm;cooperative   Verbalized Emotional State relief;hopefulness   Trust Relationship/Rapport empathic listening provided   Involvement in Care interacting with patient   Additional Documentation Spiritual Care (Group)   Spiritual Care   Use of Spiritual Resources non-Lutheran use of spiritual care   Spiritual Care Source  initiative   Spiritual Care Follow-Up follow-up, none required as presently assessed   Response to Spiritual Care engaged in conversation;receptive of support;thanks expressed   Spiritual Care Interventions supportive conversation provided   Spiritual Care Visit Type initial   Receptivity to Spiritual Care visit welcomed

## 2023-03-14 NOTE — PROGRESS NOTES
CARDIOLOGY  INPATIENT PROGRESS NOTE               [facility] [place] UN[facility]    3/14/2023      PATIENT IDENTIFICATION:   Name:  Buzz Lopez      MRN:  [id number]     77 y.o.  male                 SUBJECTIVE:   Drowsy and lethargic  Still not able to swallow    OBJECTIVE:  Vitals:    03/14/23 0629 03/14/23 0755 03/14/23 1110 03/14/23 1233   BP:  145/67 142/61    BP Location:  Right arm Right arm    Patient Position:  Lying Lying    Pulse: 70 72 69    Resp: 16 18 16    Temp:  98.9 °F (37.2 °C) 98.7 °F (37.1 °C)    TempSrc:  Axillary Axillary    SpO2: 97% 100% 100%    Weight:    53.8 kg (118 lb 9.7 oz)   Height:               Body mass index is 17.02 kg/m².    Intake/Output Summary (Last 24 hours) at 3/14/2023 1631  Last data filed at 3/14/2023 1613  Gross per 24 hour   Intake 1066 ml   Output 650 ml   Net 416 ml       Telemetry:     Physical Exam  General Appearance:   · no acute distress  · Alert and oriented x3  HENT:   · lips not cyanotic  · Atraumatic  Neck:  · No JVD   · supple  Respiratory:  · no respiratory distress  · normal breath sounds  · no rales  Cardiovascular:    · regular rhythm  · no S3, no S4   · no murmur  · no rub  · lower extremity edema: none    Skin:   · warm, dry  · No rashes      No Known Allergies    Scheduled meds:  arformoterol, 15 mcg, Nebulization, BID - RT  aspirin, 81 mg, Nasogastric, Daily  budesonide, 0.5 mg, Nebulization, BID - RT  enoxaparin, 40 mg, Subcutaneous, Q24H  insulin regular, 2-7 Units, Subcutaneous, Q6H  metoprolol tartrate, 12.5 mg, Nasogastric, BID  pantoprazole, 40 mg, Intravenous, Q AM  sodium chloride, 10 mL, Intravenous, Q12H      IV meds:                           Data Review:  CBC    CBC 3/12/23 3/13/23 3/14/23   WBC 6.81 5.54 4.70   RBC 4.22 4.15 4.54   Hemoglobin 12.2 (A) 12.4 (A) 13.2   Hematocrit 37.9 36.9 (A) 41.2   MCV 89.8 88.9 90.7   MCH 28.9 29.9 29.1   MCHC 32.2 33.6 32.0   RDW 13.2 13.1 13.3   Platelets 257 267 181   (A) Abnormal  value            CMP    CMP 3/12/23 3/13/23 3/14/23   Glucose 251 (A) 252 (A) 227 (A)   BUN 32 (A) 26 (A) 25 (A)   Creatinine 0.68 (A) 0.55 (A) 0.54 (A)   eGFR 94.6 100.8 101.4   Sodium 140 139 134 (A)   Potassium 3.4 (A) 4.0 4.2   Chloride 95 (A) 98 97 (A)   Calcium 9.2 8.9 9.0   Total Protein   7.2   Albumin   2.6 (A)   Globulin   4.6   Total Bilirubin   0.4   Alkaline Phosphatase   84   AST (SGOT)   37   ALT (SGPT)   26   Albumin/Globulin Ratio   0.6   BUN/Creatinine Ratio 47.1 (A) 47.3 (A) 46.3 (A)   Anion Gap 6.9 5.4 8.4   (A) Abnormal value       Comments are available for some flowsheets but are not being displayed.            CARDIAC LABS:      Lab 03/10/23  0407   PROBNP 6,189.0*        No results found for: DIGOXIN   No results found for: TSH        Invalid input(s): LDLCALC  No results found for: POCTROP  Lab Results   Component Value Date    TROPONINT 75 (C) 03/06/2023   (  Lab Results   Component Value Date    MG 2.0 03/14/2023     Results for orders placed during the hospital encounter of 03/06/23    Adult Transesophageal Echo (LAI) W/ Cont if Necessary Per Protocol    Interpretation Summary  The patient came down to the cardiovascular lab where he was prepped for LAI and was sedated with IV Versed and Demerol.  NG tube was in place.  He had frequent cough and was agitation.  An attempt to introduce the LAI probe was unsuccessful.  Therefore, the procedure was aborted.  We will try to do it later on after he will undergo a PEG placement and the NG tube will be withdrawn.           ASSESSMENT:    - Sepsis due to right lower lobe pneumonia (Streptococcus Pneumo)  - Acute hypoxic respiratory failure  - Questionable cardiac arrest episode  - NSTEMI/acute myocardial injury  - Possible syncope  - Lactic acidosis, resolved  - Malnutrition  - S/P Bronchoscopy 3/8/23 with mucous plugging removed  - Dysphagia requiring Core Track placed this admission  - Aspiration  - Severe aortic stenosis   - Degenerative  changes cervical spine / anterior osteophytes  - CODE STATUS:  DNR/DNI    PLAN:   May need a PEG tube  Continue core track  Follow-up as outpatient with Deepti  for possible cath        Angélica Rogers MD  3/14/2023    16:31 EDT     Portions of this documentation were transcribed electronically from a voice recognition software.  I confirm all data accurately represents the service(s) I performed at today's visit.

## 2023-03-14 NOTE — PLAN OF CARE
Goal Outcome Evaluation:  Plan of Care Reviewed With: patient        Progress: improving. Difficulty ambulating tonight to bathroom using walker and two assists. Congested cough noted at times. BBS with rhonchi. In good spirits.

## 2023-03-14 NOTE — PROGRESS NOTES
[facility]   Hospitalist Progress Note  Date: 3/14/2023  Patient Name: Buzz Lopez  : 1945  MRN: [id number]  Date of admission: 3/6/2023    Subjective   Subjective     Chief Complaint:   Shortness of breath, confusion    Summary:   Buzz Lopez is a 77 y.o. male with no significant past medical history has been brought into the ED with concerns for confusion, possible syncope, shortness of breath.  Patient states that for the past 1 to 2 weeks patient has been having difficulty breathing and subjective fevers, it has got worse and today and he has called his son to take him to the ED as he is having difficulty breathing.  By the time patient's son has arrived at the home, patient appeared to be confused, generalized weakness and unable to put his clothes on.  While his son was helping him to get the clothes on, patient appeared to be very confused and had a possible syncope episode where he was unresponsive.  Patient's son has looked for the pulse and they could not feel radial pulse and started chest compressions, EMS was called.  By the time EMS has arrived patient was receiving chest compressions from the family members.  EMS has evaluated the patient and they could not feel a pulse.  Patient was in respiratory distress, saturating around 70% on room air.  Received nebulizer treatment by the EMS and the patient has been brought into the ED.    During my examination, patient is alert and oriented x3 and able to answer questions appropriately.  States that he does not remember how he came to the hospital.  Denies any chest pain, nausea, vomiting, focal weakness, numbness, tingling.  States that he has been having difficulty breathing for the past 1 to 2 weeks.  A month ago he had some sore throat.  Associated with cough, productive sputum. Patient is thin and frail.  Chronically has a poor p.o. intake.  Admits that he has low appetite.  No previously diagnosed history of dementia.  As per the  family members, patient takes a lot of over-the-counter fiber supplement review does not gain weight despite he eats well.    Interval Followup:     3/14/2023    · No acute events overnight or new issues.  Alert and interactive this morning  · Tolerating tube feeds.  Rate at 65.  · VSS.  145/67.  Room air.  · Sinus rhythm.  Atrial bigeminy at times.  No further A-fib.  · Discussed with speech.  Some improvement as of yesterday.  Still poorly tolerating nectar thick.  Planning modified barium swallow tomorrow likely.  · Discussed with patient/son.  If dysphagia persists then options will be home with PEG tube versus no PEG and likely recurrence of pneumonia or additional morbidity  Objective   Objective     Vitals:   Temp:  [96.9 °F (36.1 °C)-98.9 °F (37.2 °C)] 98.9 °F (37.2 °C)  Heart Rate:  [67-89] 72  Resp:  [16-20] 18  BP: (107-152)/(51-67) 145/67    Physical Exam   General appearance: NAD, conversant. Cacechtic  Eyes: anicteric sclerae, moist conjunctivae; no lid-lag;   HENT: Atraumatic; oropharynx clear with moist mucous membranes and no mucosal ulcerations; normal hard and soft palate  Neck: Trachea midline; FROM, supple, no thyromegaly or lymphadenopathy  Lungs: Bilateral rhonchi, with normal respiratory effort and no intercostal retractions    Result Review    Result Review:  I have personally reviewed the results as below  [x]  Laboratory  CBC    CBC 3/12/23 3/13/23 3/14/23   WBC 6.81 5.54 4.70   RBC 4.22 4.15 4.54   Hemoglobin 12.2 (A) 12.4 (A) 13.2   Hematocrit 37.9 36.9 (A) 41.2   MCV 89.8 88.9 90.7   MCH 28.9 29.9 29.1   MCHC 32.2 33.6 32.0   RDW 13.2 13.1 13.3   Platelets 257 267 181   (A) Abnormal value            CMP    CMP 3/12/23 3/13/23 3/14/23   Glucose 251 (A) 252 (A) 227 (A)   BUN 32 (A) 26 (A) 25 (A)   Creatinine 0.68 (A) 0.55 (A) 0.54 (A)   eGFR 94.6 100.8 101.4   Sodium 140 139 134 (A)   Potassium 3.4 (A) 4.0 4.2   Chloride 95 (A) 98 97 (A)   Calcium 9.2 8.9 9.0   Total Protein   7.2    Albumin   2.6 (A)   Globulin   4.6   Total Bilirubin   0.4   Alkaline Phosphatase   84   AST (SGOT)   37   ALT (SGPT)   26   Albumin/Globulin Ratio   0.6   BUN/Creatinine Ratio 47.1 (A) 47.3 (A) 46.3 (A)   Anion Gap 6.9 5.4 8.4   (A) Abnormal value       Comments are available for some flowsheets but are not being displayed.           []  Microbiology  []  Radiology  []  EKG/Telemetry   []  Cardiology/Vascular   []  Pathology  []  Old records  []  Other:    Assessment & Plan   Assessment / Plan   Assessment:    · Sepsis due to right lower lobe pneumonia (Streptococcus Pneumo)  · Acute hypoxic respiratory failure  · Questionable cardiac arrest episode  · NSTEMI/acute myocardial injury  · Possible syncope  · Lactic acidosis, resolved  · Malnutrition  · S/P Bronchoscopy 3/8/23 with mucous plugging removed  · Dysphagia requiring Core Track placed this admission  · Aspiration  · Severe aortic stenosis   · Degenerative changes cervical spine / anterior osteophytes  · CODE STATUS:  DNR/DNI     Plan:    · Continue speech therapy.  Barium swallow tomorrow  · Continue core track.   · Check CT neck ... anterior osteophytes w mod mass effect  · Pulmonology consulted ..... have now signed off  · Cardiology consulted   · LAI attempted 3/9/23 but unable to complete  · CT scan chest .... No PE.  Bilat pneumonia noted.  · Status post bronchoscopy   · Thick mucus removal  · Food particles seen around the vocal cords  · Positive for strep pneumo  · Transthoracic echo suggesting normal EF% and severe AS  · Continue albuterol nebulizer every 4 hours as needed  · Continue bronchodilator protocol  · Continue aspirin  · PT OT consult  · Discussed CODE STATUS w pt & son.  Pt is DNR/DNI.         DVT prophylaxis:  Medical DVT prophylaxis orders are present.    CODE STATUS:   Medical Intervention Limits: NO intubation (DNI)  Level Of Support Discussed With: Patient  Code Status (Patient has no pulse and is not breathing): No CPR (Do Not  Attempt to Resuscitate)  Medical Interventions (Patient has pulse or is breathing): Limited Support         Patient independently seen and evaluated, agree with assessment and plan, above documentation reflects plan put forth during bedside rounds.  More than 51% of the time of this patient encounter was performed by me.     Interval history: No acute events overnight, patient resting comfortably in bed, no family at bedside, swallow did not show much improvement from yesterday, will attempt modified barium in the a.m.    Exam:  GEN: Resting comfortably  HEENT: Moist mucous membranes, core track in place  LUNGS: Clear to auscultation bilaterally  CARDIAC: Irregularly irregular rate and rhythm  NEURO: Globally weak     Plan:  Agree with assessment plan as above  Continue oral metoprolol  Continue on telemetry  Continue Core track for nutrition patient is at risk for aspiration and not safe for oral intake currently  Potential modified barium tomorrow  CBC, CMP reviewed  Check CT scan of the neck to rule out any obvious obstruction or reversible factors for patient's difficulty swallowing, personally reviewed, moderate displacement with osteophytes  Check CBC, CMP, mag and Phos tomorrow  PT/OT following  Discussed with pulmonology  We will need rehab at discharge, unsure if patient will agree  Clinical course will dictate further management     Reviewed patients labs and imaging, and discussed with patient and nurse at bedside.      Electronically signed by Black Capps MD, 03/14/23, 11:34 AM EDT.

## 2023-03-14 NOTE — SIGNIFICANT NOTE
03/14/23 1500   SOCIAL WORK ASSESSMENT   Referral Source physician   Reason for Consult palliative care   Visit Type ongoing         Spoke with Terri ABRAHAM, and discussed goals of care and care options. Family would like for pt to pursue rehab at this time and requested placement at [facility]. SW and CM notified.   LEIGH also requested information on filing for SSI, since pt has never applied and has no income. Palliative will look into process and connect family with resources.     Palliative will follow-up tomorrow.     DONALD Duong

## 2023-03-14 NOTE — THERAPY TREATMENT NOTE
Acute Care - Physical Therapy Treatment Note   [place]     Patient Name: Buzz Lopez  : 1945  MRN: [id number]  Today's Date: 3/14/2023      Visit Dx:     ICD-10-CM ICD-9-CM   1. Pneumonia of right lower lobe due to infectious organism  J18.9 486   2. Sepsis with acute hypoxic respiratory failure without septic shock, due to unspecified organism (HCC)  A41.9 038.9    R65.20 995.91    J96.01 518.81   3. Acute respiratory failure with hypoxia (HCC)  J96.01 518.81   4. Difficulty walking  R26.2 719.7   5. Oropharyngeal dysphagia  R13.12 787.22   6. Decreased activities of daily living (ADL)  Z78.9 V49.89     Patient Active Problem List   Diagnosis   • Pneumonia of right lower lobe due to infectious organism   • Sepsis with acute hypoxic respiratory failure without septic shock (HCC)   • Severe malnutrition (HCC)     History reviewed. No pertinent past medical history.  Past Surgical History:   Procedure Laterality Date   • BRONCHOSCOPY N/A 3/8/2023    Procedure: BRONCHOSCOPY WITH BRONCHOALVEOLAR LAVAGE, WASHINGS;  Surgeon: Jason Rowley MD;  Location: [facility] ENDOSCOPY;  Service: Pulmonary;  Laterality: N/A;  MUCOUS PLUGGING   • EYE SURGERY       PT Assessment (last 12 hours)     PT Evaluation and Treatment     Row Name 23 1159          Physical Therapy Time and Intention    Subjective Information no complaints  -RH     Document Type therapy note (daily note)  -RH     Mode of Treatment physical therapy;individual therapy  -RH     Patient Effort good  -RH     Row Name 23 1159          Pain Scale: FACES Pre/Post-Treatment    Pain: FACES Scale, Pretreatment 0-->no hurt  -RH     Posttreatment Pain Rating 0-->no hurt  -RH     Row Name 23 1159          Bed Mobility    Bed Mobility supine-sit;scooting/bridging  -RH     All Activities, Clare (Bed Mobility) contact guard  -RH     Scooting/Bridging Clare (Bed Mobility) contact guard  -RH     Assistive Device (Bed Mobility) bed rails   -     Comment, (Bed Mobility) Pt maintains sitting with SBA.  -     Row Name 03/14/23 1159          Transfers    Transfers sit-stand transfer;stand-sit transfer  -     Row Name 03/14/23 1159          Sit-Stand Transfer    Sit-Stand [place] (Transfers) contact guard  -     Assistive Device (Sit-Stand Transfers) walker, front-wheeled  -RH     Row Name 03/14/23 1159          Stand-Sit Transfer    Stand-Sit [place] (Transfers) contact guard  -     Assistive Device (Stand-Sit Transfers) walker, front-wheeled  -RH     Row Name 03/14/23 1159          Gait/Stairs (Locomotion)    Gait/Stairs Locomotion gait/ambulation independence;gait/ambulation assistive device;distance ambulated;gait pattern;gait deviations  -     [place]yer[place] Level (Gait) contact guard;minimum assist (75% patient effort)  -     Assistive Device (Gait) walker, front-wheeled  -RH     Distance in Feet (Gait) 35  -RH     Pattern (Gait) 3-point;step-through  -     Deviations/Abnormal Patterns (Gait) base of support, narrow;gait speed decreased;stride length decreased  -     Bilateral Gait Deviations heel strike decreased;forward flexed posture  -     Left Sided Gait Deviations decreased knee extension  -     Right Sided Gait Deviations decreased knee extension  -RH     Gait Assessment/Intervention Pt amb with RW and CGA/min  with inability to extend either knee fully and with decreased bilateral heel strike. Pt required min assistance for turn while maneuvering for transfer to sit.  -     Row Name 03/14/23 1159          Balance    Dynamic Standing Balance contact guard;minimal assist  -     Position/Device Used, Standing Balance walker, front-wheeled  -     Row Name 03/14/23 1159          Progress Summary (PT)    Progress Toward Functional Goals (PT) progress toward functional goals is good  -RH           User Key  (r) = Recorded By, (t) = Taken By, (c) = Cosigned By    Initials Name Provider Type    RH Mahad Whaley, MARTITA  Physical Therapist Assistant               Bilateral Lower Extremity   Exercise  Reps  Sets    Short arc quads   10 2   Heel slides  10 2   Ankle pumps  10 2   Quad sets  10 2   Straight leg raise  10 2   Hip ab/adduction 10 2        Physical Therapy Education     Title: PT OT SLP Therapies (Done)     Topic: Physical Therapy (Done)     Point: Mobility training (Done)     Learning Progress Summary           Patient Acceptance, E, VU by ALO at 3/8/2023 1531    Acceptance, E, VU by POONAM at 3/7/2023 0909                               User Key     Initials Effective Dates Name Provider Type Discipline     09/21/21 -  Heavenly Quach, RN Registered Nurse Nurse    POONAM 01/11/23 -  Ephraim Juares, NORMAN Student PT Student PT              PT Recommendation and Plan     Progress Summary (PT)  Progress Toward Functional Goals (PT): progress toward functional goals is good   Outcome Measures     Row Name 03/14/23 1200 03/13/23 1100          How much help from another person do you currently need...    Turning from your back to your side while in flat bed without using bedrails? 4  -RH 4  -RH     Moving from lying on back to sitting on the side of a flat bed without bedrails? 4  -RH 4  -RH     Moving to and from a bed to a chair (including a wheelchair)? 3  -RH 3  -RH     Standing up from a chair using your arms (e.g., wheelchair, bedside chair)? 4  -RH 4  -RH     Climbing 3-5 steps with a railing? 1  -RH 3  -RH     To walk in hospital room? 2  -RH 3  -RH     AM-PAC 6 Clicks Score (PT) 18  -RH 21  -RH           User Key  (r) = Recorded By, (t) = Taken By, (c) = Cosigned By    Initials Name Provider Type    RH Mahad Whaley PTA Physical Therapist Assistant                 Time Calculation:    PT Charges     Row Name 03/14/23 1158             Time Calculation    PT Received On 03/14/23  -RH         Timed Charges    16648 - PT Therapeutic Exercise Minutes 18  -RH      59035 - Gait Training Minutes  5  -RH      86579 - PT Therapeutic  Activity Minutes 4  -RH         Total Minutes    Timed Charges Total Minutes 27  -RH       Total Minutes 27  -RH            User Key  (r) = Recorded By, (t) = Taken By, (c) = Cosigned By    Initials Name Provider Type     Mahad Whaley PTA Physical Therapist Assistant              Therapy Charges for Today     Code Description Service Date Service Provider Modifiers Qty    29486399448 HC PT THER PROC EA 15 MIN 3/13/2023 Mahad Whaley PTA GP 2    65155667331 HC PT THERAPEUTIC ACT EA 15 MIN 3/13/2023 Mahad Whaley PTA GP 1    23164920595 HC PT THER PROC EA 15 MIN 3/14/2023 Mahad Whaley PTA GP 1    58619523775 HC GAIT TRAINING EA 15 MIN 3/14/2023 Mahad Whaley PTA GP 1          PT G-Codes  Outcome Measure Options: AM-PAC 6 Clicks Basic Mobility (PT)  AM-PAC 6 Clicks Score (PT): 18  AM-PAC 6 Clicks Score (OT): 21    Mahad Whaley PTA  3/14/2023

## 2023-03-15 ENCOUNTER — APPOINTMENT (OUTPATIENT)
Dept: GENERAL RADIOLOGY | Facility: HOSPITAL | Age: 78
DRG: 853 | End: 2023-03-15
Payer: COMMERCIAL

## 2023-03-15 LAB
ALBUMIN SERPL-MCNC: 2.9 G/DL (ref 3.5–5.2)
ALBUMIN/GLOB SERPL: 0.7 G/DL
ALP SERPL-CCNC: 76 U/L (ref 39–117)
ALT SERPL W P-5'-P-CCNC: 30 U/L (ref 1–41)
ANION GAP SERPL CALCULATED.3IONS-SCNC: 5 MMOL/L (ref 5–15)
AST SERPL-CCNC: 41 U/L (ref 1–40)
BASOPHILS # BLD AUTO: 0.02 10*3/MM3 (ref 0–0.2)
BASOPHILS NFR BLD AUTO: 0.3 % (ref 0–1.5)
BILIRUB SERPL-MCNC: 0.4 MG/DL (ref 0–1.2)
BUN SERPL-MCNC: 25 MG/DL (ref 8–23)
BUN/CREAT SERPL: 48.1 (ref 7–25)
CALCIUM SPEC-SCNC: 9 MG/DL (ref 8.6–10.5)
CHLORIDE SERPL-SCNC: 97 MMOL/L (ref 98–107)
CO2 SERPL-SCNC: 36 MMOL/L (ref 22–29)
CREAT SERPL-MCNC: 0.52 MG/DL (ref 0.76–1.27)
DEPRECATED RDW RBC AUTO: 41.4 FL (ref 37–54)
EGFRCR SERPLBLD CKD-EPI 2021: 102.5 ML/MIN/1.73
EOSINOPHIL # BLD AUTO: 0.13 10*3/MM3 (ref 0–0.4)
EOSINOPHIL NFR BLD AUTO: 2.2 % (ref 0.3–6.2)
ERYTHROCYTE [DISTWIDTH] IN BLOOD BY AUTOMATED COUNT: 12.8 % (ref 12.3–15.4)
GLOBULIN UR ELPH-MCNC: 4.2 GM/DL
GLUCOSE BLDC GLUCOMTR-MCNC: 104 MG/DL (ref 70–99)
GLUCOSE BLDC GLUCOMTR-MCNC: 125 MG/DL (ref 70–99)
GLUCOSE BLDC GLUCOMTR-MCNC: 184 MG/DL (ref 70–99)
GLUCOSE BLDC GLUCOMTR-MCNC: 227 MG/DL (ref 70–99)
GLUCOSE SERPL-MCNC: 124 MG/DL (ref 65–99)
HCT VFR BLD AUTO: 36.6 % (ref 37.5–51)
HGB BLD-MCNC: 12.3 G/DL (ref 13–17.7)
IMM GRANULOCYTES # BLD AUTO: 0.01 10*3/MM3 (ref 0–0.05)
IMM GRANULOCYTES NFR BLD AUTO: 0.2 % (ref 0–0.5)
LYMPHOCYTES # BLD AUTO: 1.35 10*3/MM3 (ref 0.7–3.1)
LYMPHOCYTES NFR BLD AUTO: 22.7 % (ref 19.6–45.3)
MAGNESIUM SERPL-MCNC: 2.1 MG/DL (ref 1.6–2.4)
MCH RBC QN AUTO: 29.7 PG (ref 26.6–33)
MCHC RBC AUTO-ENTMCNC: 33.6 G/DL (ref 31.5–35.7)
MCV RBC AUTO: 88.4 FL (ref 79–97)
MONOCYTES # BLD AUTO: 0.54 10*3/MM3 (ref 0.1–0.9)
MONOCYTES NFR BLD AUTO: 9.1 % (ref 5–12)
NEUTROPHILS NFR BLD AUTO: 3.91 10*3/MM3 (ref 1.7–7)
NEUTROPHILS NFR BLD AUTO: 65.5 % (ref 42.7–76)
NRBC BLD AUTO-RTO: 0 /100 WBC (ref 0–0.2)
PHOSPHATE SERPL-MCNC: 3.4 MG/DL (ref 2.5–4.5)
PLATELET # BLD AUTO: 262 10*3/MM3 (ref 140–450)
PMV BLD AUTO: 9.9 FL (ref 6–12)
POTASSIUM SERPL-SCNC: 4 MMOL/L (ref 3.5–5.2)
PROT SERPL-MCNC: 7.1 G/DL (ref 6–8.5)
RBC # BLD AUTO: 4.14 10*6/MM3 (ref 4.14–5.8)
SODIUM SERPL-SCNC: 138 MMOL/L (ref 136–145)
WBC NRBC COR # BLD: 5.96 10*3/MM3 (ref 3.4–10.8)

## 2023-03-15 PROCEDURE — 63710000001 INSULIN REGULAR HUMAN PER 5 UNITS: Performed by: INTERNAL MEDICINE

## 2023-03-15 PROCEDURE — 94668 MNPJ CHEST WALL SBSQ: CPT

## 2023-03-15 PROCEDURE — 85025 COMPLETE CBC W/AUTO DIFF WBC: CPT | Performed by: PHYSICIAN ASSISTANT

## 2023-03-15 PROCEDURE — 99232 SBSQ HOSP IP/OBS MODERATE 35: CPT | Performed by: INTERNAL MEDICINE

## 2023-03-15 PROCEDURE — 80053 COMPREHEN METABOLIC PANEL: CPT | Performed by: INTERNAL MEDICINE

## 2023-03-15 PROCEDURE — 82962 GLUCOSE BLOOD TEST: CPT

## 2023-03-15 PROCEDURE — 92611 MOTION FLUOROSCOPY/SWALLOW: CPT

## 2023-03-15 PROCEDURE — 94799 UNLISTED PULMONARY SVC/PX: CPT

## 2023-03-15 PROCEDURE — 25010000002 ENOXAPARIN PER 10 MG: Performed by: INTERNAL MEDICINE

## 2023-03-15 PROCEDURE — 83735 ASSAY OF MAGNESIUM: CPT | Performed by: PHYSICIAN ASSISTANT

## 2023-03-15 PROCEDURE — 74230 X-RAY XM SWLNG FUNCJ C+: CPT

## 2023-03-15 PROCEDURE — 84100 ASSAY OF PHOSPHORUS: CPT | Performed by: INTERNAL MEDICINE

## 2023-03-15 PROCEDURE — 97116 GAIT TRAINING THERAPY: CPT

## 2023-03-15 PROCEDURE — 92526 ORAL FUNCTION THERAPY: CPT

## 2023-03-15 PROCEDURE — 94664 DEMO&/EVAL PT USE INHALER: CPT

## 2023-03-15 RX ADMIN — ARFORMOTEROL TARTRATE 15 MCG: 15 SOLUTION RESPIRATORY (INHALATION) at 19:00

## 2023-03-15 RX ADMIN — ARFORMOTEROL TARTRATE 15 MCG: 15 SOLUTION RESPIRATORY (INHALATION) at 07:12

## 2023-03-15 RX ADMIN — METOPROLOL TARTRATE 12.5 MG: 25 TABLET, FILM COATED ORAL at 21:33

## 2023-03-15 RX ADMIN — ASPIRIN 81 MG 81 MG: 81 TABLET ORAL at 09:34

## 2023-03-15 RX ADMIN — METOPROLOL TARTRATE 12.5 MG: 25 TABLET, FILM COATED ORAL at 09:35

## 2023-03-15 RX ADMIN — Medication 10 ML: at 09:35

## 2023-03-15 RX ADMIN — ENOXAPARIN SODIUM 40 MG: 100 INJECTION SUBCUTANEOUS at 14:38

## 2023-03-15 RX ADMIN — INSULIN HUMAN 2 UNITS: 100 INJECTION, SOLUTION PARENTERAL at 00:20

## 2023-03-15 RX ADMIN — PANTOPRAZOLE SODIUM 40 MG: 40 INJECTION, POWDER, FOR SOLUTION INTRAVENOUS at 05:32

## 2023-03-15 RX ADMIN — BARIUM SULFATE 55 ML: 0.81 POWDER, FOR SUSPENSION ORAL at 13:10

## 2023-03-15 RX ADMIN — Medication 10 ML: at 21:33

## 2023-03-15 RX ADMIN — INSULIN HUMAN 3 UNITS: 100 INJECTION, SOLUTION PARENTERAL at 17:39

## 2023-03-15 RX ADMIN — BUDESONIDE 0.5 MG: 0.5 INHALANT ORAL at 07:12

## 2023-03-15 RX ADMIN — BUDESONIDE 0.5 MG: 0.5 INHALANT ORAL at 19:00

## 2023-03-15 NOTE — PROGRESS NOTES
[facility]   Hospitalist Progress Note  Date: 3/15/2023  Patient Name: Buzz Lopez  : 1945  MRN: [id number]  Date of admission: 3/6/2023    Subjective   Subjective     Chief Complaint:   Shortness of breath, confusion    Summary:   Buzz Lopez is a 77 y.o. male with no significant past medical history has been brought into the ED with concerns for confusion, possible syncope, shortness of breath.  Patient states that for the past 1 to 2 weeks patient has been having difficulty breathing and subjective fevers, it has got worse and today and he has called his son to take him to the ED as he is having difficulty breathing.  By the time patient's son has arrived at the home, patient appeared to be confused, generalized weakness and unable to put his clothes on.  While his son was helping him to get the clothes on, patient appeared to be very confused and had a possible syncope episode where he was unresponsive.  Patient's son has looked for the pulse and they could not feel radial pulse and started chest compressions, EMS was called.  By the time EMS has arrived patient was receiving chest compressions from the family members.  EMS has evaluated the patient and they could not feel a pulse.  Patient was in respiratory distress, saturating around 70% on room air.  Received nebulizer treatment by the EMS and the patient has been brought into the ED.    During my examination, patient is alert and oriented x3 and able to answer questions appropriately.  States that he does not remember how he came to the hospital.  Denies any chest pain, nausea, vomiting, focal weakness, numbness, tingling.  States that he has been having difficulty breathing for the past 1 to 2 weeks.  A month ago he had some sore throat.  Associated with cough, productive sputum. Patient is thin and frail.  Chronically has a poor p.o. intake.  Admits that he has low appetite.  No previously diagnosed history of dementia.  As per the  family members, patient takes a lot of over-the-counter fiber supplement review does not gain weight despite he eats well.  Patients primary issue is aspiration, this is why he has had weight loss, this is why he was admitted for pneumonia.  I discussed with patient and family that given his high functional status normally I would not recommend this however with the PEG tube he could have significant improvement in his quality of life, the patient states he would not want a PEG tube.  We are attempting to clear the patient for a safe diet prior to discharge.  If we are unable to do so and patient is adamant that he wants to go home and assume the risk, patient will be most fitted for palliative care/hospice.    Interval Followup:     3/15/2023  No acute events overnight, patient resting comfortably in bed, awaiting modified barium swallow test today    Objective   Objective     Vitals:   Temp:  [97.5 °F (36.4 °C)-99.1 °F (37.3 °C)] 97.7 °F (36.5 °C)  Heart Rate:  [63-77] 68  Resp:  [16-18] 16  BP: (127-151)/(54-61) 128/61    Physical Exam   General appearance: NAD, conversant. Cacechtic  Eyes: anicteric sclerae, moist conjunctivae; no lid-lag;   HENT: Atraumatic; oropharynx clear with moist mucous membranes and no mucosal ulcerations; normal hard and soft palate  Neck: Trachea midline; FROM, supple, no thyromegaly or lymphadenopathy  Lungs: Bilateral rhonchi, with normal respiratory effort and no intercostal retractions    Result Review    Result Review:  I have personally reviewed the results as below  [x]  Laboratory  CBC    CBC 3/13/23 3/14/23 3/15/23   WBC 5.54 4.70 5.96   RBC 4.15 4.54 4.14   Hemoglobin 12.4 (A) 13.2 12.3 (A)   Hematocrit 36.9 (A) 41.2 36.6 (A)   MCV 88.9 90.7 88.4   MCH 29.9 29.1 29.7   MCHC 33.6 32.0 33.6   RDW 13.1 13.3 12.8   Platelets 267 181 262   (A) Abnormal value            CMP    CMP 3/13/23 3/14/23 3/15/23   Glucose 252 (A) 227 (A) 124 (A)   BUN 26 (A) 25 (A) 25 (A)   Creatinine 0.55  (A) 0.54 (A) 0.52 (A)   eGFR 100.8 101.4 102.5   Sodium 139 134 (A) 138   Potassium 4.0 4.2 4.0   Chloride 98 97 (A) 97 (A)   Calcium 8.9 9.0 9.0   Total Protein  7.2 7.1   Albumin  2.6 (A) 2.9 (A)   Globulin  4.6 4.2   Total Bilirubin  0.4 0.4   Alkaline Phosphatase  84 76   AST (SGOT)  37 41 (A)   ALT (SGPT)  26 30   Albumin/Globulin Ratio  0.6 0.7   BUN/Creatinine Ratio 47.3 (A) 46.3 (A) 48.1 (A)   Anion Gap 5.4 8.4 5.0   (A) Abnormal value       Comments are available for some flowsheets but are not being displayed.           []  Microbiology  []  Radiology  []  EKG/Telemetry   []  Cardiology/Vascular   []  Pathology  []  Old records  []  Other:    Assessment & Plan   Assessment / Plan   Assessment:    Sepsis due to right lower lobe pneumonia (Streptococcus Pneumo)  Acute hypoxic respiratory failure  Questionable cardiac arrest episode  NSTEMI/acute myocardial injury  Possible syncope  Lactic acidosis, resolved  Malnutrition  S/P Bronchoscopy 3/8/23 with mucous plugging removed  Dysphagia requiring Core Track placed this admission  Aspiration  Severe aortic stenosis   Degenerative changes cervical spine / anterior osteophytes  CODE STATUS:  DNR/DNI     Plan:    Continue speech therapy.  Barium swallow tomorrow  Continue core track.   Check CT neck ... anterior osteophytes w mod mass effect  Pulmonology consulted ..... have now signed off  Cardiology consulted   LAI attempted 3/9/23 but unable to complete secondary to core track  CT scan chest .... No PE.  Bilat pneumonia noted.  Status post bronchoscopy   Thick mucus removal  Food particles seen around the vocal cords  Positive for strep pneumo  Transthoracic echo suggesting normal EF% and severe AS  Continue albuterol nebulizer every 4 hours as needed  Continue bronchodilator protocol  Continue aspirin  Planning on modified barium swallow test today  Patient unsure if he wants to undergo rehab, patient states he would not want PEG tube although it seems like he  is pretty functional at baseline and could have a pretty good quality of life with this  PT OT consult  Discussed CODE STATUS w pt & son.  Pt is DNR/DNI.         DVT prophylaxis:  Medical DVT prophylaxis orders are present.    CODE STATUS:   Medical Intervention Limits: NO intubation (DNI)  Level Of Support Discussed With: Patient  Code Status (Patient has no pulse and is not breathing): No CPR (Do Not Attempt to Resuscitate)  Medical Interventions (Patient has pulse or is breathing): Limited Support           Electronically signed by Black Capps MD, 03/15/23, 10:07 AM EDT.

## 2023-03-15 NOTE — PROGRESS NOTES
CARDIOLOGY  INPATIENT PROGRESS NOTE               [facility] UNIT    3/15/2023      PATIENT IDENTIFICATION:   Name:  Buzz Lopez      MRN:  [id number]     77 y.o.  male                 SUBJECTIVE:   Has some shortness of breath  Has trouble with swallow    OBJECTIVE:  Vitals:    03/15/23 0300 03/15/23 0712 03/15/23 0715 03/15/23 1100   BP: 137/54  128/61 149/56   BP Location: Right arm  Right arm Right arm   Patient Position: Lying  Lying Lying   Pulse: 63 71 68 69   Resp: 16 16 16 16   Temp: 97.5 °F (36.4 °C)  97.7 °F (36.5 °C) 98 °F (36.7 °C)   TempSrc: Axillary  Axillary Oral   SpO2: 100% 99% 98% 98%   Weight:       Height:               Body mass index is 17.02 kg/m².    Intake/Output Summary (Last 24 hours) at 3/15/2023 1509  Last data filed at 3/15/2023 0500  Gross per 24 hour   Intake 1853 ml   Output 675 ml   Net 1178 ml       Telemetry:     Physical Exam  General Appearance:   · no acute distress  · Alert and oriented x3  HENT:   · lips not cyanotic  · Atraumatic  Neck:  · No JVD   · supple  Respiratory:  · no respiratory distress  · normal breath sounds  · no rales  Cardiovascular:    · regular rhythm  · no S3, no S4   · no murmur  · no rub  · lower extremity edema: none    Skin:   · warm, dry  · No rashes      No Known Allergies    Scheduled meds:  arformoterol, 15 mcg, Nebulization, BID - RT  aspirin, 81 mg, Nasogastric, Daily  budesonide, 0.5 mg, Nebulization, BID - RT  enoxaparin, 40 mg, Subcutaneous, Q24H  insulin regular, 2-7 Units, Subcutaneous, Q6H  metoprolol tartrate, 12.5 mg, Nasogastric, BID  pantoprazole, 40 mg, Intravenous, Q AM  sodium chloride, 10 mL, Intravenous, Q12H      IV meds:                           Data Review:  CBC    CBC 3/13/23 3/14/23 3/15/23   WBC 5.54 4.70 5.96   RBC 4.15 4.54 4.14   Hemoglobin 12.4 (A) 13.2 12.3 (A)   Hematocrit 36.9 (A) 41.2 36.6 (A)   MCV 88.9 90.7 88.4   MCH 29.9 29.1 29.7   MCHC 33.6 32.0 33.6   RDW 13.1 13.3 12.8    Platelets 267 181 262   (A) Abnormal value            CMP    CMP 3/13/23 3/14/23 3/15/23   Glucose 252 (A) 227 (A) 124 (A)   BUN 26 (A) 25 (A) 25 (A)   Creatinine 0.55 (A) 0.54 (A) 0.52 (A)   eGFR 100.8 101.4 102.5   Sodium 139 134 (A) 138   Potassium 4.0 4.2 4.0   Chloride 98 97 (A) 97 (A)   Calcium 8.9 9.0 9.0   Total Protein  7.2 7.1   Albumin  2.6 (A) 2.9 (A)   Globulin  4.6 4.2   Total Bilirubin  0.4 0.4   Alkaline Phosphatase  84 76   AST (SGOT)  37 41 (A)   ALT (SGPT)  26 30   Albumin/Globulin Ratio  0.6 0.7   BUN/Creatinine Ratio 47.3 (A) 46.3 (A) 48.1 (A)   Anion Gap 5.4 8.4 5.0   (A) Abnormal value       Comments are available for some flowsheets but are not being displayed.            CARDIAC LABS:      Lab 03/10/23  0407   PROBNP 6,189.0*        No results found for: DIGOXIN   No results found for: TSH        Invalid input(s): LDLCALC  No results found for: POCTROP  Lab Results   Component Value Date    TROPONINT 75 (C) 03/06/2023   (  Lab Results   Component Value Date    MG 2.1 03/15/2023     Results for orders placed during the hospital encounter of 03/06/23    Adult Transesophageal Echo (LAI) W/ Cont if Necessary Per Protocol    Interpretation Summary  The patient came down to the cardiovascular lab where he was prepped for LAI and was sedated with IV Versed and Demerol.  NG tube was in place.  He had frequent cough and was agitation.  An attempt to introduce the LAI probe was unsuccessful.  Therefore, the procedure was aborted.  We will try to do it later on after he will undergo a PEG placement and the NG tube will be withdrawn.           ASSESSMENT:    - Sepsis due to right lower lobe pneumonia (Streptococcus Pneumo)  - Acute hypoxic respiratory failure  - Questionable cardiac arrest episode  - NSTEMI/acute myocardial injury  - Possible syncope  - Lactic acidosis, resolved  - Malnutrition  - S/P Bronchoscopy 3/8/23 with mucous plugging removed  - Dysphagia requiring Core Track placed this  admission  - Aspiration  - Severe aortic stenosis   - Degenerative changes cervical spine / anterior osteophytes  - CODE STATUS:  DNR/DNI       PLAN:   Continue present medication      Angélica Rogers MD  3/15/2023    15:09 EDT     Portions of this documentation were transcribed electronically from a voice recognition software.  I confirm all data accurately represents the service(s) I performed at today's visit.

## 2023-03-15 NOTE — SIGNIFICANT NOTE
03/15/23 0900   SOCIAL WORK ASSESSMENT   Referral Source physician   Reason for Consult palliative care   Visit Type ongoing         Pt resting with eyes closed with jaw extremely relaxed and mouth wide open. Pt did not respond to calling out name.   RN reported pt scheduled for modified barium swallow today and tube feeds stopped. Pt A&O x4 per report.   Palliative will follow-up after swallow study to determine goals of care (PEG or oral intake).     Family asking for rehab placement at [facility] post discharge.     DONALD Duong

## 2023-03-15 NOTE — THERAPY TREATMENT NOTE
Acute Care - Physical Therapy Treatment Note   [place]     Patient Name: Buzz Lopez  : 1945  MRN: [id number]  Today's Date: 3/15/2023      Visit Dx:     ICD-10-CM ICD-9-CM   1. Pneumonia of right lower lobe due to infectious organism  J18.9 486   2. Sepsis with acute hypoxic respiratory failure without septic shock, due to unspecified organism (HCC)  A41.9 038.9    R65.20 995.91    J96.01 518.81   3. Acute respiratory failure with hypoxia (HCC)  J96.01 518.81   4. Difficulty walking  R26.2 719.7   5. Oropharyngeal dysphagia  R13.12 787.22   6. Decreased activities of daily living (ADL)  Z78.9 V49.89     Patient Active Problem List   Diagnosis   • Pneumonia of right lower lobe due to infectious organism   • Sepsis with acute hypoxic respiratory failure without septic shock (HCC)   • Severe malnutrition (HCC)     History reviewed. No pertinent past medical history.  Past Surgical History:   Procedure Laterality Date   • BRONCHOSCOPY N/A 3/8/2023    Procedure: BRONCHOSCOPY WITH BRONCHOALVEOLAR LAVAGE, WASHINGS;  Surgeon: Jason Rowley MD;  Location: [facility] ENDOSCOPY;  Service: Pulmonary;  Laterality: N/A;  MUCOUS PLUGGING   • EYE SURGERY       PT Assessment (last 12 hours)     PT Evaluation and Treatment     Row Name 03/15/23 1447          Physical Therapy Time and Intention    Subjective Information fatigue;weakness  -RH     Document Type therapy note (daily note)  -RH     Mode of Treatment physical therapy;individual therapy  -RH     Patient Effort fair  -RH     Row Name 03/15/23 1447          Pain Scale: FACES Pre/Post-Treatment    Pain: FACES Scale, Pretreatment 0-->no hurt  -RH     Posttreatment Pain Rating 0-->no hurt  -RH     Row Name 03/15/23 1447          Bed Mobility    Bed Mobility scooting/bridging;supine-sit  -RH     All Activities, [place] (Bed Mobility) contact guard  -RH     Bed Mobility, Safety Issues decreased use of legs for bridging/pushing  -RH     Assistive Device (Bed  Mobility) bed rails  -     Comment, (Bed Mobility) Pt maintains sitting with SBA.  -     Row Name 03/15/23 1447          Transfers    Transfers sit-stand transfer;stand-sit transfer  -RH     Row Name 03/15/23 1447          Sit-Stand Transfer    Sit-Stand [place] (Transfers) contact guard  -     Assistive Device (Sit-Stand Transfers) walker, front-wheeled  -RH     Row Name 03/15/23 1447          Stand-Sit Transfer    Stand-Sit [place] (Transfers) contact guard  -RH     Assistive Device (Stand-Sit Transfers) walker, front-wheeled  -RH     Row Name 03/15/23 1447          Gait/Stairs (Locomotion)    Gait/Stairs Locomotion gait/ambulation independence;gait/ambulation assistive device;distance ambulated;gait pattern;gait deviations  -     [place] Level (Gait) contact guard  -RH     Assistive Device (Gait) walker, front-wheeled  -RH     Distance in Feet (Gait) 35  -RH     Pattern (Gait) 3-point;step-through  -     Deviations/Abnormal Patterns (Gait) base of support, narrow;gait speed decreased;stride length decreased  -     Left Sided Gait Deviations heel strike decreased;decreased knee extension  -     Right Sided Gait Deviations decreased knee extension;heel strike decreased  -     Gait Assessment/Intervention Pt amb with RW and CGA with decreased knee ext. decreased heel strike, and narrow base of support.  -     Row Name 03/15/23 1447          Balance    Dynamic Standing Balance contact guard  -     Position/Device Used, Standing Balance walker, front-wheeled  -RH     Row Name 03/15/23 1447          Progress Summary (PT)    Progress Toward Functional Goals (PT) progress toward functional goals is fair  -RH           User Key  (r) = Recorded By, (t) = Taken By, (c) = Cosigned By    Initials Name Provider Type    RH Mahad Whaley PTA Physical Therapist Assistant                Physical Therapy Education     Title: PT OT SLP Therapies (Done)     Topic: Physical Therapy (Done)     Point:  Mobility training (Done)     Learning Progress Summary           Patient Acceptance, E, VU by ALO at 3/8/2023 1531    Acceptance, E, VU by OPONAM at 3/7/2023 0909                               User Key     Initials Effective Dates Name Provider Type Discipline    ALO 09/21/21 -  Heavenly Quach, RN Registered Nurse Nurse    POONAM 01/11/23 -  Ephraim Juares, NORMAN Student PT Student PT              PT Recommendation and Plan     Progress Summary (PT)  Progress Toward Functional Goals (PT): progress toward functional goals is fair   Outcome Measures     Row Name 03/15/23 1400 03/14/23 1200 03/13/23 1100       How much help from another person do you currently need...    Turning from your back to your side while in flat bed without using bedrails? 4  -RH 4  -RH 4  -RH    Moving from lying on back to sitting on the side of a flat bed without bedrails? 4  -RH 4  -RH 4  -RH    Moving to and from a bed to a chair (including a wheelchair)? 3  -RH 3  -RH 3  -RH    Standing up from a chair using your arms (e.g., wheelchair, bedside chair)? 4  -RH 4  -RH 4  -RH    Climbing 3-5 steps with a railing? 1  -RH 1  -RH 3  -RH    To walk in hospital room? 2  -RH 2  -RH 3  -RH    AM-PAC 6 Clicks Score (PT) 18  -RH 18  -RH 21  -RH          User Key  (r) = Recorded By, (t) = Taken By, (c) = Cosigned By    Initials Name Provider Type    RH Mahad Whaley PTA Physical Therapist Assistant                 Time Calculation:    PT Charges     Row Name 03/15/23 1441             Time Calculation    PT Received On 03/15/23  -RH         Timed Charges    73032 - Gait Training Minutes  5  -RH      86945 - PT Therapeutic Activity Minutes 5  -RH         Total Minutes    Timed Charges Total Minutes 10  -RH       Total Minutes 10  -RH            User Key  (r) = Recorded By, (t) = Taken By, (c) = Cosigned By    Initials Name Provider Type     Mahad Whaley PTA Physical Therapist Assistant              Therapy Charges for Today     Code Description Service Date  Service Provider Modifiers Qty    51150217803 HC PT THER PROC EA 15 MIN 3/14/2023 Mahad Whaley, PTA GP 1    26336635970 HC GAIT TRAINING EA 15 MIN 3/14/2023 Mahad Whaley, PTA GP 1    77988976566 HC GAIT TRAINING EA 15 MIN 3/15/2023 Mahad Whaley, MARTITA GP 1          PT G-Codes  Outcome Measure Options: AM-PAC 6 Clicks Basic Mobility (PT)  AM-PAC 6 Clicks Score (PT): 18  AM-PAC 6 Clicks Score (OT): 21    Mahad Whaley PTA  3/15/2023

## 2023-03-15 NOTE — THERAPY TREATMENT NOTE
Acute Care - Speech Language Pathology   Swallow Treatment Note  [place]     Patient Name: Buzz Lopez  : 1945  MRN: [id number]  Today's Date: 3/15/2023               Admit Date: 3/6/2023    Visit Dx:     ICD-10-CM ICD-9-CM   1. Pneumonia of right lower lobe due to infectious organism  J18.9 486   2. Sepsis with acute hypoxic respiratory failure without septic shock, due to unspecified organism (HCC)  A41.9 038.9    R65.20 995.91    J96.01 518.81   3. Acute respiratory failure with hypoxia (HCC)  J96.01 518.81   4. Difficulty walking  R26.2 719.7   5. Oropharyngeal dysphagia  R13.12 787.22   6. Decreased activities of daily living (ADL)  Z78.9 V49.89     Patient Active Problem List   Diagnosis   • Pneumonia of right lower lobe due to infectious organism   • Sepsis with acute hypoxic respiratory failure without septic shock (HCC)   • Severe malnutrition (HCC)     History reviewed. No pertinent past medical history.  Past Surgical History:   Procedure Laterality Date   • BRONCHOSCOPY N/A 3/8/2023    Procedure: BRONCHOSCOPY WITH BRONCHOALVEOLAR LAVAGE, WASHINGS;  Surgeon: Jason Rowley MD;  Location: [facility] ENDOSCOPY;  Service: Pulmonary;  Laterality: N/A;  MUCOUS PLUGGING   • EYE SURGERY         SPEECH PATHOLOGY DYSPHAGIA TREATMENT     Subjective/Behavioral Observations: Alert and cooperative, sitting up in chair.        Day/time of Treatment: 3/15/2023        Current Diet:  N.p.o. with core track        Current Strategies: N/A     Treatment received: Dysphagia therapy to address swallow function through exercises and education of strategies.        Results of treatment:   Laryngeal elevation exercises with effortful swallow, 3 +/5.     Patient demonstrates good voicing with ability to crescendo.  Trials of ice chips x5, patient keeping voice clear, occasional throat clear.   Small controlled sip of water x3 with patient producing multiple swallows.   Throat clearing noted.  Cough x1.   Nectar liquid by  cup x3, multiple swallow produced, throat clear, cough x1.  Patient denying difficulty clearing material.        Progress toward goals: Adequate     Barriers to Achieving goals: Goal status        Plan of care:/changes in plan: Continue with current plan with patient to be n.p.o. with alternative feeding.  Okay for nursing to conservatively give small ice chips from time to time.   Patient ready for modified barium swallow study today.  Will likely still need ongoing alternative feeding.                                                                            Plan of Care Reviewed With: patient, family          EDUCATION  The patient has been educated in the following areas:   NPO rationale.              Time Calculation:    Time Calculation- SLP     Row Name 03/15/23 1219             Time Calculation- SLP    SLP Stop Time 1130  -TB      SLP Received On 03/15/23  -TB         Untimed Charges    26305-CU Treatment Swallow Minutes 40  -TB         Total Minutes    Untimed Charges Total Minutes 40  -TB       Total Minutes 40  -TB            User Key  (r) = Recorded By, (t) = Taken By, (c) = Cosigned By    Initials Name Provider Type    TB Terri Gomez SLP Speech and Language Pathologist                Therapy Charges for Today     Code Description Service Date Service Provider Modifiers Qty    86354918270 HC ST TREATMENT SWALLOW 3 3/14/2023 Terri Gomez SLP GN 1    84082747126 HC ST TREATMENT SWALLOW 3 3/15/2023 Terri Gomez SLP GN 1               JANAY Julio  3/15/2023

## 2023-03-15 NOTE — MBS/VFSS/FEES
Acute Care - Speech Language Pathology   Swallow Modified Barium Swallow Study  [place]     Patient Name: Buzz Lopez  : 1945  MRN: [id number]  Today's Date: 3/15/2023               Admit Date: 3/6/2023    Visit Dx:     ICD-10-CM ICD-9-CM   1. Pneumonia of right lower lobe due to infectious organism  J18.9 486   2. Sepsis with acute hypoxic respiratory failure without septic shock, due to unspecified organism (HCC)  A41.9 038.9    R65.20 995.91    J96.01 518.81   3. Acute respiratory failure with hypoxia (HCC)  J96.01 518.81   4. Difficulty walking  R26.2 719.7   5. Oropharyngeal dysphagia  R13.12 787.22   6. Decreased activities of daily living (ADL)  Z78.9 V49.89     Patient Active Problem List   Diagnosis   • Pneumonia of right lower lobe due to infectious organism   • Sepsis with acute hypoxic respiratory failure without septic shock (HCC)   • Severe malnutrition (HCC)     History reviewed. No pertinent past medical history.  Past Surgical History:   Procedure Laterality Date   • BRONCHOSCOPY N/A 3/8/2023    Procedure: BRONCHOSCOPY WITH BRONCHOALVEOLAR LAVAGE, WASHINGS;  Surgeon: Jason Rowley MD;  Location: [facility] ENDOSCOPY;  Service: Pulmonary;  Laterality: N/A;  MUCOUS PLUGGING   • EYE SURGERY         MODIFIED BARIUM SWALLOW STUDY: SPEECH PATHOLOGY REPORT        DATE OF SERVICE: 3/15/2023    PERTINENT INFORMATION:  Mr. Lopez is a 77year old male with diagnosis of dysphagia.  Patient currently has [facility] track.    He was referred for an MBSS by Dr. Capps to rule out aspiration as well as to determine appropriate treatment plan for this patient.      PROCEDURE:    Mr. Lopez was alert and cooperative.  The patient was viewed in the lateral plane.  The following Ba consistencies were administered: Thin liquids. The following compensatory swallowing strategies were performed: Bolus modification, cyclic ingestion.      RESULTS:    1.  Thin liquid by spoon with lingual pumping with spillage to the  vallecula, swallow attempted with poor laryngeal elevation and min to no epiglottic excursion.  Aspiration occurring with attempts to swallow.  Residue remaining in the vallecula.  No immediate cough.  2.  Thin liquid by cup with spillage of the pharynx, aspiration occurring, swallow attempted with min to no epiglottic excursion.  No immediate cough noted.  Patient demonstrated throat clearing cough at close of study.      IMPRESSIONS:    Mr. Lopez demonstrated severe oropharyngeal dysphagia characterized by decreased tongue base strength, decreased pharyngeal strength, decreased laryngeal elevation, minimal epiglottic excursion.  Rashid aspiration noted with no immediate cough, further aspiration with residues.     FUNCTIONAL DEFICIT: Patient scored level 1 of 7 on Functional Communication Measures for swallowing indicating a 100% limitation in function for current status, goal status, and discharge status.      RECOMMENDATIONS:   1.  Cannot recommend safe p.o. intake at this time.  Recommend alternative feeding method.  2.  Continue with speech pathology services for dysphagia.        Yes, patient/responsible party agrees with the plan of care and has been informed of all alternatives, risks and benefits.    Thank you for this referral.                                                                              Plan of Care Reviewed With: patient, family          EDUCATION  The patient has been educated in the following areas:   NPO rationale.              Time Calculation:    Time Calculation- SLP     Row Name 03/15/23 1334 03/15/23 1219          Time Calculation- SLP    SLP Stop Time -- 1130  -TB     SLP Received On 03/15/23  -TB 03/15/23  -TB        Untimed Charges    SLP Eval/Re-eval  [facility] Fluoro Eval Swallow - 35419  -TB --     44199-[facility] Motion Fluoro Eval Swallow Minutes 90  -TB --     23544-SJ Treatment Swallow Minutes -- 40  -TB        Total Minutes    Untimed Charges Total Minutes 90  -TB 40  -TB       Total Minutes 90  -TB 40  -TB           User Key  (r) = Recorded By, (t) = Taken By, (c) = Cosigned By    Initials Name Provider Type    Terri Rg SLP Speech and Language Pathologist                Therapy Charges for Today     Code Description Service Date Service Provider Modifiers Qty    46246331826 HC ST TREATMENT SWALLOW 3 3/14/2023 Terri Gomez SLP GN 1    38131446660 HC ST TREATMENT SWALLOW 3 3/15/2023 Terri Gomez SLP GN 1    35307243776 HC ST MOTION FLUORO EVAL SWALLOW 6 3/15/2023 Terri Gomez SLP GN 1               JANAY Julio  3/15/2023

## 2023-03-15 NOTE — PROGRESS NOTES
Respiratory Therapist Broncho-Pulmonary Hygiene Progress Note      Patient Name:  Buzz Lopez  YOB: 1945    Buzz Lopez meets the qualification for Level 2 of the Bronco-Pulmonary Hygiene Protocol. This was based on my daily patient assessment and includes review of chest x-ray results, cough ability and quality, oxygenation, secretions or risk for secretion development and patient mobility.     Broncho-Pulmonary Hygiene Assessment:    Level of Movement: Low level of mobility  Lethargic uncoorperative    Breath Sounds: Diminished and/or coarse rhonchi    Cough: Ineffective, weak or not cough efforts    Chest X-Ray: Possible signs of consolidation and/or atelectasis or clear.     Sputum Productions: None or small amount of thin or watery secretions with effective cough    History and Physical: None    SpO2 to Oxygen Need: greater than 92% on room air or  less than 3L nasal canula    Current SpO2 is: 99% on room air    Based on this information, I have completed the following interventions: CPT (hands)      Electronically signed by Suzette Blanco RRT, 03/15/23, 7:15 AM EDT.

## 2023-03-15 NOTE — PLAN OF CARE
Goal Outcome Evaluation:  Plan of Care Reviewed With: patient        Progress: no change. Swallow study scheduled for today.

## 2023-03-15 NOTE — PLAN OF CARE
Goal Outcome Evaluation: Modified swallow completed. Patient remains NPO with tube feeds infusing at 65

## 2023-03-16 PROBLEM — A41.9 SEPSIS WITH ACUTE HYPOXIC RESPIRATORY FAILURE WITHOUT SEPTIC SHOCK: Status: RESOLVED | Noted: 2023-03-06 | Resolved: 2023-03-16

## 2023-03-16 PROBLEM — J18.9 PNEUMONIA OF RIGHT LOWER LOBE DUE TO INFECTIOUS ORGANISM: Status: RESOLVED | Noted: 2023-03-06 | Resolved: 2023-03-16

## 2023-03-16 PROBLEM — R65.20 SEPSIS WITH ACUTE HYPOXIC RESPIRATORY FAILURE WITHOUT SEPTIC SHOCK: Status: RESOLVED | Noted: 2023-03-06 | Resolved: 2023-03-16

## 2023-03-16 PROBLEM — J96.01 SEPSIS WITH ACUTE HYPOXIC RESPIRATORY FAILURE WITHOUT SEPTIC SHOCK (HCC): Status: RESOLVED | Noted: 2023-03-06 | Resolved: 2023-03-16

## 2023-03-16 PROBLEM — T17.908A ASPIRATION INTO AIRWAY: Status: ACTIVE | Noted: 2023-03-16

## 2023-03-16 LAB
ALBUMIN SERPL-MCNC: 2.8 G/DL (ref 3.5–5.2)
ALBUMIN/GLOB SERPL: 0.7 G/DL
ALP SERPL-CCNC: 87 U/L (ref 39–117)
ALT SERPL W P-5'-P-CCNC: 31 U/L (ref 1–41)
ANION GAP SERPL CALCULATED.3IONS-SCNC: 5.3 MMOL/L (ref 5–15)
AST SERPL-CCNC: 40 U/L (ref 1–40)
BASOPHILS # BLD AUTO: 0.03 10*3/MM3 (ref 0–0.2)
BASOPHILS NFR BLD AUTO: 0.4 % (ref 0–1.5)
BILIRUB SERPL-MCNC: 0.4 MG/DL (ref 0–1.2)
BUN SERPL-MCNC: 27 MG/DL (ref 8–23)
BUN/CREAT SERPL: 45 (ref 7–25)
CALCIUM SPEC-SCNC: 9 MG/DL (ref 8.6–10.5)
CHLORIDE SERPL-SCNC: 97 MMOL/L (ref 98–107)
CO2 SERPL-SCNC: 32.7 MMOL/L (ref 22–29)
CREAT SERPL-MCNC: 0.6 MG/DL (ref 0.76–1.27)
DEPRECATED RDW RBC AUTO: 42 FL (ref 37–54)
EGFRCR SERPLBLD CKD-EPI 2021: 98.2 ML/MIN/1.73
EOSINOPHIL # BLD AUTO: 0.12 10*3/MM3 (ref 0–0.4)
EOSINOPHIL NFR BLD AUTO: 1.7 % (ref 0.3–6.2)
ERYTHROCYTE [DISTWIDTH] IN BLOOD BY AUTOMATED COUNT: 13 % (ref 12.3–15.4)
GLOBULIN UR ELPH-MCNC: 4 GM/DL
GLUCOSE BLDC GLUCOMTR-MCNC: 207 MG/DL (ref 70–99)
GLUCOSE BLDC GLUCOMTR-MCNC: 210 MG/DL (ref 70–99)
GLUCOSE BLDC GLUCOMTR-MCNC: 241 MG/DL (ref 70–99)
GLUCOSE BLDC GLUCOMTR-MCNC: 247 MG/DL (ref 70–99)
GLUCOSE SERPL-MCNC: 219 MG/DL (ref 65–99)
HCT VFR BLD AUTO: 34.7 % (ref 37.5–51)
HGB BLD-MCNC: 11.6 G/DL (ref 13–17.7)
IMM GRANULOCYTES # BLD AUTO: 0.03 10*3/MM3 (ref 0–0.05)
IMM GRANULOCYTES NFR BLD AUTO: 0.4 % (ref 0–0.5)
LYMPHOCYTES # BLD AUTO: 1.36 10*3/MM3 (ref 0.7–3.1)
LYMPHOCYTES NFR BLD AUTO: 19.5 % (ref 19.6–45.3)
MAGNESIUM SERPL-MCNC: 2 MG/DL (ref 1.6–2.4)
MCH RBC QN AUTO: 29.3 PG (ref 26.6–33)
MCHC RBC AUTO-ENTMCNC: 33.4 G/DL (ref 31.5–35.7)
MCV RBC AUTO: 87.6 FL (ref 79–97)
MONOCYTES # BLD AUTO: 0.73 10*3/MM3 (ref 0.1–0.9)
MONOCYTES NFR BLD AUTO: 10.5 % (ref 5–12)
NEUTROPHILS NFR BLD AUTO: 4.7 10*3/MM3 (ref 1.7–7)
NEUTROPHILS NFR BLD AUTO: 67.5 % (ref 42.7–76)
NRBC BLD AUTO-RTO: 0 /100 WBC (ref 0–0.2)
PHOSPHATE SERPL-MCNC: 2.8 MG/DL (ref 2.5–4.5)
PLATELET # BLD AUTO: 284 10*3/MM3 (ref 140–450)
PMV BLD AUTO: 10.3 FL (ref 6–12)
POTASSIUM SERPL-SCNC: 4.2 MMOL/L (ref 3.5–5.2)
PROT SERPL-MCNC: 6.8 G/DL (ref 6–8.5)
RBC # BLD AUTO: 3.96 10*6/MM3 (ref 4.14–5.8)
SODIUM SERPL-SCNC: 135 MMOL/L (ref 136–145)
WBC NRBC COR # BLD: 6.97 10*3/MM3 (ref 3.4–10.8)

## 2023-03-16 PROCEDURE — 97164 PT RE-EVAL EST PLAN CARE: CPT

## 2023-03-16 PROCEDURE — 84100 ASSAY OF PHOSPHORUS: CPT | Performed by: INTERNAL MEDICINE

## 2023-03-16 PROCEDURE — 94664 DEMO&/EVAL PT USE INHALER: CPT

## 2023-03-16 PROCEDURE — 63710000001 INSULIN REGULAR HUMAN PER 5 UNITS: Performed by: INTERNAL MEDICINE

## 2023-03-16 PROCEDURE — 97110 THERAPEUTIC EXERCISES: CPT

## 2023-03-16 PROCEDURE — 99233 SBSQ HOSP IP/OBS HIGH 50: CPT | Performed by: INTERNAL MEDICINE

## 2023-03-16 PROCEDURE — 82962 GLUCOSE BLOOD TEST: CPT

## 2023-03-16 PROCEDURE — 80053 COMPREHEN METABOLIC PANEL: CPT | Performed by: INTERNAL MEDICINE

## 2023-03-16 PROCEDURE — 94799 UNLISTED PULMONARY SVC/PX: CPT

## 2023-03-16 PROCEDURE — 83735 ASSAY OF MAGNESIUM: CPT | Performed by: INTERNAL MEDICINE

## 2023-03-16 PROCEDURE — 97116 GAIT TRAINING THERAPY: CPT

## 2023-03-16 PROCEDURE — 85025 COMPLETE CBC W/AUTO DIFF WBC: CPT | Performed by: INTERNAL MEDICINE

## 2023-03-16 PROCEDURE — 25010000002 ENOXAPARIN PER 10 MG: Performed by: INTERNAL MEDICINE

## 2023-03-16 RX ORDER — FAMOTIDINE 20 MG/1
20 TABLET, FILM COATED ORAL
Status: DISCONTINUED | OUTPATIENT
Start: 2023-03-16 | End: 2023-03-28

## 2023-03-16 RX ADMIN — METOPROLOL TARTRATE 12.5 MG: 25 TABLET, FILM COATED ORAL at 09:41

## 2023-03-16 RX ADMIN — Medication 10 ML: at 20:21

## 2023-03-16 RX ADMIN — FAMOTIDINE 20 MG: 20 TABLET ORAL at 09:41

## 2023-03-16 RX ADMIN — ARFORMOTEROL TARTRATE 15 MCG: 15 SOLUTION RESPIRATORY (INHALATION) at 18:42

## 2023-03-16 RX ADMIN — PANTOPRAZOLE SODIUM 40 MG: 40 INJECTION, POWDER, FOR SOLUTION INTRAVENOUS at 05:42

## 2023-03-16 RX ADMIN — INSULIN HUMAN 3 UNITS: 100 INJECTION, SOLUTION PARENTERAL at 05:42

## 2023-03-16 RX ADMIN — INSULIN HUMAN 3 UNITS: 100 INJECTION, SOLUTION PARENTERAL at 17:35

## 2023-03-16 RX ADMIN — BUDESONIDE 0.5 MG: 0.5 INHALANT ORAL at 18:42

## 2023-03-16 RX ADMIN — Medication 10 ML: at 09:43

## 2023-03-16 RX ADMIN — INSULIN HUMAN 3 UNITS: 100 INJECTION, SOLUTION PARENTERAL at 13:01

## 2023-03-16 RX ADMIN — ENOXAPARIN SODIUM 40 MG: 100 INJECTION SUBCUTANEOUS at 17:04

## 2023-03-16 RX ADMIN — BUDESONIDE 0.5 MG: 0.5 INHALANT ORAL at 07:48

## 2023-03-16 RX ADMIN — FAMOTIDINE 20 MG: 20 TABLET ORAL at 17:07

## 2023-03-16 RX ADMIN — ARFORMOTEROL TARTRATE 15 MCG: 15 SOLUTION RESPIRATORY (INHALATION) at 07:48

## 2023-03-16 RX ADMIN — ASPIRIN 81 MG 81 MG: 81 TABLET ORAL at 09:41

## 2023-03-16 RX ADMIN — METOPROLOL TARTRATE 12.5 MG: 25 TABLET, FILM COATED ORAL at 20:20

## 2023-03-16 RX ADMIN — INSULIN HUMAN 3 UNITS: 100 INJECTION, SOLUTION PARENTERAL at 00:55

## 2023-03-16 NOTE — PLAN OF CARE
Goal Outcome Evaluation:      Patient transferred to unit from PCU this shift. Patient alert and oriented x 4. Son at bedside majority of shift. Wound care provided to left leg and buttocks. Wound consult ordered per protocol. Blood sugars ranged 210-241 today.     Progress: improving

## 2023-03-16 NOTE — NURSING NOTE
Report called to RN on 3NT. VSS. This RN requesting transport. Patients son is aware and this RN to call and update when he leaves the room.

## 2023-03-16 NOTE — THERAPY RE-EVALUATION
Patient Name: Buzz Lopez  : 1945    MRN: [id number]                              Today's Date: 3/16/2023       Admit Date: 3/6/2023    Visit Dx:     ICD-10-CM ICD-9-CM   1. Pneumonia of right lower lobe due to infectious organism  J18.9 486   2. Sepsis with acute hypoxic respiratory failure without septic shock, due to unspecified organism (HCC)  A41.9 038.9    R65.20 995.91    J96.01 518.81   3. Acute respiratory failure with hypoxia (HCC)  J96.01 518.81   4. Difficulty walking  R26.2 719.7   5. Oropharyngeal dysphagia  R13.12 787.22   6. Decreased activities of daily living (ADL)  Z78.9 V49.89     Patient Active Problem List   Diagnosis   • Severe malnutrition (HCC)   • Aspiration into airway     History reviewed. No pertinent past medical history.  Past Surgical History:   Procedure Laterality Date   • BRONCHOSCOPY N/A 3/8/2023    Procedure: BRONCHOSCOPY WITH BRONCHOALVEOLAR LAVAGE, WASHINGS;  Surgeon: Jason Rowley MD;  Location: [facility] ENDOSCOPY;  Service: Pulmonary;  Laterality: N/A;  MUCOUS PLUGGING   • EYE SURGERY        General Information     Row Name 23 1306 23 1303       OT Time and Intention    Document Type re-evaluation  -PG therapy note (daily note)  -PG    Mode of Treatment -- individual therapy;occupational therapy  -PG          User Key  (r) = Recorded By, (t) = Taken By, (c) = Cosigned By    Initials Name Provider Type    PG Janes Palacios OT Occupational Therapist                 Mobility/ADL's    No documentation.                Obj/Interventions     Row Name 23 1303          Shoulder (Therapeutic Exercise)    Shoulder (Therapeutic Exercise) strengthening exercise  -PG     Shoulder Strengthening (Therapeutic Exercise) 15 repititions;resistance band  -PG     Row Name 23 1303          Elbow/Forearm (Therapeutic Exercise)    Elbow/Forearm (Therapeutic Exercise) strengthening exercise  -PG     Elbow/Forearm Strengthening (Therapeutic Exercise) 15  repititions;resistance band  -PG     Row Name 03/16/23 1303          Motor Skills    Therapeutic Exercise shoulder;elbow/forearm  -PG           User Key  (r) = Recorded By, (t) = Taken By, (c) = Cosigned By    Initials Name Provider Type    Janes Light OT Occupational Therapist               Goals/Plan     Row Name 03/16/23 1306          Transfer Goal 1 (OT)    Progress/Outcome (Transfer Goal 1, OT) continuing progress toward goal  -PG     Row Name 03/16/23 1306          Bathing Goal 1 (OT)    Progress/Outcomes (Bathing Goal 1, OT) continuing progress toward goal  -PG     Row Name 03/16/23 1306          Dressing Goal 1 (OT)    Progress/Outcome (Dressing Goal 1, OT) continuing progress toward goal  -PG     Row Name 03/16/23 1306          Toileting Goal 1 (OT)    Progress/Outcome (Toileting Goal 1, OT) continuing progress toward goal  -PG     Row Name 03/16/23 1306          Grooming Goal 1 (OT)    Progress/Outcome (Grooming Goal 1, OT) continuing progress toward goal  -PG     Row Name 03/16/23 1306          Strength Goal 1 (OT)    Progress/Outcome (Strength Goal 1, OT) continuing progress toward goal  -PG     Row Name 03/16/23 1306          Problem Specific Goal 1 (OT)    Progress/Outcome (Problem Specific Goal 1, OT) continuing progress toward goal  -PG           User Key  (r) = Recorded By, (t) = Taken By, (c) = Cosigned By    Initials Name Provider Type    Janes Light OT Occupational Therapist               Clinical Impression     Row Name 03/16/23 1305          Plan of Care Review    Progress improving  -PG           User Key  (r) = Recorded By, (t) = Taken By, (c) = Cosigned By    Initials Name Provider Type    Janes Light OT Occupational Therapist               Outcome Measures     Row Name 03/16/23 1305          How much help from another is currently needed...    Putting on and taking off regular lower body clothing? 3  -PG     Bathing (including washing, rinsing, and drying) 3  -PG      Toileting (which includes using toilet bed pan or urinal) 3  -PG     Putting on and taking off regular upper body clothing 3  -PG     Taking care of personal grooming (such as brushing teeth) 3  -PG     Eating meals 4  -PG     AM-PAC 6 Clicks Score (OT) 19  -PG     Row Name 03/16/23 1127          How much help from another person do you currently need...    Turning from your back to your side while in flat bed without using bedrails? 4 (P)   -JL     Moving from lying on back to sitting on the side of a flat bed without bedrails? 4 (P)   -JL     Moving to and from a bed to a chair (including a wheelchair)? 3 (P)   -JL     Standing up from a chair using your arms (e.g., wheelchair, bedside chair)? 3 (P)   -JL     Climbing 3-5 steps with a railing? 2 (P)   -JL     To walk in hospital room? 3 (P)   -JL     AM-PAC 6 Clicks Score (PT) 19 (P)   -JL     Highest level of mobility 6 --> Walked 10 steps or more (P)   -     Row Name 03/16/23 1305 03/16/23 1127       Functional Assessment    Outcome Measure Options AM-PAC 6 Clicks Daily Activity (OT);Optimal Instrument  -PG AM-PAC 6 Clicks Basic Mobility (PT) (P)   -    Row Name 03/16/23 1305          Optimal Instrument    Optimal Instrument Optimal - 3  -PG     Bending/Stooping 2  -PG     Standing 2  -PG     Reaching 1  -PG           User Key  (r) = Recorded By, (t) = Taken By, (c) = Cosigned By    Initials Name Provider Type    PG Janes Palacios, OT Occupational Therapist    Ephraim Garcia, PT Student PT Student                  OT Recommendation and Plan     Plan of Care Review  Progress: improving     Time Calculation:    Time Calculation- OT     Row Name 03/16/23 1307 03/16/23 1306 03/16/23 1121       Time Calculation- OT    OT Received On -- 03/16/23  -PG --    OT Goal Re-Cert Due Date 03/25/23  -PG 03/16/23  -PG --       Timed Charges    04045 - OT Therapeutic Exercise Minutes 12  -PG -- --    26920 - Gait Training Minutes  -- -- 12 (P)   -JL       Total Minutes     Timed Charges Total Minutes 12  -PG -- 12 (P)   -JL     Total Minutes 12  -PG -- 12 (P)   -JL          User Key  (r) = Recorded By, (t) = Taken By, (c) = Cosigned By    Initials Name Provider Type    Janes Light OT Occupational Therapist    Ephraim Garcia, PT Student PT Student              Therapy Charges for Today     Code Description Service Date Service Provider Modifiers Qty    72963806770 HC OT THER PROC EA 15 MIN 3/16/2023 Janes Palacios OT GO 1               Janes Palacios OT  3/16/2023

## 2023-03-16 NOTE — THERAPY RE-EVALUATION
Acute Care - Physical Therapy Re-Evaluation   [place]     Patient Name: Buzz Lopez  : 1945  MRN: [id number]  Today's Date: 3/16/2023      Visit Dx:     ICD-10-CM ICD-9-CM   1. Pneumonia of right lower lobe due to infectious organism  J18.9 486   2. Sepsis with acute hypoxic respiratory failure without septic shock, due to unspecified organism (HCC)  A41.9 038.9    R65.20 995.91    J96.01 518.81   3. Acute respiratory failure with hypoxia (HCC)  J96.01 518.81   4. Difficulty walking  R26.2 719.7   5. Oropharyngeal dysphagia  R13.12 787.22   6. Decreased activities of daily living (ADL)  Z78.9 V49.89     Patient Active Problem List   Diagnosis   • Severe malnutrition (HCC)   • Aspiration into airway     History reviewed. No pertinent past medical history.  Past Surgical History:   Procedure Laterality Date   • BRONCHOSCOPY N/A 3/8/2023    Procedure: BRONCHOSCOPY WITH BRONCHOALVEOLAR LAVAGE, WASHINGS;  Surgeon: Jason Rowley MD;  Location: [facility] ENDOSCOPY;  Service: Pulmonary;  Laterality: N/A;  MUCOUS PLUGGING   • EYE SURGERY       PT Assessment (last 12 hours)     PT Evaluation and Treatment     Row Name 23 1122          Physical Therapy Time and Intention    Subjective Information no complaints (P)   -JL     Document Type re-evaluation (P)   -JL     Mode of Treatment physical therapy;individual therapy (P)   -     Patient Effort good (P)   -JL     Row Name 23 112          General Information    Patient Profile Reviewed yes (P)   -JL     Patient Observations alert;cooperative;agree to therapy (P)   -JL     Existing Precautions/Restrictions fall (P)   -JL     Row Name 23 1122          Bed Mobility    Supine-Sit [place] (Bed Mobility) contact guard;1 person assist (P)   -JL     Row Name 23 1122          Transfers    Transfers sit-stand transfer;stand-sit transfer;bed-chair transfer (P)   -     Row Name 23 1122          Bed-Chair Transfer    Bed-Chair [place]  (Transfers) contact guard;1 person assist (P)   -JL     Assistive Device (Bed-Chair Transfers) walker, front-wheeled (P)   -JL     Row Name 03/16/23 1122          Sit-Stand Transfer    Sit-Stand [place] (Transfers) contact guard;1 person assist (P)   -JL     Assistive Device (Sit-Stand Transfers) walker, front-wheeled (P)   -JL     Row Name 03/16/23 1122          Stand-Sit Transfer    Stand-Sit [place] (Transfers) contact guard;1 person assist (P)   -JL     Assistive Device (Stand-Sit Transfers) walker, front-wheeled (P)   -JL     Row Name 03/16/23 1122          Gait/Stairs (Locomotion)    Gait/Stairs Locomotion gait/ambulation assistive device (P)   -JL     [place] Level (Gait) contact guard;1 person assist (P)   -JL     Assistive Device (Gait) walker, front-wheeled (P)   -JL     Distance in Feet (Gait) 100 (P)   -JL     Pattern (Gait) step-through (P)   -JL     Deviations/Abnormal Patterns (Gait) base of support, narrow;gait speed decreased;stride length decreased;gypsy decreased (P)   -JL     Row Name 03/16/23 1122          Balance    Dynamic Standing Balance contact guard;1-person assist (P)   -JL     Position/Device Used, Standing Balance walker, front-wheeled (P)   -JL     Row Name 03/16/23 1122          Plan of Care Review    Plan of Care Reviewed With patient (P)   -JL     Row Name 03/16/23 1122          Positioning and Restraints    Pre-Treatment Position in bed (P)   -JL     Post Treatment Position chair (P)   -JL     In Chair reclined;call light within reach;exit alarm on;encouraged to call for assist (P)   -JL     Row Name 03/16/23 1122          Progress Summary (PT)    Progress Toward Functional Goals (PT) progress toward functional goals is good (P)   -JL     Daily Progress Summary (PT) Pt. performed supine to sit with CGA, ambulated 100 ft with RW and CGA. Pt. demonstrates narrow base of support and decreased stride length symmetrically. Pt. left in reclier with call light. Recommend  continued therapy services to optimize functional mobility for safe discharge to next level of care (P)   -JL     Row Name 03/16/23 1122          Physical Therapy Goals    Transfer Goal Selection (PT) transfer, PT goal 1 (P)   -JL     Gait Training Goal Selection (PT) gait training, PT goal 1 (P)   -JL     Strength Goal Selection (PT) strength, PT goal 1 (P)   -JL     Balance Goal Selection (PT) balance, PT goal 1 (P)   -JL     Row Name 03/16/23 1122          Transfer Goal 1 (PT)    Activity/Assistive Device (Transfer Goal 1, PT) sit-to-stand/stand-to-sit;walker, rolling (P)   -JL     [place] Level/Cues Needed (Transfer Goal 1, PT) standby assist (P)   -JL     Time Frame (Transfer Goal 1, PT) long term goal (LTG);10 days (P)   -JL     Row Name 03/16/23 1122          Gait Training Goal 1 (PT)    Activity/Assistive Device (Gait Training Goal 1, PT) gait (walking locomotion);walker, rolling (P)   -JL     [place] Level (Gait Training Goal 1, PT) contact guard required (P)   -JL     Distance (Gait Training Goal 1, PT) 200 ft (P)   -JL     Time Frame (Gait Training Goal 1, PT) long term goal (LTG);10 days (P)   -     Row Name 03/16/23 1122          Strength Goal 1 (PT)    Strength Goal 1 (PT) Pt. will improve bilateral knee extensor strength to 4/5. (P)   -JL     Time Frame (Strength Goal 1, PT) long term goal (LTG);10 days (P)   -     Row Name 03/16/23 1122          Balance Goal 1 (PT)    Activity/Assistive Device (Balance Goal 1, PT) standing, dynamic;walker, rolling (P)   -JL     [place] Level/Cues Needed (Balance Goal 1, PT) standby assist (P)   -JL     Time Frame (Balance Goal 1, PT) long term goal (LTG);10 days (P)   -JL           User Key  (r) = Recorded By, (t) = Taken By, (c) = Cosigned By    Initials Name Provider Type    Ephraim Garcia, PT Student PT Student                Physical Therapy Education     Title: PT OT SLP Therapies (Done)     Topic: Physical Therapy (Done)     Point:  Mobility training (Done)     Learning Progress Summary           Patient Acceptance, E, VU by ALO at 3/8/2023 1531    Acceptance, E, VU by POONAM at 3/7/2023 0909                               User Key     Initials Effective Dates Name Provider Type Discipline    ALO 09/21/21 -  Heavenly Quach, RN Registered Nurse Nurse    POONAM 01/11/23 -  Ephraim Juares, PT Student PT Student PT              PT Recommendation and Plan  Anticipated Discharge Disposition (PT): home with home health  Planned Therapy Interventions (PT): balance training, bed mobility training, gait training, patient/family education, strengthening, transfer training  Therapy Frequency (PT): daily  Progress Summary (PT)  Progress Toward Functional Goals (PT): (P) progress toward functional goals is good  Daily Progress Summary (PT): (P) Pt. performed supine to sit with CGA, ambulated 100 ft with RW and CGA. Pt. demonstrates narrow base of support and decreased stride length symmetrically. Pt. left in reclier with call light. Recommend continued therapy services to optimize functional mobility for safe discharge to next level of care  Plan of Care Reviewed With: (P) patient  Outcome Evaluation: Pt. presents with balance and activity tolerance deficits that impair his functional independence. Recommend home health therapy services to improve balance and activity tolerance deficits and optimize safety with mobility.   Outcome Measures     Row Name 03/16/23 1127 03/15/23 1400 03/14/23 1200       How much help from another person do you currently need...    Turning from your back to your side while in flat bed without using bedrails? 4 (P)   - 4  -RH 4  -RH    Moving from lying on back to sitting on the side of a flat bed without bedrails? 4 (P)   -JL 4  -RH 4  -RH    Moving to and from a bed to a chair (including a wheelchair)? 3 (P)   -JL 3  -RH 3  -RH    Standing up from a chair using your arms (e.g., wheelchair, bedside chair)? 3 (P)   -JL 4  -RH 4  -RH     Climbing 3-5 steps with a railing? 2 (P)   -JL 1  - 1  -    To walk in hospital room? 3 (P)   -JL 2  - 2  -RH    AM-PAC 6 Clicks Score (PT) 19 (P)   -JL 18  - 18  -RH       Functional Assessment    Outcome Measure Options AM-PAC 6 Clicks Basic Mobility (PT) (P)   -JL -- --          User Key  (r) = Recorded By, (t) = Taken By, (c) = Cosigned By    Initials Name Provider Type     Mahad Whaley, PTA Physical Therapist Assistant    Ephraim Garcia, PT Student PT Student                 Time Calculation:    PT Charges     Row Name 03/16/23 1122 03/16/23 1121          Time Calculation    PT Received On -- 03/16/23 (P)   -POONAM     PT Goal Re-Cert Due Date -- 03/25/23 (P)   -JL        Timed Charges    83503 - Gait Training Minutes  -- 12 (P)   -JL        Untimed Charges    PT Eval/Re-eval Minutes 5 (P)   -JL --        Total Minutes    Timed Charges Total Minutes -- 12 (P)   -JL     Untimed Charges Total Minutes 5 (P)   -JL --      Total Minutes 5 (P)   -JL 12 (P)   -JL           User Key  (r) = Recorded By, (t) = Taken By, (c) = Cosigned By    Initials Name Provider Type    Ephraim Garcia, PT Student PT Student              Therapy Charges for Today     Code Description Service Date Service Provider Modifiers Qty    70117104981 HC GAIT TRAINING EA 15 MIN 3/16/2023 Ephraim Juares, PT Student GP 1    62156807709 HC PT RE-EVAL ESTABLISHED PLAN 2 3/16/2023 Ephraim Juares, PT Student GP 1          PT G-Codes  Outcome Measure Options: (P) AM-PAC 6 Clicks Basic Mobility (PT)  AM-PAC 6 Clicks Score (PT): (P) 19  AM-PAC 6 Clicks Score (OT): 21    Ephraim Juares PT Student  3/16/2023

## 2023-03-16 NOTE — CONSULTS
"Nutrition Services    Patient Name: Buzz Lopez  YOB: 1945  MRN: [id number]  Admission date: 3/6/2023      CLINICAL NUTRITION ASSESSMENT      Reason for Assessment  Follow-up protocol    H&P:    History reviewed. No pertinent past medical history.     Current Problems:   Active Hospital Problems    Diagnosis    • Aspiration into airway    • Severe malnutrition (HCC)         Nutrition/Diet History         Narrative     Nutrition follow up.  Tolerating enteral nutrition well at goal rate.  Patient had MBS and SLP cannot recommend safe PO intake.     Family to make decision regarding goals of care, PEG placement.     Weight is starting to trend back up.  Will continue to monitor.       Anthropometrics        Current Height, Weight Height: 177.8 cm (70\")  Weight: 53.8 kg (118 lb 9.7 oz)   Current BMI Body mass index is 17.02 kg/m².       Weight Hx  Wt Readings from Last 30 Encounters:   03/14/23 1233 53.8 kg (118 lb 9.7 oz)   03/13/23 1506 51.7 kg (113 lb 15.7 oz)   03/06/23 0824 61.2 kg (135 lb)   06/09/22 0154 61.4 kg (135 lb 5.8 oz)            Wt Change Observation Wt stable 6  Months     Estimated/Assessed Needs       Energy Requirements 30-35 kcal/kg actual wt   EST Needs (kcal/day) 6732-9589       Protein Requirements 1.2-1.5 g/kg    EST Daily Needs (g/day) 73-91 g       Fluid Requirements 1 ml/kcal    Estimated Needs (mL/day) 5340-7389 (adjust as needed)     Labs/Medications         Pertinent Labs Reviewed.   Results from last 7 days   Lab Units 03/16/23  0504 03/15/23  0419 03/14/23  0440   SODIUM mmol/L 135* 138 134*   POTASSIUM mmol/L 4.2 4.0 4.2   CHLORIDE mmol/L 97* 97* 97*   CO2 mmol/L 32.7* 36.0* 28.6   BUN mg/dL 27* 25* 25*   CREATININE mg/dL 0.60* 0.52* 0.54*   CALCIUM mg/dL 9.0 9.0 9.0   BILIRUBIN mg/dL 0.4 0.4 0.4   ALK PHOS U/L 87 76 84   ALT (SGPT) U/L 31 30 26   AST (SGOT) U/L 40 41* 37   GLUCOSE mg/dL 219* 124* 227*     Results from last 7 days   Lab Units 03/16/23  0504 " 03/15/23  0419 03/14/23  0440   MAGNESIUM mg/dL 2.0 2.1 2.0   PHOSPHORUS mg/dL 2.8 3.4 3.3   HEMOGLOBIN g/dL 11.6* 12.3* 13.2   HEMATOCRIT % 34.7* 36.6* 41.2     COVID19   Date Value Ref Range Status   03/06/2023 Not Detected Not Detected - Ref. Range Final     No results found for: HGBA1C      Pertinent Medications Reviewed.     Current Nutrition Orders & Evaluation of Intake       Oral Nutrition     Current PO Diet No diet orders on file   Supplement Orders Placed This Encounter      Place Feeding Tube Per [place] System      Diet, Tube Feeding Tube Feeding Formula: Diabetisource AC (Glucerna 1.2); Tube Feeding Type: Continuous; Continuous Tube Feeding Start Rate (mL/hr): 25; Then Advance Rate By (mL/hr): 25; Every __ Hours: 4; To Goal Rate of (mL/hr): 65       Malnutrition Severity Assessment      Patient meets criteria for : Severe Malnutrition         Nutrition Diagnosis         Nutrition Dx Problem 1 Severe malnutrition related to Inability to consume sufficient energy as evidenced by body composition changes., NPO and SLP/swallow eval     Nutrition Intervention         Diabetisource AC @ 65 ml/hr   Free water flushes 50 ml every 4 hours.  Provides 1872 kcal, 94 g pro, 1679 ml fluid      Medical Nutrition Therapy/Nutrition Education          Learner     Readiness N/A  N/A     Method     Response N/A  N/A     Monitor/Evaluation        Monitor I&O, Pertinent labs, EN delivery/tolerance, Weight, Skin status, GI status, Swallow function, Hemodynamic stability, RFS     Nutrition Discharge Plan         To be determined     Electronically signed by:  Gauri Arana RD  03/16/23 13:05 EDT

## 2023-03-16 NOTE — PLAN OF CARE
Goal Outcome Evaluation:  Plan of Care Reviewed With: patient        Progress: improving. Ambulated better tonight with walker and assistance.

## 2023-03-16 NOTE — PROGRESS NOTES
[facility]   Hospitalist Progress Note  Date: 3/16/2023  Patient Name: Buzz Lopez  : 1945  MRN: [id number]  Date of admission: 3/6/2023    Subjective   Subjective     Chief Complaint:   Shortness of breath, confusion    Summary:   Buzz Lopez is a 77 y.o. male with no significant past medical history has been brought into the ED with concerns for confusion, possible syncope, shortness of breath.  Patient states that for the past 1 to 2 weeks patient has been having difficulty breathing and subjective fevers, it has got worse and today and he has called his son to take him to the ED as he is having difficulty breathing.  By the time patient's son has arrived at the home, patient appeared to be confused, generalized weakness and unable to put his clothes on.  While his son was helping him to get the clothes on, patient appeared to be very confused and had a possible syncope episode where he was unresponsive.  Patient's son has looked for the pulse and they could not feel radial pulse and started chest compressions, EMS was called.  By the time EMS has arrived patient was receiving chest compressions from the family members.  EMS has evaluated the patient and they could not feel a pulse.  Patient was in respiratory distress, saturating around 70% on room air.  Received nebulizer treatment by the EMS and the patient has been brought into the ED.    During my examination, patient is alert and oriented x3 and able to answer questions appropriately.  States that he does not remember how he came to the hospital.  Denies any chest pain, nausea, vomiting, focal weakness, numbness, tingling.  States that he has been having difficulty breathing for the past 1 to 2 weeks.  A month ago he had some sore throat.  Associated with cough, productive sputum. Patient is thin and frail.  Chronically has a poor p.o. intake.  Admits that he has low appetite.  No previously diagnosed history of dementia.  As per the  family members, patient takes a lot of over-the-counter fiber supplement review does not gain weight despite he eats well.  Patients primary issue is aspiration, this is why he has had weight loss, this is why he was admitted for pneumonia.  I discussed with patient and family that given his high functional status normally I would not recommend this however with the PEG tube he could have significant improvement in his quality of life, the patient states he would not want a PEG tube.  We are attempting to clear the patient for a safe diet prior to discharge.  If we are unable to do so and patient is adamant that he wants to go home and assume the risk, patient will be most fitted for palliative care/hospice.  Modified barium swallow study showed  aspiration without coughing.    Interval Followup:   Vital signs stable on room air with 98% saturation.  Tolerating tube feeding well via core track.  Patient wants to eat and is begging to have food.  Patient is choking and coughing with his own saliva with difficulty spitting it up.  No other complaints  Son at bedside.    Objective   Objective     Vitals:   Temp:  [97.3 °F (36.3 °C)-98.3 °F (36.8 °C)] 98.3 °F (36.8 °C)  Heart Rate:  [63-80] 69  Resp:  [16-18] 16  BP: (115-150)/(52-67) 150/58    Physical Exam   General appearance: NAD, conversant. Cacechtic  Eyes: anicteric sclerae, moist conjunctivae; no lid-lag;   HENT: Atraumatic; oropharynx clear with moist mucous membranes and no mucosal ulcerations; normal hard and soft palate  Neck: Trachea midline; FROM, supple, no thyromegaly or lymphadenopathy  Lungs: Bilateral rhonchi, with normal respiratory effort and no intercostal retractions    Result Review    Result Review:  I have personally reviewed the results as below  [x]  Laboratory  CBC    CBC 3/14/23 3/15/23 3/16/23   WBC 4.70 5.96 6.97   RBC 4.54 4.14 3.96 (A)   Hemoglobin 13.2 12.3 (A) 11.6 (A)   Hematocrit 41.2 36.6 (A) 34.7 (A)   MCV 90.7 88.4 87.6   MCH  29.1 29.7 29.3   MCHC 32.0 33.6 33.4   RDW 13.3 12.8 13.0   Platelets 181 262 284   (A) Abnormal value            CMP    CMP 3/14/23 3/15/23 3/16/23   Glucose 227 (A) 124 (A) 219 (A)   BUN 25 (A) 25 (A) 27 (A)   Creatinine 0.54 (A) 0.52 (A) 0.60 (A)   eGFR 101.4 102.5 98.2   Sodium 134 (A) 138 135 (A)   Potassium 4.2 4.0 4.2   Chloride 97 (A) 97 (A) 97 (A)   Calcium 9.0 9.0 9.0   Total Protein 7.2 7.1 6.8   Albumin 2.6 (A) 2.9 (A) 2.8 (A)   Globulin 4.6 4.2 4.0   Total Bilirubin 0.4 0.4 0.4   Alkaline Phosphatase 84 76 87   AST (SGOT) 37 41 (A) 40   ALT (SGPT) 26 30 31   Albumin/Globulin Ratio 0.6 0.7 0.7   BUN/Creatinine Ratio 46.3 (A) 48.1 (A) 45.0 (A)   Anion Gap 8.4 5.0 5.3   (A) Abnormal value       Comments are available for some flowsheets but are not being displayed.           []  Microbiology  []  Radiology  []  EKG/Telemetry   []  Cardiology/Vascular   []  Pathology  []  Old records  []  Other:    Assessment & Plan   Assessment / Plan   Assessment:    Sepsis due to right lower lobe pneumonia (Streptococcus Pneumo).  Resolved  Acute hypoxic respiratory failure.  Resolved  Questionable cardiac arrest episode  NSTEMI/acute myocardial injury  Possible syncope  Lactic acidosis, resolved  Malnutrition.  BMI of 17  S/P Bronchoscopy 3/8/23 with mucous plugging removed  Dysphagia requiring Core Track placed this admission  Aspiration.  Severe aortic stenosis   Degenerative changes cervical spine / anterior osteophytes       Plan:    Continue speech therapy.  Barium swallow noted and discussed with son and patient at bedside  Continue core track.   Check hemoglobin A1c  CT neck noted... anterior osteophytes w mod mass effect  Pulmonology consulted ..... have now signed off  Cardiology consulted   LAI attempted 3/9/23 but unable to complete secondary to core track  CT scan chest .... No PE.  Bilat pneumonia noted.  Status post bronchoscopy   Thick mucus removal  Food particles seen around the vocal cords  Positive  for strep pneumo  Transthoracic echo suggesting normal EF% and severe AS  Continue albuterol nebulizer every 4 hours as needed  Continue bronchodilator protocol  Continue aspirin.  Sliding-scale insulin  Finished antibiotics  Appreciate palliative care input.  Patient is DNR/DNI.  Patient now thinking about PEG placement.  Son and other family members going to discuss this further and let us know in the next 24 hours if we need to proceed ahead with PEG.  Only other option is comfort care with pleasure eating due to significant aspiration.  PT OT consult.  DC telemetry.  Transfer to regular floor  Discussed with nursing staff and .  Discharge to [facility] if he gets PEG placement.  .         DVT prophylaxis:  Medical DVT prophylaxis orders are present.    CODE STATUS:   Medical Intervention Limits: NO intubation (DNI)  Level Of Support Discussed With: Patient  Code Status (Patient has no pulse and is not breathing): No CPR (Do Not Attempt to Resuscitate)  Medical Interventions (Patient has pulse or is breathing): Limited Support           Electronically signed by Jez Santos MD, 03/16/23, 4:21 PM EDT.

## 2023-03-16 NOTE — SIGNIFICANT NOTE
03/16/23 1300   SOCIAL WORK ASSESSMENT   Referral Source physician   Reason for Consult palliative care   Visit Type ongoing         Met with pt briefly this morning to discuss status on PEG and rehab placement. Pt reported feeling like the PEG was his only option to have a chance at life. Acknowledged and validated this decision. Pt stated current goal is to get PEG placed and go to rehab.   SW updated. Signature of Esther hagen.   Pt inquired about role of palliative and explained pt could still be involved in curative treatments and have palliative for additional support. Clarified [facility] is for end of life, while palliative is for support of chronic medical conditions.     Palliative will continue to monitor and provide support.     DONALD Duong

## 2023-03-17 LAB
ALBUMIN SERPL-MCNC: 2.5 G/DL (ref 3.5–5.2)
ANION GAP SERPL CALCULATED.3IONS-SCNC: 8.7 MMOL/L (ref 5–15)
BUN SERPL-MCNC: 24 MG/DL (ref 8–23)
BUN/CREAT SERPL: 39.3 (ref 7–25)
CALCIUM SPEC-SCNC: 8.9 MG/DL (ref 8.6–10.5)
CHLORIDE SERPL-SCNC: 98 MMOL/L (ref 98–107)
CO2 SERPL-SCNC: 26.3 MMOL/L (ref 22–29)
CREAT SERPL-MCNC: 0.61 MG/DL (ref 0.76–1.27)
EGFRCR SERPLBLD CKD-EPI 2021: 97.7 ML/MIN/1.73
GLUCOSE BLDC GLUCOMTR-MCNC: 173 MG/DL (ref 70–99)
GLUCOSE BLDC GLUCOMTR-MCNC: 193 MG/DL (ref 70–99)
GLUCOSE BLDC GLUCOMTR-MCNC: 194 MG/DL (ref 70–99)
GLUCOSE BLDC GLUCOMTR-MCNC: 206 MG/DL (ref 70–99)
GLUCOSE SERPL-MCNC: 158 MG/DL (ref 65–99)
HBA1C MFR BLD: 8.1 % (ref 4.8–5.6)
MAGNESIUM SERPL-MCNC: 2.1 MG/DL (ref 1.6–2.4)
PHOSPHATE SERPL-MCNC: 3.1 MG/DL (ref 2.5–4.5)
POTASSIUM SERPL-SCNC: 4.6 MMOL/L (ref 3.5–5.2)
SODIUM SERPL-SCNC: 133 MMOL/L (ref 136–145)

## 2023-03-17 PROCEDURE — 83036 HEMOGLOBIN GLYCOSYLATED A1C: CPT | Performed by: INTERNAL MEDICINE

## 2023-03-17 PROCEDURE — 94799 UNLISTED PULMONARY SVC/PX: CPT

## 2023-03-17 PROCEDURE — 94664 DEMO&/EVAL PT USE INHALER: CPT

## 2023-03-17 PROCEDURE — 94669 MECHANICAL CHEST WALL OSCILL: CPT

## 2023-03-17 PROCEDURE — 82962 GLUCOSE BLOOD TEST: CPT

## 2023-03-17 PROCEDURE — 99233 SBSQ HOSP IP/OBS HIGH 50: CPT | Performed by: INTERNAL MEDICINE

## 2023-03-17 PROCEDURE — 83735 ASSAY OF MAGNESIUM: CPT | Performed by: INTERNAL MEDICINE

## 2023-03-17 PROCEDURE — 80069 RENAL FUNCTION PANEL: CPT | Performed by: INTERNAL MEDICINE

## 2023-03-17 PROCEDURE — 97110 THERAPEUTIC EXERCISES: CPT

## 2023-03-17 PROCEDURE — 92526 ORAL FUNCTION THERAPY: CPT

## 2023-03-17 PROCEDURE — 97116 GAIT TRAINING THERAPY: CPT

## 2023-03-17 PROCEDURE — 63710000001 INSULIN REGULAR HUMAN PER 5 UNITS: Performed by: INTERNAL MEDICINE

## 2023-03-17 PROCEDURE — 25010000002 ENOXAPARIN PER 10 MG: Performed by: INTERNAL MEDICINE

## 2023-03-17 RX ORDER — LISINOPRIL 5 MG/1
5 TABLET ORAL
Status: DISCONTINUED | OUTPATIENT
Start: 2023-03-17 | End: 2023-03-25

## 2023-03-17 RX ADMIN — BUDESONIDE 0.5 MG: 0.5 INHALANT ORAL at 06:47

## 2023-03-17 RX ADMIN — METOPROLOL TARTRATE 12.5 MG: 25 TABLET, FILM COATED ORAL at 20:11

## 2023-03-17 RX ADMIN — Medication 10 ML: at 20:11

## 2023-03-17 RX ADMIN — ARFORMOTEROL TARTRATE 15 MCG: 15 SOLUTION RESPIRATORY (INHALATION) at 18:21

## 2023-03-17 RX ADMIN — METFORMIN HYDROCHLORIDE 500 MG: 500 TABLET ORAL at 18:55

## 2023-03-17 RX ADMIN — FAMOTIDINE 20 MG: 20 TABLET ORAL at 17:33

## 2023-03-17 RX ADMIN — ASPIRIN 81 MG 81 MG: 81 TABLET ORAL at 10:24

## 2023-03-17 RX ADMIN — ARFORMOTEROL TARTRATE 15 MCG: 15 SOLUTION RESPIRATORY (INHALATION) at 06:47

## 2023-03-17 RX ADMIN — BUDESONIDE 0.5 MG: 0.5 INHALANT ORAL at 18:21

## 2023-03-17 RX ADMIN — Medication 10 ML: at 10:29

## 2023-03-17 RX ADMIN — INSULIN HUMAN 2 UNITS: 100 INJECTION, SOLUTION PARENTERAL at 18:55

## 2023-03-17 RX ADMIN — FAMOTIDINE 20 MG: 20 TABLET ORAL at 07:37

## 2023-03-17 RX ADMIN — LISINOPRIL 5 MG: 5 TABLET ORAL at 12:20

## 2023-03-17 RX ADMIN — INSULIN HUMAN 2 UNITS: 100 INJECTION, SOLUTION PARENTERAL at 12:20

## 2023-03-17 RX ADMIN — ENOXAPARIN SODIUM 40 MG: 100 INJECTION SUBCUTANEOUS at 17:33

## 2023-03-17 RX ADMIN — METOPROLOL TARTRATE 12.5 MG: 25 TABLET, FILM COATED ORAL at 10:24

## 2023-03-17 RX ADMIN — INSULIN HUMAN 3 UNITS: 100 INJECTION, SOLUTION PARENTERAL at 00:31

## 2023-03-17 RX ADMIN — INSULIN HUMAN 2 UNITS: 100 INJECTION, SOLUTION PARENTERAL at 07:37

## 2023-03-17 NOTE — THERAPY TREATMENT NOTE
Acute Care - Physical Therapy Treatment Note   [facility]     Patient Name: Buzz Lopez  : 1945  MRN: [id number]  Today's Date: 3/17/2023      Visit Dx:     ICD-10-CM ICD-9-CM   1. Pneumonia of right lower lobe due to infectious organism  J18.9 486   2. Sepsis with acute hypoxic respiratory failure without septic shock, due to unspecified organism (HCC)  A41.9 038.9    R65.20 995.91    J96.01 518.81   3. Acute respiratory failure with hypoxia (HCC)  J96.01 518.81   4. Difficulty walking  R26.2 719.7   5. Oropharyngeal dysphagia  R13.12 787.22   6. Decreased activities of daily living (ADL)  Z78.9 V49.89     Patient Active Problem List   Diagnosis   • Severe malnutrition (HCC)   • Aspiration into airway     History reviewed. No pertinent past medical history.  Past Surgical History:   Procedure Laterality Date   • BRONCHOSCOPY N/A 3/8/2023    Procedure: BRONCHOSCOPY WITH BRONCHOALVEOLAR LAVAGE, WASHINGS;  Surgeon: Jason Rowley MD;  Location: [facility] ENDOSCOPY;  Service: Pulmonary;  Laterality: N/A;  MUCOUS PLUGGING   • EYE SURGERY       PT Assessment (last 12 hours)     PT Evaluation and Treatment     Row Name 23 1436          Physical Therapy Time and Intention    Subjective Information no complaints  -RH     Document Type therapy note (daily note)  -     Mode of Treatment physical therapy;individual therapy  -RH     Patient Effort good  -RH     Comment Pt feels he is walking better.  -RH     Row Name 23 1436          Pain Scale: FACES Pre/Post-Treatment    Pain: FACES Scale, Pretreatment 0-->no hurt  -RH     Posttreatment Pain Rating 0-->no hurt  -RH     Row Name 23 1436          Bed Mobility    Bed Mobility supine-sit;sit-supine  -RH     All Activities, [place] (Bed Mobility) standby assist  -RH     Scooting/Bridging [place] (Bed Mobility) standby assist  -RH     Supine-Sit [place] (Bed Mobility) standby assist  -RH     Sit-Supine [place] (Bed Mobility) standby  assist  -RH     Bed Mobility, Safety Issues decreased use of legs for bridging/pushing  -RH     Assistive Device (Bed Mobility) bed rails  -RH     Comment, (Bed Mobility) Pt maintains sitting with SBA.  -RH     Row Name 03/17/23 1436          Transfers    Transfers sit-stand transfer;stand-sit transfer  -RH     Row Name 03/17/23 1436          Sit-Stand Transfer    Sit-Stand [place] (Transfers) contact guard  -RH     Assistive Device (Sit-Stand Transfers) walker, front-wheeled  -RH     Row Name 03/17/23 1436          Stand-Sit Transfer    Stand-Sit [place] (Transfers) contact guard  -RH     Assistive Device (Stand-Sit Transfers) walker, front-wheeled  -RH     Row Name 03/17/23 1436          Gait/Stairs (Locomotion)    Gait/Stairs Locomotion gait/ambulation independence;gait/ambulation assistive device;distance ambulated;gait pattern;gait deviations  -     [place] Level (Gait) contact guard  -RH     Assistive Device (Gait) walker, front-wheeled  -RH     Distance in Feet (Gait) 110  -RH     Pattern (Gait) --  Pt able to amb for short distance with reciprocal gait with RW.  -RH     Deviations/Abnormal Patterns (Gait) gait speed decreased;stride length decreased  -RH     Left Sided Gait Deviations decreased knee extension  -RH     Right Sided Gait Deviations decreased knee extension  -RH     Gait Assessment/Intervention Pt amb with RW and CGA with reciprocal gait.  -     Row Name 03/17/23 1436          Balance    Dynamic Standing Balance contact guard  -RH     Position/Device Used, Standing Balance walker, front-wheeled  -RH     Row Name             Wound 03/16/23 1641 Bilateral medial gluteal MASD (Moisture associated skin damage)    Wound - Properties Group Placement Date: 03/16/23  - Placement Time: 1641 - Side: Bilateral  -MC Orientation: medial  -MC Location: gluteal  -MC Primary Wound Type: MASD  -MC    Retired Wound - Properties Group Placement Date: 03/16/23  - Placement Time: 1641 -  Side: Bilateral  -MC Orientation: medial  -MC Location: gluteal  -MC Primary Wound Type: MASD  -MC    Retired Wound - Properties Group Date first assessed: 03/16/23  - Time first assessed: 1641 -MC Side: Bilateral  -MC Location: gluteal  -MC Primary Wound Type: MASD  -MC    Row Name             Wound 03/16/23 1641 Left anterior leg Abrasion    Wound - Properties Group Placement Date: 03/16/23  - Placement Time: 1641 -MC Side: Left  -MC Orientation: anterior  -MC Location: leg  -MC Primary Wound Type: Abrasion  -MC    Retired Wound - Properties Group Placement Date: 03/16/23  - Placement Time: 1641 -MC Side: Left  -MC Orientation: anterior  -MC Location: leg  -MC Primary Wound Type: Abrasion  -MC    Retired Wound - Properties Group Date first assessed: 03/16/23  - Time first assessed: 1641 -MC Side: Left  -MC Location: leg  -MC Primary Wound Type: Abrasion  -MC    Row Name 03/17/23 1436          Progress Summary (PT)    Progress Toward Functional Goals (PT) progress toward functional goals is good  -RH           User Key  (r) = Recorded By, (t) = Taken By, (c) = Cosigned By    Initials Name Provider Type    Lilly Maciel RN Registered Nurse     Mahad Whaley, MARTITA Physical Therapist Assistant               Bilateral Lower Extremity   Exercise  Reps  Sets    Short arc quads   10 2   Heel slides  10 2   Ankle pumps  10 2   Quad sets  10 2   Straight leg raise  10 2   Hip ab/adduction 10 2        Physical Therapy Education     Title: PT OT SLP Therapies (Done)     Topic: Physical Therapy (Done)     Point: Mobility training (Done)     Learning Progress Summary           Patient Acceptance, E, VU by ALO at 3/8/2023 1531    Acceptance, E, VU by POONAM at 3/7/2023 0909                               User Key     Initials Effective Dates Name Provider Type Discipline    ALO 09/21/21 -  Heavenly Quach, RN Registered Nurse Nurse    POONAM 01/11/23 -  Ephraim Juares PT Student PT Student PT              PT Recommendation  and Plan     Progress Summary (PT)  Progress Toward Functional Goals (PT): progress toward functional goals is good   Outcome Measures     Row Name 03/17/23 1400 03/16/23 1127 03/15/23 1400       How much help from another person do you currently need...    Turning from your back to your side while in flat bed without using bedrails? 4  -RH 4  -CS (r) JL (t) CS (c) 4  -RH    Moving from lying on back to sitting on the side of a flat bed without bedrails? 3  -RH 4  -CS (r) JL (t) CS (c) 4  -RH    Moving to and from a bed to a chair (including a wheelchair)? 3  -RH 3  -CS (r) JL (t) CS (c) 3  -RH    Standing up from a chair using your arms (e.g., wheelchair, bedside chair)? 3  -RH 3  -CS (r) JL (t) CS (c) 4  -RH    Climbing 3-5 steps with a railing? 3  -RH 2  -CS (r) JL (t) CS (c) 1  -RH    To walk in hospital room? 3  -RH 3  -CS (r) JL (t) CS (c) 2  -RH    AM-PAC 6 Clicks Score (PT) 19  -RH 19  -CS (r) JL (t) 18  -RH       Functional Assessment    Outcome Measure Options -- AM-PAC 6 Clicks Basic Mobility (PT)  -CS (r) JL (t) CS (c) --          User Key  (r) = Recorded By, (t) = Taken By, (c) = Cosigned By    Initials Name Provider Type    RH Mahad Whaley PTA Physical Therapist Assistant    Aga Junior, PT Physical Therapist    Ephraim Garcia, PT Student PT Student                 Time Calculation:    PT Charges     Row Name 03/17/23 1435             Time Calculation    PT Received On 03/17/23  -RH         Timed Charges    55584 - PT Therapeutic Exercise Minutes 16  -RH      74596 - Gait Training Minutes  5  -RH      89819 - PT Therapeutic Activity Minutes 4  -RH         Total Minutes    Timed Charges Total Minutes 25  -RH       Total Minutes 25  -RH            User Key  (r) = Recorded By, (t) = Taken By, (c) = Cosigned By    Initials Name Provider Type    RH Mahad Whaley PTA Physical Therapist Assistant              Therapy Charges for Today     Code Description Service Date Service Provider Modifiers Qty     54119514012  PT THER PROC EA 15 MIN 3/17/2023 Mahad Whaley, PTA GP 1    [id number]  GAIT TRAINING EA 15 MIN 3/17/2023 Mahad Whaley, MARTITA GP 1          PT G-Codes  Outcome Measure Options: AM-PAC 6 Clicks Daily Activity (OT), Optimal Instrument  AM-PAC 6 Clicks Score (PT): 19  AM-PAC 6 Clicks Score (OT): 19    Mahad Whaley PTA  3/17/2023

## 2023-03-17 NOTE — PROGRESS NOTES
[facility]   Hospitalist Progress Note  Date: 3/17/2023  Patient Name: Buzz Lopez  : 1945  MRN: [id number]  Date of admission: 3/6/2023    Subjective   Subjective     Chief Complaint:   Shortness of breath, confusion    Summary:   Buzz Lopez is a 77 y.o. male with no significant past medical history has been brought into the ED with concerns for confusion, possible syncope, shortness of breath.  Patient states that for the past 1 to 2 weeks patient has been having difficulty breathing and subjective fevers, it has got worse and today and he has called his son to take him to the ED as he is having difficulty breathing.  By the time patient's son has arrived at the home, patient appeared to be confused, generalized weakness and unable to put his clothes on.  While his son was helping him to get the clothes on, patient appeared to be very confused and had a possible syncope episode where he was unresponsive.  Patient's son has looked for the pulse and they could not feel radial pulse and started chest compressions, EMS was called.  By the time EMS has arrived patient was receiving chest compressions from the family members.  EMS has evaluated the patient and they could not feel a pulse.  Patient was in respiratory distress, saturating around 70% on room air.  Received nebulizer treatment by the EMS and the patient has been brought into the ED.    During my examination, patient is alert and oriented x3 and able to answer questions appropriately.  States that he does not remember how he came to the hospital.  Denies any chest pain, nausea, vomiting, focal weakness, numbness, tingling.  States that he has been having difficulty breathing for the past 1 to 2 weeks.  A month ago he had some sore throat.  Associated with cough, productive sputum. Patient is thin and frail.  Chronically has a poor p.o. intake.  Admits that he has low appetite.  No previously diagnosed history of dementia.  As per the  family members, patient takes a lot of over-the-counter fiber supplement review does not gain weight despite he eats well.  Patients primary issue is aspiration, this is why he has had weight loss, this is why he was admitted for pneumonia.  I discussed with patient and family that given his high functional status normally I would not recommend this however with the PEG tube he could have significant improvement in his quality of life, the patient states he would not want a PEG tube.  We are attempting to clear the patient for a safe diet prior to discharge.  If we are unable to do so and patient is adamant that he wants to go home and assume the risk, patient will be most fitted for palliative care/hospice.  Modified barium swallow study showed  aspiration without coughing.  Patient hemoglobin A1c 8.1% diagnosed with diabetes mellitus.  Patient and family agreed to have PEG tube but due to lack of insurance they are trying to figure out finances and want to hold off until it can be taken care of.    Interval Followup:   Vital signs stable on room air with 98% saturation.  Tolerating tube feeding well via core track.  Patient wants to eat.  Some ice chips allowed by speech therapy  Patient surprised with the diagnosis of diabetes mellitus  No other complaints    Objective   Objective     Vitals:   Temp:  [97.5 °F (36.4 °C)-98.1 °F (36.7 °C)] 97.7 °F (36.5 °C)  Heart Rate:  [] 104  Resp:  [16-18] 18  BP: (123-164)/(49-62) 154/62    Physical Exam   General appearance: NAD, conversant. [place]ace[place]tic  Eyes: anicteric sclerae, moist conjunctivae; no lid-lag;   HENT: Atraumatic; oropharynx clear with moist mucous membranes and no mucosal ulcerations; normal hard and soft palate  Neck: Trachea midline; FROM, supple, no thyromegaly or lymphadenopathy  Lungs: Bilateral rhonchi, with normal respiratory effort and no intercostal retractions    Result Review    Result Review:  I have personally reviewed the results as  below  [x]  Laboratory  CBC    CBC 3/14/23 3/15/23 3/16/23   WBC 4.70 5.96 6.97   RBC 4.54 4.14 3.96 (A)   Hemoglobin 13.2 12.3 (A) 11.6 (A)   Hematocrit 41.2 36.6 (A) 34.7 (A)   MCV 90.7 88.4 87.6   MCH 29.1 29.7 29.3   MCHC 32.0 33.6 33.4   RDW 13.3 12.8 13.0   Platelets 181 262 284   (A) Abnormal value            CMP    CMP 3/15/23 3/16/23 3/17/23   Glucose 124 (A) 219 (A) 158 (A)   BUN 25 (A) 27 (A) 24 (A)   Creatinine 0.52 (A) 0.60 (A) 0.61 (A)   eGFR 102.5 98.2 97.7   Sodium 138 135 (A) 133 (A)   Potassium 4.0 4.2 4.6   Chloride 97 (A) 97 (A) 98   Calcium 9.0 9.0 8.9   Total Protein 7.1 6.8    Albumin 2.9 (A) 2.8 (A) 2.5 (A)   Globulin 4.2 4.0    Total Bilirubin 0.4 0.4    Alkaline Phosphatase 76 87    AST (SGOT) 41 (A) 40    ALT (SGPT) 30 31    Albumin/Globulin Ratio 0.7 0.7    BUN/Creatinine Ratio 48.1 (A) 45.0 (A) 39.3 (A)   Anion Gap 5.0 5.3 8.7   (A) Abnormal value       Comments are available for some flowsheets but are not being displayed.           []  Microbiology  []  Radiology  []  EKG/Telemetry   []  Cardiology/Vascular   []  Pathology  []  Old records  []  Other:    Assessment & Plan   Assessment / Plan   Assessment:    Sepsis due to right lower lobe pneumonia (Streptococcus Pneumo).  Resolved  Acute hypoxic respiratory failure.  Resolved  Questionable cardiac arrest episode  NSTEMI/acute myocardial injury  Possible syncope  Lactic acidosis, resolved  Malnutrition.  BMI of 17  S/P Bronchoscopy 3/8/23 with mucous plugging removed  Dysphagia requiring Core Track placed this admission  Aspiration.  Severe aortic stenosis   Degenerative changes cervical spine / anterior osteophytes  New diagnosis of type 2 diabetes mellitus hemoglobin A1c of 8.1%       Plan:    Continue speech therapy.  Barium swallow noted and discussed with son and patient at bedside  Continue core track.   Started metformin.  Hemoglobin A1c noted  CT neck noted... anterior osteophytes w mod mass effect  Pulmonology consulted .....  have now signed off  Cardiology consulted   LAI attempted 3/9/23 but unable to complete secondary to core track  CT scan chest .... No PE.  Bilat pneumonia noted.  Status post bronchoscopy   Thick mucus removal  Food particles seen around the vocal cords  Positive for strep pneumo  Transthoracic echo suggesting normal EF% and severe AS  Continue albuterol nebulizer every 4 hours as needed  Continue bronchodilator protocol  Continue aspirin and Lopressor.  Lisinopril added  Sliding-scale insulin  Finished antibiotics  Appreciate palliative care input.  Patient is DNR/DNI.  Family and patient to let us know when to get PEG tube.  PT OT consult.  Discussed with nursing staff and .  [facility] to [facility] if he gets PEG placement.  .         DVT prophylaxis:  Medical DVT prophylaxis orders are present.    CODE STATUS:   Medical Intervention Limits: NO intubation (DNI)  Level Of Support Discussed With: Patient  Code Status (Patient has no pulse and is not breathing): No CPR (Do Not Attempt to Resuscitate)  Medical Interventions (Patient has pulse or is breathing): Limited Support             Electronically signed by Jez Santos MD, 03/17/23, 5:55 PM EDT.

## 2023-03-17 NOTE — PLAN OF CARE
Goal Outcome Evaluation:   Pt resting comfortably in bed. No signs of discomfort. Patient is AAOx4, in high spirits. Pt's son stated he will be here this morning to visit

## 2023-03-17 NOTE — SIGNIFICANT NOTE
Wound Eval / Progress Noted     [facility]     Patient Name: Buzz Lopez  : 1945  MRN: [id number]  Today's Date: 3/17/2023                 Admit Date: 3/6/2023    Visit Dx:    ICD-10-CM ICD-9-CM   1. Pneumonia of right lower lobe due to infectious organism  J18.9 486   2. Sepsis with acute hypoxic respiratory failure without septic shock, due to unspecified organism (HCC)  A41.9 038.9    R65.20 995.91    J96.01 518.81   3. Acute respiratory failure with hypoxia (HCC)  J96.01 518.81   4. Difficulty walking  R26.2 719.7   5. Oropharyngeal dysphagia  R13.12 787.22   6. Decreased activities of daily living (ADL)  Z78.9 V49.89       Patient Active Problem List   Diagnosis   • Severe malnutrition (HCC)   • Aspiration into airway        History reviewed. No pertinent past medical history.     Past Surgical History:   Procedure Laterality Date   • BRONCHOSCOPY N/A 3/8/2023    Procedure: BRONCHOSCOPY WITH BRONCHOALVEOLAR LAVAGE, WASHINGS;  Surgeon: Jason Rowley MD;  Location: [facility] ENDOSCOPY;  Service: Pulmonary;  Laterality: N/A;  MUCOUS PLUGGING   • EYE SURGERY           Physical Assessment:  Wound 23 1641 Bilateral medial gluteal MASD (Moisture associated skin damage) (Active)   Wound Image   23 1600   Dressing Appearance open to air 23 1205   Closure None 23 1205   Base blanchable;pink;epithelialization 23 1205   Red (%), Wound Tissue Color 100 23 1600   Periwound intact;blanchable;dry 23 1205   Periwound Temperature warm 23 1205   Periwound Skin Turgor soft 23 1205   Edges rolled/closed 23 1205   Drainage Amount none 23 1205   Care, Wound cleansed with;sterile normal saline 23 1205   Dressing Care open to air;skin barrier agent applied 23 1205   Periwound Care barrier ointment applied 23 1205       Wound 23 1641 Left anterior leg Abrasion (Active)   Wound Image   23 1600   Dressing Appearance open to air  03/17/23 1205   Closure None 03/17/23 1205   Base dry;scab 03/17/23 1205   Red (%), Wound Tissue Color 100 03/16/23 1600   Periwound intact;dry 03/17/23 1205   Periwound Temperature warm 03/17/23 1205   Periwound Skin Turgor soft 03/17/23 1205   Edges rolled/closed 03/17/23 1205   Drainage Amount none 03/17/23 1205   Care, Wound cleansed with;sterile normal saline 03/17/23 1205   Dressing Care open to air;skin barrier agent applied 03/17/23 1205   Periwound Care barrier ointment applied 03/17/23 1205        Wound Check / Follow-up:  Patient seen today for a wound consult. Patient currently with a coretrak in place; no skin break down noted to nose.   Buttocks with pink epithelium and closed tissue from a previously resolved injury. Tissue is dry and blanchable; periwound is intact and blanchable as well. Cleansed with NS. Applied blue top barrier cream to buttocks and left open to air. Patient is at an increased risk of skin break down. Recommending implementing Q2H turns and offloading heels at all times. Apply moisture barrier to buttocks TID/PRN and leave open to air.  Keep patient clean and dry at all times. Provide quality skin care and hygiene.  Scattered crusted areas to bilateral lower legs. No open or draining areas noted. Recommend to provide daily hygiene and apply moisturizer.     Short term goals:  Maintain skin integrity, pressure reduction, skin protection, topical treatment, daily hygiene    Yenifer Escamilla RN    3/17/2023    13:03 EDT

## 2023-03-17 NOTE — THERAPY TREATMENT NOTE
Acute Care - Speech Language Pathology   Swallow Treatment Note  C[facility]ina     Patient Name: Buzz Lopez  : 1945  MRN: [id number]  Today's Date: 3/17/2023               Admit Date: 3/6/2023    Visit Dx:     ICD-10-CM ICD-9-CM   1. Pneumonia of right lower lobe due to infectious organism  J18.9 486   2. Sepsis with acute hypoxic respiratory failure without septic shock, due to unspecified organism (HCC)  A41.9 038.9    R65.20 995.91    J96.01 518.81   3. Acute respiratory failure with hypoxia (HCC)  J96.01 518.81   4. Difficulty walking  R26.2 719.7   5. Oropharyngeal dysphagia  R13.12 787.22   6. Decreased activities of daily living (ADL)  Z78.9 V49.89     Patient Active Problem List   Diagnosis   • Severe malnutrition (HCC)   • Aspiration into airway     History reviewed. No pertinent past medical history.  Past Surgical History:   Procedure Laterality Date   • BRONCHOSCOPY N/A 3/8/2023    Procedure: BRONCHOSCOPY WITH BRONCHOALVEOLAR LAVAGE, WASHINGS;  Surgeon: Jason Rowley MD;  Location: [facility] ENDOSCOPY;  Service: Pulmonary;  Laterality: N/A;  MUCOUS PLUGGING   • EYE SURGERY         SPEECH PATHOLOGY DYSPHAGIA TREATMENT     Subjective/Behavioral Observations: Alert and cooperative, sitting up in bed.        Day/time of Treatment: 3/17/2023        Current Diet:  N.p.o. with core track        Current Strategies: N/A     Treatment received: Dysphagia therapy to address swallow function through exercises and education of strategies.        Results of treatment:   Laryngeal elevation exercises with effortful swallow, 3 +/5.  Tongue base exercises completed x3 sets.   Trials of ice chips x10, occasional vocal wetness with patient cued for clearing, occasional  spontaneous throat clear.   Small controlled sip of water x1 with patient producing multiple swallows, coughing.           Progress toward goals: Adequate     Barriers to Achieving goals: Goal status        Plan of care:/changes in plan: Continue with  current plan with patient to be n.p.o. with alternative feeding.  Patient will likely require longer term alternative feeding.  Okay for nursing to conservatively give small ice chips from time to time.                                                                                  Plan of Care Reviewed With: patient, family          EDUCATION  The patient has been educated in the following areas:   NPO rationale.              Time Calculation:    Time Calculation- SLP     Row Name 03/17/23 0928             Time Calculation- SLP    SLP Stop Time 0800  -TB      SLP Received On 03/17/23  -TB         Untimed Charges    03017-EG Treatment Swallow Minutes 45  -TB         Total Minutes    Untimed Charges Total Minutes 45  -TB       Total Minutes 45  -TB            User Key  (r) = Recorded By, (t) = Taken By, (c) = Cosigned By    Initials Name Provider Type    TB Terri Gomez SLP Speech and Language Pathologist                Therapy Charges for Today     Code Description Service Date Service Provider Modifiers Qty    88706918571  ST TREATMENT SWALLOW 3 3/17/2023 Terri Gomez SLP GN 1               JANAY Julio  3/17/2023

## 2023-03-17 NOTE — SIGNIFICANT NOTE
03/17/23 1300   SOCIAL WORK ASSESSMENT   Referral Source physician   Reason for Consult palliative care   Visit Type ongoing       Spoke to daughter-in-law and discussed the decision needing to be made on PEG or hospice within the next 24 hours. Family and pt are in agreement with PEG placement, but have to work out the financial components before proceeding. Pt does not have medicare or medicaid. Pt has retired federal employee insurance and they need to verify total cost.   Reached out to [employer]  and pt was referred to financial aid program. This program will reach out to family and discuss options.   Also, provided contact information for finance department to understand cost of procedure.   Sent resources via email and called and left message with DIL.     Updated MD.     Palliative will continue to monitor and provide support.     DONALD Duong

## 2023-03-18 LAB
GLUCOSE BLDC GLUCOMTR-MCNC: 106 MG/DL (ref 70–99)
GLUCOSE BLDC GLUCOMTR-MCNC: 151 MG/DL (ref 70–99)
GLUCOSE BLDC GLUCOMTR-MCNC: 152 MG/DL (ref 70–99)
GLUCOSE BLDC GLUCOMTR-MCNC: 166 MG/DL (ref 70–99)
GLUCOSE BLDC GLUCOMTR-MCNC: 189 MG/DL (ref 70–99)

## 2023-03-18 PROCEDURE — 82962 GLUCOSE BLOOD TEST: CPT

## 2023-03-18 PROCEDURE — 63710000001 INSULIN REGULAR HUMAN PER 5 UNITS: Performed by: INTERNAL MEDICINE

## 2023-03-18 PROCEDURE — 25010000002 ENOXAPARIN PER 10 MG: Performed by: INTERNAL MEDICINE

## 2023-03-18 PROCEDURE — 94664 DEMO&/EVAL PT USE INHALER: CPT

## 2023-03-18 PROCEDURE — 99232 SBSQ HOSP IP/OBS MODERATE 35: CPT | Performed by: INTERNAL MEDICINE

## 2023-03-18 PROCEDURE — 94799 UNLISTED PULMONARY SVC/PX: CPT

## 2023-03-18 RX ADMIN — ARFORMOTEROL TARTRATE 15 MCG: 15 SOLUTION RESPIRATORY (INHALATION) at 06:42

## 2023-03-18 RX ADMIN — ARFORMOTEROL TARTRATE 15 MCG: 15 SOLUTION RESPIRATORY (INHALATION) at 18:55

## 2023-03-18 RX ADMIN — LISINOPRIL 5 MG: 5 TABLET ORAL at 09:34

## 2023-03-18 RX ADMIN — METOPROLOL TARTRATE 12.5 MG: 25 TABLET, FILM COATED ORAL at 20:23

## 2023-03-18 RX ADMIN — METFORMIN HYDROCHLORIDE 500 MG: 500 TABLET ORAL at 18:00

## 2023-03-18 RX ADMIN — INSULIN HUMAN 2 UNITS: 100 INJECTION, SOLUTION PARENTERAL at 06:11

## 2023-03-18 RX ADMIN — ENOXAPARIN SODIUM 40 MG: 100 INJECTION SUBCUTANEOUS at 16:24

## 2023-03-18 RX ADMIN — ASPIRIN 81 MG 81 MG: 81 TABLET ORAL at 09:34

## 2023-03-18 RX ADMIN — BUDESONIDE 0.5 MG: 0.5 INHALANT ORAL at 18:55

## 2023-03-18 RX ADMIN — INSULIN HUMAN 2 UNITS: 100 INJECTION, SOLUTION PARENTERAL at 00:36

## 2023-03-18 RX ADMIN — METFORMIN HYDROCHLORIDE 500 MG: 500 TABLET ORAL at 09:34

## 2023-03-18 RX ADMIN — FAMOTIDINE 20 MG: 20 TABLET ORAL at 18:00

## 2023-03-18 RX ADMIN — Medication 10 ML: at 20:23

## 2023-03-18 RX ADMIN — BUDESONIDE 0.5 MG: 0.5 INHALANT ORAL at 06:42

## 2023-03-18 RX ADMIN — FAMOTIDINE 20 MG: 20 TABLET ORAL at 09:34

## 2023-03-18 RX ADMIN — METOPROLOL TARTRATE 12.5 MG: 25 TABLET, FILM COATED ORAL at 09:34

## 2023-03-18 RX ADMIN — Medication 10 ML: at 09:34

## 2023-03-18 RX ADMIN — INSULIN HUMAN 2 UNITS: 100 INJECTION, SOLUTION PARENTERAL at 13:30

## 2023-03-18 NOTE — PROGRESS NOTES
Date of service 3/18/2023    Subjective: No chest pain, SOB at rest, lightheadedness or palpitations.    Review of systems: No headaches, vertigo, nausea, vomiting, abdominal pain, fever, chills, TIA or CVA-like symptoms.    Physical examination: No pain or distress.  The feeding tube is in place.  Vital signs: Reviewed  HEENT: Sclerae nonicteric.  Neck: No JVD or carotid bruit.  Rotted upstroke was delayed.  Chest:   CTA.  No wheeze or rales.  Cardiac: RRR.  II/IV SM over the right and LSB.  No S3 gallop.   Abdomen: Soft and nontender.  No megalies or masses.  Extremities: No edema, cyanosis or clubbing.  Pulse intact.  Neuro: Alert, oriented x3, no facial droop and speech was clear.     Records: Reviewed.     Assessment and plan:     1.  Acute diastolic CHF.    2.  Syncope, may be vasovagal and/or due to severe aortic stenosis.   3.  Acute hypoxemic respiratory failure, improved.  4.  Bilateral aspiration pneumonia due to abnormal swallowing reflex.  Still PEG placement is planned.  5.  Reactive sinus tachycardia, secondary to the a the yon, resolved.  6.  Elevated troponin, mostly due to demand ischemia due to the above.    7.  Once again, preoperative (AVR) LAI and cardiac cardiac catheterization will be done later on.  We are waiting for PEG placement and the feeding tube will be pulled out.

## 2023-03-18 NOTE — PLAN OF CARE
Goal Outcome Evaluation:              Outcome Evaluation: Pt resting comfortably in bed. Call light within reach. VSS. No complaints or concerns voiced during shift.

## 2023-03-18 NOTE — PLAN OF CARE
Goal Outcome Evaluation:               Patient rested comfortably throughout shift. Continues on tube feeding. Tolerating well. No complaints of pain or discomfort.

## 2023-03-18 NOTE — PROGRESS NOTES
[facility]   Hospitalist Progress Note  Date: 3/18/2023  Patient Name: Buzz Lopez  : 1945  MRN: [id number]  Date of admission: 3/6/2023    Subjective   Subjective     Chief Complaint:   Shortness of breath, confusion    Summary:   Buzz Lopez is a 77 y.o. male with no significant past medical history has been brought into the ED with concerns for confusion, possible syncope, shortness of breath.  Patient states that for the past 1 to 2 weeks patient has been having difficulty breathing and subjective fevers, it has got worse and today and he has called his son to take him to the ED as he is having difficulty breathing.  By the time patient's son has arrived at the home, patient appeared to be confused, generalized weakness and unable to put his clothes on.  While his son was helping him to get the clothes on, patient appeared to be very confused and had a possible syncope episode where he was unresponsive.  Patient's son has looked for the pulse and they could not feel radial pulse and started chest compressions, EMS was called.  By the time EMS has arrived patient was receiving chest compressions from the family members.  EMS has evaluated the patient and they could not feel a pulse.  Patient was in respiratory distress, saturating around 70% on room air.  Received nebulizer treatment by the EMS and the patient has been brought into the ED.    During my examination, patient is alert and oriented x3 and able to answer questions appropriately.  States that he does not remember how he came to the hospital.  Denies any chest pain, nausea, vomiting, focal weakness, numbness, tingling.  States that he has been having difficulty breathing for the past 1 to 2 weeks.  A month ago he had some sore throat.  Associated with cough, productive sputum. Patient is thin and frail.  Chronically has a poor p.o. intake.  Admits that he has low appetite.  No previously diagnosed history of dementia.  As per the  family members, patient takes a lot of over-the-counter fiber supplement review does not gain weight despite he eats well.  Patients primary issue is aspiration, this is why he has had weight loss, this is why he was admitted for pneumonia.  I discussed with patient and family that given his high functional status normally I would not recommend this however with the PEG tube he could have significant improvement in his quality of life, the patient states he would not want a PEG tube.  We are attempting to clear the patient for a safe diet prior to discharge.  If we are unable to do so and patient is adamant that he wants to go home and assume the risk, patient will be most fitted for palliative care/hospice.  Modified barium swallow study showed  aspiration without coughing.  Patient hemoglobin A1c 8.1% diagnosed with diabetes mellitus.  Patient and family agreed to have PEG tube but due to lack of insurance they are trying to figure out finances and want to hold off until it can be taken care of.    Interval Followup:   Vital signs stable on room air with 98% saturation.  Tolerating tube feeding well via core track.  Patient wants to eat.  Some ice chips allowed by speech therapy  Patient still surprised with the diagnosis of diabetes mellitus  No other complaints    Objective   Objective     Vitals:   Temp:  [97.2 °F (36.2 °C)-97.4 °F (36.3 °C)] 97.4 °F (36.3 °C)  Heart Rate:  [] 83  Resp:  [16-18] 18  BP: (108-139)/(45-64) 112/45    Physical Exam   General appearance: NAD, conversant. Cacechtic  Eyes: anicteric sclerae, moist conjunctivae; no lid-lag;   HENT: Atraumatic; oropharynx clear with moist mucous membranes and no mucosal ulcerations; normal hard and soft palate  Neck: Trachea midline; FROM, supple, no thyromegaly or lymphadenopathy  Lungs: Bilateral rhonchi, with normal respiratory effort and no intercostal retractions    Result Review    Result Review:  I have personally reviewed the results as  below  [x]  Laboratory  CBC    CBC 3/14/23 3/15/23 3/16/23   WBC 4.70 5.96 6.97   RBC 4.54 4.14 3.96 (A)   Hemoglobin 13.2 12.3 (A) 11.6 (A)   Hematocrit 41.2 36.6 (A) 34.7 (A)   MCV 90.7 88.4 87.6   MCH 29.1 29.7 29.3   MCHC 32.0 33.6 33.4   RDW 13.3 12.8 13.0   Platelets 181 262 284   (A) Abnormal value            CMP    CMP 3/15/23 3/16/23 3/17/23   Glucose 124 (A) 219 (A) 158 (A)   BUN 25 (A) 27 (A) 24 (A)   Creatinine 0.52 (A) 0.60 (A) 0.61 (A)   eGFR 102.5 98.2 97.7   Sodium 138 135 (A) 133 (A)   Potassium 4.0 4.2 4.6   Chloride 97 (A) 97 (A) 98   Calcium 9.0 9.0 8.9   Total Protein 7.1 6.8    Albumin 2.9 (A) 2.8 (A) 2.5 (A)   Globulin 4.2 4.0    Total Bilirubin 0.4 0.4    Alkaline Phosphatase 76 87    AST (SGOT) 41 (A) 40    ALT (SGPT) 30 31    Albumin/Globulin Ratio 0.7 0.7    BUN/Creatinine Ratio 48.1 (A) 45.0 (A) 39.3 (A)   Anion Gap 5.0 5.3 8.7   (A) Abnormal value       Comments are available for some flowsheets but are not being displayed.           []  Microbiology  []  Radiology  []  EKG/Telemetry   []  Cardiology/Vascular   []  Pathology  []  Old records  []  Other:    Assessment & Plan   Assessment / Plan   Assessment:    Sepsis due to right lower lobe pneumonia (Streptococcus Pneumo).  Resolved  Acute hypoxic respiratory failure.  Resolved  Questionable cardiac arrest episode  NSTEMI/acute myocardial injury  Possible syncope  Lactic acidosis, resolved  Malnutrition.  BMI of 17  S/P Bronchoscopy 3/8/23 with mucous plugging removed  Dysphagia requiring Core Track placed this admission  Aspiration.  Severe aortic stenosis   Degenerative changes cervical spine / anterior osteophytes  New diagnosis of type 2 diabetes mellitus hemoglobin A1c of 8.1%       Plan:    Continue speech therapy.  Barium swallow noted and discussed with son and patient at bedside  Continue core track.   Started metformin.  Hemoglobin A1c noted  CT neck noted... anterior osteophytes w mod mass effect  Pulmonology consulted .....  have now signed off  Cardiology consulted   LAI attempted 3/9/23 but unable to complete secondary to core track  CT scan chest .... No PE.  Bilat pneumonia noted.  Status post bronchoscopy   Thick mucus removal  Food particles seen around the vocal cords  Positive for strep pneumo  Transthoracic echo suggesting normal EF% and severe AS  Continue albuterol nebulizer every 4 hours as needed  Continue bronchodilator protocol  Continue aspirin and Lopressor.  Lisinopril added  Sliding-scale insulin  Finished antibiotics  Appreciate palliative care input.  Patient is DNR/DNI.  Family and patient to let us know when to get PEG tube.  PT OT consult.  Discussed with nursing staff and .  Discharge to [facility] if he gets PEG placement.  .         DVT prophylaxis:  Medical DVT prophylaxis orders are present.    CODE STATUS:   Medical Intervention Limits: NO intubation (DNI)  Level Of Support Discussed With: Patient  Code Status (Patient has no pulse and is not breathing): No CPR (Do Not Attempt to Resuscitate)  Medical Interventions (Patient has pulse or is breathing): Limited Support               Electronically signed by Jez Santos MD, 03/18/23, 5:24 PM EDT.

## 2023-03-18 NOTE — PLAN OF CARE
Goal Outcome Evaluation:  Pt resting comfortably today. No complaints. Family in to visit today.

## 2023-03-19 LAB
GLUCOSE BLDC GLUCOMTR-MCNC: 126 MG/DL (ref 70–99)
GLUCOSE BLDC GLUCOMTR-MCNC: 144 MG/DL (ref 70–99)
GLUCOSE BLDC GLUCOMTR-MCNC: 156 MG/DL (ref 70–99)
GLUCOSE BLDC GLUCOMTR-MCNC: 178 MG/DL (ref 70–99)

## 2023-03-19 PROCEDURE — 94664 DEMO&/EVAL PT USE INHALER: CPT

## 2023-03-19 PROCEDURE — 94799 UNLISTED PULMONARY SVC/PX: CPT

## 2023-03-19 PROCEDURE — 97530 THERAPEUTIC ACTIVITIES: CPT

## 2023-03-19 PROCEDURE — 82962 GLUCOSE BLOOD TEST: CPT

## 2023-03-19 PROCEDURE — 99232 SBSQ HOSP IP/OBS MODERATE 35: CPT | Performed by: INTERNAL MEDICINE

## 2023-03-19 PROCEDURE — 63710000001 INSULIN REGULAR HUMAN PER 5 UNITS: Performed by: INTERNAL MEDICINE

## 2023-03-19 PROCEDURE — 25010000002 ENOXAPARIN PER 10 MG: Performed by: INTERNAL MEDICINE

## 2023-03-19 PROCEDURE — 97110 THERAPEUTIC EXERCISES: CPT

## 2023-03-19 RX ADMIN — METOPROLOL TARTRATE 12.5 MG: 25 TABLET, FILM COATED ORAL at 09:42

## 2023-03-19 RX ADMIN — BUDESONIDE 0.5 MG: 0.5 INHALANT ORAL at 06:32

## 2023-03-19 RX ADMIN — ASPIRIN 81 MG 81 MG: 81 TABLET ORAL at 09:42

## 2023-03-19 RX ADMIN — FAMOTIDINE 20 MG: 20 TABLET ORAL at 17:43

## 2023-03-19 RX ADMIN — INSULIN HUMAN 2 UNITS: 100 INJECTION, SOLUTION PARENTERAL at 12:31

## 2023-03-19 RX ADMIN — METFORMIN HYDROCHLORIDE 850 MG: 850 TABLET ORAL at 20:16

## 2023-03-19 RX ADMIN — ARFORMOTEROL TARTRATE 15 MCG: 15 SOLUTION RESPIRATORY (INHALATION) at 18:24

## 2023-03-19 RX ADMIN — LISINOPRIL 5 MG: 5 TABLET ORAL at 09:42

## 2023-03-19 RX ADMIN — Medication 10 ML: at 09:42

## 2023-03-19 RX ADMIN — METFORMIN HYDROCHLORIDE 500 MG: 500 TABLET ORAL at 09:42

## 2023-03-19 RX ADMIN — Medication 10 ML: at 20:16

## 2023-03-19 RX ADMIN — FAMOTIDINE 20 MG: 20 TABLET ORAL at 06:06

## 2023-03-19 RX ADMIN — ARFORMOTEROL TARTRATE 15 MCG: 15 SOLUTION RESPIRATORY (INHALATION) at 06:32

## 2023-03-19 RX ADMIN — INSULIN HUMAN 2 UNITS: 100 INJECTION, SOLUTION PARENTERAL at 00:12

## 2023-03-19 RX ADMIN — ENOXAPARIN SODIUM 40 MG: 100 INJECTION SUBCUTANEOUS at 15:39

## 2023-03-19 RX ADMIN — BUDESONIDE 0.5 MG: 0.5 INHALANT ORAL at 18:24

## 2023-03-19 RX ADMIN — METOPROLOL TARTRATE 12.5 MG: 25 TABLET, FILM COATED ORAL at 20:16

## 2023-03-19 NOTE — PLAN OF CARE
Goal Outcome Evaluation:               VSS. Patient ambulated in room and with PT in the hallway. Tolerated well. Current resting in chair with no complaints of pain or discomfort. Will continues current care plan.

## 2023-03-19 NOTE — PROGRESS NOTES
[facility]   Hospitalist Progress Note  Date: 3/19/2023  Patient Name: Buzz Lopez  : 1945  MRN: [id number]  Date of admission: 3/6/2023    Subjective   Subjective     Chief Complaint:   Shortness of breath, confusion    Summary:   Buzz Lopez is a 77 y.o. male with no significant past medical history has been brought into the ED with concerns for confusion, possible syncope, shortness of breath.  Patient states that for the past 1 to 2 weeks patient has been having difficulty breathing and subjective fevers, it has got worse and today and he has called his son to take him to the ED as he is having difficulty breathing.  By the time patient's son has arrived at the home, patient appeared to be confused, generalized weakness and unable to put his clothes on.  While his son was helping him to get the clothes on, patient appeared to be very confused and had a possible syncope episode where he was unresponsive.  Patient's son has looked for the pulse and they could not feel radial pulse and started chest compressions, EMS was called.  By the time EMS has arrived patient was receiving chest compressions from the family members.  EMS has evaluated the patient and they could not feel a pulse.  Patient was in respiratory distress, saturating around 70% on room air.  Received nebulizer treatment by the EMS and the patient has been brought into the ED.    During my examination, patient is alert and oriented x3 and able to answer questions appropriately.  States that he does not remember how he came to the hospital.  Denies any chest pain, nausea, vomiting, focal weakness, numbness, tingling.  States that he has been having difficulty breathing for the past 1 to 2 weeks.  A month ago he had some sore throat.  Associated with cough, productive sputum. Patient is thin and frail.  Chronically has a poor p.o. intake.  Admits that he has low appetite.  No previously diagnosed history of dementia.  As per the  family members, patient takes a lot of over-the-counter fiber supplement review does not gain weight despite he eats well.  Patients primary issue is aspiration, this is why he has had weight loss, this is why he was admitted for pneumonia.  I discussed with patient and family that given his high functional status normally I would not recommend this however with the PEG tube he could have significant improvement in his quality of life, the patient states he would not want a PEG tube.  We are attempting to clear the patient for a safe diet prior to discharge.  If we are unable to do so and patient is adamant that he wants to go home and assume the risk, patient will be most fitted for palliative care/hospice.  Modified barium swallow study showed  aspiration without coughing.  Patient hemoglobin A1c 8.1% diagnosed with diabetes mellitus.  Patient and family agreed to have PEG tube but due to lack of insurance they are trying to figure out finances and want to hold off until it can be taken care of.    Interval Followup:   Vital signs stable on room air with 98% saturation.  Tolerating tube feeding well via core track.  Patient wants to eat.  Some ice chips allowed by speech therapy  Patient still surprised with the diagnosis of diabetes mellitus  Ambulating with upper therapy and walker in hallway.  No other complaints    Objective   Objective     Vitals:   Temp:  [97.3 °F (36.3 °C)-97.7 °F (36.5 °C)] 97.3 °F (36.3 °C)  Heart Rate:  [62-73] 64  Resp:  [18] 18  BP: (108-141)/(44-60) 116/49    Physical Exam   General appearance: NAD, conversant. Cacechtic  Eyes: anicteric sclerae, moist conjunctivae; no lid-lag;   HENT: Atraumatic; oropharynx clear with moist mucous membranes and no mucosal ulcerations; normal hard and soft palate  Neck: Trachea midline; FROM, supple, no thyromegaly or lymphadenopathy  Lungs: Bilateral rhonchi, with normal respiratory effort and no intercostal retractions    Result Review    Result  Review:  I have personally reviewed the results as below  [x]  Laboratory  CBC    CBC 3/14/23 3/15/23 3/16/23   WBC 4.70 5.96 6.97   RBC 4.54 4.14 3.96 (A)   Hemoglobin 13.2 12.3 (A) 11.6 (A)   Hematocrit 41.2 36.6 (A) 34.7 (A)   MCV 90.7 88.4 87.6   MCH 29.1 29.7 29.3   MCHC 32.0 33.6 33.4   RDW 13.3 12.8 13.0   Platelets 181 262 284   (A) Abnormal value            CMP    CMP 3/15/23 3/16/23 3/17/23   Glucose 124 (A) 219 (A) 158 (A)   BUN 25 (A) 27 (A) 24 (A)   Creatinine 0.52 (A) 0.60 (A) 0.61 (A)   eGFR 102.5 98.2 97.7   Sodium 138 135 (A) 133 (A)   Potassium 4.0 4.2 4.6   Chloride 97 (A) 97 (A) 98   Calcium 9.0 9.0 8.9   Total Protein 7.1 6.8    Albumin 2.9 (A) 2.8 (A) 2.5 (A)   Globulin 4.2 4.0    Total Bilirubin 0.4 0.4    Alkaline Phosphatase 76 87    AST (SGOT) 41 (A) 40    ALT (SGPT) 30 31    Albumin/Globulin Ratio 0.7 0.7    BUN/Creatinine Ratio 48.1 (A) 45.0 (A) 39.3 (A)   Anion Gap 5.0 5.3 8.7   (A) Abnormal value       Comments are available for some flowsheets but are not being displayed.           []  Microbiology  []  Radiology  []  EKG/Telemetry   []  Cardiology/Vascular   []  Pathology  []  Old records  []  Other:    Assessment & Plan   Assessment / Plan   Assessment:    Sepsis due to right lower lobe pneumonia (Streptococcus Pneumo).  Resolved  Acute hypoxic respiratory failure.  Resolved  Questionable cardiac arrest episode  NSTEMI/acute myocardial injury  Possible syncope  Lactic acidosis, resolved  Malnutrition.  BMI of 17  S/P Bronchoscopy 3/8/23 with mucous plugging removed  Dysphagia requiring Core Track placed this admission  Aspiration.  Severe aortic stenosis   Degenerative changes cervical spine / anterior osteophytes  New diagnosis of type 2 diabetes mellitus hemoglobin A1c of 8.1%       Plan:    Continue speech therapy.  Barium swallow noted and discussed with son and patient at bedside  Continue core track.  Tube feed with Glucerna.  Started metformin.  Dose uptitrated.  Hemoglobin  A1c noted  CT neck noted... anterior osteophytes w mod mass effect  Pulmonology consulted ..... have now signed off  Cardiology consulted   LAI attempted 3/9/23 but unable to complete secondary to core track  CT scan chest .... No PE.  Bilat pneumonia noted.  Status post bronchoscopy   Thick mucus removal  Food particles seen around the vocal cords  Positive for strep pneumo  Transthoracic echo suggesting normal EF% and severe AS  Continue albuterol nebulizer every 4 hours as needed  Continue bronchodilator protocol  Continue aspirin and Lopressor.  Lisinopril added  Sliding-scale insulin  Finished antibiotics  Appreciate palliative care input.  Patient is DNR/DNI.  Family and patient to let us know when to get PEG tube.  PT OT consult.  Diabetic nurse educator consulted  Discussed with nursing staff and .  Discharge to [facility] if he gets PEG placement.  .         DVT prophylaxis:  Medical DVT prophylaxis orders are present.    CODE STATUS:   Medical Intervention Limits: NO intubation (DNI)  Level Of Support Discussed With: Patient  Code Status (Patient has no pulse and is not breathing): No CPR (Do Not Attempt to Resuscitate)  Medical Interventions (Patient has pulse or is breathing): Limited Support               Electronically signed by Jez Santos MD, 03/19/23, 4:02 PM EDT.

## 2023-03-19 NOTE — PLAN OF CARE
Goal Outcome Evaluation:              Outcome Evaluation: Pt resting throughout shift.  VSS.  No acute issues during this shift.

## 2023-03-19 NOTE — PROGRESS NOTES
Respiratory Therapist Broncho-Pulmonary Hygiene Progress Note      Patient Name:  Buzz Lopez  YOB: 1945    Buzz Lopez meets the qualification for Level 2 of the Bronco-Pulmonary Hygiene Protocol. This was based on my daily patient assessment and includes review of chest x-ray results, cough ability and quality, oxygenation, secretions or risk for secretion development and patient mobility.     Broncho-Pulmonary Hygiene Assessment:    Level of Movement: Actively changing positions-requires assistance  Disoriented/Follows Commands    Breath Sounds: Clear to slightly diminished    Cough: Strong, effective    Chest X-Ray: Possible signs of consolidation and/or atelectasis or clear. 3/10/2023 Read:  1. Bibasilar airspace disease with significant improvement on the right compared with the last   study.    Sputum Productions: None or small amount of thin or watery secretions with effective cough    History and Physical: None    SpO2 to Oxygen Need: greater than 92% on room air or  less than 3L nasal canula    Current SpO2 is: 97 on room air    Based on this information, I have completed the following interventions: Teach/Instruct patient on cough and deep breathe. Encourage patient to deep breath and cough. Patient has a strong non-productive cough. Patient did Aerobika Positive Expiratory Pressure Therapy well after aerosolized treatments were given. Aerobika therapy frequency was changed to PRN per Bronchopulmonary Protocol. Respiratory Therapy will continue to monitor patient and make changes.       Electronically signed by Joana Parkinson RRT, 03/19/23, 7:09 AM EDT.

## 2023-03-19 NOTE — THERAPY TREATMENT NOTE
Acute Care - Physical Therapy Treatment Note   [facility]     Patient Name: Buzz Lopez  : 1945  MRN: [id number]  Today's Date: 3/19/2023      Visit Dx:     ICD-10-CM ICD-9-CM   1. Pneumonia of right lower lobe due to infectious organism  J18.9 486   2. Sepsis with acute hypoxic respiratory failure without septic shock, due to unspecified organism (HCC)  A41.9 038.9    R65.20 995.91    J96.01 518.81   3. Acute respiratory failure with hypoxia (HCC)  J96.01 518.81   4. Difficulty walking  R26.2 719.7   5. Oropharyngeal dysphagia  R13.12 787.22   6. Decreased activities of daily living (ADL)  Z78.9 V49.89     Patient Active Problem List   Diagnosis   • Severe malnutrition (HCC)   • Aspiration into airway     History reviewed. No pertinent past medical history.  Past Surgical History:   Procedure Laterality Date   • BRONCHOSCOPY N/A 3/8/2023    Procedure: BRONCHOSCOPY WITH BRONCHOALVEOLAR LAVAGE, WASHINGS;  Surgeon: Jason Rowley MD;  Location: [facility] ENDOSCOPY;  Service: Pulmonary;  Laterality: N/A;  MUCOUS PLUGGING   • EYE SURGERY       PT Assessment (last 12 hours)     PT Evaluation and Treatment     Row Name 23 1438          Physical Therapy Time and Intention    Subjective Information complains of;weakness;fatigue;pain  -DK     Document Type therapy note (daily note)  -DK     Mode of Treatment individual therapy;physical therapy  -DK     Patient Effort good  -DK     Symptoms Noted During/After Treatment fatigue;increased pain  -DK     Comment Pt reports minor throat pain.  -DK     Row Name 23 1438          Pain    Pretreatment Pain Rating 10  -DK     Posttreatment Pain Rating 10  -DK     Pain Location generalized  -DK     Pain Location - throat  -DK     Pain Intervention(s) Repositioned;Ambulation/increased activity;Distraction;Therapeutic presence  -DK     Row Name 23 1438          Cognition    Affect/Mental Status (Cognition) WFL  -DK     Orientation Status (Cognition) oriented  to;person;situation  -DK     Follows Commands (Cognition) WFL  -DK     Cognitive Function WFL  -DK     Personal Safety Interventions gait belt;nonskid shoes/slippers when out of bed;supervised activity  -DK     Row Name 03/19/23 1438          Transfers    Transfers sit-stand transfer;stand-sit transfer  -DK     Row Name 03/19/23 1438          Sit-Stand Transfer    Sit-Stand [place] (Transfers) standby assist;contact guard;1 person assist  -DK     Assistive Device (Sit-Stand Transfers) walker, front-wheeled  -DK     Row Name 03/19/23 1438          Stand-Sit Transfer    Stand-Sit [place] (Transfers) standby assist;contact guard;1 person assist  -DK     Assistive Device (Stand-Sit Transfers) walker, front-wheeled  -DK     Row Name 03/19/23 1438          Gait/Stairs (Locomotion)    Gait/Stairs Locomotion gait/ambulation independence;gait/ambulation assistive device;distance ambulated;gait pattern  -DK     [place] Level (Gait) standby assist;contact guard;1 person assist  -DK     Assistive Device (Gait) walker, front-wheeled  -DK     Distance in Feet (Gait) 80  -DK     Pattern (Gait) step-through  -DK     Deviations/Abnormal Patterns (Gait) festinating/shuffling  -DK     Bilateral Gait Deviations forward flexed posture  -DK     Comment, (Gait/Stairs) Pt ambulated on room air with a rolling walker.  He required the bathroom, so extended gait deferred.  Pt was left in the recliner post treatment.  -     Row Name 03/19/23 1438          Safety Issues, Functional Mobility    Safety Issues Affecting Function (Mobility) impulsivity;judgment;awareness of need for assistance;safety precaution awareness  -DK     Impairments Affecting Function (Mobility) balance;endurance/activity tolerance;pain;strength  -     Row Name 03/19/23 1438          Balance    Balance Assessment sitting static balance;sitting dynamic balance;standing static balance;standing dynamic balance  -DK     Static Sitting Balance standby assist   -DK     Dynamic Sitting Balance standby assist  -DK     Position, Sitting Balance unsupported;sitting in chair  -DK     Static Standing Balance standby assist;contact guard;1-person assist  -DK     Dynamic Standing Balance standby assist;contact guard;1-person assist  -DK     Position/Device Used, Standing Balance walker, front-wheeled  -DK     Balance Interventions standing;dynamic;tandem gait  -     Row Name 03/19/23 1438          Motor Skills    Motor Skills --  therapeutic exercises  -DK     Coordination WFL  -DK     Therapeutic Exercise hip;knee;ankle  -     Row Name 03/19/23 1438          Hip (Therapeutic Exercise)    Hip (Therapeutic Exercise) AAROM (active assistive range of motion)  -     Hip AAROM (Therapeutic Exercise) bilateral;flexion;extension;aBduction;aDduction;supine;10 repetitions;2 sets  -     Row Name 03/19/23 1438          Knee (Therapeutic Exercise)    Knee (Therapeutic Exercise) AAROM (active assistive range of motion)  -     Knee AAROM (Therapeutic Exercise) bilateral;flexion;extension;supine;10 repetitions;2 sets  -     Row Name 03/19/23 1438          Ankle (Therapeutic Exercise)    Ankle (Therapeutic Exercise) AAROM (active assistive range of motion)  -     Ankle AAROM (Therapeutic Exercise) bilateral;dorsiflexion;plantarflexion;supine;10 repetitions;2 sets  -     Row Name             Wound 03/16/23 1641 Bilateral medial gluteal MASD (Moisture associated skin damage)    Wound - Properties Group Placement Date: 03/16/23  - Placement Time: 1641  -MC Side: Bilateral  -MC Orientation: medial  -MC Location: gluteal  -MC Primary Wound Type: MASD  -MC    Retired Wound - Properties Group Placement Date: 03/16/23  - Placement Time: 1641  -MC Side: Bilateral  -MC Orientation: medial  -MC Location: gluteal  -MC Primary Wound Type: MASD  -MC    Retired Wound - Properties Group Date first assessed: 03/16/23  - Time first assessed: 1641  -MC Side: Bilateral  -MC Location: gluteal   -MC Primary Wound Type: MASD  -MC    Row Name             Wound 03/16/23 1641 Left anterior leg Abrasion    Wound - Properties Group Placement Date: 03/16/23  - Placement Time: 1641 -MC Side: Left  -MC Orientation: anterior  -MC Location: leg  -MC Primary Wound Type: Abrasion  -MC    Retired Wound - Properties Group Placement Date: 03/16/23  - Placement Time: 1641  -MC Side: Left  -MC Orientation: anterior  -MC Location: leg  -MC Primary Wound Type: Abrasion  -MC    Retired Wound - Properties Group Date first assessed: 03/16/23  - Time first assessed: 1641 - Side: Left  -MC Location: leg  -MC Primary Wound Type: Abrasion  -MC    Row Name 03/19/23 1438          Plan of Care Review    Plan of Care Reviewed With patient  -     Progress improving  -     Row Name 03/19/23 1438          Positioning and Restraints    Pre-Treatment Position sitting in chair/recliner  -DK     Post Treatment Position chair  -DK     In Chair reclined;call light within reach;encouraged to call for assist;legs elevated  -     Row Name 03/19/23 1438          Therapy Assessment/Plan (PT)    Rehab Potential (PT) good, to achieve stated therapy goals  -     Criteria for Skilled Interventions Met (PT) skilled treatment is necessary  -     Therapy Frequency (PT) daily  -     Problem List (PT) problems related to;balance;mobility;strength;hearing  -     Activity Limitations Related to Problem List (PT) unable to ambulate safely;unable to transfer safely  -     Row Name 03/19/23 1438          Progress Summary (PT)    Progress Toward Functional Goals (PT) progress toward functional goals is good  -           User Key  (r) = Recorded By, (t) = Taken By, (c) = Cosigned By    Initials Name Provider Type    Lilly Maciel, RN Registered Nurse    Luisana Serrano PTA Physical Therapist Assistant                Physical Therapy Education     Title: PT OT SLP Therapies (Done)     Topic: Physical Therapy (Done)     Point: Mobility  training (Done)     Learning Progress Summary           Patient Acceptance, E, VU by ALO at 3/8/2023 1531    Acceptance, E, VU by POONAM at 3/7/2023 0909                               User Key     Initials Effective Dates Name Provider Type Discipline    ALO 09/21/21 -  Heavenly Quach, RN Registered Nurse Nurse    POONAM 01/11/23 -  Ephraim Juares, NORMAN Student PT Student PT              PT Recommendation and Plan  Planned Therapy Interventions (PT): balance training, bed mobility training, gait training, strengthening, transfer training  Therapy Frequency (PT): daily  Progress Summary (PT)  Progress Toward Functional Goals (PT): progress toward functional goals is good  Plan of Care Reviewed With: patient  Progress: improving   Outcome Measures     Row Name 03/19/23 1437 03/17/23 1400          How much help from another person do you currently need...    Turning from your back to your side while in flat bed without using bedrails? 3  -DK 4  -RH     Moving from lying on back to sitting on the side of a flat bed without bedrails? 3  -DK 3  -RH     Moving to and from a bed to a chair (including a wheelchair)? 3  -DK 3  -RH     Standing up from a chair using your arms (e.g., wheelchair, bedside chair)? 3  -DK 3  -RH     Climbing 3-5 steps with a railing? 2  -DK 3  -RH     To walk in hospital room? 3  -DK 3  -RH     AM-PAC 6 Clicks Score (PT) 17  -DK 19  -RH        Functional Assessment    Outcome Measure Options AM-PAC 6 Clicks Basic Mobility (PT)  -DK --           User Key  (r) = Recorded By, (t) = Taken By, (c) = Cosigned By    Initials Name Provider Type    RH Mahad Whaley, MARTITA Physical Therapist Assistant    Luiasna Serrano PTA Physical Therapist Assistant                 Time Calculation:    PT Charges     Row Name 03/19/23 1442             Time Calculation    PT Received On 03/19/23  -      PT Goal Re-Cert Due Date 03/25/23  -         Timed Charges    60239 - PT Therapeutic Exercise Minutes 14  -DK      65987 - Gait  Training Minutes  6  -DK      68377 - PT Therapeutic Activity Minutes 8  -DK         Total Minutes    Timed Charges Total Minutes 28  -DK       Total Minutes 28  -DK            User Key  (r) = Recorded By, (t) = Taken By, (c) = Cosigned By    Initials Name Provider Type    Luisana Serrano PTA Physical Therapist Assistant              Therapy Charges for Today     Code Description Service Date Service Provider Modifiers Qty    46439710591 HC PT THER PROC EA 15 MIN 3/19/2023 Luisana Ramirez PTA GP 1    30032412071 HC PT THERAPEUTIC ACT EA 15 MIN 3/19/2023 uLisana Ramirez PTA GP 1          PT G-Codes  Outcome Measure Options: AM-PAC 6 Clicks Basic Mobility (PT)  AM-PAC 6 Clicks Score (PT): 17  AM-PAC 6 Clicks Score (OT): 19    Luisana Ramirez PTA  3/19/2023

## 2023-03-20 LAB
GLUCOSE BLDC GLUCOMTR-MCNC: 105 MG/DL (ref 70–99)
GLUCOSE BLDC GLUCOMTR-MCNC: 122 MG/DL (ref 70–99)
GLUCOSE BLDC GLUCOMTR-MCNC: 154 MG/DL (ref 70–99)
GLUCOSE BLDC GLUCOMTR-MCNC: 170 MG/DL (ref 70–99)

## 2023-03-20 PROCEDURE — 92526 ORAL FUNCTION THERAPY: CPT

## 2023-03-20 PROCEDURE — 82962 GLUCOSE BLOOD TEST: CPT

## 2023-03-20 PROCEDURE — 94799 UNLISTED PULMONARY SVC/PX: CPT

## 2023-03-20 PROCEDURE — 25010000002 ENOXAPARIN PER 10 MG: Performed by: INTERNAL MEDICINE

## 2023-03-20 PROCEDURE — 99232 SBSQ HOSP IP/OBS MODERATE 35: CPT | Performed by: INTERNAL MEDICINE

## 2023-03-20 PROCEDURE — 97116 GAIT TRAINING THERAPY: CPT

## 2023-03-20 PROCEDURE — 63710000001 INSULIN REGULAR HUMAN PER 5 UNITS: Performed by: INTERNAL MEDICINE

## 2023-03-20 PROCEDURE — 94664 DEMO&/EVAL PT USE INHALER: CPT

## 2023-03-20 RX ORDER — DIPHENHYDRAMINE HYDROCHLORIDE AND LIDOCAINE HYDROCHLORIDE AND ALUMINUM HYDROXIDE AND MAGNESIUM HYDRO
10 KIT EVERY 6 HOURS
Status: DISCONTINUED | OUTPATIENT
Start: 2023-03-20 | End: 2023-03-29 | Stop reason: HOSPADM

## 2023-03-20 RX ADMIN — LISINOPRIL 5 MG: 5 TABLET ORAL at 09:57

## 2023-03-20 RX ADMIN — Medication 10 ML: at 09:49

## 2023-03-20 RX ADMIN — FAMOTIDINE 20 MG: 20 TABLET ORAL at 09:57

## 2023-03-20 RX ADMIN — METOPROLOL TARTRATE 12.5 MG: 25 TABLET, FILM COATED ORAL at 09:57

## 2023-03-20 RX ADMIN — DIPHENHYDRAMINE HYDROCHLORIDE AND LIDOCAINE HYDROCHLORIDE AND ALUMINUM HYDROXIDE AND MAGNESIUM HYDRO 10 ML: KIT at 09:50

## 2023-03-20 RX ADMIN — BUDESONIDE 0.5 MG: 0.5 INHALANT ORAL at 06:53

## 2023-03-20 RX ADMIN — ENOXAPARIN SODIUM 40 MG: 100 INJECTION SUBCUTANEOUS at 15:21

## 2023-03-20 RX ADMIN — ARFORMOTEROL TARTRATE 15 MCG: 15 SOLUTION RESPIRATORY (INHALATION) at 06:53

## 2023-03-20 RX ADMIN — BUDESONIDE 0.5 MG: 0.5 INHALANT ORAL at 19:00

## 2023-03-20 RX ADMIN — DIPHENHYDRAMINE HYDROCHLORIDE AND LIDOCAINE HYDROCHLORIDE AND ALUMINUM HYDROXIDE AND MAGNESIUM HYDRO 10 ML: KIT at 01:32

## 2023-03-20 RX ADMIN — METOPROLOL TARTRATE 12.5 MG: 25 TABLET, FILM COATED ORAL at 20:56

## 2023-03-20 RX ADMIN — INSULIN HUMAN 2 UNITS: 100 INJECTION, SOLUTION PARENTERAL at 12:44

## 2023-03-20 RX ADMIN — Medication 10 ML: at 20:57

## 2023-03-20 RX ADMIN — METFORMIN HYDROCHLORIDE 850 MG: 850 TABLET ORAL at 09:49

## 2023-03-20 RX ADMIN — DIPHENHYDRAMINE HYDROCHLORIDE AND LIDOCAINE HYDROCHLORIDE AND ALUMINUM HYDROXIDE AND MAGNESIUM HYDRO 10 ML: KIT at 15:21

## 2023-03-20 RX ADMIN — FAMOTIDINE 20 MG: 20 TABLET ORAL at 19:11

## 2023-03-20 RX ADMIN — DIPHENHYDRAMINE HYDROCHLORIDE AND LIDOCAINE HYDROCHLORIDE AND ALUMINUM HYDROXIDE AND MAGNESIUM HYDRO 10 ML: KIT at 20:56

## 2023-03-20 RX ADMIN — ASPIRIN 81 MG 81 MG: 81 TABLET ORAL at 09:57

## 2023-03-20 RX ADMIN — INSULIN HUMAN 2 UNITS: 100 INJECTION, SOLUTION PARENTERAL at 00:27

## 2023-03-20 RX ADMIN — METFORMIN HYDROCHLORIDE 850 MG: 850 TABLET ORAL at 19:11

## 2023-03-20 RX ADMIN — ARFORMOTEROL TARTRATE 15 MCG: 15 SOLUTION RESPIRATORY (INHALATION) at 19:00

## 2023-03-20 NOTE — CONSULTS
"Nutrition Services    Patient Name: Buzz Lopez  YOB: 1945  MRN: [id number]  Admission date: 3/6/2023      CLINICAL NUTRITION ASSESSMENT      Reason for Assessment  Follow-up protocol, EN    H&P:    History reviewed. No pertinent past medical history.     Current Problems:   Active Hospital Problems    Diagnosis    • Aspiration into airway    • Severe malnutrition (HCC)         Nutrition/Diet History         Narrative     Nutrition follow up.  Patient had MBS and SLP cannot recommend safe PO intake.  Cortrak was placed on 3/07.  Tolerating enteral nutrition well at goal rate.    Patient is hyponatremic.  Will decrease free water flushes today.  All other electrolytes WDL at this time.  Will CTM per protocol.     Anthropometrics        Current Height, Weight Height: 177.8 cm (70\")  Weight: 53.8 kg (118 lb 9.7 oz)   Current BMI Body mass index is 17.02 kg/m².       Weight Hx  Wt Readings from Last 30 Encounters:   03/14/23 1233 53.8 kg (118 lb 9.7 oz)   03/13/23 1506 51.7 kg (113 lb 15.7 oz)   03/06/23 0824 61.2 kg (135 lb)   06/09/22 0154 61.4 kg (135 lb 5.8 oz)            Wt Change Observation Questionable accuracy of weight on 3/06. JACKY.      Estimated/Assessed Needs       Energy Requirements 30-35 kcal/kg    EST Needs (kcal/day) 4731-6455       Protein Requirements 1.2-1.5 g/kg    EST Daily Needs (g/day) 65-81       Fluid Requirements 1 ml/kcal    Estimated Needs (mL/day) 2424-3020     Labs/Medications         Pertinent Labs Reviewed.   Results from last 7 days   Lab Units 03/17/23  0450 03/16/23  0504 03/15/23  0419 03/14/23  0440   SODIUM mmol/L 133* 135* 138 134*   POTASSIUM mmol/L 4.6 4.2 4.0 4.2   CHLORIDE mmol/L 98 97* 97* 97*   CO2 mmol/L 26.3 32.7* 36.0* 28.6   BUN mg/dL 24* 27* 25* 25*   CREATININE mg/dL 0.61* 0.60* 0.52* 0.54*   CALCIUM mg/dL 8.9 9.0 9.0 9.0   BILIRUBIN mg/dL  --  0.4 0.4 0.4   ALK PHOS U/L  --  87 76 84   ALT (SGPT) U/L  --  31 30 26   AST (SGOT) U/L  --  40 41* 37 "   GLUCOSE mg/dL 158* 219* 124* 227*     Results from last 7 days   Lab Units 03/17/23  0450 03/16/23  0504 03/15/23  0419   MAGNESIUM mg/dL 2.1 2.0 2.1   PHOSPHORUS mg/dL 3.1 2.8 3.4   HEMOGLOBIN g/dL  --  11.6* 12.3*   HEMATOCRIT %  --  34.7* 36.6*     COVID19   Date Value Ref Range Status   03/06/2023 Not Detected Not Detected - Ref. Range Final     Lab Results   Component Value Date    HGBA1C 8.10 (H) 03/17/2023         Pertinent Medications Reviewed.     Current Nutrition Orders & Evaluation of Intake       Oral Nutrition     Current PO Diet NPO Diet NPO Type: Strict NPO   Supplement Orders Placed This Encounter      Place Feeding Tube Per Babble System      Diet, Tube Feeding Tube Feeding Formula: Diabetisource AC (Glucerna 1.2); Tube Feeding Type: Continuous; Continuous Tube Feeding Start Rate (mL/hr): 25; Then Advance Rate By (mL/hr): 25; Every __ Hours: 4; To Goal Rate of (mL/hr): 65       Malnutrition Severity Assessment      Patient meets criteria for : Severe Malnutrition         Nutrition Diagnosis         Nutrition Dx Problem 1 Severe malnutrition related to Inability to consume sufficient energy as evidenced by body composition changes., NPO and SLP/swallow eval     Nutrition Intervention         Diabetisource AC @ 65 ml/hr   Free water flushes 25 ml every 4 hours.  Provides 1872 kcal, 94 g pro, 1429 ml fluid      Medical Nutrition Therapy/Nutrition Education          Learner     Readiness N/A  N/A     Method     Response N/A  N/A     Monitor/Evaluation        Monitor I&O, Pertinent labs, EN delivery/tolerance, Weight, Skin status, GI status, Swallow function, Hemodynamic stability, RFS     Nutrition Discharge Plan         To be determined     Electronically signed by:  Sharon David RD  03/20/23 08:12 EDT

## 2023-03-20 NOTE — THERAPY TREATMENT NOTE
Acute Care - Physical Therapy Treatment Note   [facility]     Patient Name: Buzz Lopez  : 1945  MRN: [id number]  Today's Date: 3/20/2023      Visit Dx:     ICD-10-CM ICD-9-CM   1. Pneumonia of right lower lobe due to infectious organism  J18.9 486   2. Sepsis with acute hypoxic respiratory failure without septic shock, due to unspecified organism (HCC)  A41.9 038.9    R65.20 995.91    J96.01 518.81   3. Acute respiratory failure with hypoxia (HCC)  J96.01 518.81   4. Difficulty walking  R26.2 719.7   5. Oropharyngeal dysphagia  R13.12 787.22   6. Decreased activities of daily living (ADL)  Z78.9 V49.89     Patient Active Problem List   Diagnosis   • Severe malnutrition (HCC)   • Aspiration into airway     History reviewed. No pertinent past medical history.  Past Surgical History:   Procedure Laterality Date   • BRONCHOSCOPY N/A 3/8/2023    Procedure: BRONCHOSCOPY WITH BRONCHOALVEOLAR LAVAGE, WASHINGS;  Surgeon: Jason Rowley MD;  Location: [facility] ENDOSCOPY;  Service: Pulmonary;  Laterality: N/A;  MUCOUS PLUGGING   • EYE SURGERY       PT Assessment (last 12 hours)     PT Evaluation and Treatment     Row Name 23 1450          Physical Therapy Time and Intention    Subjective Information no complaints (P)   -AT     Document Type therapy note (daily note) (P)   -AT     Mode of Treatment individual therapy;physical therapy (P)   -AT     Patient Effort good (P)   -AT     Symptoms Noted During/After Treatment fatigue (P)   -AT     Row Name 23 1450          General Information    Patient Profile Reviewed yes (P)   -AT     Patient Observations alert;cooperative;agree to therapy (P)   -AT     Equipment Currently Used at Home none (P)   -AT     Existing Precautions/Restrictions fall (P)   -AT     Barriers to Rehab none identified (P)   -AT     Row Name 23 1450          Cognition    Affect/Mental Status (Cognition) WFL (P)   -AT     Orientation Status (Cognition) oriented to;person;situation (P)    -AT     Follows Commands (Cognition) WFL (P)   -AT     Cognitive Function WFL (P)   -AT     Row Name 03/20/23 1450          Bed Mobility    Bed Mobility supine-sit;sit-supine (P)   -AT     All Activities, [place] (Bed Mobility) standby assist (P)   -AT     Supine-Sit [place] (Bed Mobility) standby assist (P)   -AT     Sit-Supine [place] (Bed Mobility) standby assist (P)   -AT     Bed Mobility, Safety Issues decreased use of legs for bridging/pushing (P)   -AT     Assistive Device (Bed Mobility) bed rails (P)   -AT     Row Name 03/20/23 1450          Transfers    Transfers sit-stand transfer;stand-sit transfer (P)   -AT     Row Name 03/20/23 1450          Sit-Stand Transfer    Sit-Stand [place] (Transfers) contact guard (P)   -AT     Assistive Device (Sit-Stand Transfers) walker, front-wheeled (P)   -AT     Row Name 03/20/23 1450          Stand-Sit Transfer    Stand-Sit [place] (Transfers) contact guard (P)   -AT     Assistive Device (Stand-Sit Transfers) walker, front-wheeled (P)   -AT     Row Name 03/20/23 1450          Gait/Stairs (Locomotion)    Gait/Stairs Locomotion gait/ambulation independence;gait/ambulation assistive device (P)   -AT     [place] Level (Gait) contact guard (P)   -AT     Assistive Device (Gait) walker, front-wheeled (P)   -AT     Distance in Feet (Gait) 60 (P)   -AT     Pattern (Gait) step-through (P)   -AT     Deviations/Abnormal Patterns (Gait) festinating/shuffling (P)   -AT     Bilateral Gait Deviations forward flexed posture (P)   -AT     Row Name 03/20/23 1450          Balance    Balance Assessment standing dynamic balance (P)   -AT     Dynamic Standing Balance contact guard (P)   -AT     Position/Device Used, Standing Balance walker, front-wheeled (P)   -AT     Row Name             Wound 03/16/23 1641 Bilateral medial gluteal MASD (Moisture associated skin damage)    Wound - Properties Group Placement Date: 03/16/23  - Placement Time: 1641 - Side:  Bilateral  -MC Orientation: medial  -MC Location: gluteal  -MC Primary Wound Type: MASD  -MC    Retired Wound - Properties Group Placement Date: 03/16/23  - Placement Time: 1641  -MC Side: Bilateral  -MC Orientation: medial  -MC Location: gluteal  -MC Primary Wound Type: MASD  -MC    Retired Wound - Properties Group Date first assessed: 03/16/23  - Time first assessed: 1641  -MC Side: Bilateral  -MC Location: gluteal  -MC Primary Wound Type: MASD  -MC    Row Name             Wound 03/16/23 1641 Left anterior leg Abrasion    Wound - Properties Group Placement Date: 03/16/23  - Placement Time: 1641  -MC Side: Left  -MC Orientation: anterior  -MC Location: leg  -MC Primary Wound Type: Abrasion  -MC    Retired Wound - Properties Group Placement Date: 03/16/23  - Placement Time: 1641  -MC Side: Left  -MC Orientation: anterior  -MC Location: leg  -MC Primary Wound Type: Abrasion  -MC    Retired Wound - Properties Group Date first assessed: 03/16/23  - Time first assessed: 1641  -MC Side: Left  -MC Location: leg  -MC Primary Wound Type: Abrasion  -MC    Row Name 03/20/23 1450          Progress Summary (PT)    Daily Progress Summary (PT) Patient tolerated ambulating in room today with moderate difficulty due to fatigue. He will continue to benefit from physical therapy services while in the hospital. (P)   -AT           User Key  (r) = Recorded By, (t) = Taken By, (c) = Cosigned By    Initials Name Provider Type     Lilly Merritt, RN Registered Nurse    AT [facility], PT Student PT Student                Physical Therapy Education     Title: PT OT SLP Therapies (Done)     Topic: Physical Therapy (Done)     Point: Mobility training (Done)     Learning Progress Summary           Patient Acceptance, E, VU by ALO at 3/8/2023 1531    Acceptance, E, VU by POONAM at 3/7/2023 0909                               User Key     Initials Effective Dates Name Provider Type Shruthi PRAJAPATI 09/21/21 -  Heavenly Quach, CARLOS  Registered Nurse Nurse    POONAM 01/11/23 -  Ephraim Juares, PT Student PT Student PT              PT Recommendation and Plan     Progress Summary (PT)  Daily Progress Summary (PT): (P) Patient tolerated ambulating in room today with moderate difficulty due to fatigue. He will continue to benefit from physical therapy services while in the hospital.   Outcome Measures     Row Name 03/20/23 1452 03/19/23 1437          How much help from another person do you currently need...    Turning from your back to your side while in flat bed without using bedrails? 3 (P)   -AT 3  -DK     Moving from lying on back to sitting on the side of a flat bed without bedrails? 3 (P)   -AT 3  -DK     Moving to and from a bed to a chair (including a wheelchair)? 3 (P)   -AT 3  -DK     Standing up from a chair using your arms (e.g., wheelchair, bedside chair)? 3 (P)   -AT 3  -DK     Climbing 3-5 steps with a railing? 3 (P)   -AT 2  -DK     To walk in hospital room? 3 (P)   -AT 3  -DK     AM-PAC 6 Clicks Score (PT) 18 (P)   -AT 17  -DK        Functional Assessment    Outcome Measure Options AM-PAC 6 Clicks Basic Mobility (PT) (P)   -AT AM-PAC 6 Clicks Basic Mobility (PT)  -DK           User Key  (r) = Recorded By, (t) = Taken By, (c) = Cosigned By    Initials Name Provider Type    Luisana Serrano, PTA Physical Therapist Assistant    AT Olga Christie, PT Student PT Student                 Time Calculation:    PT Charges     Row Name 03/20/23 1452             Time Calculation    PT Received On 03/20/23 (P)   -AT         Timed Charges    23951 - Gait Training Minutes  15 (P)   -AT         Total Minutes    Timed Charges Total Minutes 15 (P)   -AT       Total Minutes 15 (P)   -AT            User Key  (r) = Recorded By, (t) = Taken By, (c) = Cosigned By    Initials Name Provider Type    AT Olga Christie, PT Student PT Student              Therapy Charges for Today     Code Description Service Date Service Provider Modifiers Qty    24951990554   GAIT TRAINING EA 15 MIN 3/20/2023 Olga Christie, PT Student GP 1          PT G-Codes  Outcome Measure Options: (P) AM-PAC 6 Clicks Basic Mobility (PT)  AM-PAC 6 Clicks Score (PT): (P) 18  AM-PAC 6 Clicks Score (OT): 19    Olga Christie, PT Student  3/20/2023

## 2023-03-20 NOTE — PROGRESS NOTES
[facility]   Hospitalist Progress Note  Date: 3/20/2023  Patient Name: Buzz Lopez  : 1945  MRN: [id number]  Date of admission: 3/6/2023    Subjective   Subjective     Chief Complaint:   Shortness of breath, confusion    Summary:   Buzz Lopez is a 77 y.o. male with no significant past medical history has been brought into the ED with concerns for confusion, possible syncope, shortness of breath.  Patient states that for the past 1 to 2 weeks patient has been having difficulty breathing and subjective fevers, it has got worse and today and he has called his son to take him to the ED as he is having difficulty breathing.  By the time patient's son has arrived at the home, patient appeared to be confused, generalized weakness and unable to put his clothes on.  While his son was helping him to get the clothes on, patient appeared to be very confused and had a possible syncope episode where he was unresponsive.  Patient's son has looked for the pulse and they could not feel radial pulse and started chest compressions, EMS was called.  By the time EMS has arrived patient was receiving chest compressions from the family members.  EMS has evaluated the patient and they could not feel a pulse.  Patient was in respiratory distress, saturating around 70% on room air.  Received nebulizer treatment by the EMS and the patient has been brought into the ED.    During my examination, patient is alert and oriented x3 and able to answer questions appropriately.  States that he does not remember how he came to the hospital.  Denies any chest pain, nausea, vomiting, focal weakness, numbness, tingling.  States that he has been having difficulty breathing for the past 1 to 2 weeks.  A month ago he had some sore throat.  Associated with cough, productive sputum. Patient is thin and frail.  Chronically has a poor p.o. intake.  Admits that he has low appetite.  No previously diagnosed history of dementia.  As per the  family members, patient takes a lot of over-the-counter fiber supplement review does not gain weight despite he eats well.  Patients primary issue is aspiration, this is why he has had weight loss, this is why he was admitted for pneumonia.  I discussed with patient and family that given his high functional status normally I would not recommend this however with the PEG tube he could have significant improvement in his quality of life, the patient states he would not want a PEG tube.  We are attempting to clear the patient for a safe diet prior to discharge.  If we are unable to do so and patient is adamant that he wants to go home and assume the risk, patient will be most fitted for palliative care/hospice.  Modified barium swallow study showed  aspiration without coughing.  Patient hemoglobin A1c 8.1% diagnosed with diabetes mellitus.  Patient and family agreed to have PEG tube but  they are trying to figure out finances and want to hold off until it can be taken care of.    Interval Followup:   Vital signs stable on room air with 98% saturation.  Tolerating tube feeding well via core track.  Patient wants to eat.  Some ice chips allowed by speech therapy  Denies any family history of diabetes mellitus.  Ambulating with therapy and walker in hallway.  No other complaints    Objective   Objective     Vitals:   Temp:  [97.2 °F (36.2 °C)-98 °F (36.7 °C)] 97.3 °F (36.3 °C)  Heart Rate:  [63-73] 65  Resp:  [18-20] 18  BP: (103-144)/(48-68) 127/53    Physical Exam   General appearance: NAD, conversant. Cacechtic  Eyes: anicteric sclerae, moist conjunctivae; no lid-lag;   HENT: Atraumatic; oropharynx clear with moist mucous membranes and no mucosal ulcerations; normal hard and soft palate  Neck: Trachea midline; FROM, supple, no thyromegaly or lymphadenopathy  Lungs: Bilateral rhonchi, with normal respiratory effort and no intercostal retractions    Result Review    Result Review:  I have personally reviewed the results  as below  [x]  Laboratory  CBC    CBC 3/14/23 3/15/23 3/16/23   WBC 4.70 5.96 6.97   RBC 4.54 4.14 3.96 (A)   Hemoglobin 13.2 12.3 (A) 11.6 (A)   Hematocrit 41.2 36.6 (A) 34.7 (A)   MCV 90.7 88.4 87.6   MCH 29.1 29.7 29.3   MCHC 32.0 33.6 33.4   RDW 13.3 12.8 13.0   Platelets 181 262 284   (A) Abnormal value            CMP    CMP 3/15/23 3/16/23 3/17/23   Glucose 124 (A) 219 (A) 158 (A)   BUN 25 (A) 27 (A) 24 (A)   Creatinine 0.52 (A) 0.60 (A) 0.61 (A)   eGFR 102.5 98.2 97.7   Sodium 138 135 (A) 133 (A)   Potassium 4.0 4.2 4.6   Chloride 97 (A) 97 (A) 98   Calcium 9.0 9.0 8.9   Total Protein 7.1 6.8    Albumin 2.9 (A) 2.8 (A) 2.5 (A)   Globulin 4.2 4.0    Total Bilirubin 0.4 0.4    Alkaline Phosphatase 76 87    AST (SGOT) 41 (A) 40    ALT (SGPT) 30 31    Albumin/Globulin Ratio 0.7 0.7    BUN/Creatinine Ratio 48.1 (A) 45.0 (A) 39.3 (A)   Anion Gap 5.0 5.3 8.7   (A) Abnormal value       Comments are available for some flowsheets but are not being displayed.           []  Microbiology  []  Radiology  []  EKG/Telemetry   []  Cardiology/Vascular   []  Pathology  []  Old records  []  Other:    Assessment & Plan   Assessment / Plan   Assessment:    Sepsis due to right lower lobe pneumonia (Streptococcus Pneumo).  Resolved  Acute hypoxic respiratory failure.  Resolved  Questionable cardiac arrest episode  NSTEMI/acute myocardial injury  Possible syncope  Lactic acidosis, resolved  Malnutrition.  BMI of 17  S/P Bronchoscopy 3/8/23 with mucous plugging removed  Dysphagia requiring Core Track placed this admission  Aspiration.  Severe aortic stenosis   Degenerative changes cervical spine / anterior osteophytes  New diagnosis of type 2 diabetes mellitus hemoglobin A1c of 8.1%       Plan:    Continue speech therapy.  Barium swallow noted and discussed with son and patient at bedside  Continue core track.  Tube feed with Glucerna.  Started metformin.  Dose uptitrated.  Hemoglobin A1c noted  CT neck noted... anterior osteophytes w  mod mass effect  Pulmonology consulted ..... have now signed off  Cardiology consulted   LAI attempted 3/9/23 but unable to complete secondary to core track  CT scan chest .... No PE.  Bilat pneumonia noted.  Status post bronchoscopy   Thick mucus removal  Food particles seen around the vocal cords  Positive for strep pneumo  Transthoracic echo suggesting normal EF% and severe AS  Continue albuterol nebulizer every 4 hours as needed  Continue bronchodilator protocol  Continue aspirin and Lopressor.  Lisinopril added  Sliding-scale insulin  Finished antibiotics  Appreciate palliative care input.  Patient is DNR/DNI.  GI consulted for PEG placement  PT OT consult.  Diabetic nurse educator consulted  Discussed with nursing staff and .  Discharge to [facility] of  rut versus [place] after  PEG placement.  .         DVT prophylaxis:  Medical DVT prophylaxis orders are present.    CODE STATUS:   Medical Intervention Limits: NO intubation (DNI)  Level Of Support Discussed With: Patient  Code Status (Patient has no pulse and is not breathing): No CPR (Do Not Attempt to Resuscitate)  Medical Interventions (Patient has pulse or is breathing): Limited Support             Electronically signed by Jez Santos MD, 03/20/23, 4:47 PM EDT.

## 2023-03-20 NOTE — THERAPY TREATMENT NOTE
Acute Care - Speech Language Pathology   Swallow Treatment Note  [facility]     Patient Name: Buzz Lopez  : 1945  MRN: [id number]  Today's Date: 3/20/2023               Admit Date: 3/6/2023    Visit Dx:     ICD-10-CM ICD-9-CM   1. Pneumonia of right lower lobe due to infectious organism  J18.9 486   2. Sepsis with acute hypoxic respiratory failure without septic shock, due to unspecified organism (HCC)  A41.9 038.9    R65.20 995.91    J96.01 518.81   3. Acute respiratory failure with hypoxia (HCC)  J96.01 518.81   4. Difficulty walking  R26.2 719.7   5. Oropharyngeal dysphagia  R13.12 787.22   6. Decreased activities of daily living (ADL)  Z78.9 V49.89     Patient Active Problem List   Diagnosis   • Severe malnutrition (HCC)   • Aspiration into airway     History reviewed. No pertinent past medical history.  Past Surgical History:   Procedure Laterality Date   • BRONCHOSCOPY N/A 3/8/2023    Procedure: BRONCHOSCOPY WITH BRONCHOALVEOLAR LAVAGE, WASHINGS;  Surgeon: Jason Rowley MD;  Location: [facility] ENDOSCOPY;  Service: Pulmonary;  Laterality: N/A;  MUCOUS PLUGGING   • EYE SURGERY         SPEECH PATHOLOGY DYSPHAGIA TREATMENT     Subjective/Behavioral Observations: Alert and cooperative, sitting up in bed.        Day/time of Treatment: 3/20/2023        Current Diet:  N.p.o. with core track        Current Strategies: N/A     Treatment received: Dysphagia therapy to address swallow function through exercises and education of strategies.        Results of treatment:   Laryngeal elevation exercises with effortful swallow, 3 +/5.  Tongue base exercises completed x3 sets.   Trials of ice chips x10, occasional vocal wetness with patient cued for clearing, frequent cough noted.   Patient reports finding swallow exercises on the Internet.  Reviewed exercises with patient.  Patient okay to try breath-holding techniques.  Not ready for Marixa maneuver, educated to continue working on exercise techniques already in  place.     Progress toward goals: Adequate     Barriers to Achieving goals: Goal status        Plan of care:/changes in plan: Continue with current plan with patient to be n.p.o. with alternative feeding.  Patient will likely require longer term alternative feeding.  Okay for nursing to conservatively give small ice chips from time to time.                                                                             Plan of Care Reviewed With: patient, family          EDUCATION  The patient has been educated in the following areas:   Dysphagia (Swallowing Impairment).              Time Calculation:    Time Calculation- SLP     Row Name 03/20/23 1127             Time Calculation- SLP    SLP Stop Time 1128  -TB      SLP Received On 03/20/23  -TB         Untimed Charges    76398-TO Treatment Swallow Minutes 45  -TB         Total Minutes    Untimed Charges Total Minutes 45  -TB       Total Minutes 45  -TB            User Key  (r) = Recorded By, (t) = Taken By, (c) = Cosigned By    Initials Name Provider Type    Terri Rg SLP Speech and Language Pathologist                Therapy Charges for Today     Code Description Service Date Service Provider Modifiers Qty    [id number]  ST TREATMENT SWALLOW 3 3/20/2023 Terri Gomez SLP GN 1               JANAY Julio  3/20/2023

## 2023-03-20 NOTE — PLAN OF CARE
Goal Outcome Evaluation:              Outcome Evaluation: Pt resting comfortably in bed. Call light within reach. VSS. Pt had some complaints of sore throat, magic mouth wash ordered and pt expressed relief. No further complaints or concerns voiced during shift.

## 2023-03-20 NOTE — SIGNIFICANT NOTE
03/20/23 1324   Coping/Psychosocial   Additional Documentation Spiritual Care (Group)   Spiritual Care   Use of Spiritual Resources spirituality for coping, indicated strong use of   Spiritual Care Source  initiative   Response to Spiritual Care thanks expressed;engaged in conversation   Spiritual Care Interventions supportive conversation provided   Spiritual Care Visit Type follow-up   Spiritual Care Request spiritual/moral support   Receptivity to Spiritual Care visit welcomed     At time of visit pt is on 3NT

## 2023-03-20 NOTE — PLAN OF CARE
Goal Outcome Evaluation:              Outcome Evaluation: VSS. Patient ambulated with PT around the room. Tolerated well. No complaints of pain or discomfort. Family plans to move ahead with PEG TUBE insertion.

## 2023-03-21 ENCOUNTER — PREP FOR SURGERY (OUTPATIENT)
Dept: OTHER | Facility: HOSPITAL | Age: 78
End: 2023-03-21
Payer: COMMERCIAL

## 2023-03-21 DIAGNOSIS — T17.908A ASPIRATION INTO AIRWAY, INITIAL ENCOUNTER: Primary | ICD-10-CM

## 2023-03-21 LAB
DEPRECATED RDW RBC AUTO: 41.3 FL (ref 37–54)
ERYTHROCYTE [DISTWIDTH] IN BLOOD BY AUTOMATED COUNT: 13.3 % (ref 12.3–15.4)
GLUCOSE BLDC GLUCOMTR-MCNC: 130 MG/DL (ref 70–99)
GLUCOSE BLDC GLUCOMTR-MCNC: 130 MG/DL (ref 70–99)
GLUCOSE BLDC GLUCOMTR-MCNC: 149 MG/DL (ref 70–99)
GLUCOSE BLDC GLUCOMTR-MCNC: 150 MG/DL (ref 70–99)
HCT VFR BLD AUTO: 33.3 % (ref 37.5–51)
HGB BLD-MCNC: 11.2 G/DL (ref 13–17.7)
HOLD SPECIMEN: NORMAL
INR PPP: 1.01 (ref 0.86–1.15)
MCH RBC QN AUTO: 29.3 PG (ref 26.6–33)
MCHC RBC AUTO-ENTMCNC: 33.6 G/DL (ref 31.5–35.7)
MCV RBC AUTO: 87.2 FL (ref 79–97)
PLATELET # BLD AUTO: 237 10*3/MM3 (ref 140–450)
PMV BLD AUTO: 11.4 FL (ref 6–12)
PROTHROMBIN TIME: 13.4 SECONDS (ref 11.8–14.9)
RBC # BLD AUTO: 3.82 10*6/MM3 (ref 4.14–5.8)
WBC NRBC COR # BLD: 5.33 10*3/MM3 (ref 3.4–10.8)

## 2023-03-21 PROCEDURE — 94799 UNLISTED PULMONARY SVC/PX: CPT

## 2023-03-21 PROCEDURE — 92526 ORAL FUNCTION THERAPY: CPT

## 2023-03-21 PROCEDURE — 99232 SBSQ HOSP IP/OBS MODERATE 35: CPT | Performed by: INTERNAL MEDICINE

## 2023-03-21 PROCEDURE — 82962 GLUCOSE BLOOD TEST: CPT

## 2023-03-21 PROCEDURE — 94664 DEMO&/EVAL PT USE INHALER: CPT

## 2023-03-21 PROCEDURE — 99222 1ST HOSP IP/OBS MODERATE 55: CPT | Performed by: INTERNAL MEDICINE

## 2023-03-21 PROCEDURE — 25010000002 ENOXAPARIN PER 10 MG: Performed by: INTERNAL MEDICINE

## 2023-03-21 PROCEDURE — 85610 PROTHROMBIN TIME: CPT | Performed by: INTERNAL MEDICINE

## 2023-03-21 PROCEDURE — 97110 THERAPEUTIC EXERCISES: CPT

## 2023-03-21 PROCEDURE — 63710000001 INSULIN REGULAR HUMAN PER 5 UNITS: Performed by: INTERNAL MEDICINE

## 2023-03-21 PROCEDURE — 85027 COMPLETE CBC AUTOMATED: CPT | Performed by: INTERNAL MEDICINE

## 2023-03-21 RX ORDER — CEFAZOLIN SODIUM 1 G/50ML
1 INJECTION, SOLUTION INTRAVENOUS
Status: DISCONTINUED | OUTPATIENT
Start: 2023-03-21 | End: 2023-03-21

## 2023-03-21 RX ORDER — CEFAZOLIN SODIUM 1 G/50ML
1 INJECTION, SOLUTION INTRAVENOUS
Status: COMPLETED | OUTPATIENT
Start: 2023-03-22 | End: 2023-03-22

## 2023-03-21 RX ADMIN — METFORMIN HYDROCHLORIDE 850 MG: 850 TABLET ORAL at 17:09

## 2023-03-21 RX ADMIN — LISINOPRIL 5 MG: 5 TABLET ORAL at 09:06

## 2023-03-21 RX ADMIN — Medication 10 ML: at 21:11

## 2023-03-21 RX ADMIN — METOPROLOL TARTRATE 12.5 MG: 25 TABLET, FILM COATED ORAL at 09:06

## 2023-03-21 RX ADMIN — ASPIRIN 81 MG 81 MG: 81 TABLET ORAL at 09:06

## 2023-03-21 RX ADMIN — FAMOTIDINE 20 MG: 20 TABLET ORAL at 09:06

## 2023-03-21 RX ADMIN — BUDESONIDE 0.5 MG: 0.5 INHALANT ORAL at 06:35

## 2023-03-21 RX ADMIN — POLYETHYLENE GLYCOL 3350 17 G: 17 POWDER, FOR SOLUTION ORAL at 09:08

## 2023-03-21 RX ADMIN — FAMOTIDINE 20 MG: 20 TABLET ORAL at 17:09

## 2023-03-21 RX ADMIN — POLYETHYLENE GLYCOL 3350 17 G: 17 POWDER, FOR SOLUTION ORAL at 21:10

## 2023-03-21 RX ADMIN — ARFORMOTEROL TARTRATE 15 MCG: 15 SOLUTION RESPIRATORY (INHALATION) at 18:27

## 2023-03-21 RX ADMIN — ENOXAPARIN SODIUM 40 MG: 100 INJECTION SUBCUTANEOUS at 14:46

## 2023-03-21 RX ADMIN — METOPROLOL TARTRATE 12.5 MG: 25 TABLET, FILM COATED ORAL at 21:10

## 2023-03-21 RX ADMIN — DIPHENHYDRAMINE HYDROCHLORIDE AND LIDOCAINE HYDROCHLORIDE AND ALUMINUM HYDROXIDE AND MAGNESIUM HYDRO 10 ML: KIT at 21:11

## 2023-03-21 RX ADMIN — METFORMIN HYDROCHLORIDE 850 MG: 850 TABLET ORAL at 09:06

## 2023-03-21 RX ADMIN — BUDESONIDE 0.5 MG: 0.5 INHALANT ORAL at 18:27

## 2023-03-21 RX ADMIN — INSULIN HUMAN 2 UNITS: 100 INJECTION, SOLUTION PARENTERAL at 07:28

## 2023-03-21 RX ADMIN — ARFORMOTEROL TARTRATE 15 MCG: 15 SOLUTION RESPIRATORY (INHALATION) at 06:35

## 2023-03-21 RX ADMIN — Medication 10 ML: at 09:07

## 2023-03-21 NOTE — THERAPY TREATMENT NOTE
Acute Care - Speech Language Pathology   Swallow Treatment Note  [facility]     Patient Name: Buzz Lopez  : 1945  MRN: [id number]  Today's Date: 3/21/2023               Admit Date: 3/6/2023    Visit Dx:     ICD-10-CM ICD-9-CM   1. Pneumonia of right lower lobe due to infectious organism  J18.9 486   2. Sepsis with acute hypoxic respiratory failure without septic shock, due to unspecified organism (HCC)  A41.9 038.9    R65.20 995.91    J96.01 518.81   3. Acute respiratory failure with hypoxia (HCC)  J96.01 518.81   4. Difficulty walking  R26.2 719.7   5. Oropharyngeal dysphagia  R13.12 787.22   6. Decreased activities of daily living (ADL)  Z78.9 V49.89     Patient Active Problem List   Diagnosis   • Severe malnutrition (HCC)   • Aspiration into airway     History reviewed. No pertinent past medical history.  Past Surgical History:   Procedure Laterality Date   • BRONCHOSCOPY N/A 3/8/2023    Procedure: BRONCHOSCOPY WITH BRONCHOALVEOLAR LAVAGE, WASHINGS;  Surgeon: Jason Rowley MD;  Location: [facility] ENDOSCOPY;  Service: Pulmonary;  Laterality: N/A;  MUCOUS PLUGGING   • EYE SURGERY       SPEECH PATHOLOGY DYSPHAGIA TREATMENT     Subjective/Behavioral Observations: Alert and cooperative, sitting up in bed.        Day/time of Treatment: 3/21/2023        Current Diet:  N.p.o. with core track        Current Strategies: N/A     Treatment received: Dysphagia therapy to address swallow function through exercises and education of strategies.        Results of treatment:   Laryngeal elevation exercises with effortful swallow, 3 +/5.  Tongue base exercises completed x3 sets.   Trials of ice chips x10, frequent cough noted.       Progress toward goals: Adequate     Barriers to Achieving goals: Medical status        Plan of care:/changes in plan: Continue with current plan with patient to be n.p.o. with alternative feeding.  Patient will likely require longer term alternative feeding.  Okay for nursing to conservatively  give small ice chips from time to time.                                                                               Plan of Care Reviewed With: patient, family          EDUCATION  The patient has been educated in the following areas:   Dysphagia (Swallowing Impairment).              Time Calculation:    Time Calculation- SLP     Row Name 03/21/23 0940             Time Calculation- SLP    SLP Stop Time 0800  -TB      SLP Received On 03/21/23  -TB         Untimed Charges    81384-AC Treatment Swallow Minutes 40  -TB         Total Minutes    Untimed Charges Total Minutes 40  -TB       Total Minutes 40  -TB            User Key  (r) = Recorded By, (t) = Taken By, (c) = Cosigned By    Initials Name Provider Type    TB Terri Gomez SLP Speech and Language Pathologist                Therapy Charges for Today     Code Description Service Date Service Provider Modifiers Qty    43286976978 HC ST TREATMENT SWALLOW 3 3/20/2023 Terri Gomez SLP GN 1    03678949633 HC ST TREATMENT SWALLOW 3 3/21/2023 Terri Gomez SLP GN 1               JANAY Julio  3/21/2023

## 2023-03-21 NOTE — H&P (VIEW-ONLY)
[facility], [facility]  Initial Inpatient Consult Note    Referring Provider: Hospitalist    Reason for Consultation: Dysphagia, aspiration pneumonia    Subjective     History of present illness:    77 y.o. male admitted with aspiration pneumonia and now found to have continued difficulty in swallowing.  Speech and language evaluation recommended long-term alternative to oral feedings.  He currently has core track in place which she is tolerating well.  His son is at the bedside.  Denies any abdominal pain, nausea, vomiting fevers or chills.  His respiratory status is significantly improved.    Past Medical History:  History reviewed. No pertinent past medical history.  Past Surgical History:  Past Surgical History:   Procedure Laterality Date   • BRONCHOSCOPY N/A 3/8/2023    Procedure: BRONCHOSCOPY WITH BRONCHOALVEOLAR LAVAGE, WASHINGS;  Surgeon: Jason Rowley MD;  [facility];  Service: Pulmonary;  Laterality: N/A;  MUCOUS PLUGGING   • EYE SURGERY        Social History:   Social History     Tobacco Use   • Smoking status: Former     Passive exposure: Never   • Smokeless tobacco: Not on file   Substance Use Topics   • Alcohol use: Never      Family History:  History reviewed. No pertinent family history.    Home Meds:  Medications Prior to Admission   Medication Sig Dispense Refill Last Dose   • multivitamin with minerals tablet tablet Take 1 tablet by mouth 2 (Two) Times a Week.        Current Meds:   arformoterol, 15 mcg, Nebulization, BID - RT  aspirin, 81 mg, Nasogastric, Daily  budesonide, 0.5 mg, Nebulization, BID - RT  enoxaparin, 40 mg, Subcutaneous, Q24H  famotidine, 20 mg, Nasogastric, BID AC  First Mouthwash (Magic Mouthwash), 10 mL, Swish & Spit, Q6H  insulin regular, 2-7 Units, Subcutaneous, Q6H  lisinopril, 5 mg, Nasogastric, Q24H  metFORMIN, 850 mg, Per G Tube, BID With Meals  metoprolol tartrate, 12.5 mg, Nasogastric, BID  sodium chloride, 10 mL, Intravenous,  Q12H      Allergies:  No Known Allergies  Review of Systems  Pertinent items are noted in HPI, all other systems reviewed and negative         Vital Signs  Temp:  [96.7 °F (35.9 °C)-97.8 °F (36.6 °C)] 96.7 °F (35.9 °C)  Heart Rate:  [63-74] 70  Resp:  [18-20] 18  BP: (111-153)/(47-67) 111/47  Physical Exam:  General Appearance:    Alert, cooperative, in no acute distress   Head:    Normocephalic, without obvious abnormality, atraumatic   Eyes:          conjunctivae and sclerae normal, no   icterus   Throat:   no thrush, oral mucosa moist   Neck:   Supple, no adenopathy   Lungs:     Clear to auscultation bilaterally    Heart:    Regular rhythm and normal rate    Chest Wall:    No abnormalities observed   Abdomen:     Soft, nondistended, nontender; normal bowel sounds   Extremities:   no edema, no redness   Skin:   No bruising or rash   Psychiatric:  normal mood and insight     Results Review:  [x]  Laboratory   [x]  Radiology  []  Pathology      I reviewed the patient's new clinical results.    Results from last 7 days   Lab Units 03/21/23  0543 03/16/23  0504 03/15/23  0419   WBC 10*3/mm3 5.33 6.97 5.96   HEMOGLOBIN g/dL 11.2* 11.6* 12.3*   HEMATOCRIT % 33.3* 34.7* 36.6*   PLATELETS 10*3/mm3 237 284 262     Results from last 7 days   Lab Units 03/17/23  0450 03/16/23  0504 03/15/23  0419   SODIUM mmol/L 133* 135* 138   POTASSIUM mmol/L 4.6 4.2 4.0   CHLORIDE mmol/L 98 97* 97*   CO2 mmol/L 26.3 32.7* 36.0*   BUN mg/dL 24* 27* 25*   CREATININE mg/dL 0.61* 0.60* 0.52*   CALCIUM mg/dL 8.9 9.0 9.0   BILIRUBIN mg/dL  --  0.4 0.4   ALK PHOS U/L  --  87 76   ALT (SGPT) U/L  --  31 30   AST (SGOT) U/L  --  40 41*   GLUCOSE mg/dL 158* 219* 124*     Results from last 7 days   Lab Units 03/21/23  0543   INR  1.01     No results found for: LIPASE    Radiology:  FL Video Swallow With Speech Single Contrast   Final Result           1. Rashid, silent tracheal aspiration of thin liquid barium       Please consult speech pathology  report for further details regarding exam and dietary    recommendations            SOCORRO REBOLLAR          Electronically Signed and Approved By: ORLANDO DAMON MD on 3/15/2023 at 14:58                           CT Soft Tissue Neck Without Contrast   Final Result       1. Diffuse anterior osteophyte formation throughout the cervical spine producing moderate mass    effect upon the cervical esophagus   2. NG tube in place   3. Airspace opacities in the left lower lobe superior segment and right upper lobe posterior    segment could represent pneumonia and/or aspiration.  Correlate clinically.                Johnathon Garces M.D.          Electronically Signed and Approved By: Johnathon Garces M.D. on 3/10/2023 at 20:20                           XR Chest 1 View   Final Result       1. Bibasilar airspace disease with significant improvement on the right compared with the last    study.                        Ben Samuel MD          Electronically Signed and Approved By: Ben Samuel MD on 3/10/2023 at 10:40                           CT Chest With Contrast Diagnostic   Final Result       CT scan of the chest with IV contrast demonstrating no findings of PE.       Extensive alveolar airspace disease, with areas of dense consolidation in the lower lobes    consistent with bacterial pneumonia.       Coronary artery calcifications.       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                BRADLEY BROWN MD          Electronically Signed and Approved By: BRADLEY BROWN MD on 3/06/2023 at 20:41                           CT Head Without Contrast   Final Result       CT scan of the head without IV contrast demonstrating mild atrophy and white matter changes.       No acute intracranial abnormality is seen.                BRADLEY BROWN MD          Electronically Signed and Approved By: BRADLEY BROWN MD on 3/06/2023 at 18:05                           XR Chest 1 View   Final Result        1. Interval development of increasing infiltrate in the right lung base suggestive of pneumonia.     An atypical pattern of pulmonary edema is in the differential.     2.  Right pleural fluid versus right pleural thickening.                          Ben Samuel MD          Electronically Signed and Approved By: Ben Samuel MD on 3/06/2023 at 9:10                                Assessment & Plan     Patient Active Problem List   Diagnosis   • Severe malnutrition (HCC)   • Aspiration into airway        Plan:  I will schedule patient for an EGD with PEG tube placement tomorrow.  I discussed risk and benefits with both the patient and his son and they are willing to proceed.  We will hold tube feeds at midnight      I discussed the patients findings and my recommendations with patient, family and nursing staff.    Ruslan Guido MD

## 2023-03-21 NOTE — PLAN OF CARE
Goal Outcome Evaluation:  Plan of Care Reviewed With: patient        Progress: no change  Outcome Evaluation: VSS. No complaints voiced. Tolerating tube feeds. Rested well overnight

## 2023-03-21 NOTE — PROGRESS NOTES
[facility]   Hospitalist Progress Note  Date: 3/21/2023  Patient Name: Buzz Lopez  : 1945  MRN: [id number]  Date of admission: 3/6/2023    Subjective   Subjective     Chief Complaint:   Shortness of breath, confusion    Summary:   Buzz Lopez is a 77 y.o. male with no significant past medical history has been brought into the ED with concerns for confusion, possible syncope, shortness of breath.  Patient states that for the past 1 to 2 weeks patient has been having difficulty breathing and subjective fevers, it has got worse and today and he has called his son to take him to the ED as he is having difficulty breathing.  By the time patient's son has arrived at the home, patient appeared to be confused, generalized weakness and unable to put his clothes on.  While his son was helping him to get the clothes on, patient appeared to be very confused and had a possible syncope episode where he was unresponsive.  Patient's son has looked for the pulse and they could not feel radial pulse and started chest compressions, EMS was called.  By the time EMS has arrived patient was receiving chest compressions from the family members.  EMS has evaluated the patient and they could not feel a pulse.  Patient was in respiratory distress, saturating around 70% on room air.  Received nebulizer treatment by the EMS and the patient has been brought into the ED.    During my examination, patient is alert and oriented x3 and able to answer questions appropriately.  States that he does not remember how he came to the hospital.  Denies any chest pain, nausea, vomiting, focal weakness, numbness, tingling.  States that he has been having difficulty breathing for the past 1 to 2 weeks.  A month ago he had some sore throat.  Associated with cough, productive sputum. Patient is thin and frail.  Chronically has a poor p.o. intake.  Admits that he has low appetite.  No previously diagnosed history of dementia.  As per the  family members, patient takes a lot of over-the-counter fiber supplement review does not gain weight despite he eats well.  Patients primary issue is aspiration, this is why he has had weight loss, this is why he was admitted for pneumonia.  I discussed with patient and family that given his high functional status normally I would not recommend this however with the PEG tube he could have significant improvement in his quality of life, the patient states he would not want a PEG tube.  We are attempting to clear the patient for a safe diet prior to discharge.  If we are unable to do so and patient is adamant that he wants to go home and assume the risk, patient will be most fitted for palliative care/hospice.  Modified barium swallow study showed  aspiration without coughing.  Patient hemoglobin A1c 8.1% diagnosed with diabetes mellitus.  Patient and family agreed to have PEG tube but  they are trying to figure out finances and want to hold off until it can be taken care of.  Patient seen by GI and PEG tube planned for March 22    Interval Followup:   Vital signs stable on room air with 98% saturation.  Tolerating tube feeding well via core track.  Patient wants to eat.  Some ice chips allowed by speech therapy  Denies any family history of diabetes mellitus.  Ambulating with therapy and walker in hallway.  No other complaints    Objective   Objective     Vitals:   Temp:  [96.7 °F (35.9 °C)-98.5 °F (36.9 °C)] 98.5 °F (36.9 °C)  Heart Rate:  [64-74] 69  Resp:  [12-20] 16  BP: (111-153)/(47-67) 132/61    Physical Exam   General appearance: NAD, conversant. Cacechtic  Eyes: anicteric sclerae, moist conjunctivae; no lid-lag;   HENT: Atraumatic; oropharynx clear with moist mucous membranes and no mucosal ulcerations; normal hard and soft palate  Neck: Trachea midline; FROM, supple, no thyromegaly or lymphadenopathy  Lungs: Bilateral rhonchi, with normal respiratory effort and no intercostal retractions    Result Review     Result Review:  I have personally reviewed the results as below  [x]  Laboratory  CBC    CBC 3/15/23 3/16/23 3/21/23   WBC 5.96 6.97 5.33   RBC 4.14 3.96 (A) 3.82 (A)   Hemoglobin 12.3 (A) 11.6 (A) 11.2 (A)   Hematocrit 36.6 (A) 34.7 (A) 33.3 (A)   MCV 88.4 87.6 87.2   MCH 29.7 29.3 29.3   MCHC 33.6 33.4 33.6   RDW 12.8 13.0 13.3   Platelets 262 284 237   (A) Abnormal value            CMP    CMP 3/15/23 3/16/23 3/17/23   Glucose 124 (A) 219 (A) 158 (A)   BUN 25 (A) 27 (A) 24 (A)   Creatinine 0.52 (A) 0.60 (A) 0.61 (A)   eGFR 102.5 98.2 97.7   Sodium 138 135 (A) 133 (A)   Potassium 4.0 4.2 4.6   Chloride 97 (A) 97 (A) 98   Calcium 9.0 9.0 8.9   Total Protein 7.1 6.8    Albumin 2.9 (A) 2.8 (A) 2.5 (A)   Globulin 4.2 4.0    Total Bilirubin 0.4 0.4    Alkaline Phosphatase 76 87    AST (SGOT) 41 (A) 40    ALT (SGPT) 30 31    Albumin/Globulin Ratio 0.7 0.7    BUN/Creatinine Ratio 48.1 (A) 45.0 (A) 39.3 (A)   Anion Gap 5.0 5.3 8.7   (A) Abnormal value       Comments are available for some flowsheets but are not being displayed.           []  Microbiology  []  Radiology  []  EKG/Telemetry   []  Cardiology/Vascular   []  Pathology  []  Old records  []  Other:    Assessment & Plan   Assessment / Plan   Assessment:    Sepsis due to right lower lobe pneumonia (Streptococcus Pneumo).  Resolved  Acute hypoxic respiratory failure.  Resolved  Questionable cardiac arrest episode  NSTEMI/acute myocardial injury  Possible syncope  Lactic acidosis, resolved  Malnutrition.  BMI of 17  S/P Bronchoscopy 3/8/23 with mucous plugging removed.  With  yeast growing.  Dysphagia requiring Core Track placed this admission  Aspiration.  Severe aortic stenosis   Degenerative changes cervical spine / anterior osteophytes  New diagnosis of type 2 diabetes mellitus hemoglobin A1c of 8.1%       Plan:    Continue speech therapy.  Barium swallow noted and discussed with son and patient at bedside  Continue core track.  Tube feed with Glucerna.  Started  metformin.  Dose uptitrated.  Hemoglobin A1c noted  CT neck noted... anterior osteophytes w mod mass effect  Pulmonology consulted ..... have now signed off  Cardiology consulted   LAI attempted 3/9/23 but unable to complete secondary to core track  CT scan chest .... No PE.  Bilat pneumonia noted.  Status post bronchoscopy   Thick mucus removal  Food particles seen around the vocal cords  Positive for strep pneumo  Transthoracic echo suggesting normal EF% and severe AS  Continue albuterol nebulizer every 4 hours as needed  Continue bronchodilator protocol  Continue aspirin and Lopressor.  Lisinopril added  Sliding-scale insulin  Finished antibiotics  Appreciate palliative care input.  Patient is DNR/DNI.  GI consulted for PEG placement  PT OT consult.  Diabetic nurse educator consulted  Discussed with nursing staff and .  Discharge to [facility] after  PEG placement.  .         DVT prophylaxis:  Medical DVT prophylaxis orders are present.    CODE STATUS:   Medical Intervention Limits: NO intubation (DNI)  Level Of Support Discussed With: Patient  Code Status (Patient has no pulse and is not breathing): No CPR (Do Not Attempt to Resuscitate)  Medical Interventions (Patient has pulse or is breathing): Limited Support             Electronically signed by Jez Santos MD, 03/21/23, 3:22 PM EDT.

## 2023-03-21 NOTE — CONSULTS
[facility]  Initial Inpatient Consult Note    Referring Provider: Hospitalist    Reason for Consultation: Dysphagia, aspiration pneumonia    Subjective     History of present illness:    77 y.o. male admitted with aspiration pneumonia and now found to have continued difficulty in swallowing.  Speech and language evaluation recommended long-term alternative to oral feedings.  He currently has core track in place which she is tolerating well.  His son is at the bedside.  Denies any abdominal pain, nausea, vomiting fevers or chills.  His respiratory status is significantly improved.    Past Medical History:  History reviewed. No pertinent past medical history.  Past Surgical History:  Past Surgical History:   Procedure Laterality Date   • BRONCHOSCOPY N/A 3/8/2023    Procedure: BRONCHOSCOPY WITH BRONCHOALVEOLAR LAVAGE, WASHINGS;  Surgeon: Jason Rowley MD;  Location: [facility] ENDOSCOPY;  Service: Pulmonary;  Laterality: N/A;  MUCOUS PLUGGING   • EYE SURGERY        Social History:   Social History     Tobacco Use   • Smoking status: Former     Passive exposure: Never   • Smokeless tobacco: Not on file   Substance Use Topics   • Alcohol use: Never      Family History:  History reviewed. No pertinent family history.    Home Meds:  Medications Prior to Admission   Medication Sig Dispense Refill Last Dose   • multivitamin with minerals tablet tablet Take 1 tablet by mouth 2 (Two) Times a Week.        Current Meds:   arformoterol, 15 mcg, Nebulization, BID - RT  aspirin, 81 mg, Nasogastric, Daily  budesonide, 0.5 mg, Nebulization, BID - RT  enoxaparin, 40 mg, Subcutaneous, Q24H  famotidine, 20 mg, Nasogastric, BID AC  First Mouthwash (Magic Mouthwash), 10 mL, Swish & Spit, Q6H  insulin regular, 2-7 Units, Subcutaneous, Q6H  lisinopril, 5 mg, Nasogastric, Q24H  metFORMIN, 850 mg, Per G Tube, BID With Meals  metoprolol tartrate, 12.5 mg, Nasogastric, BID  sodium chloride, 10 mL, Intravenous,  Q12H      Allergies:  No Known Allergies  Review of Systems  Pertinent items are noted in HPI, all other systems reviewed and negative         Vital Signs  Temp:  [96.7 °F (35.9 °C)-97.8 °F (36.6 °C)] 96.7 °F (35.9 °C)  Heart Rate:  [63-74] 70  Resp:  [18-20] 18  BP: (111-153)/(47-67) 111/47  Physical Exam:  General Appearance:    Alert, cooperative, in no acute distress   Head:    Normocephalic, without obvious abnormality, atraumatic   Eyes:          conjunctivae and sclerae normal, no   icterus   Throat:   no thrush, oral mucosa moist   Neck:   Supple, no adenopathy   Lungs:     Clear to auscultation bilaterally    Heart:    Regular rhythm and normal rate    Chest Wall:    No abnormalities observed   Abdomen:     Soft, nondistended, nontender; normal bowel sounds   Extremities:   no edema, no redness   Skin:   No bruising or rash   Psychiatric:  normal mood and insight     Results Review:  [x]  Laboratory   [x]  Radiology  []  Pathology      I reviewed the patient's new clinical results.    Results from last 7 days   Lab Units 03/21/23  0543 03/16/23  0504 03/15/23  0419   WBC 10*3/mm3 5.33 6.97 5.96   HEMOGLOBIN g/dL 11.2* 11.6* 12.3*   HEMATOCRIT % 33.3* 34.7* 36.6*   PLATELETS 10*3/mm3 237 284 262     Results from last 7 days   Lab Units 03/17/23  0450 03/16/23  0504 03/15/23  0419   SODIUM mmol/L 133* 135* 138   POTASSIUM mmol/L 4.6 4.2 4.0   CHLORIDE mmol/L 98 97* 97*   CO2 mmol/L 26.3 32.7* 36.0*   BUN mg/dL 24* 27* 25*   CREATININE mg/dL 0.61* 0.60* 0.52*   CALCIUM mg/dL 8.9 9.0 9.0   BILIRUBIN mg/dL  --  0.4 0.4   ALK PHOS U/L  --  87 76   ALT (SGPT) U/L  --  31 30   AST (SGOT) U/L  --  40 41*   GLUCOSE mg/dL 158* 219* 124*     Results from last 7 days   Lab Units 03/21/23  0543   INR  1.01     No results found for: LIPASE    Radiology:  FL Video Swallow With Speech Single Contrast   Final Result           1. Rashid, silent tracheal aspiration of thin liquid barium       Please consult speech pathology  report for further details regarding exam and dietary    recommendations            SOCORRO REBOLLAR          Electronically Signed and Approved By: ORLANDO DAMON MD on 3/15/2023 at 14:58                           CT Soft Tissue Neck Without Contrast   Final Result       1. Diffuse anterior osteophyte formation throughout the cervical spine producing moderate mass    effect upon the cervical esophagus   2. NG tube in place   3. Airspace opacities in the left lower lobe superior segment and right upper lobe posterior    segment could represent pneumonia and/or aspiration.  Correlate clinically.                Johnathon Garces M.D.          Electronically Signed and Approved By: Johnathon Garces M.D. on 3/10/2023 at 20:20                           XR Chest 1 View   Final Result       1. Bibasilar airspace disease with significant improvement on the right compared with the last    study.                        Ben Samuel MD          Electronically Signed and Approved By: Ben Samuel MD on 3/10/2023 at 10:40                           CT Chest With Contrast Diagnostic   Final Result       CT scan of the chest with IV contrast demonstrating no findings of PE.       Extensive alveolar airspace disease, with areas of dense consolidation in the lower lobes    consistent with bacterial pneumonia.       Coronary artery calcifications.       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                BRADLEY BROWN MD          Electronically Signed and Approved By: BRADLEY BROWN MD on 3/06/2023 at 20:41                           CT Head Without Contrast   Final Result       CT scan of the head without IV contrast demonstrating mild atrophy and white matter changes.       No acute intracranial abnormality is seen.                BRADLEY BROWN MD          Electronically Signed and Approved By: BRADLEY BROWN MD on 3/06/2023 at 18:05                           XR Chest 1 View   Final Result        1. Interval development of increasing infiltrate in the right lung base suggestive of pneumonia.     An atypical pattern of pulmonary edema is in the differential.     2.  Right pleural fluid versus right pleural thickening.                          Ben Samuel MD          Electronically Signed and Approved By: Ben Samuel MD on 3/06/2023 at 9:10                                Assessment & Plan     Patient Active Problem List   Diagnosis   • Severe malnutrition (HCC)   • Aspiration into airway        Plan:  I will schedule patient for an EGD with PEG tube placement tomorrow.  I discussed risk and benefits with both the patient and his son and they are willing to proceed.  We will hold tube feeds at midnight      I discussed the patients findings and my recommendations with patient, family and nursing staff.    Ruslan Guido MD

## 2023-03-21 NOTE — THERAPY TREATMENT NOTE
Acute Care - Physical Therapy Treatment Note   [facility]     Patient Name: Buzz Lopez  : 1945  MRN: [id number]  Today's Date: 3/21/2023      Visit Dx:     ICD-10-CM ICD-9-CM   1. Pneumonia of right lower lobe due to infectious organism  J18.9 486   2. Sepsis with acute hypoxic respiratory failure without septic shock, due to unspecified organism (HCC)  A41.9 038.9    R65.20 995.91    J96.01 518.81   3. Acute respiratory failure with hypoxia (HCC)  J96.01 518.81   4. Difficulty walking  R26.2 719.7   5. Oropharyngeal dysphagia  R13.12 787.22   6. Decreased activities of daily living (ADL)  Z78.9 V49.89     Patient Active Problem List   Diagnosis   • Severe malnutrition (HCC)   • Aspiration into airway     History reviewed. No pertinent past medical history.  Past Surgical History:   Procedure Laterality Date   • BRONCHOSCOPY N/A 3/8/2023    Procedure: BRONCHOSCOPY WITH BRONCHOALVEOLAR LAVAGE, WASHINGS;  Surgeon: Jason Rowley MD;  Location: [facility] ENDOSCOPY;  Service: Pulmonary;  Laterality: N/A;  MUCOUS PLUGGING   • EYE SURGERY       PT Assessment (last 12 hours)     PT Evaluation and Treatment     Row Name 23 1353          Physical Therapy Time and Intention    Subjective Information complains of;weakness;fatigue  -DK     Document Type therapy note (daily note)  -DK     Mode of Treatment individual therapy;physical therapy  -DK     Patient Effort good  -DK     Symptoms Noted During/After Treatment fatigue  -DK     Comment Pt c/o that people keep coming in and waking him up.  -DK     Row Name 23 1353          Pain    Pretreatment Pain Rating 0/10 - no pain  -DK     Posttreatment Pain Rating 0/10 - no pain  -DK     Row Name 23 1353          Cognition    Affect/Mental Status (Cognition) WFL  -DK     Orientation Status (Cognition) oriented to;person;situation  -DK     Follows Commands (Cognition) WFL  -DK     Cognitive Function WFL  -DK     Personal Safety Interventions gait belt;nonskid  shoes/slippers when out of bed;supervised activity  -     Row Name 03/21/23 1353          Motor Skills    Motor Skills --  therapeutic exercises  -DK     Coordination WFL  -     Therapeutic Exercise hip;knee;ankle  -     Additional Documentation --  Pt declined transfers due to fatigue.  -     Row Name 03/21/23 1353          Hip (Therapeutic Exercise)    Hip (Therapeutic Exercise) AAROM (active assistive range of motion)  -     Hip AAROM (Therapeutic Exercise) bilateral;flexion;extension;aBduction;aDduction;supine;10 repetitions;2 sets  -     Row Name 03/21/23 1353          Knee (Therapeutic Exercise)    Knee (Therapeutic Exercise) AAROM (active assistive range of motion)  -     Knee AAROM (Therapeutic Exercise) bilateral;flexion;extension;supine;10 repetitions;2 sets  -     Row Name 03/21/23 1353          Ankle (Therapeutic Exercise)    Ankle (Therapeutic Exercise) AAROM (active assistive range of motion)  -     Ankle AAROM (Therapeutic Exercise) bilateral;dorsiflexion;plantarflexion;supine;10 repetitions;2 sets  -     Row Name             Wound 03/16/23 1641 Bilateral medial gluteal MASD (Moisture associated skin damage)    Wound - Properties Group Placement Date: 03/16/23  -MC Placement Time: 1641  -MC Side: Bilateral  -MC Orientation: medial  -MC Location: gluteal  -MC Primary Wound Type: MASD  -MC    Retired Wound - Properties Group Placement Date: 03/16/23  - Placement Time: 1641  -MC Side: Bilateral  -MC Orientation: medial  -MC Location: gluteal  -MC Primary Wound Type: MASD  -MC    Retired Wound - Properties Group Date first assessed: 03/16/23  - Time first assessed: 1641  -MC Side: Bilateral  -MC Location: gluteal  -MC Primary Wound Type: MASD  -MC    Row Name             Wound 03/16/23 1641 Left anterior leg Abrasion    Wound - Properties Group Placement Date: 03/16/23  -MC Placement Time: 1641  -MC Side: Left  -MC Orientation: anterior  -MC Location: leg  -MC Primary Wound Type:  Abrasion  -MC    Retired Wound - Properties Group Placement Date: 03/16/23  - Placement Time: 1641 - Side: Left  - Orientation: anterior  - Location: leg  -MC Primary Wound Type: Abrasion  -MC    Retired Wound - Properties Group Date first assessed: 03/16/23  - Time first assessed: 1641 - Side: Left  -MC Location: leg  -MC Primary Wound Type: Abrasion  -MC    Row Name 03/21/23 1353          Plan of Care Review    Plan of Care Reviewed With patient  -DK     Progress no change  -     Row Name 03/21/23 1353          Positioning and Restraints    Pre-Treatment Position in bed  -DK     Post Treatment Position bed  -DK     In Bed supine;call light within reach;encouraged to call for assist;exit alarm on;side rails up x3;legs elevated;heels elevated  -     Row Name 03/21/23 1351          Therapy Assessment/Plan (PT)    Rehab Potential (PT) good, to achieve stated therapy goals  -     Criteria for Skilled Interventions Met (PT) skilled treatment is necessary  -     Therapy Frequency (PT) daily  -     Problem List (PT) problems related to;balance;mobility;strength;hearing  -     Activity Limitations Related to Problem List (PT) unable to ambulate safely;unable to transfer safely  -     Row Name 03/21/23 1358          Progress Summary (PT)    Progress Toward Functional Goals (PT) progress toward functional goals is good  -           User Key  (r) = Recorded By, (t) = Taken By, (c) = Cosigned By    Initials Name Provider Type    Lilly Maciel RN Registered Nurse    Luisana Serrano PTA Physical Therapist Assistant                Physical Therapy Education     Title: PT OT SLP Therapies (Done)     Topic: Physical Therapy (Done)     Point: Mobility training (Done)     Learning Progress Summary           Patient Acceptance, E, VU by ALO at 3/8/2023 1531    Acceptance, E, VU by POONAM at 3/7/2023 0909                               User Key     Initials Effective Dates Name Provider Type Discipline     ALO 09/21/21 -  Heavenly Quach, RN Registered Nurse Nurse    JL 01/11/23 -  Ephraim Juares, PT Student PT Student PT              PT Recommendation and Plan  Planned Therapy Interventions (PT): balance training, bed mobility training, gait training, strengthening, transfer training  Therapy Frequency (PT): daily  Progress Summary (PT)  Progress Toward Functional Goals (PT): progress toward functional goals is good  Plan of Care Reviewed With: patient  Progress: no change   Outcome Measures     Row Name 03/21/23 1353 03/20/23 1452 03/19/23 1437       How much help from another person do you currently need...    Turning from your back to your side while in flat bed without using bedrails? 3  -DK 3  -DP (r) AT (t) DP (c) 3  -DK    Moving from lying on back to sitting on the side of a flat bed without bedrails? 3  -DK 3  -DP (r) AT (t) DP (c) 3  -DK    Moving to and from a bed to a chair (including a wheelchair)? 3  -DK 3  -DP (r) AT (t) DP (c) 3  -DK    Standing up from a chair using your arms (e.g., wheelchair, bedside chair)? 3  -DK 3  -DP (r) AT (t) DP (c) 3  -DK    Climbing 3-5 steps with a railing? 3  -DK 3  -DP (r) AT (t) DP (c) 2  -DK    To walk in hospital room? 3  -DK 3  -DP (r) AT (t) DP (c) 3  -DK    AM-PAC 6 Clicks Score (PT) 18  -DK 18  -DP (r) AT (t) 17  -DK       Functional Assessment    Outcome Measure Options AM-PAC 6 Clicks Basic Mobility (PT)  -DK AM-PAC 6 Clicks Basic Mobility (PT)  -DP (r) AT (t) DP (c) AM-PAC 6 Clicks Basic Mobility (PT)  -DK          User Key  (r) = Recorded By, (t) = Taken By, (c) = Cosigned By    Initials Name Provider Type    Luisana Serrano, PTA Physical Therapist Assistant    Bren Flanagan, PT Physical Therapist    AT Olga Christie, PT Student PT Student                 Time Calculation:    PT Charges     Row Name 03/21/23 135             Time Calculation    PT Received On 03/21/23  -DK      PT Goal Re-Cert Due Date 03/25/23  -DK         Timed Charges    02469 - PT  Therapeutic Exercise Minutes 14  -DK      82395 - PT Therapeutic Activity Minutes 3  -DK         Total Minutes    Timed Charges Total Minutes 17  -DK       Total Minutes 17  -DK            User Key  (r) = Recorded By, (t) = Taken By, (c) = Cosigned By    Initials Name Provider Type    Luisana Serrano PTA Physical Therapist Assistant              Therapy Charges for Today     Code Description Service Date Service Provider Modifiers Qty    [id number] HC PT THER PROC EA 15 MIN 3/21/2023 Luisana Ramirez PTA GP 1          PT G-Codes  Outcome Measure Options: AM-PAC 6 Clicks Basic Mobility (PT)  AM-PAC 6 Clicks Score (PT): 18  AM-PAC 6 Clicks Score (OT): 19    Luisana Ramirez PTA  3/21/2023

## 2023-03-21 NOTE — PLAN OF CARE
Goal Outcome Evaluation: continuous tube feed via cortrak. Pt tolerates well. No s/s of distress noted. Pt to have tube feeds stopped at MN. Pt to have peg placement tomorrow. Continue plan of care

## 2023-03-22 ENCOUNTER — ANESTHESIA (OUTPATIENT)
Dept: GASTROENTEROLOGY | Facility: HOSPITAL | Age: 78
DRG: 853 | End: 2023-03-22
Payer: COMMERCIAL

## 2023-03-22 ENCOUNTER — ANESTHESIA EVENT (OUTPATIENT)
Dept: GASTROENTEROLOGY | Facility: HOSPITAL | Age: 78
DRG: 853 | End: 2023-03-22
Payer: COMMERCIAL

## 2023-03-22 LAB
GLUCOSE BLDC GLUCOMTR-MCNC: 104 MG/DL (ref 70–99)
GLUCOSE BLDC GLUCOMTR-MCNC: 117 MG/DL (ref 70–99)
GLUCOSE BLDC GLUCOMTR-MCNC: 120 MG/DL (ref 70–99)
GLUCOSE BLDC GLUCOMTR-MCNC: 96 MG/DL (ref 70–99)

## 2023-03-22 PROCEDURE — 94799 UNLISTED PULMONARY SVC/PX: CPT

## 2023-03-22 PROCEDURE — 82962 GLUCOSE BLOOD TEST: CPT

## 2023-03-22 PROCEDURE — 25010000002 CEFAZOLIN 1-4 GM/50ML-% SOLUTION: Performed by: INTERNAL MEDICINE

## 2023-03-22 PROCEDURE — 0DH63UZ INSERTION OF FEEDING DEVICE INTO STOMACH, PERCUTANEOUS APPROACH: ICD-10-PCS | Performed by: INTERNAL MEDICINE

## 2023-03-22 PROCEDURE — 0 LIDOCAINE 1 % SOLUTION: Performed by: INTERNAL MEDICINE

## 2023-03-22 PROCEDURE — 94664 DEMO&/EVAL PT USE INHALER: CPT

## 2023-03-22 PROCEDURE — 25010000002 PROPOFOL 10 MG/ML EMULSION: Performed by: NURSE ANESTHETIST, CERTIFIED REGISTERED

## 2023-03-22 PROCEDURE — 43246 EGD PLACE GASTROSTOMY TUBE: CPT | Performed by: INTERNAL MEDICINE

## 2023-03-22 PROCEDURE — C1769 GUIDE WIRE: HCPCS | Performed by: INTERNAL MEDICINE

## 2023-03-22 PROCEDURE — 99232 SBSQ HOSP IP/OBS MODERATE 35: CPT | Performed by: INTERNAL MEDICINE

## 2023-03-22 PROCEDURE — 25010000002 CEFAZOLIN PER 500 MG: Performed by: NURSE ANESTHETIST, CERTIFIED REGISTERED

## 2023-03-22 RX ORDER — SODIUM CHLORIDE, SODIUM LACTATE, POTASSIUM CHLORIDE, CALCIUM CHLORIDE 600; 310; 30; 20 MG/100ML; MG/100ML; MG/100ML; MG/100ML
30 INJECTION, SOLUTION INTRAVENOUS CONTINUOUS
Status: DISCONTINUED | OUTPATIENT
Start: 2023-03-22 | End: 2023-03-24

## 2023-03-22 RX ORDER — PHENYLEPHRINE HCL IN 0.9% NACL 1 MG/10 ML
SYRINGE (ML) INTRAVENOUS AS NEEDED
Status: DISCONTINUED | OUTPATIENT
Start: 2023-03-22 | End: 2023-03-22 | Stop reason: SURG

## 2023-03-22 RX ORDER — CEFAZOLIN SODIUM 1 G/3ML
INJECTION, POWDER, FOR SOLUTION INTRAMUSCULAR; INTRAVENOUS AS NEEDED
Status: DISCONTINUED | OUTPATIENT
Start: 2023-03-22 | End: 2023-03-22 | Stop reason: SURG

## 2023-03-22 RX ORDER — PROPOFOL 10 MG/ML
VIAL (ML) INTRAVENOUS AS NEEDED
Status: DISCONTINUED | OUTPATIENT
Start: 2023-03-22 | End: 2023-03-22 | Stop reason: SURG

## 2023-03-22 RX ORDER — CEFAZOLIN SODIUM 1 G/50ML
1 INJECTION, SOLUTION INTRAVENOUS EVERY 8 HOURS
Status: COMPLETED | OUTPATIENT
Start: 2023-03-22 | End: 2023-03-23

## 2023-03-22 RX ORDER — LIDOCAINE HYDROCHLORIDE 20 MG/ML
INJECTION, SOLUTION EPIDURAL; INFILTRATION; INTRACAUDAL; PERINEURAL AS NEEDED
Status: DISCONTINUED | OUTPATIENT
Start: 2023-03-22 | End: 2023-03-22 | Stop reason: SURG

## 2023-03-22 RX ORDER — LIDOCAINE HYDROCHLORIDE 10 MG/ML
INJECTION, SOLUTION INFILTRATION; PERINEURAL AS NEEDED
Status: DISCONTINUED | OUTPATIENT
Start: 2023-03-22 | End: 2023-03-22 | Stop reason: HOSPADM

## 2023-03-22 RX ADMIN — BUDESONIDE 0.5 MG: 0.5 INHALANT ORAL at 06:26

## 2023-03-22 RX ADMIN — CEFAZOLIN SODIUM 1 G: 1 INJECTION, SOLUTION INTRAVENOUS at 07:58

## 2023-03-22 RX ADMIN — Medication 10 ML: at 11:16

## 2023-03-22 RX ADMIN — ASPIRIN 81 MG 81 MG: 81 TABLET ORAL at 11:12

## 2023-03-22 RX ADMIN — Medication 200 MCG: at 09:57

## 2023-03-22 RX ADMIN — METFORMIN HYDROCHLORIDE 850 MG: 850 TABLET ORAL at 17:23

## 2023-03-22 RX ADMIN — FAMOTIDINE 20 MG: 20 TABLET ORAL at 11:12

## 2023-03-22 RX ADMIN — Medication 10 ML: at 21:22

## 2023-03-22 RX ADMIN — METOPROLOL TARTRATE 12.5 MG: 25 TABLET, FILM COATED ORAL at 21:22

## 2023-03-22 RX ADMIN — FAMOTIDINE 20 MG: 20 TABLET ORAL at 17:23

## 2023-03-22 RX ADMIN — Medication 10 ML: at 07:57

## 2023-03-22 RX ADMIN — SODIUM CHLORIDE, POTASSIUM CHLORIDE, SODIUM LACTATE AND CALCIUM CHLORIDE 30 ML/HR: 600; 310; 30; 20 INJECTION, SOLUTION INTRAVENOUS at 08:35

## 2023-03-22 RX ADMIN — PROPOFOL 125 MCG/KG/MIN: 10 INJECTION, EMULSION INTRAVENOUS at 09:35

## 2023-03-22 RX ADMIN — ARFORMOTEROL TARTRATE 15 MCG: 15 SOLUTION RESPIRATORY (INHALATION) at 18:40

## 2023-03-22 RX ADMIN — METFORMIN HYDROCHLORIDE 850 MG: 850 TABLET ORAL at 11:11

## 2023-03-22 RX ADMIN — PROPOFOL 50 MG: 10 INJECTION, EMULSION INTRAVENOUS at 09:35

## 2023-03-22 RX ADMIN — BUDESONIDE 0.5 MG: 0.5 INHALANT ORAL at 18:40

## 2023-03-22 RX ADMIN — METOPROLOL TARTRATE 12.5 MG: 25 TABLET, FILM COATED ORAL at 11:11

## 2023-03-22 RX ADMIN — CEFAZOLIN SODIUM 1 G: 1 INJECTION, POWDER, FOR SOLUTION INTRAMUSCULAR; INTRAVENOUS at 09:23

## 2023-03-22 RX ADMIN — LIDOCAINE HYDROCHLORIDE 50 MG: 20 INJECTION, SOLUTION EPIDURAL; INFILTRATION; INTRACAUDAL; PERINEURAL at 09:35

## 2023-03-22 RX ADMIN — LISINOPRIL 5 MG: 5 TABLET ORAL at 11:12

## 2023-03-22 RX ADMIN — ARFORMOTEROL TARTRATE 15 MCG: 15 SOLUTION RESPIRATORY (INHALATION) at 06:26

## 2023-03-22 RX ADMIN — CEFAZOLIN SODIUM 1 G: 1 INJECTION, SOLUTION INTRAVENOUS at 16:32

## 2023-03-22 NOTE — ANESTHESIA POSTPROCEDURE EVALUATION
Patient: Buzz Lopez    Procedure Summary     Date: 03/22/23 Room / Location: [facility] ENDOSCOPY 3 / [facility] ENDOSCOPY    Anesthesia Start: 0923 Anesthesia Stop: 1005    Procedure: ESOPHAGOGASTRODUODENOSCOPY WITH PERCUTANEOUS ENDOSCOPIC GASTROSTOMY TUBE INSERTION Diagnosis:       Aspiration into airway, initial encounter      (Aspiration into airway, initial encounter [T17.908A])    Surgeons: Ruslan Guido MD Provider: Fer Armstrong MD    Anesthesia Type: general ASA Status: 4          Anesthesia Type: general    Vitals  Vitals Value Taken Time   /55 03/22/23 1018   Temp 36.6 °C (97.8 °F) 03/22/23 1018   Pulse 75 03/22/23 1020   Resp 17 03/22/23 1018   SpO2 100 % 03/22/23 1020   Vitals shown include unvalidated device data.        Post Anesthesia Care and Evaluation    Patient location during evaluation: bedside  Patient participation: complete - patient participated  Level of consciousness: awake  Pain management: adequate    Airway patency: patent  PONV Status: none  Cardiovascular status: acceptable  Respiratory status: acceptable  Hydration status: acceptable    Comments: An Anesthesiologist personally participated in the most demanding procedures (including induction and emergence if applicable) in the anesthesia plan, monitored the course of anesthesia administration at frequent intervals and remained physically present and available for immediate diagnosis and treatment of emergencies.

## 2023-03-22 NOTE — ANESTHESIA PREPROCEDURE EVALUATION
Anesthesia Evaluation     Patient summary reviewed and Nursing notes reviewed                Airway   Mallampati: I  TM distance: >3 FB  Neck ROM: full  No difficulty expected  Dental    (+) poor dentition    Pulmonary    (+) pneumonia , COPD, rhonchi,   Cardiovascular     Rhythm: irregular  Rate: normal    (+) valvular problems/murmurs AS, dysrhythmias, murmur,       Neuro/Psych- negative ROS  GI/Hepatic/Renal/Endo - negative ROS     Musculoskeletal (-) negative ROS    Abdominal    Substance History - negative use     OB/GYN negative ob/gyn ROS         Other        ROS/Med Hx Other: Severe malnutrition (HCC)  Aspiration into airway  Pneumonia of right lower lobe due to infectious organism (Resolved)  Sepsis with acute hypoxic respiratory failure without septic shock (HCC)    3/6/23 Echocardiogram Findings  Left Ventricle Left ventricular ejection fraction appears to be 56 - 60%.   Normal left ventricular cavity size and wall thickness noted. Left ventricular diastolic function is consistent with (grade I) impaired relaxation.  Right Ventricle Normal right ventricular cavity size and systolic function noted.  Left Atrium Normal left atrial size and volume noted.  Right Atrium Normal right atrial cavity size noted. Inferior vena cava not well visualized.  Aortic Valve The aortic valve was not well seen but appeared sclerotic.  There was nonsignificant to mild aortic insufficiency.  There was severe aortic stenosis.  Mitral Valve The mitral valve was mildly fibrosclerotic.  There was no stenosis.  There was mild mitral annular calcification.  There was trace regurgitation.  Tricuspid Valve The tricuspid valve is grossly normal in structure. No evidence of significant tricuspid valve regurgitation is present. Estimated right ventricular systolic pressure from tricuspid regurgitation is normal (<35 mmHg).  Pulmonic Valve The pulmonic valve is not well visualized. There is no pulmonic valve regurgitation  present.  Greater Vessels No dilation of the aortic root is present.  Pericardium There is no evidence of pericardial effusion.                Anesthesia Plan    ASA 4     general     intravenous induction     Anesthetic plan, risks, benefits, and alternatives have been provided, discussed and informed consent has been obtained with: patient.        CODE STATUS:    Medical Intervention Limits: NO intubation (DNI)  Level Of Support Discussed With: Patient  Code Status (Patient has no pulse and is not breathing): No CPR (Do Not Attempt to Resuscitate)  Medical Interventions (Patient has pulse or is breathing): Limited Support

## 2023-03-22 NOTE — PLAN OF CARE
Goal Outcome Evaluation: G-Tube placement completed this shift. Pt tolerates well. Dsg CDI. Tube feeds restarted later this shift and pt tolerates well. No verbalized complaints. No s/s of distress noted.

## 2023-03-22 NOTE — SIGNIFICANT NOTE
03/22/23 1344   Plan   Plan Pt had peg tube placed today. SW reached out to [facility] to start precert. [facility] will be able to start precert once updated PT notes are available with ambulation/transfers.

## 2023-03-22 NOTE — PROGRESS NOTES
[facility]   Hospitalist Progress Note  Date: 3/22/2023  Patient Name: Buzz Lopez  : 1945  MRN: [id number]  Date of admission: 3/6/2023    Subjective   Subjective     Chief Complaint:   Shortness of breath, confusion    Summary:   Buzz Lopez is a 77 y.o. male with no significant past medical history brought into the ED with concerns for confusion, possible syncope, shortness of breath.  Patient states for the previous 1 to 2 weeks, having issues with difficulty breathing and subjective fevers.  The symptoms worsened therefore he called son to take him to the emergency department.  By the time son arrived at home, patient confused General weakness unable to put on close.  Possible syncopal episode where he became unresponsive.  Patient's son look for pulse, could not feel radial pulse and started chest compressions.  EMS was called.  By time EMS arrived patient was receiving compressions from family members, EMS also could not find a pulse.  Respiratory distress satting around 70% on room air.  Once in the emergency department patient was alert and oriented x3 and able to answer questions appropriately.  Patient with no recollection of the events preceding hospitalization.  Denied any chest pain nausea vomiting focal weakness numbness or tingling.  Patient with poor oral intake, thin and frail.  Patient's primary issue is aspiration, felt this is why he has been having significant weight loss and admitted for pneumonia.  Given his high functional status normally, recommendations for PEG tube placement.  PEG tube was placed on 3/22/2023      Interval Followup:   Successful PEG tube placement today with gastroenterologist.  Patient seen post procedure, still slightly lethargic with no acute complaints.  Reaching out to cardiology, earlier in hospitalization for consideration of preoperative evaluation of aortic valve replacement, a LAI was attempted.  However, this was unsuccessful given NG tube.   We will see if cardiology wants to attempt LAI prior to discharge    ROS:  Patient with no complaints at this time  Objective     Vitals:   Temp:  [97.3 °F (36.3 °C)-98.5 °F (36.9 °C)] 97.4 °F (36.3 °C)  Heart Rate:  [64-82] 69  Resp:  [10-22] 14  BP: (111-156)/(54-66) 156/58  Flow (L/min):  [3] 3    Physical Exam   General appearance: NAD, resting in bed comfortably, cachectic  Eyes: anicteric sclerae, moist conjunctivae; no lid-lag;   HENT: Atraumatic; oropharynx clear with moist mucous membranes and no mucosal ulcerations; normal hard and soft palate  Neck: Trachea midline; FROM, supple, no thyromegaly or lymphadenopathy  Lungs: Bilateral rhonchi, with normal respiratory effort and no intercostal retractions  Abdomen: PEG tube in place, surgical dressing clean dry and intact    Result Review    Result Review:  I have personally reviewed the results as below  [x]  Laboratory  CBC    CBC 3/15/23 3/16/23 3/21/23   WBC 5.96 6.97 5.33   RBC 4.14 3.96 (A) 3.82 (A)   Hemoglobin 12.3 (A) 11.6 (A) 11.2 (A)   Hematocrit 36.6 (A) 34.7 (A) 33.3 (A)   MCV 88.4 87.6 87.2   MCH 29.7 29.3 29.3   MCHC 33.6 33.4 33.6   RDW 12.8 13.0 13.3   Platelets 262 284 237   (A) Abnormal value            CMP    CMP 3/15/23 3/16/23 3/17/23   Glucose 124 (A) 219 (A) 158 (A)   BUN 25 (A) 27 (A) 24 (A)   Creatinine 0.52 (A) 0.60 (A) 0.61 (A)   eGFR 102.5 98.2 97.7   Sodium 138 135 (A) 133 (A)   Potassium 4.0 4.2 4.6   Chloride 97 (A) 97 (A) 98   Calcium 9.0 9.0 8.9   Total Protein 7.1 6.8    Albumin 2.9 (A) 2.8 (A) 2.5 (A)   Globulin 4.2 4.0    Total Bilirubin 0.4 0.4    Alkaline Phosphatase 76 87    AST (SGOT) 41 (A) 40    ALT (SGPT) 30 31    Albumin/Globulin Ratio 0.7 0.7    BUN/Creatinine Ratio 48.1 (A) 45.0 (A) 39.3 (A)   Anion Gap 5.0 5.3 8.7   (A) Abnormal value       Comments are available for some flowsheets but are not being displayed.           []  Microbiology  []  Radiology  []  EKG/Telemetry   []  Cardiology/Vascular   []   Pathology  []  Old records  []  Other:    Assessment & Plan   Assessment / Plan   Assessment:    · Sepsis due to right lower lobe pneumonia (Streptococcus Pneumo).  Resolved  · Acute hypoxic respiratory failure.  Resolved  · Questionable cardiac arrest episode  · NSTEMI/acute myocardial injury  · Possible syncope  · Lactic acidosis, resolved  · Malnutrition.  BMI of 17  · S/P Bronchoscopy 3/8/23 with mucous plugging removed.  With  yeast growing.  · Dysphagia requiring Core Track placed this admission  · Aspiration.  · Severe aortic stenosis   · Degenerative changes cervical spine / anterior osteophytes  · New diagnosis of type 2 diabetes mellitus hemoglobin A1c of 8.1%       Plan:    · PEG tube placed on 22nd, tube feed with Glucerna.  · Started metformin.  Dose uptitrated.  Hemoglobin A1c noted  · CT neck noted... anterior osteophytes w mod mass effect  · Cardiology consulted   · LAI attempted 3/9/23 but unable to complete secondary to core track  · Reaching back out to cardiology to see if repeat LAI will be attempted this hospitalization  · CT scan chest .... No PE.  Bilat pneumonia noted.  · Status post bronchoscopy   · Thick mucus removal  · Food particles seen around the vocal cords  · Positive for strep pneumo  · Transthoracic echo suggesting normal EF% and severe AS  · Continue albuterol nebulizer every 4 hours as needed  · Continue bronchodilator protocol  · Continue aspirin and Lopressor.  Lisinopril added  · Sliding-scale insulin  · Course of antibiotics now completed  · Appreciate palliative care input.  Patient is DNR/DNI.  · PT OT consult.  · Diabetic nurse educator consulted  · Discussed with nursing staff and .  Discharge to [facility] versus [facility] after  PEG placement.  .         DVT prophylaxis:  Medical DVT prophylaxis orders are present.    CODE STATUS:   Medical Intervention Limits: NO intubation (DNI)  Level Of Support Discussed With: Patient  Code Status (Patient  has no pulse and is not breathing): No CPR (Do Not Attempt to Resuscitate)  Medical Interventions (Patient has pulse or is breathing): Limited Support

## 2023-03-22 NOTE — PLAN OF CARE
Goal Outcome Evaluation:  Plan of Care Reviewed With: patient           Outcome Evaluation: vss. slept well. tube feedings turned off at 0000 per order for preparation for PEG tube placement with dr duffy today. bs wnl

## 2023-03-23 LAB
ANION GAP SERPL CALCULATED.3IONS-SCNC: 8.6 MMOL/L (ref 5–15)
BUN SERPL-MCNC: 25 MG/DL (ref 8–23)
BUN/CREAT SERPL: 39.1 (ref 7–25)
CALCIUM SPEC-SCNC: 9.1 MG/DL (ref 8.6–10.5)
CHLORIDE SERPL-SCNC: 96 MMOL/L (ref 98–107)
CO2 SERPL-SCNC: 28.4 MMOL/L (ref 22–29)
CREAT SERPL-MCNC: 0.64 MG/DL (ref 0.76–1.27)
DEPRECATED RDW RBC AUTO: 42.4 FL (ref 37–54)
EGFRCR SERPLBLD CKD-EPI 2021: 96.3 ML/MIN/1.73
ERYTHROCYTE [DISTWIDTH] IN BLOOD BY AUTOMATED COUNT: 13.4 % (ref 12.3–15.4)
GLUCOSE BLDC GLUCOMTR-MCNC: 154 MG/DL (ref 70–99)
GLUCOSE BLDC GLUCOMTR-MCNC: 182 MG/DL (ref 70–99)
GLUCOSE BLDC GLUCOMTR-MCNC: 191 MG/DL (ref 70–99)
GLUCOSE BLDC GLUCOMTR-MCNC: 219 MG/DL (ref 70–99)
GLUCOSE SERPL-MCNC: 206 MG/DL (ref 65–99)
HCT VFR BLD AUTO: 33.4 % (ref 37.5–51)
HGB BLD-MCNC: 11.1 G/DL (ref 13–17.7)
MAGNESIUM SERPL-MCNC: 1.9 MG/DL (ref 1.6–2.4)
MCH RBC QN AUTO: 29.3 PG (ref 26.6–33)
MCHC RBC AUTO-ENTMCNC: 33.2 G/DL (ref 31.5–35.7)
MCV RBC AUTO: 88.1 FL (ref 79–97)
PLATELET # BLD AUTO: 253 10*3/MM3 (ref 140–450)
PMV BLD AUTO: 11.3 FL (ref 6–12)
POTASSIUM SERPL-SCNC: 4.6 MMOL/L (ref 3.5–5.2)
RBC # BLD AUTO: 3.79 10*6/MM3 (ref 4.14–5.8)
SODIUM SERPL-SCNC: 133 MMOL/L (ref 136–145)
WBC NRBC COR # BLD: 10.85 10*3/MM3 (ref 3.4–10.8)

## 2023-03-23 PROCEDURE — 82962 GLUCOSE BLOOD TEST: CPT

## 2023-03-23 PROCEDURE — 25010000002 CEFAZOLIN 1-4 GM/50ML-% SOLUTION: Performed by: INTERNAL MEDICINE

## 2023-03-23 PROCEDURE — 25010000002 ENOXAPARIN PER 10 MG: Performed by: INTERNAL MEDICINE

## 2023-03-23 PROCEDURE — 80048 BASIC METABOLIC PNL TOTAL CA: CPT | Performed by: INTERNAL MEDICINE

## 2023-03-23 PROCEDURE — 85027 COMPLETE CBC AUTOMATED: CPT | Performed by: INTERNAL MEDICINE

## 2023-03-23 PROCEDURE — 94799 UNLISTED PULMONARY SVC/PX: CPT

## 2023-03-23 PROCEDURE — 83735 ASSAY OF MAGNESIUM: CPT | Performed by: INTERNAL MEDICINE

## 2023-03-23 PROCEDURE — 97530 THERAPEUTIC ACTIVITIES: CPT

## 2023-03-23 PROCEDURE — 97164 PT RE-EVAL EST PLAN CARE: CPT

## 2023-03-23 PROCEDURE — 92526 ORAL FUNCTION THERAPY: CPT

## 2023-03-23 PROCEDURE — 97110 THERAPEUTIC EXERCISES: CPT

## 2023-03-23 PROCEDURE — 97116 GAIT TRAINING THERAPY: CPT

## 2023-03-23 PROCEDURE — 99232 SBSQ HOSP IP/OBS MODERATE 35: CPT | Performed by: INTERNAL MEDICINE

## 2023-03-23 PROCEDURE — 63710000001 INSULIN REGULAR HUMAN PER 5 UNITS: Performed by: INTERNAL MEDICINE

## 2023-03-23 RX ADMIN — INSULIN HUMAN 2 UNITS: 100 INJECTION, SOLUTION PARENTERAL at 17:45

## 2023-03-23 RX ADMIN — Medication 10 ML: at 21:24

## 2023-03-23 RX ADMIN — BUDESONIDE 0.5 MG: 0.5 INHALANT ORAL at 18:30

## 2023-03-23 RX ADMIN — METOPROLOL TARTRATE 12.5 MG: 25 TABLET, FILM COATED ORAL at 08:32

## 2023-03-23 RX ADMIN — LISINOPRIL 5 MG: 5 TABLET ORAL at 08:32

## 2023-03-23 RX ADMIN — INSULIN HUMAN 3 UNITS: 100 INJECTION, SOLUTION PARENTERAL at 12:31

## 2023-03-23 RX ADMIN — ENOXAPARIN SODIUM 40 MG: 100 INJECTION SUBCUTANEOUS at 15:35

## 2023-03-23 RX ADMIN — FAMOTIDINE 20 MG: 20 TABLET ORAL at 05:36

## 2023-03-23 RX ADMIN — POLYETHYLENE GLYCOL 3350 17 G: 17 POWDER, FOR SOLUTION ORAL at 21:24

## 2023-03-23 RX ADMIN — ASPIRIN 81 MG 81 MG: 81 TABLET ORAL at 08:32

## 2023-03-23 RX ADMIN — METFORMIN HYDROCHLORIDE 850 MG: 850 TABLET ORAL at 08:32

## 2023-03-23 RX ADMIN — ARFORMOTEROL TARTRATE 15 MCG: 15 SOLUTION RESPIRATORY (INHALATION) at 18:30

## 2023-03-23 RX ADMIN — INSULIN HUMAN 2 UNITS: 100 INJECTION, SOLUTION PARENTERAL at 00:16

## 2023-03-23 RX ADMIN — Medication 10 ML: at 10:03

## 2023-03-23 RX ADMIN — FAMOTIDINE 20 MG: 20 TABLET ORAL at 17:43

## 2023-03-23 RX ADMIN — METOPROLOL TARTRATE 12.5 MG: 25 TABLET, FILM COATED ORAL at 21:24

## 2023-03-23 RX ADMIN — ARFORMOTEROL TARTRATE 15 MCG: 15 SOLUTION RESPIRATORY (INHALATION) at 07:16

## 2023-03-23 RX ADMIN — METFORMIN HYDROCHLORIDE 850 MG: 850 TABLET ORAL at 17:43

## 2023-03-23 RX ADMIN — CEFAZOLIN SODIUM 1 G: 1 INJECTION, SOLUTION INTRAVENOUS at 00:13

## 2023-03-23 RX ADMIN — CEFAZOLIN SODIUM 1 G: 1 INJECTION, SOLUTION INTRAVENOUS at 10:02

## 2023-03-23 RX ADMIN — INSULIN HUMAN 2 UNITS: 100 INJECTION, SOLUTION PARENTERAL at 05:42

## 2023-03-23 RX ADMIN — BUDESONIDE 0.5 MG: 0.5 INHALANT ORAL at 07:16

## 2023-03-23 NOTE — PROGRESS NOTES
[facility]   Hospitalist Progress Note  Date: 3/23/2023  Patient Name: Buzz Lopez  : 1945  MRN: [id number]  Date of admission: 3/6/2023    Subjective   Subjective     Chief Complaint:   Shortness of breath, confusion    Summary:   Buzz Lopez is a 77 y.o. male with no significant past medical history brought into the ED with concerns for confusion, possible syncope, shortness of breath.  Patient states for the previous 1 to 2 weeks, having issues with difficulty breathing and subjective fevers.  The symptoms worsened therefore he called son to take him to the emergency department.  By the time son arrived at home, patient confused General weakness unable to put on close.  Possible syncopal episode where he became unresponsive.  Patient's son look for pulse, could not feel radial pulse and started chest compressions.  EMS was called.  By time EMS arrived patient was receiving compressions from family members, EMS also could not find a pulse.  Respiratory distress satting around 70% on room air.  Once in the emergency department patient was alert and oriented x3 and able to answer questions appropriately.  Patient with no recollection of the events preceding hospitalization.  Denied any chest pain nausea vomiting focal weakness numbness or tingling.  Patient with poor oral intake, thin and frail.  Patient's primary issue is aspiration, felt this is why he has been having significant weight loss and admitted for pneumonia.  Given his high functional status normally, recommendations for PEG tube placement.  PEG tube was placed on 3/22/2023    Interval Followup:   Discussed with cardiologist at bedside, we will not perform LAI inpatient.  Patient will follow-up with cardiology as an outpatient for possible LAI.  Patient with no complaints today on rounds.  Discussed with  pre-CERT has been started    ROS:  Patient with no complaints at this time  Objective     Vitals:   Temp:  [97.3 °F  (36.3 °C)-98.1 °F (36.7 °C)] 97.7 °F (36.5 °C)  Heart Rate:  [69-87] 74  Resp:  [14-20] 18  BP: (100-156)/(46-63) 118/48    Physical Exam   General appearance: NAD, resting in bed comfortably, cachectic  Eyes: anicteric sclerae, moist conjunctivae; no lid-lag;   HENT: Atraumatic; oropharynx clear with moist mucous membranes and no mucosal ulcerations; normal hard and soft palate  Neck: Trachea midline; FROM, supple, no thyromegaly or lymphadenopathy  Lungs: Bilateral rhonchi, with normal respiratory effort and no intercostal retractions  Abdomen: PEG tube in place, surgical dressing clean dry and intact    Result Review    Result Review:  I have personally reviewed the results as below  [x]  Laboratory  CBC    CBC 3/16/23 3/21/23 3/23/23   WBC 6.97 5.33 10.85 (A)   RBC 3.96 (A) 3.82 (A) 3.79 (A)   Hemoglobin 11.6 (A) 11.2 (A) 11.1 (A)   Hematocrit 34.7 (A) 33.3 (A) 33.4 (A)   MCV 87.6 87.2 88.1   MCH 29.3 29.3 29.3   MCHC 33.4 33.6 33.2   RDW 13.0 13.3 13.4   Platelets 284 237 253   (A) Abnormal value            CMP    CMP 3/16/23 3/17/23 3/23/23   Glucose 219 (A) 158 (A) 206 (A)   BUN 27 (A) 24 (A) 25 (A)   Creatinine 0.60 (A) 0.61 (A) 0.64 (A)   eGFR 98.2 97.7 96.3   Sodium 135 (A) 133 (A) 133 (A)   Potassium 4.2 4.6 4.6   Chloride 97 (A) 98 96 (A)   Calcium 9.0 8.9 9.1   Total Protein 6.8     Albumin 2.8 (A) 2.5 (A)    Globulin 4.0     Total Bilirubin 0.4     Alkaline Phosphatase 87     AST (SGOT) 40     ALT (SGPT) 31     Albumin/Globulin Ratio 0.7     BUN/Creatinine Ratio 45.0 (A) 39.3 (A) 39.1 (A)   Anion Gap 5.3 8.7 8.6   (A) Abnormal value       Comments are available for some flowsheets but are not being displayed.           []  Microbiology  []  Radiology  []  EKG/Telemetry   []  Cardiology/Vascular   []  Pathology  []  Old records  []  Other:    Assessment & Plan   Assessment / Plan   Assessment:    Sepsis due to right lower lobe pneumonia (Streptococcus Pneumo).  Resolved  Acute hypoxic respiratory  failure.  Resolved  Questionable cardiac arrest episode  NSTEMI/acute myocardial injury  Possible syncope  Lactic acidosis, resolved  Malnutrition.  BMI of 17  S/P Bronchoscopy 3/8/23 with mucous plugging removed.  With  yeast growing.  Dysphagia requiring Core Track placed this admission  Aspiration.  Severe aortic stenosis   Degenerative changes cervical spine / anterior osteophytes  New diagnosis of type 2 diabetes mellitus hemoglobin A1c of 8.1%       Plan:    PEG tube placed on 22nd, tube feed with Glucerna.  Started metformin.  Dose uptitrated.  Hemoglobin A1c noted  CT neck noted... anterior osteophytes w mod mass effect  Cardiology consulted   LAI attempted 3/9/23 but unable to complete secondary to core track  Discussed with cardiologist, will defer LAI to outpatient  CT scan chest .... No PE.  Bilat pneumonia noted.  Status post bronchoscopy   Thick mucus removal  Food particles seen around the vocal cords  Positive for strep pneumo  Transthoracic echo suggesting normal EF% and severe AS  Continue albuterol nebulizer every 4 hours as needed  Continue bronchodilator protocol  Continue aspirin and Lopressor.  Lisinopril added  Sliding-scale insulin  Course of antibiotics now completed  Appreciate palliative care input.  Patient is DNR/DNI.  PT OT consult.  Diabetic nurse educator consulted  Discussed with nursing staff and .  Precertification has been started by social work for placement  .     DVT prophylaxis:  Medical DVT prophylaxis orders are present.    CODE STATUS:   Medical Intervention Limits: NO intubation (DNI)  Level Of Support Discussed With: Patient  Code Status (Patient has no pulse and is not breathing): No CPR (Do Not Attempt to Resuscitate)  Medical Interventions (Patient has pulse or is breathing): Limited Support

## 2023-03-23 NOTE — PLAN OF CARE
Goal Outcome Evaluation:   VSS. Pt had no complaints during shift. Pt tolerating tube feed well.

## 2023-03-23 NOTE — PLAN OF CARE
Goal Outcome Evaluation:  Plan of Care Reviewed With: patient           Outcome Evaluation: vss. tube feeding now at goal rate of 65ml/hr. pt tolerating well. pt has denied need for pain med tonight. bs 182 and 191-tx with Humulin R sliding scale per orders.

## 2023-03-23 NOTE — CONSULTS
"Nutrition Services    Patient Name: Buzz Lopez  YOB: 1945  MRN: [id number]  Admission date: 3/6/2023      CLINICAL NUTRITION ASSESSMENT      Reason for Assessment  Follow-up protocol, EN    H&P:    Past Medical History:   Diagnosis Date   • Dysphagia         Current Problems:   Active Hospital Problems    Diagnosis    • Aspiration into airway    • Severe malnutrition (HCC)         Nutrition/Diet History         Narrative     Nutrition follow up.  Patient had MBS and SLP cannot recommend safe PO intake.  PEG tube placed 3/22. Tube feeds restarted, and now back at goal rate. Tolerating well.     Patient continues to be hyponatremic.  Receiving minimal free water.  All other electrolytes WDL at this time.      No weight obtained since 3/14.  Reached out to RN to request updated weight today.  Will CTM per protocol.     Anthropometrics        Current Height, Weight Height: 177.8 cm (70\")  Weight: 53.8 kg (118 lb 9.7 oz)   Current BMI Body mass index is 17.02 kg/m².       Weight Hx  Wt Readings from Last 30 Encounters:   03/14/23 1233 53.8 kg (118 lb 9.7 oz)   03/13/23 1506 51.7 kg (113 lb 15.7 oz)   03/06/23 0824 61.2 kg (135 lb)   06/09/22 0154 61.4 kg (135 lb 5.8 oz)            Wt Change Observation Questionable accuracy of weight on 3/06. JACKY. Requested updated weight from RN on 3/23.      Estimated/Assessed Needs       Energy Requirements 30-35 kcal/kg    EST Needs (kcal/day) 6451-6844       Protein Requirements 1.2-1.5 g/kg    EST Daily Needs (g/day) 65-81       Fluid Requirements 1 ml/kcal    Estimated Needs (mL/day) 5070-0209     Labs/Medications         Pertinent Labs Reviewed.   Results from last 7 days   Lab Units 03/23/23  0614 03/17/23  0450   SODIUM mmol/L 133* 133*   POTASSIUM mmol/L 4.6 4.6   CHLORIDE mmol/L 96* 98   CO2 mmol/L 28.4 26.3   BUN mg/dL 25* 24*   CREATININE mg/dL 0.64* 0.61*   CALCIUM mg/dL 9.1 8.9   GLUCOSE mg/dL 206* 158*     Results from last 7 days   Lab Units " 03/23/23  0614 03/21/23  0543 03/17/23  0450   MAGNESIUM mg/dL 1.9  --  2.1   PHOSPHORUS mg/dL  --   --  3.1   HEMOGLOBIN g/dL 11.1*   < >  --    HEMATOCRIT % 33.4*   < >  --     < > = values in this interval not displayed.     COVID19   Date Value Ref Range Status   03/06/2023 Not Detected Not Detected - Ref. Range Final     Lab Results   Component Value Date    HGBA1C 8.10 (H) 03/17/2023         Pertinent Medications Reviewed.     Current Nutrition Orders & Evaluation of Intake       Oral Nutrition     Current PO Diet NPO Diet NPO Type: Strict NPO   Supplement Orders Placed This Encounter      Diet, Tube Feeding Tube Feeding Formula: Diabetisource AC (Glucerna 1.2); Tube Feeding Type: Continuous; Continuous Tube Feeding Start Rate (mL/hr): 25; Then Advance Rate By (mL/hr): 25; Every __ Hours: 4; To Goal Rate of (mL/hr): 65      Hold Tube Feeding       Malnutrition Severity Assessment      Patient meets criteria for : Severe Malnutrition         Nutrition Diagnosis         Nutrition Dx Problem 1 Severe malnutrition related to Inability to consume sufficient energy as evidenced by body composition changes., NPO and SLP/swallow eval     Nutrition Intervention         Diabetisource AC @ 65 ml/hr   Free water flushes 25 ml every 4 hours.  Provides 1872 kcal, 94 g pro, 1429 ml fluid      Medical Nutrition Therapy/Nutrition Education          Learner     Readiness N/A  N/A     Method     Response N/A  N/A     Monitor/Evaluation        Monitor I&O, Pertinent labs, EN delivery/tolerance, Weight, Skin status, GI status, Swallow function, Hemodynamic stability, RFS     Nutrition Discharge Plan         To be determined     Electronically signed by:  Sharon David RD  03/23/23 07:46 EDT

## 2023-03-23 NOTE — THERAPY RE-EVALUATION
Acute Care - Physical Therapy Re-Evaluation   [place]     Patient Name: Buzz Lopez  : 1945  MRN: [id number]  Today's Date: 3/23/2023      Visit Dx:     ICD-10-CM ICD-9-CM   1. Pneumonia of right lower lobe due to infectious organism  J18.9 486   2. Sepsis with acute hypoxic respiratory failure without septic shock, due to unspecified organism (HCC)  A41.9 038.9    R65.20 995.91    J96.01 518.81   3. Acute respiratory failure with hypoxia (HCC)  J96.01 518.81   4. Difficulty walking  R26.2 719.7   5. Oropharyngeal dysphagia  R13.12 787.22   6. Decreased activities of daily living (ADL)  Z78.9 V49.89     Patient Active Problem List   Diagnosis   • Severe malnutrition (HCC)   • Aspiration into airway     Past Medical History:   Diagnosis Date   • Dysphagia      Past Surgical History:   Procedure Laterality Date   • BRONCHOSCOPY N/A 3/8/2023    Procedure: BRONCHOSCOPY WITH BRONCHOALVEOLAR LAVAGE, WASHINGS;  Surgeon: Jason Rowley MD;  Location: [facility] ENDOSCOPY;  Service: Pulmonary;  Laterality: N/A;  MUCOUS PLUGGING   • ENDOSCOPY W/ PEG TUBE PLACEMENT N/A 3/22/2023    Procedure: ESOPHAGOGASTRODUODENOSCOPY WITH PERCUTANEOUS ENDOSCOPIC GASTROSTOMY TUBE INSERTION;  Surgeon: Ruslan Guido MD;  Location: [facility] ENDOSCOPY;  Service: Gastroenterology;  Laterality: N/A;  PEG INSERTION   • EYE SURGERY       PT Assessment (last 12 hours)     PT Evaluation and Treatment     Row Name 23 0832          Physical Therapy Time and Intention    Subjective Information no complaints (P)   -JL     Document Type re-evaluation (P)   -JL     Mode of Treatment individual therapy;physical therapy (P)   -JL     Patient Effort good (P)   -JL     Row Name 23 0832          General Information    Patient Profile Reviewed yes (P)   -JL     Patient Observations alert;cooperative;agree to therapy (P)   -JL     Existing Precautions/Restrictions fall (P)   -JL     Row Name 23 0881          Bed Mobility    All  Activities, [place] (Bed Mobility) standby assist (P)   -JL     Row Name 03/23/23 0832          Transfers    Transfers sit-stand transfer;stand-sit transfer;toilet transfer;bed-chair transfer (P)   -JL     Row Name 03/23/23 0832          Bed-Chair Transfer    Bed-Chair [place] (Transfers) contact guard;1 person assist (P)   -JL     Assistive Device (Bed-Chair Transfers) walker, front-wheeled (P)   -JL     Row Name 03/23/23 0832          Sit-Stand Transfer    Sit-Stand [place] (Transfers) contact guard (P)   -JL     Assistive Device (Sit-Stand Transfers) walker, front-wheeled (P)   -JL     Row Name 03/23/23 0832          Stand-Sit Transfer    Stand-Sit [place] (Transfers) contact guard (P)   -JL     Assistive Device (Stand-Sit Transfers) walker, front-wheeled (P)   -JL     Row Name 03/23/23 0832          Toilet Transfer    Type (Toilet Transfer) stand pivot/stand step (P)   -JL     [place] Level (Toilet Transfer) contact guard;1 person assist (P)   -JL     Assistive Device (Toilet Transfer) walker, front-wheeled (P)   -JL     Row Name 03/23/23 0832          Gait/Stairs (Locomotion)    Gait/Stairs Locomotion gait/ambulation assistive device (P)   -JL     [place] Level (Gait) contact guard;1 person assist (P)   -JL     Assistive Device (Gait) walker, front-wheeled (P)   -JL     Distance in Feet (Gait) 30 (P)   -JL     Pattern (Gait) step-through (P)   -JL     Deviations/Abnormal Patterns (Gait) gypsy decreased;base of support, narrow;stride length decreased;gait speed decreased (P)   -JL     Row Name 03/23/23 0832          Balance    Dynamic Standing Balance contact guard;1-person assist (P)   -JL     Position/Device Used, Standing Balance walker, front-wheeled (P)   -JL     Row Name             Wound 03/16/23 1641 Bilateral medial gluteal MASD (Moisture associated skin damage)    Wound - Properties Group Placement Date: 03/16/23  - Placement Time: 1641  - Side: Bilateral  -  Orientation: medial  -MC Location: gluteal  -MC Primary Wound Type: MASD  -MC    Retired Wound - Properties Group Placement Date: 03/16/23  - Placement Time: 1641  -MC Side: Bilateral  -MC Orientation: medial  -MC Location: gluteal  -MC Primary Wound Type: MASD  -MC    Retired Wound - Properties Group Date first assessed: 03/16/23  - Time first assessed: 1641  -MC Side: Bilateral  -MC Location: gluteal  -MC Primary Wound Type: MASD  -MC    Row Name             Wound 03/16/23 1641 Left anterior leg Abrasion    Wound - Properties Group Placement Date: 03/16/23  - Placement Time: 1641 -MC Side: Left  -MC Orientation: anterior  -MC Location: leg  -MC Primary Wound Type: Abrasion  -MC    Retired Wound - Properties Group Placement Date: 03/16/23  - Placement Time: 1641  -MC Side: Left  -MC Orientation: anterior  -MC Location: leg  -MC Primary Wound Type: Abrasion  -MC    Retired Wound - Properties Group Date first assessed: 03/16/23  - Time first assessed: 1641  -MC Side: Left  -MC Location: leg  -MC Primary Wound Type: Abrasion  -MC    Row Name 03/23/23 0832          Positioning and Restraints    Pre-Treatment Position in bed (P)   -JL     Post Treatment Position chair (P)   -JL     In Chair reclined;call light within reach;encouraged to call for assist;with family/caregiver (P)   -JL     Row Name 03/23/23 0832          Progress Summary (PT)    Daily Progress Summary (PT) Pt. performed bed mobility with SBA, sit to stand with RW and CGA, ambulated 15 ft to restroom with RW and CGA. Upon sit to stand from toilet, pt. reported dizziness and needed to sit back down. Pt. able to recover, ambulate 15 ft. to bed. Pt. transferred bed to chair with RW and CGA. Recommend subacute rehab upon hospital discharge to continue to improve mobility deficits and optimize functional independence. (P)   -JL     Row Name 03/23/23 0832          Physical Therapy Goals    Transfer Goal Selection (PT) transfer, PT goal 1 (P)   -JL      Gait Training Goal Selection (PT) gait training, PT goal 1 (P)   -JL     Strength Goal Selection (PT) strength, PT goal 1 (P)   -JL     Balance Goal Selection (PT) balance, PT goal 1 (P)   -JL     Row Name 03/23/23 0832          Transfer Goal 1 (PT)    Activity/Assistive Device (Transfer Goal 1, PT) sit-to-stand/stand-to-sit;walker, rolling (P)   -JL     [place] Level/Cues Needed (Transfer Goal 1, PT) standby assist (P)   -JL     Time Frame (Transfer Goal 1, PT) long term goal (LTG);10 days (P)   -JL     Row Name 03/23/23 0832          Gait Training Goal 1 (PT)    Activity/Assistive Device (Gait Training Goal 1, PT) gait (walking locomotion);walker, rolling (P)   -JL     [place] Level (Gait Training Goal 1, PT) contact guard required (P)   -JL     Distance (Gait Training Goal 1, PT) 150 ft (P)   -JL     Time Frame (Gait Training Goal 1, PT) long term goal (LTG);10 days (P)   -JL     Row Name 03/23/23 0832          Strength Goal 1 (PT)    Strength Goal 1 (PT) Pt. will improve bilateral knee extensor strength to 4/5. (P)   -JL     Time Frame (Strength Goal 1, PT) long term goal (LTG);10 days (P)   -JL     Row Name 03/23/23 0832          Balance Goal 1 (PT)    Activity/Assistive Device (Balance Goal 1, PT) standing, dynamic;walker, rolling (P)   -JL     [place] Level/Cues Needed (Balance Goal 1, PT) standby assist (P)   -JL     Time Frame (Balance Goal 1, PT) long term goal (LTG);10 days (P)   -JL           User Key  (r) = Recorded By, (t) = Taken By, (c) = Cosigned By    Initials Name Provider Type    Lilly Maciel, RN Registered Nurse    Ephraim Garcia, PT Student PT Student                Physical Therapy Education     Title: PT OT SLP Therapies (Done)     Topic: Physical Therapy (Done)     Point: Mobility training (Done)     Learning Progress Summary           Patient Acceptance, E, VU by ALO at 3/8/2023 1531    Acceptance, E, VU by POONAM at 3/7/2023 0909                               User Key      Initials Effective Dates Name Provider Type Discipline     09/21/21 -  Heavenly Quach, RN Registered Nurse Nurse     01/11/23 -  Ephraim Juares, NORMAN Student PT Student PT              PT Recommendation and Plan  Anticipated Discharge Disposition (PT): home with home health  Planned Therapy Interventions (PT): balance training, bed mobility training, gait training, patient/family education, strengthening, transfer training  Therapy Frequency (PT): daily  Progress Summary (PT)  Progress Toward Functional Goals (PT): progress toward functional goals is good  Daily Progress Summary (PT): (P) Pt. performed bed mobility with SBA, sit to stand with RW and CGA, ambulated 15 ft to restroom with RW and CGA. Upon sit to stand from toilet, pt. reported dizziness and needed to sit back down. Pt. able to recover, ambulate 15 ft. to bed. Pt. transferred bed to chair with RW and CGA. Recommend subacute rehab upon hospital discharge to continue to improve mobility deficits and optimize functional independence.  Plan of Care Reviewed With: patient  Outcome Evaluation: Pt. presents with balance and activity tolerance deficits that impair his functional independence. Recommend home health therapy services to improve balance and activity tolerance deficits and optimize safety with mobility.   Outcome Measures     Row Name 03/23/23 0838 03/21/23 1353 03/20/23 1452       How much help from another person do you currently need...    Turning from your back to your side while in flat bed without using bedrails? 4 (P)   -POONAM [place]  -DK 3  -DP (r) AT (t) DP (c)    Moving from lying on back to sitting on the side of a flat bed without bedrails? 3 (P)   -POONAM [place]  -DK 3  -DP (r) AT (t) DP (c)    Moving to and from a bed to a chair (including a wheelchair)? 3 (P)   -POONAM [place]  -DK 3  -DP (r) AT (t) DP (c)    Standing up from a chair using your arms (e.g., wheelchair, bedside chair)? 3 (P)   -POONAM 3  -DK 3  -DP (r) AT (t) DP (c)    Climbing 3-5 steps with a  railing? 2 (P)   -JL 3  -DK 3  -DP (r) AT (t) DP (c)    To walk in hospital room? 3 (P)   -JL 3  -DK 3  -DP (r) AT (t) DP (c)    AM-PAC 6 Clicks Score (PT) 18 (P)   -JL 18  -DK 18  -DP (r) AT (t)       Functional Assessment    Outcome Measure Options AM-PAC 6 Clicks Basic Mobility (PT) (P)   -JL AM-PAC 6 Clicks Basic Mobility (PT)  -DK AM-PAC 6 Clicks Basic Mobility (PT)  -DP (r) AT (t) DP (c)          User Key  (r) = Recorded By, (t) = Taken By, (c) = Cosigned By    Initials Name Provider Type    Luisana Serrano, PTA Physical Therapist Assistant    Bren Flanagan, PT Physical Therapist    Ephraim Garcia, PT Student PT Student    [place], PT Student PT Student                 Time Calculation:    PT Charges     Row Name 03/23/23 0831             Time Calculation    PT Received On 03/23/23 (P)   -JL         Timed Charges    94297 - Gait Training Minutes  10 (P)   -JL      91095 - PT Therapeutic Activity Minutes 14 (P)   -JL         Untimed Charges    PT Eval/Re-eval Minutes 5 (P)   -JL         Total Minutes    Timed Charges Total Minutes 24 (P)   -JL      Untimed Charges Total Minutes 5 (P)   -JL       Total Minutes 29 (P)   -JL            User Key  (r) = Recorded By, (t) = Taken By, (c) = Cosigned By    Initials Name Provider Type    Ephraim Garcia, PT Student PT Student              Therapy Charges for Today     Code Description Service Date Service Provider Modifiers Qty    20403158315 HC GAIT TRAINING EA 15 MIN 3/23/2023 Ephraim Juares, PT Student GP 1    94953567316 HC PT THERAPEUTIC ACT EA 15 MIN 3/23/2023 Ephraim Juares, PT Student GP 1    61319041496 HC PT RE-EVAL ESTABLISHED PLAN 2 3/23/2023 Ephraim Juares, PT Student GP 1          PT G-Codes  Outcome Measure Options: (P) AM-PAC 6 Clicks Basic Mobility (PT)  AM-PAC 6 Clicks Score (PT): (P) 18  AM-PAC 6 Clicks Score (OT): 19    Ephraim Juares, PT Student  3/23/2023

## 2023-03-23 NOTE — PROGRESS NOTES
Date of service: 3/23/2023    Subjective: Has lightheadedness when he stands up.  No chest pain, SOB at rest or palpitations.    Review of systems:No headache, vertigo, nausea, vomiting, abdominal pain, fever, chills, TIA or CVA-like symptoms.     Physical examination: He was in no pain or distress.  Vital signs: Reviewed  HEENT: No JVD or carotid bruit.    Neck: No JVD or carotid bruit.    Chest:   CTA anteriorly.  Cardiac: RRR.  III/IV SM over the right and LSB.  No S3 gallop.   Abdomen: Soft and nontender.  No megalies or masses.  PEG tube is in place.  Extremities: No edema, cyanosis or clubbing.  Pulse intact.  Neuro: Alert, oriented x3, no facial droop and speech was clear.    Records: Reviewed.     Assessment and plan:     1.  Acute diastolic CHF.    2.  Syncope, may be vasovagal and/or due to severe aortic stenosis.   3.  Acute hypoxemic respiratory failure, resolved.  4.  Bilateral aspiration pneumonia due to abnormal swallowing reflex.  PEG placement is planned.  5.  Reactive sinus tachycardia, secondary to the a the yon, resolved.  6.  Elevated troponin, mostly due to demand ischemia due to the above.    7.  I talked to his son and the patient about further cardiac work-up including LAI and  cardiac catheterization prior to cardiac surgery.  The patient CODE STATUS is no CPR at this time.  Therefore, his son and family has to change disease status prior to proceed with any cardiac work or surgery.  His son preferred to let him father go to the rehabilitation center and and see him in office there after for further discussion.  I agreed with his thoughts as well as Dr. Jj Frankel.

## 2023-03-23 NOTE — THERAPY TREATMENT NOTE
Patient Name: Buzz Lopez  : 1945    MRN: [id number]                              Today's Date: 3/23/2023       Admit Date: 3/6/2023    Visit Dx:     ICD-10-CM ICD-9-CM   1. Pneumonia of right lower lobe due to infectious organism  J18.9 486   2. Sepsis with acute hypoxic respiratory failure without septic shock, due to unspecified organism (HCC)  A41.9 038.9    R65.20 995.91    J96.01 518.81   3. Acute respiratory failure with hypoxia (HCC)  J96.01 518.81   4. Difficulty walking  R26.2 719.7   5. Oropharyngeal dysphagia  R13.12 787.22   6. Decreased activities of daily living (ADL)  Z78.9 V49.89     Patient Active Problem List   Diagnosis   • Severe malnutrition (HCC)   • Aspiration into airway     Past Medical History:   Diagnosis Date   • Dysphagia      Past Surgical History:   Procedure Laterality Date   • BRONCHOSCOPY N/A 3/8/2023    Procedure: BRONCHOSCOPY WITH BRONCHOALVEOLAR LAVAGE, WASHINGS;  Surgeon: Jason Rowley MD;  Location: [facility] ENDOSCOPY;  Service: Pulmonary;  Laterality: N/A;  MUCOUS PLUGGING   • ENDOSCOPY W/ PEG TUBE PLACEMENT N/A 3/22/2023    Procedure: ESOPHAGOGASTRODUODENOSCOPY WITH PERCUTANEOUS ENDOSCOPIC GASTROSTOMY TUBE INSERTION;  Surgeon: Ruslan Guido MD;  Location: [facility] ENDOSCOPY;  Service: Gastroenterology;  Laterality: N/A;  PEG INSERTION   • EYE SURGERY        General Information     Row Name 23 1307          OT Time and Intention    Document Type therapy note (daily note)  -PG     Mode of Treatment individual therapy;occupational therapy  -PG           User Key  (r) = Recorded By, (t) = Taken By, (c) = Cosigned By    Initials Name Provider Type    PG Janes Palacios OT Occupational Therapist                 Mobility/ADL's    No documentation.                Obj/Interventions     Row Name 23 1307          Shoulder (Therapeutic Exercise)    Shoulder Strengthening (Therapeutic Exercise) 1 lb free weight;15 repititions  -PG     Row Name 23 7467           Elbow/Forearm (Therapeutic Exercise)    Elbow/Forearm (Therapeutic Exercise) strengthening exercise  -PG     Elbow/Forearm Strengthening (Therapeutic Exercise) 15 repititions;1 lb free weight  -PG     Row Name 03/23/23 1307          Motor Skills    Therapeutic Exercise shoulder;elbow/forearm  -PG           User Key  (r) = Recorded By, (t) = Taken By, (c) = Cosigned By    Initials Name Provider Type    PG Janes Palacios, OT Occupational Therapist               Goals/Plan    No documentation.                Clinical Impression     Row Name 03/23/23 1307          Plan of Care Review    Progress no change  -PG           User Key  (r) = Recorded By, (t) = Taken By, (c) = Cosigned By    Initials Name Provider Type    PG Janes Palacios, ZANA Occupational Therapist               Outcome Measures     Row Name 03/23/23 1308          How much help from another is currently needed...    Putting on and taking off regular lower body clothing? 3  -PG     Bathing (including washing, rinsing, and drying) 3  -PG     Toileting (which includes using toilet bed pan or urinal) 3  -PG     Putting on and taking off regular upper body clothing 3  -PG     Taking care of personal grooming (such as brushing teeth) 4  -PG     Eating meals 4  -PG     AM-PAC 6 Clicks Score (OT) 20  -PG     Row Name 03/23/23 0910 03/23/23 0838       How much help from another person do you currently need...    Turning from your back to your side while in flat bed without using bedrails? 4  -RL 4 (P)   -JL    Moving from lying on back to sitting on the side of a flat bed without bedrails? 3  -RL 3 (P)   -JL    Moving to and from a bed to a chair (including a wheelchair)? 3  -RL 3 (P)   -JL    Standing up from a chair using your arms (e.g., wheelchair, bedside chair)? 3  -RL 3 (P)   -JL    Climbing 3-5 steps with a railing? 2  -RL 2 (P)   -JL    To walk in hospital room? 3  -RL 3 (P)   -JL    AM-PAC 6 Clicks Score (PT) 18  -RL 18 (P)   -JL    Highest level of  mobility 6 --> Walked 10 steps or more  -RL 6 --> Walked 10 steps or more (P)   -JL    Row Name 03/23/23 1308 03/23/23 0838       Functional Assessment    Outcome Measure Options Optimal Instrument;AM-PAC 6 Clicks Daily Activity (OT)  -PG AM-PAC 6 Clicks Basic Mobility (PT) (P)   -    Row Name 03/23/23 1308          Optimal Instrument    Optimal Instrument Optimal - 3  -PG     Bending/Stooping 2  -PG     Standing 2  -PG     Reaching 1  -PG           User Key  (r) = Recorded By, (t) = Taken By, (c) = Cosigned By    Initials Name Provider Type    PG Janes Palacios, OT Occupational Therapist    Elvi Castellanos RNA Registered Nurse    Ephraim Garcia, PT Student PT Student                  OT Recommendation and Plan     Plan of Care Review  Progress: no change     Time Calculation:    Time Calculation- OT     Row Name 03/23/23 1308 03/23/23 0831          Time Calculation- OT    OT Received On 03/23/23  -PG --     OT Goal Re-Cert Due Date 03/25/23  -PG --        Timed Charges    25032 - OT Therapeutic Exercise Minutes 12  -PG --     40855 - Gait Training Minutes  -- 10 (P)   -JL        Total Minutes    Timed Charges Total Minutes 12  -PG 10 (P)   -JL      Total Minutes 12  -PG 10 (P)   -JL           User Key  (r) = Recorded By, (t) = Taken By, (c) = Cosigned By    Initials Name Provider Type    PG Janes Palacios OT Occupational Therapist    Ephraim Garcia, PT Student PT Student              Therapy Charges for Today     Code Description Service Date Service Provider Modifiers Qty    37722238952  OT THER PROC EA 15 MIN 3/23/2023 Janes Palacios OT GO 1               Janes Palacios OT  3/23/2023

## 2023-03-23 NOTE — THERAPY PROGRESS REPORT/RE-CERT
Acute Care - Speech Language Pathology   Swallow Progress Note  [facility]     Patient Name: Buzz Lopez  : 1945  MRN: [id number]  Today's Date: 3/23/2023               Admit Date: 3/6/2023    Visit Dx:     ICD-10-CM ICD-9-CM   1. Pneumonia of right lower lobe due to infectious organism  J18.9 486   2. Sepsis with acute hypoxic respiratory failure without septic shock, due to unspecified organism (HCC)  A41.9 038.9    R65.20 995.91    J96.01 518.81   3. Acute respiratory failure with hypoxia (HCC)  J96.01 518.81   4. Difficulty walking  R26.2 719.7   5. Oropharyngeal dysphagia  R13.12 787.22   6. Decreased activities of daily living (ADL)  Z78.9 V49.89     Patient Active Problem List   Diagnosis   • Severe malnutrition (HCC)   • Aspiration into airway     Past Medical History:   Diagnosis Date   • Dysphagia      Past Surgical History:   Procedure Laterality Date   • BRONCHOSCOPY N/A 3/8/2023    Procedure: BRONCHOSCOPY WITH BRONCHOALVEOLAR LAVAGE, WASHINGS;  Surgeon: Jason Rowley MD;  Location: [facility] ENDOSCOPY;  Service: Pulmonary;  Laterality: N/A;  MUCOUS PLUGGING   • ENDOSCOPY W/ PEG TUBE PLACEMENT N/A 3/22/2023    Procedure: ESOPHAGOGASTRODUODENOSCOPY WITH PERCUTANEOUS ENDOSCOPIC GASTROSTOMY TUBE INSERTION;  Surgeon: Ruslan Guido MD;  Location: [facility] ENDOSCOPY;  Service: Gastroenterology;  Laterality: N/A;  PEG INSERTION   • EYE SURGERY           Inpatient Speech Pathology Dysphagia Evaluation           PAIN SCALE: None indicated.     PRECAUTIONS/CONTRAINDICATIONS: Standard     SUSPECTED ABUSE/NEGLECT/EXPLOITATION: None indicated     SOCIAL/PSYCHOLOGICAL NEEDS/BARRIERS: None indicated.     PAST SOCIAL HISTORY: 77-year-old male lives at home with family.     PRIOR FUNCTION: On regular diet     PATIENT GOALS/EXPECTATIONS:  To regain ability to eat orally.     HISTORY: 77-year-old male with the above diagnosis referred for speech therapy evaluation to assess for swallow.  Per report patient  getting choked at times.  Patient denies difficulty though does state difficulty chewing due to poor dentition.  No previous speech pathology services are reported.  Patient now with PEG.  Modified barium swallow study was completed 3/15/2023 with silent aspiration noted.     CURRENT DIET LEVEL: NPO     OBJECTIVE:    TEST ADMINISTERED: Clinical dysphagia evaluation     COGNITION/SAFETY AWARENESS: Patient followed basic directions and responded to simple questions.     BEHAVIORAL OBSERVATIONS: Alert and cooperative     ORAL MOTOR EXAM: Poor dentition, general decreased oral motor strength/range of motion.      VOICE QUALITY: Wet at times, patient did clear.     REFLEX EXAM: Patient demonstrated throat clear,  cough     POSTURE: Assisted sitting upright in bed     FEEDING/SWALLOWING FUNCTION:  Ice chips, sips of water.    CLINICAL OBSERVATIONS:  Patient completing lingual and tongue base exercises, 3 sets each.  Laryngeal elevation appears with slight improvement, 3+/5.  Trials of ice chips x10 with delayed swallow, laryngeal elevation is irregular at times.  Patient producing multiple swallow each trial.  Occasional throat clearing noted.  Patient maintaining clear vocal quality.  Controlled sips of thin liquid x5 with cough noted x1.  Patient producing multiple swallows x4 each trial.  Maintaining clear vocal quality at this session.  Note patient with silent aspiration per modified barium swallow study.     DYSPHAGIA CRITERIA: decreased tongue base strength, decreased pharyngeal strength, decreased laryngeal elevation, minimal epiglottic excursion.  Rashid aspiration noted with no immediate cough, further aspiration with residues.     FUNCTIONAL ASSESSMENT INSTRUMENT: Patient currently scored a level 2 of 7 on Functional Communication Measures for swallowing indicating a 80-99 % limitation in function.     ASSESSMENT/ PLAN OF CARE:  Pt presents with limitations, noted below, that impede his ability to swallow safely  and maintain nutrition. The skills of a therapist will be required to safely and effectively implement the following treatment plan to restore maximal level of function.     PROBLEMS:  1.  decreased tongue base strength, decreased pharyngeal strength, decreased laryngeal elevation, minimal epiglottic excursion.  Rashid aspiration noted with no immediate cough, further aspiration with residues.    Note goals revised following modified barium swallow study.                       LTG 1: 30 days: Increase swallow function for initiation of p.o. intake.   STG 1a: 14 days: Patient will demonstrate swallow within 3 seconds of multimodalic stimulation in 8 of 10 trials.   STG 1b: 14 days: Patient will demonstrate swallow of thin liquid in 8 of 10 trials with min to no signs or symptoms of aspiration.   STG 1c: 14 days: Patient will participate in oral motor and laryngeal exercises with strength /range of motion 4/5.      LTG 2: 30 days: Patient will tolerate diet of soft purée solids and thin liquid utilizing appropriate positioning and strategies with minimal assistance.   STG 2a: 21 days: Patient will tolerate trials of soft purée solid and nectar thick liquids with min to no signs or symptoms of aspiration.   STG 2c: 21 days: Patient/family education.                          TREATMENT: Dysphagia therapy to address swallow function through exercises and education of strategies.                FREQUENCY/DURATION: Twice daily 5 times per week     REHAB POTENTIAL:  Pt has fair to good rehab potential.  The following limitations may influence improvement/ length of tx: Medical status.     RECOMMENDATIONS:   1.   DIET: Continue with primary nutrition through PEG.    2.    Continue with speech pathology services for dysphagia.     Pt/responsible party agrees with plan of care and has been informed of all alternatives, risks and benefits.                                                                            Plan of Care  Reviewed With: patient, family          EDUCATION  The patient has been educated in the following areas:   NPO rationale.              Time Calculation:    Time Calculation- SLP     Row Name 03/23/23 1208             Time Calculation- SLP    SLP Stop Time 1120  -TB      SLP Received On 03/23/23  -TB         Untimed Charges    82038-NL Treatment Swallow Minutes 40  -TB         Total Minutes    Untimed Charges Total Minutes 40  -TB       Total Minutes 40  -TB            User Key  (r) = Recorded By, (t) = Taken By, (c) = Cosigned By    Initials Name Provider Type    TB Terri Gomez SLP Speech and Language Pathologist                Therapy Charges for Today     Code Description Service Date Service Provider Modifiers Qty    12732550785 HC ST TREATMENT SWALLOW 3 3/23/2023 Terri Gomez SLP GN 1               JANAY Julio  3/23/2023

## 2023-03-24 LAB
GLUCOSE BLDC GLUCOMTR-MCNC: 122 MG/DL (ref 70–99)
GLUCOSE BLDC GLUCOMTR-MCNC: 150 MG/DL (ref 70–99)
GLUCOSE BLDC GLUCOMTR-MCNC: 160 MG/DL (ref 70–99)
GLUCOSE BLDC GLUCOMTR-MCNC: 181 MG/DL (ref 70–99)
GLUCOSE BLDC GLUCOMTR-MCNC: 190 MG/DL (ref 70–99)

## 2023-03-24 PROCEDURE — 63710000001 INSULIN REGULAR HUMAN PER 5 UNITS: Performed by: INTERNAL MEDICINE

## 2023-03-24 PROCEDURE — 25010000002 ENOXAPARIN PER 10 MG: Performed by: INTERNAL MEDICINE

## 2023-03-24 PROCEDURE — 82962 GLUCOSE BLOOD TEST: CPT

## 2023-03-24 PROCEDURE — 94760 N-INVAS EAR/PLS OXIMETRY 1: CPT

## 2023-03-24 PROCEDURE — 94799 UNLISTED PULMONARY SVC/PX: CPT

## 2023-03-24 PROCEDURE — 92526 ORAL FUNCTION THERAPY: CPT

## 2023-03-24 PROCEDURE — 94664 DEMO&/EVAL PT USE INHALER: CPT

## 2023-03-24 PROCEDURE — 99232 SBSQ HOSP IP/OBS MODERATE 35: CPT | Performed by: INTERNAL MEDICINE

## 2023-03-24 RX ADMIN — METOPROLOL TARTRATE 12.5 MG: 25 TABLET, FILM COATED ORAL at 20:43

## 2023-03-24 RX ADMIN — BUDESONIDE 0.5 MG: 0.5 INHALANT ORAL at 18:20

## 2023-03-24 RX ADMIN — Medication 10 ML: at 08:32

## 2023-03-24 RX ADMIN — FAMOTIDINE 20 MG: 20 TABLET ORAL at 18:03

## 2023-03-24 RX ADMIN — METFORMIN HYDROCHLORIDE 850 MG: 850 TABLET ORAL at 18:03

## 2023-03-24 RX ADMIN — METFORMIN HYDROCHLORIDE 850 MG: 850 TABLET ORAL at 08:32

## 2023-03-24 RX ADMIN — METOPROLOL TARTRATE 12.5 MG: 25 TABLET, FILM COATED ORAL at 08:32

## 2023-03-24 RX ADMIN — INSULIN HUMAN 2 UNITS: 100 INJECTION, SOLUTION PARENTERAL at 00:35

## 2023-03-24 RX ADMIN — ARFORMOTEROL TARTRATE 15 MCG: 15 SOLUTION RESPIRATORY (INHALATION) at 06:35

## 2023-03-24 RX ADMIN — LISINOPRIL 5 MG: 5 TABLET ORAL at 08:32

## 2023-03-24 RX ADMIN — INSULIN HUMAN 2 UNITS: 100 INJECTION, SOLUTION PARENTERAL at 12:46

## 2023-03-24 RX ADMIN — INSULIN HUMAN 2 UNITS: 100 INJECTION, SOLUTION PARENTERAL at 05:41

## 2023-03-24 RX ADMIN — ARFORMOTEROL TARTRATE 15 MCG: 15 SOLUTION RESPIRATORY (INHALATION) at 18:20

## 2023-03-24 RX ADMIN — ASPIRIN 81 MG 81 MG: 81 TABLET ORAL at 08:32

## 2023-03-24 RX ADMIN — BUDESONIDE 0.5 MG: 0.5 INHALANT ORAL at 06:35

## 2023-03-24 RX ADMIN — FAMOTIDINE 20 MG: 20 TABLET ORAL at 05:48

## 2023-03-24 RX ADMIN — ENOXAPARIN SODIUM 40 MG: 100 INJECTION SUBCUTANEOUS at 15:45

## 2023-03-24 RX ADMIN — DIPHENHYDRAMINE HYDROCHLORIDE AND LIDOCAINE HYDROCHLORIDE AND ALUMINUM HYDROXIDE AND MAGNESIUM HYDRO 10 ML: KIT at 20:44

## 2023-03-24 RX ADMIN — INSULIN HUMAN 2 UNITS: 100 INJECTION, SOLUTION PARENTERAL at 23:18

## 2023-03-24 RX ADMIN — Medication 10 ML: at 20:43

## 2023-03-24 NOTE — PLAN OF CARE
Goal Outcome Evaluation:      No complaints per pt. Tube feed increased to 70 ml/hr per new order.

## 2023-03-24 NOTE — PROGRESS NOTES
[facility]   Hospitalist Progress Note  Date: 3/24/2023  Patient Name: Buzz Lopez  : 1945  MRN: [id number]  Date of admission: 3/6/2023    Subjective   Subjective     Chief Complaint:   Shortness of breath, confusion    Summary:   Buzz Lopez is a 77 y.o. male with no significant past medical history brought into the ED with concerns for confusion, possible syncope, shortness of breath.  Patient states for the previous 1 to 2 weeks, having issues with difficulty breathing and subjective fevers.  The symptoms worsened therefore he called son to take him to the emergency department.  By the time son arrived at home, patient confused General weakness unable to put on close.  Possible syncopal episode where he became unresponsive.  Patient's son look for pulse, could not feel radial pulse and started chest compressions.  EMS was called.  By time EMS arrived patient was receiving compressions from family members, EMS also could not find a pulse.  Respiratory distress satting around 70% on room air.  Once in the emergency department patient was alert and oriented x3 and able to answer questions appropriately.  Patient with no recollection of the events preceding hospitalization.  Denied any chest pain nausea vomiting focal weakness numbness or tingling.  Patient with poor oral intake, thin and frail.  Patient's primary issue is aspiration, felt this is why he has been having significant weight loss and admitted for pneumonia.  Given his high functional status normally, recommendations for PEG tube placement.  PEG tube was placed on 3/22/2023.  Regional discussions with cardiology regarding patient's LAI for preoperative evaluation of aortic stenosis has been deferred to outpatient.  This was discussed by cardiologist with family.    Interval Followup:   Currently awaiting placement, precertification was started by .  Patient currently tolerating tube feeds well.  Patient's blood pressures  drifted to the softer end, closely monitor.    ROS:  Patient with no complaints at this time  Objective     Vitals:   Temp:  [97.7 °F (36.5 °C)-98.1 °F (36.7 °C)] 97.7 °F (36.5 °C)  Heart Rate:  [71-75] 71  Resp:  [18-20] 18  BP: (105-118)/(44-58) 107/58    Physical Exam   General appearance: NAD, resting in bed comfortably, cachectic  Eyes: anicteric sclerae, moist conjunctivae; no lid-lag;   HENT: Atraumatic; oropharynx clear with moist mucous membranes and no mucosal ulcerations; normal hard and soft palate  Neck: Trachea midline; FROM, supple, no thyromegaly or lymphadenopathy  Lungs: Bilateral rhonchi, with normal respiratory effort and no intercostal retractions  Abdomen: PEG tube in place, surgical dressing clean dry and intact    Result Review    Result Review:  I have personally reviewed the results as below  [x]  Laboratory  CBC    CBC 3/16/23 3/21/23 3/23/23   WBC 6.97 5.33 10.85 (A)   RBC 3.96 (A) 3.82 (A) 3.79 (A)   Hemoglobin 11.6 (A) 11.2 (A) 11.1 (A)   Hematocrit 34.7 (A) 33.3 (A) 33.4 (A)   MCV 87.6 87.2 88.1   MCH 29.3 29.3 29.3   MCHC 33.4 33.6 33.2   RDW 13.0 13.3 13.4   Platelets 284 237 253   (A) Abnormal value            CMP    CMP 3/16/23 3/17/23 3/23/23   Glucose 219 (A) 158 (A) 206 (A)   BUN 27 (A) 24 (A) 25 (A)   Creatinine 0.60 (A) 0.61 (A) 0.64 (A)   eGFR 98.2 97.7 96.3   Sodium 135 (A) 133 (A) 133 (A)   Potassium 4.2 4.6 4.6   Chloride 97 (A) 98 96 (A)   Calcium 9.0 8.9 9.1   Total Protein 6.8     Albumin 2.8 (A) 2.5 (A)    Globulin 4.0     Total Bilirubin 0.4     Alkaline Phosphatase 87     AST (SGOT) 40     ALT (SGPT) 31     Albumin/Globulin Ratio 0.7     BUN/Creatinine Ratio 45.0 (A) 39.3 (A) 39.1 (A)   Anion Gap 5.3 8.7 8.6   (A) Abnormal value       Comments are available for some flowsheets but are not being displayed.           []  Microbiology  []  Radiology  []  EKG/Telemetry   []  Cardiology/Vascular   []  Pathology  []  Old records  []  Other:    Assessment & Plan    Assessment / Plan   Assessment:    Sepsis due to right lower lobe pneumonia (Streptococcus Pneumo).  Resolved  Acute hypoxic respiratory failure.  Resolved  Questionable cardiac arrest episode  NSTEMI/acute myocardial injury  Possible syncope  Lactic acidosis, resolved  Malnutrition.  BMI of 17  S/P Bronchoscopy 3/8/23 with mucous plugging removed.  With  yeast growing.  Dysphagia requiring Core Track placed this admission  Aspiration.  Severe aortic stenosis   Degenerative changes cervical spine / anterior osteophytes  New diagnosis of type 2 diabetes mellitus hemoglobin A1c of 8.1%       Plan:    PEG tube placed on 22nd, tube feed with Glucerna.  Currently tolerating  Started metformin.  Dose uptitrated.  Hemoglobin A1c noted, patient's glucose overall controlled  CT neck noted... anterior osteophytes w mod mass effect  Cardiology consulted   LAI attempted 3/9/23 but unable to complete secondary to core track  Discussed with cardiologist, will defer LAI to outpatient, this is also been discussed with patient's son  CT scan chest .... No PE.  Bilat pneumonia noted.  Status post bronchoscopy   Thick mucus removal  Food particles seen around the vocal cords  Positive for strep pneumo  Transthoracic echo suggesting normal EF% and severe AS  Continue albuterol nebulizer every 4 hours as needed  Continue bronchodilator protocol  Continue aspirin and Lopressor.  Lisinopril added.  Patient's blood pressure slowly drifting down, monitor.  May need to discontinue lisinopril  Sliding-scale insulin  Course of antibiotics now completed  Appreciate palliative care input.  Patient is DNR/DNI.  Palliative care has since signed off, appreciate assistance  PT OT consult.  Diabetic nurse educator consulted  Discussed with nursing staff and .  Precertification has been started by social work for placement  .     DVT prophylaxis:  Medical DVT prophylaxis orders are present.    CODE STATUS:   Medical Intervention Limits:  NO intubation (DNI)  Level Of Support Discussed With: Patient  Code Status (Patient has no pulse and is not breathing): No CPR (Do Not Attempt to Resuscitate)  Medical Interventions (Patient has pulse or is breathing): Limited Support

## 2023-03-24 NOTE — SIGNIFICANT NOTE
03/24/23 0900   SOCIAL WORK ASSESSMENT   Referral Source physician   Reason for Consult palliative care   Visit Type ongoing       Pt wants to continue with full treatment and is pending placement. Palliative will sign off at this time. Please initiate new palliative consult if new issues or needs arise.     DONALD Duong

## 2023-03-24 NOTE — PLAN OF CARE
Goal Outcome Evaluation:  Plan of Care Reviewed With: patient, son           Outcome Evaluation: vss. continues to tolerate tube feeding without residuals or c/o. slightly elevated blood sugars tonight-covered with Humulin R per sliding scale as ordered. pt denies pain. slept well.

## 2023-03-24 NOTE — THERAPY TREATMENT NOTE
Acute Care - Speech Language Pathology   Swallow Treatment Note [facility]     Patient Name: Buzz Lopez  : 1945  MRN: [id number]  Today's Date: 3/24/2023               Admit Date: 3/6/2023    Visit Dx:     ICD-10-CM ICD-9-CM   1. Pneumonia of right lower lobe due to infectious organism  J18.9 486   2. Sepsis with acute hypoxic respiratory failure without septic shock, due to unspecified organism (HCC)  A41.9 038.9    R65.20 995.91    J96.01 518.81   3. Acute respiratory failure with hypoxia (HCC)  J96.01 518.81   4. Difficulty walking  R26.2 719.7   5. Oropharyngeal dysphagia  R13.12 787.22   6. Decreased activities of daily living (ADL)  Z78.9 V49.89     Patient Active Problem List   Diagnosis   • Severe malnutrition (HCC)   • Aspiration into airway     Past Medical History:   Diagnosis Date   • Dysphagia      Past Surgical History:   Procedure Laterality Date   • BRONCHOSCOPY N/A 3/8/2023    Procedure: BRONCHOSCOPY WITH BRONCHOALVEOLAR LAVAGE, WASHINGS;  Surgeon: Jason Rowley MD;  Location: [facility] ENDOSCOPY;  Service: Pulmonary;  Laterality: N/A;  MUCOUS PLUGGING   • ENDOSCOPY W/ PEG TUBE PLACEMENT N/A 3/22/2023    Procedure: ESOPHAGOGASTRODUODENOSCOPY WITH PERCUTANEOUS ENDOSCOPIC GASTROSTOMY TUBE INSERTION;  Surgeon: Ruslan Guido MD;  Location: [facility] ENDOSCOPY;  Service: Gastroenterology;  Laterality: N/A;  PEG INSERTION   • EYE SURGERY         SPEECH PATHOLOGY DYSPHAGIA TREATMENT     Subjective/Behavioral Observations: Alert and cooperative, sitting up in bed.        Day/time of Treatment: 3/24/2023        Current Diet:  N.p.o. with PEG        Current Strategies: N/A     Treatment received: Dysphagia therapy to address swallow function through exercises and education of strategies.        Results of treatment:   Laryngeal elevation exercises with effortful swallow, 3 +/5.  Tongue base exercises completed x4 sets.   Trials of ice chips x10, wet vocal quality noted x2, cough x2.  Trials of  nectar liquid x5, coughing x2.  Patient produced multiple swallow each presentation.        Progress toward goals: Adequate     Barriers to Achieving goals: Medical status        Plan of care:/changes in plan: Continue with current plan with patient to be n.p.o. with alternative feeding.  Okay for nursing to conservatively give small ice chips from time to time.                                                                                   Plan of Care Reviewed With: patient, family          EDUCATION  The patient has been educated in the following areas:   Dysphagia (Swallowing Impairment) NPO rationale.              Time Calculation:    Time Calculation- SLP     Row Name 03/24/23 1155             Time Calculation- SLP    SLP Stop Time 1155  -TB         Untimed Charges    62692-XU Treatment Swallow Minutes 45  -TB         Total Minutes    Untimed Charges Total Minutes 45  -TB       Total Minutes 45  -TB            User Key  (r) = Recorded By, (t) = Taken By, (c) = Cosigned By    Initials Name Provider Type    Terri Rg SLP Speech and Language Pathologist                Therapy Charges for Today     Code Description Service Date Service Provider Modifiers Qty    94543732537 HC ST TREATMENT SWALLOW 3 3/23/2023 Terri Gomez SLP GN 1    92874555784 HC ST TREATMENT SWALLOW 3 3/24/2023 Terri Gomez SLP GN 1               JANAY Julio  3/24/2023

## 2023-03-24 NOTE — CONSULTS
"Met with patient and granddaughter at bedside. Patient with questions regarding Type 1 vs. Type 2 diagnosis. Discussed that current treatment with metformin suggests a Type 2 diagnosis. Discussed that given his current age, nutritional status, and overall health, an HbA1c of 8.1% is relatively controlled. Patient questions whether he will be discharged with insulin. Discussed current treatment, the ability to further titrate metformin based on physiological response to tube feeds, and the possibility of the addition of other oral agents to the treatment regimen after discharge.    Provided written materials, Diabetes Survival Skills, and covered the pertinent material with the patient. Provided time to ask questions and offered to follow-up at a later time. Patient states he will \"read the book and ask the nurse to get in touch with you if I have any other questions.\"  "

## 2023-03-24 NOTE — CONSULTS
"Nutrition Services    Patient Name: Buzz Lopez  YOB: 1945  MRN: [id number]  Admission date: 3/6/2023      CLINICAL NUTRITION ASSESSMENT      Reason for Assessment  Follow-up protocol, EN    H&P:    Past Medical History:   Diagnosis Date   • Dysphagia         Current Problems:   Active Hospital Problems    Diagnosis    • Aspiration into airway    • Severe malnutrition (HCC)         Nutrition/Diet History         Narrative     Nutrition follow up.  Patient had MBS and SLP cannot recommend safe PO intake.  PEG tube placed 3/22. Tube feeds restarted, and now back at goal rate. Tolerating well.     Patient continues to be hyponatremic.  Receiving minimal free water.  All other electrolytes WDL at this time.      No weight obtained since 3/14.  Reached out to RN again today to request updated weight.  Weight was obtained and patient has had weight loss in the last 10 days.  Will increase tube feeding rate today in order to promote weight gain, which would be the optimal outcome.  Discussed plan of care with patient and patient is agreeable.  Will CTM per protocol.     Anthropometrics        Current Height, Weight Height: 177.8 cm (70\")  Weight: 53.1 kg (117 lb 1 oz)   Current BMI Body mass index is 16.8 kg/m².       Weight Hx  Wt Readings from Last 30 Encounters:   03/24/23 0737 53.1 kg (117 lb 1 oz)   03/14/23 1233 53.8 kg (118 lb 9.7 oz)   03/13/23 1506 51.7 kg (113 lb 15.7 oz)   03/06/23 0824 61.2 kg (135 lb)   06/09/22 0154 61.4 kg (135 lb 5.8 oz)            Wt Change Observation Questionable accuracy of weight on 3/06.    Patient has had 1.3% wt loss x 10 days.      Estimated/Assessed Needs       Energy Requirements 25-30 kcal/kg IBW    EST Needs (kcal/day) 6706-8294       Protein Requirements 1.0-1.3 g/kg IBW   EST Daily Needs (g/day) 73-95       Fluid Requirements 1 ml/kcal    Estimated Needs (mL/day) 4846-7369     Labs/Medications         Pertinent Labs Reviewed.   Results from last 7 days   Lab " Units 03/23/23  0614   SODIUM mmol/L 133*   POTASSIUM mmol/L 4.6   CHLORIDE mmol/L 96*   CO2 mmol/L 28.4   BUN mg/dL 25*   CREATININE mg/dL 0.64*   CALCIUM mg/dL 9.1   GLUCOSE mg/dL 206*     Results from last 7 days   Lab Units 03/23/23  0614   MAGNESIUM mg/dL 1.9   HEMOGLOBIN g/dL 11.1*   HEMATOCRIT % 33.4*     COVID19   Date Value Ref Range Status   03/06/2023 Not Detected Not Detected - Ref. Range Final     Lab Results   Component Value Date    HGBA1C 8.10 (H) 03/17/2023         Pertinent Medications Reviewed.     Current Nutrition Orders & Evaluation of Intake       Oral Nutrition     Current PO Diet NPO Diet NPO Type: Strict NPO   Supplement Orders Placed This Encounter      Hold Tube Feeding      Diet, Tube Feeding Tube Feeding Formula: Diabetisource AC (Glucerna 1.2); Tube Feeding Type: Continuous; Continuous Tube Feeding Start Rate (mL/hr): 70; Then Advance Rate By (mL/hr): Do Not Advance; Every __ Hours: Patient at Goal Rate; To Goal Ra...       Malnutrition Severity Assessment      Patient meets criteria for : Severe Malnutrition  Malnutrition Type (last 8 hours)     Malnutrition Severity Assessment     Row Name 03/24/23 1356       Malnutrition Severity Assessment    Malnutrition Type Chronic Disease - Related Malnutrition    Row Name 03/24/23 1356       Insufficient Energy Intake     Insufficient Energy Intake Findings None    Row Name 03/24/23 1356       Muscle Loss    Loss of Muscle Mass Findings Severe    [place] Region Severe - deep hollowing/scooping, lack of muscle to touch, facial bones well defined    Clavicle Bone Region Severe - protruding prominent bone    Acromion Bone Region Severe - squared shoulders, bones, and acromion process protrusion prominent    Dorsal Hand Region Severe - prominent depression    Row Name 03/24/23 1356       Fat Loss    Subcutaneous Fat Loss Findings Severe    Orbital Region  Severe - pronounced hollowness/depression, dark circles, loose saggy skin    Upper Arm Region  Severe - mostly skin, very little space between folds, fingers touch    Row Name 03/24/23 1356       Criteria Met (Must meet criteria for severity in at least 2 of these categories: M Wasting, Fat Loss, Fluid, Secondary Signs, Wt. Status, Intake)    Patient meets criteria for  Severe Malnutrition                   Nutrition Diagnosis         Nutrition Dx Problem 1 Severe malnutrition related to Inability to consume sufficient energy as evidenced by body composition changes., NPO and SLP/swallow eval     Nutrition Intervention         Diabetisource AC @ 70 ml/hr   Free water flushes 25 ml every 4 hours*  Provides 2016 kcal, 101 g pro, 1528 ml fluid     *FWF may change based on Na+     Medical Nutrition Therapy/Nutrition Education          Learner     Readiness Patient  Acceptance     Method     Response Explanation  Verbalizes understanding     Monitor/Evaluation        Monitor I&O, Pertinent labs, EN delivery/tolerance, Weight, Skin status, GI status, POC/GOC, Swallow function, RFS     Nutrition Discharge Plan         To be determined     Electronically signed by:  Sharon David RD  03/24/23 13:58 EDT

## 2023-03-25 LAB
ANION GAP SERPL CALCULATED.3IONS-SCNC: 7.4 MMOL/L (ref 5–15)
BUN SERPL-MCNC: 26 MG/DL (ref 8–23)
BUN/CREAT SERPL: 52 (ref 7–25)
CALCIUM SPEC-SCNC: 8.9 MG/DL (ref 8.6–10.5)
CHLORIDE SERPL-SCNC: 97 MMOL/L (ref 98–107)
CO2 SERPL-SCNC: 29.6 MMOL/L (ref 22–29)
CREAT SERPL-MCNC: 0.5 MG/DL (ref 0.76–1.27)
DEPRECATED RDW RBC AUTO: 41.7 FL (ref 37–54)
EGFRCR SERPLBLD CKD-EPI 2021: 103.8 ML/MIN/1.73
ERYTHROCYTE [DISTWIDTH] IN BLOOD BY AUTOMATED COUNT: 13.4 % (ref 12.3–15.4)
GLUCOSE BLDC GLUCOMTR-MCNC: 127 MG/DL (ref 70–99)
GLUCOSE BLDC GLUCOMTR-MCNC: 147 MG/DL (ref 70–99)
GLUCOSE BLDC GLUCOMTR-MCNC: 153 MG/DL (ref 70–99)
GLUCOSE BLDC GLUCOMTR-MCNC: 165 MG/DL (ref 70–99)
GLUCOSE SERPL-MCNC: 157 MG/DL (ref 65–99)
HCT VFR BLD AUTO: 30.6 % (ref 37.5–51)
HGB BLD-MCNC: 10.4 G/DL (ref 13–17.7)
MAGNESIUM SERPL-MCNC: 1.8 MG/DL (ref 1.6–2.4)
MCH RBC QN AUTO: 29.6 PG (ref 26.6–33)
MCHC RBC AUTO-ENTMCNC: 34 G/DL (ref 31.5–35.7)
MCV RBC AUTO: 87.2 FL (ref 79–97)
PLATELET # BLD AUTO: 213 10*3/MM3 (ref 140–450)
PMV BLD AUTO: 11.3 FL (ref 6–12)
POTASSIUM SERPL-SCNC: 4.4 MMOL/L (ref 3.5–5.2)
RBC # BLD AUTO: 3.51 10*6/MM3 (ref 4.14–5.8)
SODIUM SERPL-SCNC: 134 MMOL/L (ref 136–145)
WBC NRBC COR # BLD: 6.84 10*3/MM3 (ref 3.4–10.8)

## 2023-03-25 PROCEDURE — 94799 UNLISTED PULMONARY SVC/PX: CPT

## 2023-03-25 PROCEDURE — 94664 DEMO&/EVAL PT USE INHALER: CPT

## 2023-03-25 PROCEDURE — 80048 BASIC METABOLIC PNL TOTAL CA: CPT | Performed by: INTERNAL MEDICINE

## 2023-03-25 PROCEDURE — 83735 ASSAY OF MAGNESIUM: CPT | Performed by: INTERNAL MEDICINE

## 2023-03-25 PROCEDURE — 99232 SBSQ HOSP IP/OBS MODERATE 35: CPT | Performed by: INTERNAL MEDICINE

## 2023-03-25 PROCEDURE — 82962 GLUCOSE BLOOD TEST: CPT

## 2023-03-25 PROCEDURE — 25010000002 ENOXAPARIN PER 10 MG: Performed by: INTERNAL MEDICINE

## 2023-03-25 PROCEDURE — 85027 COMPLETE CBC AUTOMATED: CPT | Performed by: INTERNAL MEDICINE

## 2023-03-25 PROCEDURE — 63710000001 INSULIN REGULAR HUMAN PER 5 UNITS: Performed by: INTERNAL MEDICINE

## 2023-03-25 RX ADMIN — INSULIN HUMAN 2 UNITS: 100 INJECTION, SOLUTION PARENTERAL at 17:28

## 2023-03-25 RX ADMIN — ARFORMOTEROL TARTRATE 15 MCG: 15 SOLUTION RESPIRATORY (INHALATION) at 07:39

## 2023-03-25 RX ADMIN — METFORMIN HYDROCHLORIDE 850 MG: 850 TABLET ORAL at 09:22

## 2023-03-25 RX ADMIN — METFORMIN HYDROCHLORIDE 850 MG: 850 TABLET ORAL at 17:28

## 2023-03-25 RX ADMIN — FAMOTIDINE 20 MG: 20 TABLET ORAL at 17:28

## 2023-03-25 RX ADMIN — DIPHENHYDRAMINE HYDROCHLORIDE AND LIDOCAINE HYDROCHLORIDE AND ALUMINUM HYDROXIDE AND MAGNESIUM HYDRO 10 ML: KIT at 16:03

## 2023-03-25 RX ADMIN — BUDESONIDE 0.5 MG: 0.5 INHALANT ORAL at 07:39

## 2023-03-25 RX ADMIN — ARFORMOTEROL TARTRATE 15 MCG: 15 SOLUTION RESPIRATORY (INHALATION) at 18:07

## 2023-03-25 RX ADMIN — ENOXAPARIN SODIUM 40 MG: 100 INJECTION SUBCUTANEOUS at 16:03

## 2023-03-25 RX ADMIN — METOPROLOL TARTRATE 12.5 MG: 25 TABLET, FILM COATED ORAL at 09:22

## 2023-03-25 RX ADMIN — INSULIN HUMAN 2 UNITS: 100 INJECTION, SOLUTION PARENTERAL at 12:40

## 2023-03-25 RX ADMIN — Medication 10 ML: at 09:23

## 2023-03-25 RX ADMIN — BUDESONIDE 0.5 MG: 0.5 INHALANT ORAL at 18:07

## 2023-03-25 RX ADMIN — METOPROLOL TARTRATE 12.5 MG: 25 TABLET, FILM COATED ORAL at 20:15

## 2023-03-25 RX ADMIN — POLYETHYLENE GLYCOL 3350 17 G: 17 POWDER, FOR SOLUTION ORAL at 20:15

## 2023-03-25 RX ADMIN — Medication 10 ML: at 20:15

## 2023-03-25 RX ADMIN — ASPIRIN 81 MG 81 MG: 81 TABLET ORAL at 09:22

## 2023-03-25 RX ADMIN — FAMOTIDINE 20 MG: 20 TABLET ORAL at 09:22

## 2023-03-25 RX ADMIN — DIPHENHYDRAMINE HYDROCHLORIDE AND LIDOCAINE HYDROCHLORIDE AND ALUMINUM HYDROXIDE AND MAGNESIUM HYDRO 10 ML: KIT at 09:24

## 2023-03-25 NOTE — PLAN OF CARE
Goal Outcome Evaluation:  Plan of Care Reviewed With: patient      PATIENT TOLERATING TUBE FEEDS AT 70ML/HR, (GOAL), 10 RESIDUAL, NO COMPLAINTS OF PAIN RESTED WELL. WAIT PRECERT FOR REHAB

## 2023-03-25 NOTE — PLAN OF CARE
Goal Outcome Evaluation:  Plan of Care Reviewed With: patient   Patient is alert and orientated x4. Vital signs within normal limits at this time. Denies any pain or discomfort at this time. Tolerating tube feeds well.     Progress: no change

## 2023-03-25 NOTE — PROGRESS NOTES
[facility]   Hospitalist Progress Note  Date: 3/25/2023  Patient Name: Buzz Lopez  : 1945  MRN: [id number]  Date of admission: 3/6/2023    Subjective   Subjective     Chief Complaint:   Shortness of breath, confusion    Summary:   Buzz Lopez is a 77 y.o. male with no significant past medical history brought into the ED with concerns for confusion, possible syncope, shortness of breath.  Patient states for the previous 1 to 2 weeks, having issues with difficulty breathing and subjective fevers.  The symptoms worsened therefore he called son to take him to the emergency department.  By the time son arrived at home, patient confused General weakness unable to put on close.  Possible syncopal episode where he became unresponsive.  Patient's son look for pulse, could not feel radial pulse and started chest compressions.  EMS was called.  By time EMS arrived patient was receiving compressions from family members, EMS also could not find a pulse.  Respiratory distress satting around 70% on room air.  Once in the emergency department patient was alert and oriented x3 and able to answer questions appropriately.  Patient with no recollection of the events preceding hospitalization.  Denied any chest pain nausea vomiting focal weakness numbness or tingling.  Patient with poor oral intake, thin and frail.  Patient's primary issue is aspiration, felt this is why he has been having significant weight loss and admitted for pneumonia.  Given his high functional status normally, recommendations for PEG tube placement.  PEG tube was placed on 3/22/2023.  Regional discussions with cardiology regarding patient's LAI for preoperative evaluation of aortic stenosis has been deferred to outpatient.  This was discussed by cardiologist with family.    Interval Followup:   For persistent soft blood pressures we will hold lisinopril and monitor blood pressure.  Patient's electrolytes within normal limits.  Still awaiting  precertification at this point, hopeful for Monday discharge    ROS:  Patient with no complaints at this time  Objective     Vitals:   Temp:  [97.2 °F (36.2 °C)-99.8 °F (37.7 °C)] 97.9 °F (36.6 °C)  Heart Rate:  [65-75] 75  Resp:  [16-20] 20  BP: ()/(41-51) 111/46  Flow (L/min):  [0] 0    Physical Exam   General appearance: NAD, resting in bed comfortably, cachectic  Eyes: anicteric sclerae, moist conjunctivae; no lid-lag;   HENT: Atraumatic; oropharynx clear with moist mucous membranes and no mucosal ulcerations; normal hard and soft palate  Neck: Trachea midline; FROM, supple, no thyromegaly or lymphadenopathy  Lungs: Bilateral rhonchi, with normal respiratory effort and no intercostal retractions  Abdomen: PEG tube in place, surgical dressing clean dry and intact    Result Review    Result Review:  I have personally reviewed the results as below  [x]  Laboratory  CBC    CBC 3/21/23 3/23/23 3/25/23   WBC 5.33 10.85 (A) 6.84   RBC 3.82 (A) 3.79 (A) 3.51 (A)   Hemoglobin 11.2 (A) 11.1 (A) 10.4 (A)   Hematocrit 33.3 (A) 33.4 (A) 30.6 (A)   MCV 87.2 88.1 87.2   MCH 29.3 29.3 29.6   MCHC 33.6 33.2 34.0   RDW 13.3 13.4 13.4   Platelets 237 253 213   (A) Abnormal value            CMP    CMP 3/17/23 3/23/23 3/25/23   Glucose 158 (A) 206 (A) 157 (A)   BUN 24 (A) 25 (A) 26 (A)   Creatinine 0.61 (A) 0.64 (A) 0.50 (A)   eGFR 97.7 96.3 103.8   Sodium 133 (A) 133 (A) 134 (A)   Potassium 4.6 4.6 4.4   Chloride 98 96 (A) 97 (A)   Calcium 8.9 9.1 8.9   Albumin 2.5 (A)     BUN/Creatinine Ratio 39.3 (A) 39.1 (A) 52.0 (A)   Anion Gap 8.7 8.6 7.4   (A) Abnormal value       Comments are available for some flowsheets but are not being displayed.           []  Microbiology  []  Radiology  []  EKG/Telemetry   []  Cardiology/Vascular   []  Pathology  []  Old records  []  Other:    Assessment & Plan   Assessment / Plan   Assessment:    Sepsis due to right lower lobe pneumonia (Streptococcus Pneumo).  Resolved  Acute hypoxic  respiratory failure.  Resolved  Questionable cardiac arrest episode  NSTEMI/acute myocardial injury  Possible syncope  Lactic acidosis, resolved  Malnutrition.  BMI of 17  S/P Bronchoscopy 3/8/23 with mucous plugging removed.  With  yeast growing.  Dysphagia requiring Core Track placed this admission  Aspiration.  Severe aortic stenosis   Degenerative changes cervical spine / anterior osteophytes  New diagnosis of type 2 diabetes mellitus hemoglobin A1c of 8.1%       Plan:    PEG tube placed on 22nd, tube feed with Glucerna.  Currently tolerating  Started metformin.  Dose uptitrated.  Hemoglobin A1c noted, patient's glucose overall controlled  CT neck noted... anterior osteophytes w mod mass effect  Cardiology consulted   LAI attempted 3/9/23 but unable to complete secondary to core track  Discussed with cardiologist, will defer LAI to outpatient, this is also been discussed with patient's son  CT scan chest .... No PE.  Bilat pneumonia noted.  Status post bronchoscopy   Thick mucus removal  Food particles seen around the vocal cords  Positive for strep pneumo  Transthoracic echo suggesting normal EF% and severe AS  Continue albuterol nebulizer every 4 hours as needed  Continue bronchodilator protocol  Continue aspirin and Lopressor.    Holding further doses of lisinopril, monitor blood pressure.  Patient's blood pressure has been soft  Sliding-scale insulin  Course of antibiotics now completed  Appreciate palliative care input.  Patient is DNR/DNI.  Palliative care has since signed off, appreciate assistance  PT OT consult.  Diabetic nurse educator consulted  Discussed with nursing staff and .  Precertification has been started by social work for placement  .     DVT prophylaxis:  Medical DVT prophylaxis orders are present.    CODE STATUS:   Medical Intervention Limits: NO intubation (DNI)  Level Of Support Discussed With: Patient  Code Status (Patient has no pulse and is not breathing): No CPR (Do Not  Attempt to Resuscitate)  Medical Interventions (Patient has pulse or is breathing): Limited Support

## 2023-03-26 LAB
ANION GAP SERPL CALCULATED.3IONS-SCNC: 7.5 MMOL/L (ref 5–15)
BACTERIA UR QL AUTO: ABNORMAL /HPF
BILIRUB UR QL STRIP: NEGATIVE
BUN SERPL-MCNC: 21 MG/DL (ref 8–23)
BUN/CREAT SERPL: 46.7 (ref 7–25)
CALCIUM SPEC-SCNC: 8.8 MG/DL (ref 8.6–10.5)
CHLORIDE SERPL-SCNC: 93 MMOL/L (ref 98–107)
CLARITY UR: ABNORMAL
CO2 SERPL-SCNC: 30.5 MMOL/L (ref 22–29)
COLOR UR: YELLOW
CREAT SERPL-MCNC: 0.45 MG/DL (ref 0.76–1.27)
DEPRECATED RDW RBC AUTO: 41.3 FL (ref 37–54)
EGFRCR SERPLBLD CKD-EPI 2021: 107.1 ML/MIN/1.73
ERYTHROCYTE [DISTWIDTH] IN BLOOD BY AUTOMATED COUNT: 13.6 % (ref 12.3–15.4)
GLUCOSE BLDC GLUCOMTR-MCNC: 140 MG/DL (ref 70–99)
GLUCOSE BLDC GLUCOMTR-MCNC: 157 MG/DL (ref 70–99)
GLUCOSE BLDC GLUCOMTR-MCNC: 163 MG/DL (ref 70–99)
GLUCOSE SERPL-MCNC: 155 MG/DL (ref 65–99)
GLUCOSE UR STRIP-MCNC: NEGATIVE MG/DL
HCT VFR BLD AUTO: 33.1 % (ref 37.5–51)
HGB BLD-MCNC: 11.2 G/DL (ref 13–17.7)
HGB UR QL STRIP.AUTO: ABNORMAL
HYALINE CASTS UR QL AUTO: ABNORMAL /LPF
KETONES UR QL STRIP: NEGATIVE
LEUKOCYTE ESTERASE UR QL STRIP.AUTO: NEGATIVE
MAGNESIUM SERPL-MCNC: 1.7 MG/DL (ref 1.6–2.4)
MCH RBC QN AUTO: 29.6 PG (ref 26.6–33)
MCHC RBC AUTO-ENTMCNC: 33.8 G/DL (ref 31.5–35.7)
MCV RBC AUTO: 87.3 FL (ref 79–97)
NITRITE UR QL STRIP: NEGATIVE
PH UR STRIP.AUTO: 8.5 [PH] (ref 5–8)
PLATELET # BLD AUTO: 193 10*3/MM3 (ref 140–450)
PMV BLD AUTO: 12.6 FL (ref 6–12)
POTASSIUM SERPL-SCNC: 4.5 MMOL/L (ref 3.5–5.2)
PROT UR QL STRIP: NEGATIVE
QT INTERVAL: 334 MS
RBC # BLD AUTO: 3.79 10*6/MM3 (ref 4.14–5.8)
RBC # UR STRIP: ABNORMAL /HPF
REF LAB TEST METHOD: ABNORMAL
SODIUM SERPL-SCNC: 131 MMOL/L (ref 136–145)
SP GR UR STRIP: 1.01 (ref 1–1.03)
SQUAMOUS #/AREA URNS HPF: ABNORMAL /HPF
UROBILINOGEN UR QL STRIP: ABNORMAL
WBC # UR STRIP: ABNORMAL /HPF
WBC NRBC COR # BLD: 5.57 10*3/MM3 (ref 3.4–10.8)

## 2023-03-26 PROCEDURE — 94760 N-INVAS EAR/PLS OXIMETRY 1: CPT

## 2023-03-26 PROCEDURE — 94799 UNLISTED PULMONARY SVC/PX: CPT

## 2023-03-26 PROCEDURE — 25010000002 ENOXAPARIN PER 10 MG: Performed by: INTERNAL MEDICINE

## 2023-03-26 PROCEDURE — 81001 URINALYSIS AUTO W/SCOPE: CPT | Performed by: INTERNAL MEDICINE

## 2023-03-26 PROCEDURE — 85027 COMPLETE CBC AUTOMATED: CPT | Performed by: INTERNAL MEDICINE

## 2023-03-26 PROCEDURE — 80048 BASIC METABOLIC PNL TOTAL CA: CPT | Performed by: INTERNAL MEDICINE

## 2023-03-26 PROCEDURE — 99232 SBSQ HOSP IP/OBS MODERATE 35: CPT | Performed by: INTERNAL MEDICINE

## 2023-03-26 PROCEDURE — 82962 GLUCOSE BLOOD TEST: CPT

## 2023-03-26 PROCEDURE — 63710000001 INSULIN REGULAR HUMAN PER 5 UNITS: Performed by: INTERNAL MEDICINE

## 2023-03-26 PROCEDURE — 83735 ASSAY OF MAGNESIUM: CPT | Performed by: INTERNAL MEDICINE

## 2023-03-26 RX ADMIN — METFORMIN HYDROCHLORIDE 850 MG: 850 TABLET ORAL at 08:05

## 2023-03-26 RX ADMIN — FAMOTIDINE 20 MG: 20 TABLET ORAL at 08:05

## 2023-03-26 RX ADMIN — ARFORMOTEROL TARTRATE 15 MCG: 15 SOLUTION RESPIRATORY (INHALATION) at 07:06

## 2023-03-26 RX ADMIN — ASPIRIN 81 MG 81 MG: 81 TABLET ORAL at 08:05

## 2023-03-26 RX ADMIN — ENOXAPARIN SODIUM 40 MG: 100 INJECTION SUBCUTANEOUS at 16:44

## 2023-03-26 RX ADMIN — DIPHENHYDRAMINE HYDROCHLORIDE AND LIDOCAINE HYDROCHLORIDE AND ALUMINUM HYDROXIDE AND MAGNESIUM HYDRO 10 ML: KIT at 16:44

## 2023-03-26 RX ADMIN — Medication 10 ML: at 08:06

## 2023-03-26 RX ADMIN — METFORMIN HYDROCHLORIDE 850 MG: 850 TABLET ORAL at 17:01

## 2023-03-26 RX ADMIN — METOPROLOL TARTRATE 12.5 MG: 25 TABLET, FILM COATED ORAL at 21:38

## 2023-03-26 RX ADMIN — ARFORMOTEROL TARTRATE 15 MCG: 15 SOLUTION RESPIRATORY (INHALATION) at 18:38

## 2023-03-26 RX ADMIN — BUDESONIDE 0.5 MG: 0.5 INHALANT ORAL at 07:06

## 2023-03-26 RX ADMIN — FAMOTIDINE 20 MG: 20 TABLET ORAL at 16:44

## 2023-03-26 RX ADMIN — INSULIN HUMAN 2 UNITS: 100 INJECTION, SOLUTION PARENTERAL at 05:59

## 2023-03-26 RX ADMIN — Medication 10 ML: at 21:38

## 2023-03-26 RX ADMIN — POLYETHYLENE GLYCOL 3350 17 G: 17 POWDER, FOR SOLUTION ORAL at 21:39

## 2023-03-26 RX ADMIN — DIPHENHYDRAMINE HYDROCHLORIDE AND LIDOCAINE HYDROCHLORIDE AND ALUMINUM HYDROXIDE AND MAGNESIUM HYDRO 10 ML: KIT at 08:05

## 2023-03-26 RX ADMIN — Medication 10 MG: at 22:02

## 2023-03-26 RX ADMIN — BUDESONIDE 0.5 MG: 0.5 INHALANT ORAL at 18:38

## 2023-03-26 RX ADMIN — INSULIN HUMAN 2 UNITS: 100 INJECTION, SOLUTION PARENTERAL at 17:00

## 2023-03-26 RX ADMIN — METOPROLOL TARTRATE 12.5 MG: 25 TABLET, FILM COATED ORAL at 08:05

## 2023-03-26 NOTE — PROGRESS NOTES
[facility]   Hospitalist Progress Note  Date: 3/26/2023  Patient Name: Buzz Lopez  : 1945  MRN: [id number]  Date of admission: 3/6/2023    Subjective   Subjective     Chief Complaint:   Shortness of breath, confusion    Summary:   Buzz Lopez is a 77 y.o. male with no significant past medical history brought into the ED with concerns for confusion, possible syncope, shortness of breath.  Patient states for the previous 1 to 2 weeks, having issues with difficulty breathing and subjective fevers.  The symptoms worsened therefore he called son to take him to the emergency department.  By the time son arrived at home, patient confused General weakness unable to put on close.  Possible syncopal episode where he became unresponsive.  Patient's son look for pulse, could not feel radial pulse and started chest compressions.  EMS was called.  By time EMS arrived patient was receiving compressions from family members, EMS also could not find a pulse.  Respiratory distress satting around 70% on room air.  Once in the emergency department patient was alert and oriented x3 and able to answer questions appropriately.  Patient with no recollection of the events preceding hospitalization.  Denied any chest pain nausea vomiting focal weakness numbness or tingling.  Patient with poor oral intake, thin and frail.  Patient's primary issue is aspiration, felt this is why he has been having significant weight loss and admitted for pneumonia.  Given his high functional status normally, recommendations for PEG tube placement.  PEG tube was placed on 3/22/2023.  Regional discussions with cardiology regarding patient's LAI for preoperative evaluation of aortic stenosis has been deferred to outpatient.  This was discussed by cardiologist with family.    Interval Followup:   Blood pressure better after holding lisinopril.  Patient complaining of hematuria today.  Inspected patient's drug at bedside, blood covering the top.   However, no hematuria on the bottom, urine is dark however does not appear to be bloody.  We will order a UA.  Patient denies having a cut on his finger or on the tip of his penis causing the blood on the outside of the urinal jug.    ROS:  Patient with no complaints at this time  Objective     Vitals:   Temp:  [97.2 °F (36.2 °C)-98.3 °F (36.8 °C)] 98.1 °F (36.7 °C)  Heart Rate:  [67-74] 71  Resp:  [16-20] 16  BP: (113-149)/(42-62) 135/51    Physical Exam   General appearance: NAD, resting in bed comfortably, cachectic  Eyes: anicteric sclerae, moist conjunctivae; no lid-lag;   HENT: Atraumatic; oropharynx clear with moist mucous membranes and no mucosal ulcerations; normal hard and soft palate  Neck: Trachea midline; FROM, supple, no thyromegaly or lymphadenopathy  Lungs: Bilateral rhonchi, with normal respiratory effort and no intercostal retractions  Abdomen: PEG tube in place, surgical dressing clean dry and intact    Result Review    Result Review:  I have personally reviewed the results as below  [x]  Laboratory  CBC    CBC 3/23/23 3/25/23 3/26/23   WBC 10.85 (A) 6.84 5.57   RBC 3.79 (A) 3.51 (A) 3.79 (A)   Hemoglobin 11.1 (A) 10.4 (A) 11.2 (A)   Hematocrit 33.4 (A) 30.6 (A) 33.1 (A)   MCV 88.1 87.2 87.3   MCH 29.3 29.6 29.6   MCHC 33.2 34.0 33.8   RDW 13.4 13.4 13.6   Platelets 253 213 193   (A) Abnormal value            CMP    CMP 3/23/23 3/25/23 3/26/23   Glucose 206 (A) 157 (A) 155 (A)   BUN 25 (A) 26 (A) 21   Creatinine 0.64 (A) 0.50 (A) 0.45 (A)   eGFR 96.3 103.8 107.1   Sodium 133 (A) 134 (A) 131 (A)   Potassium 4.6 4.4 4.5   Chloride 96 (A) 97 (A) 93 (A)   Calcium 9.1 8.9 8.8   BUN/Creatinine Ratio 39.1 (A) 52.0 (A) 46.7 (A)   Anion Gap 8.6 7.4 7.5   (A) Abnormal value            []  Microbiology  []  Radiology  []  EKG/Telemetry   []  Cardiology/Vascular   []  Pathology  []  Old records  []  Other:    Assessment & Plan   Assessment / Plan   Assessment:    Sepsis due to right lower lobe pneumonia  (Streptococcus Pneumo).  Resolved  Acute hypoxic respiratory failure.  Resolved  Questionable cardiac arrest episode  NSTEMI/acute myocardial injury  Possible syncope  Lactic acidosis, resolved  Malnutrition.  BMI of 17  S/P Bronchoscopy 3/8/23 with mucous plugging removed.  With  yeast growing.  Dysphagia requiring Core Track placed this admission  Aspiration.  Severe aortic stenosis   Degenerative changes cervical spine / anterior osteophytes  New diagnosis of type 2 diabetes mellitus hemoglobin A1c of 8.1%       Plan:    PEG tube placed on 22nd, tube feed with Glucerna.  Currently tolerating  Started metformin.  Dose uptitrated.  Hemoglobin A1c noted, patient's glucose overall controlled  Ordering UA today, patient has blood on the outside of his urinary jug, does not appear to be hematuria  CT neck noted... anterior osteophytes w mod mass effect  Cardiology consulted   LAI attempted 3/9/23 but unable to complete secondary to core track  Discussed with cardiologist, will defer LAI to outpatient, this is also been discussed with patient's son  CT scan chest .... No PE.  Bilat pneumonia noted.  Status post bronchoscopy   Thick mucus removal  Food particles seen around the vocal cords  Positive for strep pneumo  Transthoracic echo suggesting normal EF% and severe AS  Continue albuterol nebulizer every 4 hours as needed  Continue bronchodilator protocol  Continue aspirin and Lopressor.    Holding further doses of lisinopril, monitor blood pressure.  Patient's blood pressure has improved  Sliding-scale insulin  Course of antibiotics now completed  Appreciate palliative care input.  Patient is DNR/DNI.  Palliative care has since signed off, appreciate assistance  PT OT consult.  Diabetic nurse educator consulted  Discussed with nursing staff and .  Precertification has been started by social work for placement  .     DVT prophylaxis:  Medical DVT prophylaxis orders are present.    CODE STATUS:   Medical  Intervention Limits: NO intubation (DNI)  Level Of Support Discussed With: Patient  Code Status (Patient has no pulse and is not breathing): No CPR (Do Not Attempt to Resuscitate)  Medical Interventions (Patient has pulse or is breathing): Limited Support

## 2023-03-26 NOTE — PLAN OF CARE
Goal Outcome Evaluation:  Plan of Care Reviewed With: patient   Patient is alert and orientated. Vital signs within normal limits. Denies any pain or discomfort at this time. Tolerating tube feedings well this shift.      Progress: no change

## 2023-03-26 NOTE — PLAN OF CARE
Goal Outcome Evaluation:  Plan of Care Reviewed With: patient        Progress: no change    Patient has some bleeding from tip of penis unable to see if a scratch from urinal, but some blood noted to edge of urinal and no clots seen in urine. 5 residual and tolerating feedings. Gave PRN Miralax since no BM

## 2023-03-27 LAB
ANION GAP SERPL CALCULATED.3IONS-SCNC: 8.7 MMOL/L (ref 5–15)
BUN SERPL-MCNC: 20 MG/DL (ref 8–23)
BUN/CREAT SERPL: 40 (ref 7–25)
CALCIUM SPEC-SCNC: 9.3 MG/DL (ref 8.6–10.5)
CHLORIDE SERPL-SCNC: 91 MMOL/L (ref 98–107)
CO2 SERPL-SCNC: 30.3 MMOL/L (ref 22–29)
CREAT SERPL-MCNC: 0.5 MG/DL (ref 0.76–1.27)
DEPRECATED RDW RBC AUTO: 41.4 FL (ref 37–54)
EGFRCR SERPLBLD CKD-EPI 2021: 103.8 ML/MIN/1.73
ERYTHROCYTE [DISTWIDTH] IN BLOOD BY AUTOMATED COUNT: 13.2 % (ref 12.3–15.4)
GLUCOSE BLDC GLUCOMTR-MCNC: 108 MG/DL (ref 70–99)
GLUCOSE BLDC GLUCOMTR-MCNC: 130 MG/DL (ref 70–99)
GLUCOSE BLDC GLUCOMTR-MCNC: 142 MG/DL (ref 70–99)
GLUCOSE BLDC GLUCOMTR-MCNC: 172 MG/DL (ref 70–99)
GLUCOSE SERPL-MCNC: 181 MG/DL (ref 65–99)
HCT VFR BLD AUTO: 32.8 % (ref 37.5–51)
HGB BLD-MCNC: 11.2 G/DL (ref 13–17.7)
MAGNESIUM SERPL-MCNC: 1.7 MG/DL (ref 1.6–2.4)
MCH RBC QN AUTO: 29.9 PG (ref 26.6–33)
MCHC RBC AUTO-ENTMCNC: 34.1 G/DL (ref 31.5–35.7)
MCV RBC AUTO: 87.7 FL (ref 79–97)
PLATELET # BLD AUTO: 208 10*3/MM3 (ref 140–450)
PMV BLD AUTO: 11.6 FL (ref 6–12)
POTASSIUM SERPL-SCNC: 4.3 MMOL/L (ref 3.5–5.2)
RBC # BLD AUTO: 3.74 10*6/MM3 (ref 4.14–5.8)
SODIUM SERPL-SCNC: 130 MMOL/L (ref 136–145)
WBC NRBC COR # BLD: 6.09 10*3/MM3 (ref 3.4–10.8)

## 2023-03-27 PROCEDURE — 83735 ASSAY OF MAGNESIUM: CPT | Performed by: INTERNAL MEDICINE

## 2023-03-27 PROCEDURE — 94799 UNLISTED PULMONARY SVC/PX: CPT

## 2023-03-27 PROCEDURE — 99232 SBSQ HOSP IP/OBS MODERATE 35: CPT | Performed by: INTERNAL MEDICINE

## 2023-03-27 PROCEDURE — 97164 PT RE-EVAL EST PLAN CARE: CPT

## 2023-03-27 PROCEDURE — 82962 GLUCOSE BLOOD TEST: CPT

## 2023-03-27 PROCEDURE — 25010000002 ENOXAPARIN PER 10 MG: Performed by: INTERNAL MEDICINE

## 2023-03-27 PROCEDURE — 63710000001 INSULIN REGULAR HUMAN PER 5 UNITS: Performed by: INTERNAL MEDICINE

## 2023-03-27 PROCEDURE — 80048 BASIC METABOLIC PNL TOTAL CA: CPT | Performed by: INTERNAL MEDICINE

## 2023-03-27 PROCEDURE — 85027 COMPLETE CBC AUTOMATED: CPT | Performed by: INTERNAL MEDICINE

## 2023-03-27 PROCEDURE — 97530 THERAPEUTIC ACTIVITIES: CPT

## 2023-03-27 PROCEDURE — 92526 ORAL FUNCTION THERAPY: CPT

## 2023-03-27 PROCEDURE — 97110 THERAPEUTIC EXERCISES: CPT

## 2023-03-27 PROCEDURE — 94664 DEMO&/EVAL PT USE INHALER: CPT

## 2023-03-27 RX ADMIN — ARFORMOTEROL TARTRATE 15 MCG: 15 SOLUTION RESPIRATORY (INHALATION) at 06:27

## 2023-03-27 RX ADMIN — METOPROLOL TARTRATE 12.5 MG: 25 TABLET, FILM COATED ORAL at 20:12

## 2023-03-27 RX ADMIN — Medication 10 ML: at 10:00

## 2023-03-27 RX ADMIN — INSULIN HUMAN 2 UNITS: 100 INJECTION, SOLUTION PARENTERAL at 06:37

## 2023-03-27 RX ADMIN — METFORMIN HYDROCHLORIDE 850 MG: 850 TABLET ORAL at 17:39

## 2023-03-27 RX ADMIN — METOPROLOL TARTRATE 12.5 MG: 25 TABLET, FILM COATED ORAL at 09:59

## 2023-03-27 RX ADMIN — FAMOTIDINE 20 MG: 20 TABLET ORAL at 06:30

## 2023-03-27 RX ADMIN — FAMOTIDINE 20 MG: 20 TABLET ORAL at 17:39

## 2023-03-27 RX ADMIN — ARFORMOTEROL TARTRATE 15 MCG: 15 SOLUTION RESPIRATORY (INHALATION) at 19:08

## 2023-03-27 RX ADMIN — BUDESONIDE 0.5 MG: 0.5 INHALANT ORAL at 19:08

## 2023-03-27 RX ADMIN — Medication 10 ML: at 20:12

## 2023-03-27 RX ADMIN — ASPIRIN 81 MG 81 MG: 81 TABLET ORAL at 09:59

## 2023-03-27 RX ADMIN — BUDESONIDE 0.5 MG: 0.5 INHALANT ORAL at 06:27

## 2023-03-27 RX ADMIN — ENOXAPARIN SODIUM 40 MG: 100 INJECTION SUBCUTANEOUS at 17:39

## 2023-03-27 RX ADMIN — METFORMIN HYDROCHLORIDE 850 MG: 850 TABLET ORAL at 09:59

## 2023-03-27 NOTE — PROGRESS NOTES
[facility]   Hospitalist Progress Note  Date: 3/27/2023  Patient Name: Buzz Lopez  : 1945  MRN: [id number]  Date of admission: 3/6/2023    Subjective   Subjective     Chief Complaint:   Shortness of breath, confusion    Summary:   Buzz Lopez is a 77 y.o. male with no significant past medical history brought into the ED with concerns for confusion, possible syncope, shortness of breath.  Patient states for the previous 1 to 2 weeks, having issues with difficulty breathing and subjective fevers.  The symptoms worsened therefore he called son to take him to the emergency department.  By the time son arrived at home, patient confused General weakness unable to put on close.  Possible syncopal episode where he became unresponsive.  Patient's son look for pulse, could not feel radial pulse and started chest compressions.  EMS was called.  By time EMS arrived patient was receiving compressions from family members, EMS also could not find a pulse.  Respiratory distress satting around 70% on room air.  Once in the emergency department patient was alert and oriented x3 and able to answer questions appropriately.  Patient with no recollection of the events preceding hospitalization.  Denied any chest pain nausea vomiting focal weakness numbness or tingling.  Patient with poor oral intake, thin and frail.  Patient's primary issue is aspiration, felt this is why he has been having significant weight loss and admitted for pneumonia.  Given his high functional status normally, recommendations for PEG tube placement.  PEG tube was placed on 3/22/2023.  Regional discussions with cardiology regarding patient's LAI for preoperative evaluation of aortic stenosis has been deferred to outpatient.  This was discussed by cardiologist with family.    Interval Followup:   Blood pressure improved after holding lisinopril.  No acute issues today.  Still waiting appropriate placement for patient, precertification is  pending.    ROS:  Patient with no complaints at this time  Objective     Vitals:   Temp:  [97.5 °F (36.4 °C)-98.1 °F (36.7 °C)] 97.6 °F (36.4 °C)  Heart Rate:  [65-78] 65  Resp:  [16-18] 18  BP: (113-131)/(46-58) 128/53    Physical Exam   General appearance: NAD, resting in bed comfortably, cachectic  Eyes: anicteric sclerae, moist conjunctivae; no lid-lag;   HENT: Atraumatic; oropharynx clear with moist mucous membranes and no mucosal ulcerations; normal hard and soft palate  Neck: Trachea midline; FROM, supple, no thyromegaly or lymphadenopathy  Lungs: Bilateral rhonchi, with normal respiratory effort and no intercostal retractions  Abdomen: PEG tube in place, surgical dressing clean dry and intact    Result Review    Result Review:  I have personally reviewed the results as below  [x]  Laboratory  CBC    CBC 3/25/23 3/26/23 3/27/23   WBC 6.84 5.57 6.09   RBC 3.51 (A) 3.79 (A) 3.74 (A)   Hemoglobin 10.4 (A) 11.2 (A) 11.2 (A)   Hematocrit 30.6 (A) 33.1 (A) 32.8 (A)   MCV 87.2 87.3 87.7   MCH 29.6 29.6 29.9   MCHC 34.0 33.8 34.1   RDW 13.4 13.6 13.2   Platelets 213 193 208   (A) Abnormal value            CMP    CMP 3/25/23 3/26/23 3/27/23   Glucose 157 (A) 155 (A) 181 (A)   BUN 26 (A) 21 20   Creatinine 0.50 (A) 0.45 (A) 0.50 (A)   eGFR 103.8 107.1 103.8   Sodium 134 (A) 131 (A) 130 (A)   Potassium 4.4 4.5 4.3   Chloride 97 (A) 93 (A) 91 (A)   Calcium 8.9 8.8 9.3   BUN/Creatinine Ratio 52.0 (A) 46.7 (A) 40.0 (A)   Anion Gap 7.4 7.5 8.7   (A) Abnormal value            []  Microbiology  []  Radiology  []  EKG/Telemetry   []  Cardiology/Vascular   []  Pathology  []  Old records  []  Other:    Assessment & Plan   Assessment / Plan   Assessment:    Sepsis due to right lower lobe pneumonia (Streptococcus Pneumo).  Resolved  Acute hypoxic respiratory failure.  Resolved  Questionable cardiac arrest episode  NSTEMI/acute myocardial injury  Possible syncope  Lactic acidosis, resolved  Malnutrition.  BMI of 17  S/P  Bronchoscopy 3/8/23 with mucous plugging removed.  With  yeast growing.  Dysphagia requiring Core Track placed this admission  Aspiration.  Severe aortic stenosis   Degenerative changes cervical spine / anterior osteophytes  New diagnosis of type 2 diabetes mellitus hemoglobin A1c of 8.1%       Plan:    PEG tube placed on 22nd, tube feed with Glucerna.  Currently tolerating  Started metformin.  Dose uptitrated.  Hemoglobin A1c noted, patient's glucose overall controlled  UA with a small amount of blood.  Patient noted that he was having blood in his urine originally on the 26.  On inspection of bedside urinal at that time, patient mostly had blood on the outside of the jug and on the rim, patient denied cut to his groin or on his finger.  We will continue to monitor patient's urine, consider repeating UA  CT neck noted... anterior osteophytes w mod mass effect  Cardiology consulted   LAI attempted 3/9/23 but unable to complete secondary to core track  Discussed with cardiologist, will defer LAI to outpatient, this is also been discussed with patient's son  CT scan chest .... No PE.  Bilat pneumonia noted.  Status post bronchoscopy   Thick mucus removal  Food particles seen around the vocal cords  Positive for strep pneumo  Transthoracic echo suggesting normal EF% and severe AS  Continue albuterol nebulizer every 4 hours as needed  Continue bronchodilator protocol  Continue aspirin and Lopressor.    Holding further doses of lisinopril, monitor blood pressure.  Patient's blood pressure has improved  Sliding-scale insulin  Course of antibiotics now completed  Appreciate palliative care input.  Patient is DNR/DNI.  Palliative care has since signed off, appreciate assistance  PT OT consult.  Diabetic nurse educator consulted  Discussed with nursing staff and .  Precertification has been started by social work for placement  .     DVT prophylaxis:  Medical DVT prophylaxis orders are present.    CODE STATUS:    Medical Intervention Limits: NO intubation (DNI)  Level Of Support Discussed With: Patient  Code Status (Patient has no pulse and is not breathing): No CPR (Do Not Attempt to Resuscitate)  Medical Interventions (Patient has pulse or is breathing): Limited Support

## 2023-03-27 NOTE — CONSULTS
"Nutrition Services    Patient Name: Buzz Lopez  YOB: 1945  MRN: [id number]  Admission date: 3/6/2023      CLINICAL NUTRITION ASSESSMENT      Reason for Assessment  Follow-up protocol, EN    H&P:    Past Medical History:   Diagnosis Date   • Dysphagia         Current Problems:   Active Hospital Problems    Diagnosis    • Aspiration into airway    • Severe malnutrition (HCC)         Nutrition/Diet History         Narrative     PEG tube feeding continues to be running at goal rate.  Tolerating tube feeding. BM  3/27/23.  Sodium 130:  Receiving minimal free water.  Glucose 181: receives insulin as needed.  Pt is waiting for SNF placement. Per  Speech note 3/27/23: continues to recommend NPO with alternative feeding.      Anthropometrics        Current Height, Weight Height: 177.8 cm (70\")  Weight: 53.1 kg (117 lb 1 oz)   Current BMI Body mass index is 16.8 kg/m².       Weight Hx  Wt Readings from Last 30 Encounters:   03/24/23 0737 53.1 kg (117 lb 1 oz)   03/14/23 1233 53.8 kg (118 lb 9.7 oz)   03/13/23 1506 51.7 kg (113 lb 15.7 oz)   03/06/23 0824 61.2 kg (135 lb)   06/09/22 0154 61.4 kg (135 lb 5.8 oz)            Wt Change Observation Questionable accuracy of weight on 3/06.    Patient has had 1.3% wt loss x 10 days.      Estimated/Assessed Needs       Energy Requirements 25-30 kcal/kg IBW    EST Needs (kcal/day) 2275-3174       Protein Requirements 1.0-1.3 g/kg IBW   EST Daily Needs (g/day) 73-95       Fluid Requirements 1 ml/kcal    Estimated Needs (mL/day) 1964-3808     Labs/Medications         Pertinent Labs Reviewed.   Results from last 7 days   Lab Units 03/27/23  0438 03/26/23  0827 03/25/23  0451   SODIUM mmol/L 130* 131* 134*   POTASSIUM mmol/L 4.3 4.5 4.4   CHLORIDE mmol/L 91* 93* 97*   CO2 mmol/L 30.3* 30.5* 29.6*   BUN mg/dL 20 21 26*   CREATININE mg/dL 0.50* 0.45* 0.50*   CALCIUM mg/dL 9.3 8.8 8.9   GLUCOSE mg/dL 181* 155* 157*     Results from last 7 days   Lab Units 03/27/23  0438 " 03/26/23  0827 03/26/23  0520 03/25/23  0451   MAGNESIUM mg/dL 1.7 1.7  --  1.8   HEMOGLOBIN g/dL 11.2*  --    < > 10.4*   HEMATOCRIT % 32.8*  --    < > 30.6*    < > = values in this interval not displayed.     COVID19   Date Value Ref Range Status   03/06/2023 Not Detected Not Detected - Ref. Range Final     Lab Results   Component Value Date    HGBA1C 8.10 (H) 03/17/2023         Pertinent Medications Reviewed.     Current Nutrition Orders & Evaluation of Intake       Oral Nutrition     Current PO Diet NPO Diet NPO Type: Strict NPO   Supplement Orders Placed This Encounter      Hold Tube Feeding      Diet, Tube Feeding Tube Feeding Formula: Diabetisource AC (Glucerna 1.2); Tube Feeding Type: Continuous; Continuous Tube Feeding Start Rate (mL/hr): 70; Then Advance Rate By (mL/hr): Do Not Advance; Every __ Hours: Patient at Goal Rate; To Goal Ra...       Malnutrition Severity Assessment      Patient meets criteria for : Severe Malnutrition         Nutrition Diagnosis         Nutrition Dx Problem 1 Severe malnutrition related to Inability to consume sufficient energy as evidenced by body composition changes., NPO and SLP/swallow eval     Nutrition Intervention         Diabetisource AC @ 70 ml/hr   Free water flushes 25 ml every 4 hours*  Provides 2016 kcal, 101 g pro, 1528 ml fluid     *FWF may change based on Na+     Medical Nutrition Therapy/Nutrition Education          Learner     Readiness N/A  N/A     Method     Response N/A  N/A     Monitor/Evaluation        Monitor I&O, Pertinent labs, EN delivery/tolerance, Weight, Skin status, GI status, POC/GOC, Swallow function, RFS     Nutrition Discharge Plan         To be determined     Electronically signed by:  Hien Delvalle RD  03/27/23 15:09 EDT

## 2023-03-27 NOTE — PLAN OF CARE
Goal Outcome Evaluation:              Outcome Evaluation: No complaints of pain or discomfort. Waitng for placement. No other concerns at this time.

## 2023-03-27 NOTE — PLAN OF CARE
Goal Outcome Evaluation:  Plan of Care Reviewed With: patient, son           Outcome Evaluation: vss. tube feeding tolerated well. prn meds given to promote bm-pt had 2 bms tonight. denies c/o. ambulated to bathroom with walker and 1 assist.

## 2023-03-27 NOTE — THERAPY TREATMENT NOTE
Acute Care - Speech Language Pathology   Swallow Treatment Note  [facility]     Patient Name: Buzz Lopez  : 1945  MRN: [id number]  Today's Date: 3/27/2023               Admit Date: 3/6/2023    Visit Dx:     ICD-10-CM ICD-9-CM   1. Pneumonia of right lower lobe due to infectious organism  J18.9 486   2. Sepsis with acute hypoxic respiratory failure without septic shock, due to unspecified organism (HCC)  A41.9 038.9    R65.20 995.91    J96.01 518.81   3. Acute respiratory failure with hypoxia (HCC)  J96.01 518.81   4. Difficulty walking  R26.2 719.7   5. Oropharyngeal dysphagia  R13.12 787.22   6. Decreased activities of daily living (ADL)  Z78.9 V49.89     Patient Active Problem List   Diagnosis   • Severe malnutrition (HCC)   • Aspiration into airway     Past Medical History:   Diagnosis Date   • Dysphagia      Past Surgical History:   Procedure Laterality Date   • BRONCHOSCOPY N/A 3/8/2023    Procedure: BRONCHOSCOPY WITH BRONCHOALVEOLAR LAVAGE, WASHINGS;  Surgeon: Jason Rowley MD;  Location: [facility] ENDOSCOPY;  Service: Pulmonary;  Laterality: N/A;  MUCOUS PLUGGING   • ENDOSCOPY W/ PEG TUBE PLACEMENT N/A 3/22/2023    Procedure: ESOPHAGOGASTRODUODENOSCOPY WITH PERCUTANEOUS ENDOSCOPIC GASTROSTOMY TUBE INSERTION;  Surgeon: Ruslan uGido MD;  Location: [facility] ENDOSCOPY;  Service: Gastroenterology;  Laterality: N/A;  PEG INSERTION   • EYE SURGERY         SPEECH PATHOLOGY DYSPHAGIA TREATMENT     Subjective/Behavioral Observations: Alert and cooperative, sitting up in bed.        Day/time of Treatment: 3/27/2023        Current Diet:  N.p.o. with PEG        Current Strategies: N/A     Treatment received: Dysphagia therapy to address swallow function through exercises and education of strategies.        Results of treatment:   Laryngeal elevation exercises with effortful swallow, 3 +/5.  Tongue base exercises completed x4 sets.  Further education for patient to recognize lingual engagement for improved  performance on exercises.   Trials of ice chips x10, wet vocal quality noted x2, cough x2.  Small sips of liquid x3 with patient producing multiple swallows, laryngeal elevation appeared variable.  Trials of nectar liquid x3, minimal throat clearing noted.  Minimal wet vocal quality at times. Patient produced multiple swallow each presentation.        Progress toward goals: Adequate     Barriers to Achieving goals: Medical status        Plan of care:/changes in plan: Continue with current plan with patient to be n.p.o. with alternative feeding.  Okay for nursing to conservatively give small single ice chips.                                                                                  Plan of Care Reviewed With: patient, family          EDUCATION  The patient has been educated in the following areas:   Dysphagia (Swallowing Impairment) NPO rationale.              Time Calculation:    Time Calculation- SLP     Row Name 03/27/23 1306             Time Calculation- SLP    SLP Stop Time 1306  -TB         Untimed Charges    67832-AA Treatment Swallow Minutes 45  -TB         Total Minutes    Untimed Charges Total Minutes 45  -TB       Total Minutes 45  -TB            User Key  (r) = Recorded By, (t) = Taken By, (c) = Cosigned By    Initials Name Provider Type    TB Terri Gomez SLP Speech and Language Pathologist                Therapy Charges for Today     Code Description Service Date Service Provider Modifiers Qty    04321008424 HC ST TREATMENT SWALLOW 3 3/27/2023 Terri Gomez SLP GN 1               JANAY Julio  3/27/2023

## 2023-03-28 LAB
ANION GAP SERPL CALCULATED.3IONS-SCNC: 8 MMOL/L (ref 5–15)
BACTERIA UR QL AUTO: ABNORMAL /HPF
BILIRUB UR QL STRIP: NEGATIVE
BUN SERPL-MCNC: 19 MG/DL (ref 8–23)
BUN/CREAT SERPL: 35.8 (ref 7–25)
CALCIUM SPEC-SCNC: 9.2 MG/DL (ref 8.6–10.5)
CHLORIDE SERPL-SCNC: 96 MMOL/L (ref 98–107)
CLARITY UR: CLEAR
CO2 SERPL-SCNC: 30 MMOL/L (ref 22–29)
COLOR UR: YELLOW
CREAT SERPL-MCNC: 0.53 MG/DL (ref 0.76–1.27)
DEPRECATED RDW RBC AUTO: 41.8 FL (ref 37–54)
EGFRCR SERPLBLD CKD-EPI 2021: 101.9 ML/MIN/1.73
ERYTHROCYTE [DISTWIDTH] IN BLOOD BY AUTOMATED COUNT: 13.2 % (ref 12.3–15.4)
GLUCOSE BLDC GLUCOMTR-MCNC: 106 MG/DL (ref 70–99)
GLUCOSE BLDC GLUCOMTR-MCNC: 107 MG/DL (ref 70–99)
GLUCOSE BLDC GLUCOMTR-MCNC: 113 MG/DL (ref 70–99)
GLUCOSE BLDC GLUCOMTR-MCNC: 167 MG/DL (ref 70–99)
GLUCOSE SERPL-MCNC: 105 MG/DL (ref 65–99)
GLUCOSE UR STRIP-MCNC: NEGATIVE MG/DL
HCT VFR BLD AUTO: 32.4 % (ref 37.5–51)
HGB BLD-MCNC: 11 G/DL (ref 13–17.7)
HGB UR QL STRIP.AUTO: NEGATIVE
HYALINE CASTS UR QL AUTO: ABNORMAL /LPF
KETONES UR QL STRIP: NEGATIVE
LEUKOCYTE ESTERASE UR QL STRIP.AUTO: ABNORMAL
MAGNESIUM SERPL-MCNC: 1.8 MG/DL (ref 1.6–2.4)
MCH RBC QN AUTO: 29.8 PG (ref 26.6–33)
MCHC RBC AUTO-ENTMCNC: 34 G/DL (ref 31.5–35.7)
MCV RBC AUTO: 87.8 FL (ref 79–97)
NITRITE UR QL STRIP: NEGATIVE
PH UR STRIP.AUTO: 8 [PH] (ref 5–8)
PLATELET # BLD AUTO: 217 10*3/MM3 (ref 140–450)
PMV BLD AUTO: 11.2 FL (ref 6–12)
POTASSIUM SERPL-SCNC: 4.2 MMOL/L (ref 3.5–5.2)
PROT UR QL STRIP: NEGATIVE
RBC # BLD AUTO: 3.69 10*6/MM3 (ref 4.14–5.8)
RBC # UR STRIP: ABNORMAL /HPF
REF LAB TEST METHOD: ABNORMAL
SODIUM SERPL-SCNC: 134 MMOL/L (ref 136–145)
SP GR UR STRIP: 1.02 (ref 1–1.03)
SQUAMOUS #/AREA URNS HPF: ABNORMAL /HPF
UROBILINOGEN UR QL STRIP: ABNORMAL
WBC # UR STRIP: ABNORMAL /HPF
WBC NRBC COR # BLD: 5.15 10*3/MM3 (ref 3.4–10.8)

## 2023-03-28 PROCEDURE — 83735 ASSAY OF MAGNESIUM: CPT | Performed by: INTERNAL MEDICINE

## 2023-03-28 PROCEDURE — 63710000001 INSULIN REGULAR HUMAN PER 5 UNITS: Performed by: INTERNAL MEDICINE

## 2023-03-28 PROCEDURE — 94799 UNLISTED PULMONARY SVC/PX: CPT

## 2023-03-28 PROCEDURE — 81001 URINALYSIS AUTO W/SCOPE: CPT | Performed by: INTERNAL MEDICINE

## 2023-03-28 PROCEDURE — 85027 COMPLETE CBC AUTOMATED: CPT | Performed by: INTERNAL MEDICINE

## 2023-03-28 PROCEDURE — 82962 GLUCOSE BLOOD TEST: CPT

## 2023-03-28 PROCEDURE — 25010000002 ENOXAPARIN PER 10 MG: Performed by: INTERNAL MEDICINE

## 2023-03-28 PROCEDURE — 80048 BASIC METABOLIC PNL TOTAL CA: CPT | Performed by: INTERNAL MEDICINE

## 2023-03-28 PROCEDURE — 97110 THERAPEUTIC EXERCISES: CPT

## 2023-03-28 PROCEDURE — 99232 SBSQ HOSP IP/OBS MODERATE 35: CPT | Performed by: INTERNAL MEDICINE

## 2023-03-28 RX ORDER — ASPIRIN 81 MG/1
81 TABLET, CHEWABLE ORAL DAILY
Status: DISCONTINUED | OUTPATIENT
Start: 2023-03-28 | End: 2023-03-29 | Stop reason: HOSPADM

## 2023-03-28 RX ORDER — FAMOTIDINE 20 MG/1
20 TABLET, FILM COATED ORAL
Status: DISCONTINUED | OUTPATIENT
Start: 2023-03-28 | End: 2023-03-29 | Stop reason: HOSPADM

## 2023-03-28 RX ORDER — POLYETHYLENE GLYCOL 3350 17 G/17G
17 POWDER, FOR SOLUTION ORAL 2 TIMES DAILY PRN
Status: DISCONTINUED | OUTPATIENT
Start: 2023-03-28 | End: 2023-03-29 | Stop reason: HOSPADM

## 2023-03-28 RX ADMIN — METOPROLOL TARTRATE 12.5 MG: 25 TABLET, FILM COATED ORAL at 09:37

## 2023-03-28 RX ADMIN — FAMOTIDINE 20 MG: 20 TABLET, FILM COATED ORAL at 17:53

## 2023-03-28 RX ADMIN — METFORMIN HYDROCHLORIDE 850 MG: 850 TABLET ORAL at 09:37

## 2023-03-28 RX ADMIN — METOPROLOL TARTRATE 12.5 MG: 25 TABLET, FILM COATED ORAL at 21:00

## 2023-03-28 RX ADMIN — ARFORMOTEROL TARTRATE 15 MCG: 15 SOLUTION RESPIRATORY (INHALATION) at 06:51

## 2023-03-28 RX ADMIN — INSULIN HUMAN 2 UNITS: 100 INJECTION, SOLUTION PARENTERAL at 12:59

## 2023-03-28 RX ADMIN — Medication 10 ML: at 09:37

## 2023-03-28 RX ADMIN — Medication 10 ML: at 20:59

## 2023-03-28 RX ADMIN — ASPIRIN 81 MG 81 MG: 81 TABLET ORAL at 09:37

## 2023-03-28 RX ADMIN — ARFORMOTEROL TARTRATE 15 MCG: 15 SOLUTION RESPIRATORY (INHALATION) at 18:43

## 2023-03-28 RX ADMIN — BUDESONIDE 0.5 MG: 0.5 INHALANT ORAL at 18:43

## 2023-03-28 RX ADMIN — ENOXAPARIN SODIUM 40 MG: 100 INJECTION SUBCUTANEOUS at 15:21

## 2023-03-28 RX ADMIN — METFORMIN HYDROCHLORIDE 850 MG: 850 TABLET ORAL at 17:54

## 2023-03-28 RX ADMIN — BUDESONIDE 0.5 MG: 0.5 INHALANT ORAL at 06:51

## 2023-03-28 NOTE — PLAN OF CARE
Goal Outcome Evaluation:           Progress: improving  VSS. NO COMPLAINTS OF PAIN OR DISCOMFORT. POSSIBLE D/C TOMORROW.   Problem: Adult Inpatient Plan of Care  Goal: Plan of Care Review  Outcome: Ongoing, Progressing  Flowsheets  Taken 3/28/2023 1636 by Camille Guo RN  Progress: improving  Taken 3/28/2023 1413 by Conchita Sanford PT Student  Plan of Care Reviewed With: patient (Pended)  Goal: Patient-Specific Goal (Individualized)  Outcome: Ongoing, Progressing  Goal: Absence of Hospital-Acquired Illness or Injury  Outcome: Ongoing, Progressing  Intervention: Identify and Manage Fall Risk  Recent Flowsheet Documentation  Taken 3/28/2023 1631 by Camille Guo RN  Safety Promotion/Fall Prevention: safety round/check completed  Taken 3/28/2023 1430 by Camille Guo RN  Safety Promotion/Fall Prevention: safety round/check completed  Taken 3/28/2023 1345 by Camille Guo RN  Safety Promotion/Fall Prevention: safety round/check completed  Taken 3/28/2023 1229 by Camille Guo RN  Safety Promotion/Fall Prevention: safety round/check completed  Taken 3/28/2023 1130 by Camille Guo RN  Safety Promotion/Fall Prevention: safety round/check completed  Taken 3/28/2023 0940 by Caimlle Guo RN  Safety Promotion/Fall Prevention: safety round/check completed  Taken 3/28/2023 0900 by Camille Guo RN  Safety Promotion/Fall Prevention: safety round/check completed  Intervention: Prevent Infection  Recent Flowsheet Documentation  Taken 3/28/2023 0940 by Camille Guo RN  Infection Prevention:   rest/sleep promoted   personal protective equipment utilized   hand hygiene promoted  Goal: Optimal Comfort and Wellbeing  Outcome: Ongoing, Progressing  Intervention: Provide Person-Centered Care  Recent Flowsheet Documentation  Taken 3/28/2023 0940 by Camille Guo RN  Trust Relationship/Rapport:   care explained   choices provided   emotional support provided   empathic listening provided   questions  answered   questions encouraged   reassurance provided   thoughts/feelings acknowledged  Goal: Readiness for Transition of Care  Outcome: Ongoing, Progressing     Problem: Fall Injury Risk  Goal: Absence of Fall and Fall-Related Injury  Outcome: Ongoing, Progressing  Intervention: Promote Injury-Free Environment  Recent Flowsheet Documentation  Taken 3/28/2023 1631 by Camille Guo RN  Safety Promotion/Fall Prevention: safety round/check completed  Taken 3/28/2023 1430 by Camille Guo RN  Safety Promotion/Fall Prevention: safety round/check completed  Taken 3/28/2023 1345 by Camille Guo RN  Safety Promotion/Fall Prevention: safety round/check completed  Taken 3/28/2023 1229 by Camille Guo RN  Safety Promotion/Fall Prevention: safety round/check completed  Taken 3/28/2023 1130 by Camille Guo RN  Safety Promotion/Fall Prevention: safety round/check completed  Taken 3/28/2023 0940 by Camille Guo RN  Safety Promotion/Fall Prevention: safety round/check completed  Taken 3/28/2023 0900 by Camille Guo RN  Safety Promotion/Fall Prevention: safety round/check completed     Problem: Skin Injury Risk Increased  Goal: Skin Health and Integrity  Outcome: Ongoing, Progressing     Problem: Infection (Pneumonia)  Goal: Resolution of Infection Signs and Symptoms  Outcome: Ongoing, Progressing     Problem: Respiratory Compromise (Pneumonia)  Goal: Effective Oxygenation and Ventilation  Outcome: Ongoing, Progressing  Intervention: Promote Airway Secretion Clearance  Recent Flowsheet Documentation  Taken 3/28/2023 0940 by Camille Guo RN  Cough And Deep Breathing: done independently per patient     Problem: Palliative Care  Goal: Enhanced Quality of Life  Outcome: Ongoing, Progressing  Intervention: Maximize Comfort  Recent Flowsheet Documentation  Taken 3/28/2023 0800 by Camille Guo RN  Oral Care: swabbed with sterile water  Intervention: Optimize Psychosocial Wellbeing  Recent Flowsheet  Documentation  Taken 3/28/2023 0157 by Camille Guo, RN  Family/Support System Care:   support provided   self-care encouraged

## 2023-03-28 NOTE — SIGNIFICANT NOTE
Wound Eval / Progress Noted     [facility]     Patient Name: Buzz Lopez  : 1945  MRN: [id number]  Today's Date: 3/28/2023                 Admit Date: 3/6/2023    Visit Dx:    ICD-10-CM ICD-9-CM   1. Pneumonia of right lower lobe due to infectious organism  J18.9 486   2. Sepsis with acute hypoxic respiratory failure without septic shock, due to unspecified organism (HCC)  A41.9 038.9    R65.20 995.91    J96.01 518.81   3. Acute respiratory failure with hypoxia (HCC)  J96.01 518.81   4. Difficulty walking  R26.2 719.7   5. Oropharyngeal dysphagia  R13.12 787.22   6. Decreased activities of daily living (ADL)  Z78.9 V49.89       Patient Active Problem List   Diagnosis   • Severe malnutrition (HCC)   • Aspiration into airway        Past Medical History:   Diagnosis Date   • Dysphagia         Past Surgical History:   Procedure Laterality Date   • BRONCHOSCOPY N/A 3/8/2023    Procedure: BRONCHOSCOPY WITH BRONCHOALVEOLAR LAVAGE, WASHINGS;  Surgeon: Jason Rowley MD;  Location: [facility] ENDOSCOPY;  Service: Pulmonary;  Laterality: N/A;  MUCOUS PLUGGING   • ENDOSCOPY W/ PEG TUBE PLACEMENT N/A 3/22/2023    Procedure: ESOPHAGOGASTRODUODENOSCOPY WITH PERCUTANEOUS ENDOSCOPIC GASTROSTOMY TUBE INSERTION;  Surgeon: Ruslan Guido MD;  Location: [facility] ENDOSCOPY;  Service: Gastroenterology;  Laterality: N/A;  PEG INSERTION   • EYE SURGERY           Physical Assessment:  Wound 23 1641 Bilateral medial gluteal MASD (Moisture associated skin damage) (Active)   Wound Image   23 1238   Dressing Appearance open to air 23 1238   Closure None 23 1238   Base blanchable;moist;dry 23 1238   Periwound intact;dry 23 1238   Periwound Temperature warm 23 1238   Periwound Skin Turgor soft 23 1238   Edges rolled/closed 23 1238   Drainage Amount none 23 1238   Care, Wound cleansed with;sterile normal saline;barrier applied 23 1238   Dressing Care open to air  03/28/23 1238   Periwound Care moisturizer applied 03/27/23 2012       Wound 03/16/23 1641 Left anterior leg Abrasion (Active)   Closure None 03/28/23 0940   Base dry;scab 03/28/23 0940   Periwound dry;intact 03/27/23 2012   Periwound Temperature warm 03/27/23 2012   Periwound Skin Turgor soft 03/27/23 2012   Edges rolled/closed 03/27/23 2012   Drainage Amount none 03/27/23 2012   Care, Wound cleansed with;soap and water 03/27/23 2012   Dressing Care open to air 03/27/23 2012   Periwound Care moisturizer applied 03/27/23 2012      Wound Check / Follow-up:  Patient seen today for wound follow-up. Gluteal crease with pink epithelial tissue noted. Tissue is dry and blanchable. Bilateral heels blanchable. Recommending to continue current care with application of barrier cream. No open wounds noted. Continue every two hour turns, offload heels, keep patient free from moisture.         Short term goals:  Maintain skin integrity, pressure reduction, skin protection, moisture prevention.      Jessie Gaviria RN    3/28/2023    13:07 EDT

## 2023-03-28 NOTE — PROGRESS NOTES
[facility]   Hospitalist Progress Note  Date: 3/28/2023  Patient Name: Buzz Lopez  : 1945  MRN: [id number]  Date of admission: 3/6/2023    Subjective   Subjective     Chief Complaint:   Shortness of breath, confusion    Summary:   Buzz Lopez is a 77 y.o. male with no significant past medical history brought into the ED with concerns for confusion, possible syncope, shortness of breath.  Patient states for the previous 1 to 2 weeks, having issues with difficulty breathing and subjective fevers.  The symptoms worsened therefore he called son to take him to the emergency department.  By the time son arrived at home, patient confused General weakness unable to put on close.  Possible syncopal episode where he became unresponsive.  Patient's son look for pulse, could not feel radial pulse and started chest compressions.  EMS was called.  By time EMS arrived patient was receiving compressions from family members, EMS also could not find a pulse.  Respiratory distress satting around 70% on room air.  Once in the emergency department patient was alert and oriented x3 and able to answer questions appropriately.  Patient with no recollection of the events preceding hospitalization.  Denied any chest pain nausea vomiting focal weakness numbness or tingling.  Patient with poor oral intake, thin and frail.  Patient's primary issue is aspiration, felt this is why he has been having significant weight loss and admitted for pneumonia.  Given his high functional status normally, recommendations for PEG tube placement.  PEG tube was placed on 3/22/2023.  Regional discussions with cardiology regarding patient's LAI for preoperative evaluation of aortic stenosis has been deferred to outpatient.  This was discussed by cardiologist with family.    Interval Followup:   No events overnight.  Tolerating tube feeds.  No pain currently.    ROS:  Denies chest pain or palpitations    Objective     Vitals:   Temp:  [97.3 °F  (36.3 °C)-97.6 °F (36.4 °C)] 97.3 °F (36.3 °C)  Heart Rate:  [69-78] 69  Resp:  [18] 18  BP: (129-144)/(53-75) 137/53    Physical Exam   General appearance: Cachectic male, NAD, appears comfortable, temporal wasting noted  HENT: Atraumatic; oropharynx clear with moist mucous membranes   Lungs: Improved aeration, no w/r/r  Abdomen: PEG tube in place, normoactive bowel sounds    Result Review    Result Review:  I have personally reviewed the results as below  [x]  Laboratory  CBC    CBC 3/26/23 3/27/23 3/28/23   WBC 5.57 6.09 5.15   RBC 3.79 (A) 3.74 (A) 3.69 (A)   Hemoglobin 11.2 (A) 11.2 (A) 11.0 (A)   Hematocrit 33.1 (A) 32.8 (A) 32.4 (A)   MCV 87.3 87.7 87.8   MCH 29.6 29.9 29.8   MCHC 33.8 34.1 34.0   RDW 13.6 13.2 13.2   Platelets 193 208 217   (A) Abnormal value            CMP    CMP 3/26/23 3/27/23 3/28/23   Glucose 155 (A) 181 (A) 105 (A)   BUN 21 20 19   Creatinine 0.45 (A) 0.50 (A) 0.53 (A)   eGFR 107.1 103.8 101.9   Sodium 131 (A) 130 (A) 134 (A)   Potassium 4.5 4.3 4.2   Chloride 93 (A) 91 (A) 96 (A)   Calcium 8.8 9.3 9.2   BUN/Creatinine Ratio 46.7 (A) 40.0 (A) 35.8 (A)   Anion Gap 7.5 8.7 8.0   (A) Abnormal value            []  Microbiology  []  Radiology  []  EKG/Telemetry   []  Cardiology/Vascular   []  Pathology  []  Old records  []  Other:    Assessment & Plan   Assessment / Plan   Assessment/plan:    Sepsis due to right lower lobe pneumonia (Streptococcus Pneumo).  Resolved  Acute hypoxic respiratory failure.  Resolved  Questionable cardiac arrest episode  NSTEMI/acute myocardial injury  Possible syncope  Lactic acidosis, resolved  Malnutrition.  BMI of 17  S/P Bronchoscopy 3/8/23 with mucous plugging removed.  With  yeast growing.  Dysphagia requiring Core Track placed this admission  Aspiration.  Severe aortic stenosis   Degenerative changes cervical spine / anterior osteophytes  New diagnosis of type 2 diabetes mellitus hemoglobin A1c of 8.1%       Continue to monitor in the hospital for  management of the above  Status post PEG tube placement on 3/22, tolerating tube feeds.  Will need continued work with speech therapy in the hopes that this can be discontinued in the future  Cardiology plans for outpatient LAI for follow-up of severe AS  Continue aspirin and Lopressor  Continue to hold lisinopril  Completed course of antibiotics for strep pneumo  Palliative care previously evaluated patient.  He is DNR/DNI, full treatment  PT/OT consulted, precertification started and pending for placement  Lab holiday tomorrow    Discussed case with: Bedside RN  .     DVT prophylaxis:  Medical DVT prophylaxis orders are present.    CODE STATUS:   Medical Intervention Limits: NO intubation (DNI)  Level Of Support Discussed With: Patient  Code Status (Patient has no pulse and is not breathing): No CPR (Do Not Attempt to Resuscitate)  Medical Interventions (Patient has pulse or is breathing): Limited Support

## 2023-03-28 NOTE — THERAPY TREATMENT NOTE
Acute Care - Physical Therapy Treatment Note   [facility]     Patient Name: Buzz Lopez  : 1945  MRN: [id number]  Today's Date: 3/28/2023      Visit Dx:     ICD-10-CM ICD-9-CM   1. Pneumonia of right lower lobe due to infectious organism  J18.9 486   2. Sepsis with acute hypoxic respiratory failure without septic shock, due to unspecified organism (HCC)  A41.9 038.9    R65.20 995.91    J96.01 518.81   3. Acute respiratory failure with hypoxia (HCC)  J96.01 518.81   4. Difficulty walking  R26.2 719.7   5. Oropharyngeal dysphagia  R13.12 787.22   6. Decreased activities of daily living (ADL)  Z78.9 V49.89     Patient Active Problem List   Diagnosis   • Severe malnutrition (HCC)   • Aspiration into airway     Past Medical History:   Diagnosis Date   • Dysphagia      Past Surgical History:   Procedure Laterality Date   • BRONCHOSCOPY N/A 3/8/2023    Procedure: BRONCHOSCOPY WITH BRONCHOALVEOLAR LAVAGE, WASHINGS;  Surgeon: Jason Rowley MD;  Location: [facility] ENDOSCOPY;  Service: Pulmonary;  Laterality: N/A;  MUCOUS PLUGGING   • ENDOSCOPY W/ PEG TUBE PLACEMENT N/A 3/22/2023    Procedure: ESOPHAGOGASTRODUODENOSCOPY WITH PERCUTANEOUS ENDOSCOPIC GASTROSTOMY TUBE INSERTION;  Surgeon: Ruslan Guido MD;  Location: [facility] ENDOSCOPY;  Service: Gastroenterology;  Laterality: N/A;  PEG INSERTION   • EYE SURGERY       PT Assessment (last 12 hours)     PT Evaluation and Treatment     Row Name 23 1413          Physical Therapy Time and Intention    Subjective Information complains of;fatigue (P)   -TG     Document Type therapy note (daily note) (P)   -TG     Mode of Treatment individual therapy;physical therapy (P)   -TG     Patient Effort good (P)   -TG     Symptoms Noted During/After Treatment fatigue (P)   -TG     Row Name 23 1413          General Information    Patient Profile Reviewed yes (P)   -TG     Patient Observations alert;cooperative;agree to therapy (P)   -TG     Row Name 23 1411           Cognition    Orientation Status (Cognition) oriented x 3 (P)   -TG     Row Name 03/28/23 1413          Bed Mobility    Comment, (Bed Mobility) declined out of bed activity d/t fatigue (P)   -TG     Row Name 03/28/23 1413          Transfers    Comment, (Transfers) declined out of bed activity d/t fatigue (P)   -TG     Row Name 03/28/23 1413          Gait/Stairs (Locomotion)    Comment, (Gait/Stairs) declined out of bed activity d/t fatigue (P)   -TG     Row Name 03/28/23 1413          Balance    Comment, Balance unable to assess (P)   -TG     Row Name             Wound 03/16/23 1641 Bilateral medial gluteal MASD (Moisture associated skin damage)    Wound - Properties Group Placement Date: 03/16/23  - Placement Time: 1641 -MC Side: Bilateral  -MC Orientation: medial  -MC Location: gluteal  -MC Primary Wound Type: MASD  -MC    Retired Wound - Properties Group Placement Date: 03/16/23  - Placement Time: 1641  -MC Side: Bilateral  -MC Orientation: medial  -MC Location: gluteal  -MC Primary Wound Type: MASD  -MC    Retired Wound - Properties Group Date first assessed: 03/16/23  - Time first assessed: 1641  -MC Side: Bilateral  -MC Location: gluteal  -MC Primary Wound Type: MASD  -MC    Row Name             Wound 03/16/23 1641 Left anterior leg Abrasion    Wound - Properties Group Placement Date: 03/16/23  - Placement Time: 1641  -MC Side: Left  -MC Orientation: anterior  -MC Location: leg  -MC Primary Wound Type: Abrasion  -MC    Retired Wound - Properties Group Placement Date: 03/16/23  - Placement Time: 1641  -MC Side: Left  -MC Orientation: anterior  -MC Location: leg  -MC Primary Wound Type: Abrasion  -MC    Retired Wound - Properties Group Date first assessed: 03/16/23  - Time first assessed: 1641  -MC Side: Left  -MC Location: leg  -MC Primary Wound Type: Abrasion  -MC    Row Name 03/28/23 1413          Plan of Care Review    Plan of Care Reviewed With patient (P)   -TG     Progress no change (P)    -TG     Row Name 03/28/23 1413          Progress Summary (PT)    Daily Progress Summary (PT) Pt declined out of bed activity d/t fatigue. Performed supine therex. Verbal cues required to keep pt engaged in session. (P)   -TG           User Key  (r) = Recorded By, (t) = Taken By, (c) = Cosigned By    Initials Name Provider Type    Lilly Maciel, RN Registered Nurse    TG Conchita Sanford, PT Student PT Student              Pt performed supine therapeutic exercises: heel slides 2 x 10, ankle pumps 2 x 20, hip adduction 2 x 10, hip abduction 2 x 10, and short arc quads 2 x 10.    Physical Therapy Education     Title: PT OT SLP Therapies (Done)     Topic: Physical Therapy (Done)     Point: Mobility training (Done)     Learning Progress Summary           Patient Acceptance, E, VU by  at 3/24/2023 1031    Acceptance, E, VU by  at 3/8/2023 1531    Acceptance, E, VU by  at 3/7/2023 0909   Family Acceptance, E, VU by  at 3/24/2023 1031                               User Key     Initials Effective Dates Name Provider Type Discipline     06/16/21 -  Urszula Barros, RN Registered Nurse Nurse    ALO 09/21/21 -  Heavenly Quach RN Registered Nurse Nurse    POONAM 01/11/23 -  Ephraim Juares, PT Student PT Student PT              PT Recommendation and Plan     Progress Summary (PT)  Daily Progress Summary (PT): (P) Pt declined out of bed activity d/t fatigue. Performed supine therex. Verbal cues required to keep pt engaged in session.  Plan of Care Reviewed With: (P) patient  Progress: (P) no change   Outcome Measures     Row Name 03/28/23 1400             How much help from another person do you currently need...    Turning from your back to your side while in flat bed without using bedrails? 4 (P)   -TG      Moving from lying on back to sitting on the side of a flat bed without bedrails? 3 (P)   -TG      Moving to and from a bed to a chair (including a wheelchair)? 3 (P)   -TG      Standing up from a chair using your arms  (e.g., wheelchair, bedside chair)? 3 (P)   -TG      Climbing 3-5 steps with a railing? 2 (P)   -TG      To walk in hospital room? 3 (P)   -TG      AM-PAC 6 Clicks Score (PT) 18 (P)   -TG         Functional Assessment    Outcome Measure Options AM-PAC 6 Clicks Basic Mobility (PT) (P)   -TG            User Key  (r) = Recorded By, (t) = Taken By, (c) = Cosigned By    Initials Name Provider Type    TG Conchita Sanford PT Student PT Student                 Time Calculation:    PT Charges     Row Name 03/28/23 1416             Time Calculation    PT Received On 03/28/23 (P)   -TG         Timed Charges    42882 - PT Therapeutic Exercise Minutes 10 (P)   -TG         Total Minutes    Timed Charges Total Minutes 10 (P)   -TG       Total Minutes 10 (P)   -TG            User Key  (r) = Recorded By, (t) = Taken By, (c) = Cosigned By    Initials Name Provider Type    TG Conchita Sanford PT Student PT Student              Therapy Charges for Today     Code Description Service Date Service Provider Modifiers Qty    70453566882 HC PT THER PROC EA 15 MIN 3/28/2023 Conchita Sanford PT Student GP 1          PT G-Codes  Outcome Measure Options: (P) AM-PAC 6 Clicks Basic Mobility (PT)  AM-PAC 6 Clicks Score (PT): (P) 18  AM-PAC 6 Clicks Score (OT): 20    Conchita Sanford PT Student  3/28/2023

## 2023-03-29 VITALS
RESPIRATION RATE: 18 BRPM | HEIGHT: 70 IN | TEMPERATURE: 97.2 F | BODY MASS INDEX: 16.79 KG/M2 | HEART RATE: 70 BPM | DIASTOLIC BLOOD PRESSURE: 61 MMHG | OXYGEN SATURATION: 97 % | SYSTOLIC BLOOD PRESSURE: 152 MMHG | WEIGHT: 117.28 LBS

## 2023-03-29 LAB
GLUCOSE BLDC GLUCOMTR-MCNC: 143 MG/DL (ref 70–99)
GLUCOSE BLDC GLUCOMTR-MCNC: 146 MG/DL (ref 70–99)

## 2023-03-29 PROCEDURE — 94799 UNLISTED PULMONARY SVC/PX: CPT

## 2023-03-29 PROCEDURE — 99239 HOSP IP/OBS DSCHRG MGMT >30: CPT | Performed by: INTERNAL MEDICINE

## 2023-03-29 PROCEDURE — 92526 ORAL FUNCTION THERAPY: CPT

## 2023-03-29 PROCEDURE — 82962 GLUCOSE BLOOD TEST: CPT

## 2023-03-29 PROCEDURE — 97110 THERAPEUTIC EXERCISES: CPT

## 2023-03-29 RX ORDER — ATORVASTATIN CALCIUM 20 MG/1
40 TABLET, FILM COATED ORAL DAILY
Qty: 90 TABLET
Start: 2023-03-29 | End: 2023-04-11 | Stop reason: HOSPADM

## 2023-03-29 RX ORDER — POLYETHYLENE GLYCOL 3350 17 G/17G
17 POWDER, FOR SOLUTION ORAL 2 TIMES DAILY PRN
Start: 2023-03-29 | End: 2023-04-11 | Stop reason: HOSPADM

## 2023-03-29 RX ORDER — ASPIRIN 81 MG/1
81 TABLET, CHEWABLE ORAL DAILY
Start: 2023-03-29 | End: 2023-04-11 | Stop reason: HOSPADM

## 2023-03-29 RX ADMIN — ARFORMOTEROL TARTRATE 15 MCG: 15 SOLUTION RESPIRATORY (INHALATION) at 07:51

## 2023-03-29 RX ADMIN — FAMOTIDINE 20 MG: 20 TABLET, FILM COATED ORAL at 06:08

## 2023-03-29 RX ADMIN — ASPIRIN 81 MG 81 MG: 81 TABLET ORAL at 09:57

## 2023-03-29 RX ADMIN — BUDESONIDE 0.5 MG: 0.5 INHALANT ORAL at 07:51

## 2023-03-29 RX ADMIN — METOPROLOL TARTRATE 12.5 MG: 25 TABLET, FILM COATED ORAL at 09:57

## 2023-03-29 RX ADMIN — METFORMIN HYDROCHLORIDE 850 MG: 850 TABLET ORAL at 09:57

## 2023-03-29 RX ADMIN — Medication 10 ML: at 09:58

## 2023-03-29 NOTE — THERAPY TREATMENT NOTE
Acute Care - Speech Language Pathology   Swallow Treatment Note [facility]     Patient Name: Buzz Lopez  : 1945  MRN: [id number]  Today's Date: 3/29/2023               Admit Date: 3/6/2023    Visit Dx:     ICD-10-CM ICD-9-CM   1. Pneumonia of right lower lobe due to infectious organism  J18.9 486   2. Sepsis with acute hypoxic respiratory failure without septic shock, due to unspecified organism (HCC)  A41.9 038.9    R65.20 995.91    J96.01 518.81   3. Acute respiratory failure with hypoxia (HCC)  J96.01 518.81   4. Difficulty walking  R26.2 719.7   5. Oropharyngeal dysphagia  R13.12 787.22   6. Decreased activities of daily living (ADL)  Z78.9 V49.89     Patient Active Problem List   Diagnosis   • Severe malnutrition (HCC)   • Aspiration into airway     Past Medical History:   Diagnosis Date   • Dysphagia      Past Surgical History:   Procedure Laterality Date   • BRONCHOSCOPY N/A 3/8/2023    Procedure: BRONCHOSCOPY WITH BRONCHOALVEOLAR LAVAGE, WASHINGS;  Surgeon: Jason Rowley MD;  Location: [facility] ENDOSCOPY;  Service: Pulmonary;  Laterality: N/A;  MUCOUS PLUGGING   • ENDOSCOPY W/ PEG TUBE PLACEMENT N/A 3/22/2023    Procedure: ESOPHAGOGASTRODUODENOSCOPY WITH PERCUTANEOUS ENDOSCOPIC GASTROSTOMY TUBE INSERTION;  Surgeon: Ruslan Guido MD;  Location: [facility] ENDOSCOPY;  Service: Gastroenterology;  Laterality: N/A;  PEG INSERTION   • EYE SURGERY       SPEECH PATHOLOGY DYSPHAGIA TREATMENT     Subjective/Behavioral Observations: Alert and cooperative, sitting up in bed.        Day/time of Treatment: 3/29/2023        Current Diet:  N.p.o. with PEG        Current Strategies: N/A     Treatment received: Dysphagia therapy to address swallow function through exercises and education of strategies.        Results of treatment:   Laryngeal elevation exercises with effortful swallow, 3 +/5.  4 -/5x2.  Tongue base exercises completed x4 sets.  Further education for patient to recognize lingual engagement for  improved performance on exercises.   Trials of ice chips x5, wet vocal quality noted x1, cough x1.  Small sips of liquid x3 with patient producing multiple swallows, laryngeal elevation appeared variable.  Patient educated for brief pause for more effective second swallow.  Trials of nectar liquid x3, minimal throat clearing noted.  Minimal wet vocal quality at times. Patient produced multiple swallow each presentation, educated for improved effortful swallow.        Progress toward goals: Adequate     Barriers to Achieving goals: Medical status        Plan of care:/changes in plan: Continue with current plan with patient to be n.p.o. with alternative feeding.  Okay for nursing to conservatively give small single ice chips.   Continue with speech pathology services.                                                                                 Plan of Care Reviewed With: patient, family          EDUCATION  The patient has been educated in the following areas:   Home Exercise Program (HEP) Dysphagia (Swallowing Impairment) NPO rationale.              Time Calculation:    Time Calculation- SLP     Row Name 03/29/23 1153             Time Calculation- SLP    SLP Stop Time 1130  -TB      SLP Received On 03/29/23  -TB         Untimed Charges    31583-YR Treatment Swallow Minutes 45  -TB         Total Minutes    Untimed Charges Total Minutes 45  -TB       Total Minutes 45  -TB            User Key  (r) = Recorded By, (t) = Taken By, (c) = Cosigned By    Initials Name Provider Type    TB Terri Gomez SLP Speech and Language Pathologist                Therapy Charges for Today     Code Description Service Date Service Provider Modifiers Qty    [id number] HC ST TREATMENT SWALLOW 3 3/29/2023 Terri Gomez SLP GN 1               JANAY Julio  3/29/2023

## 2023-03-29 NOTE — PLAN OF CARE
Goal Outcome Evaluation:           Progress: improving  Outcome Evaluation: No complaints of pain or discomfort. Waitng for placement. No other concerns at this time.  Problem: Adult Inpatient Plan of Care  Goal: Plan of Care Review  Outcome: Met  Goal: Patient-Specific Goal (Individualized)  Outcome: Met  Goal: Absence of Hospital-Acquired Illness or Injury  Outcome: Met  Intervention: Identify and Manage Fall Risk  Recent Flowsheet Documentation  Taken 3/29/2023 1104 by Camille Guo RN  Safety Promotion/Fall Prevention: safety round/check completed  Taken 3/29/2023 0950 by Camille Guo RN  Safety Promotion/Fall Prevention: safety round/check completed  Taken 3/29/2023 0715 by Camille Guo RN  Safety Promotion/Fall Prevention: safety round/check completed  Goal: Optimal Comfort and Wellbeing  Outcome: Met  Intervention: Provide Person-Centered Care  Recent Flowsheet Documentation  Taken 3/29/2023 0950 by Camille Guo RN  Trust Relationship/Rapport:   care explained   choices provided   emotional support provided   empathic listening provided   questions answered   reassurance provided   questions encouraged   thoughts/feelings acknowledged  Goal: Readiness for Transition of Care  Outcome: Met     Problem: Fall Injury Risk  Goal: Absence of Fall and Fall-Related Injury  Outcome: Met  Intervention: Promote Injury-Free Environment  Recent Flowsheet Documentation  Taken 3/29/2023 1104 by Camille Guo RN  Safety Promotion/Fall Prevention: safety round/check completed  Taken 3/29/2023 0950 by Camille Guo RN  Safety Promotion/Fall Prevention: safety round/check completed  Taken 3/29/2023 0715 by Camille Guo RN  Safety Promotion/Fall Prevention: safety round/check completed     Problem: Skin Injury Risk Increased  Goal: Skin Health and Integrity  Outcome: Met     Problem: Infection (Pneumonia)  Goal: Resolution of Infection Signs and Symptoms  Outcome: Met     Problem: Respiratory Compromise  (Pneumonia)  Goal: Effective Oxygenation and Ventilation  Outcome: Met  Intervention: Promote Airway Secretion Clearance  Recent Flowsheet Documentation  Taken 3/29/2023 0950 by Camille Guo RN  Cough And Deep Breathing: done independently per patient     Problem: Palliative Care  Goal: Enhanced Quality of Life  Outcome: Met  Intervention: Maximize Comfort  Recent Flowsheet Documentation  Taken 3/29/2023 0800 by Camille Guo, RN  Oral Care: swabbed with sterile water  Intervention: Optimize Psychosocial Wellbeing  Recent Flowsheet Documentation  Taken 3/29/2023 0950 by Camille Guo, RN  Family/Support System Care:   support provided   self-care encouraged

## 2023-03-29 NOTE — PLAN OF CARE
Goal Outcome Evaluation:  Plan of Care Reviewed With: patient, son        Progress: improving  Outcome Evaluation: vss. denies c/o tonight. bs wnl. tolerating tube feed well, no residuals. tolerated ice chips with minimal coughing. educated patient and son, Mauri, extensively on G tube care and feedings. pt demonstrated flushing g tube and was shown how to do site care.

## 2023-03-29 NOTE — DISCHARGE SUMMARY
[facility]         HOSPITALIST  DISCHARGE SUMMARY    Patient Name: Buzz Lopez  : 1945  MRN: [id number]    Date of Admission: 3/6/2023  Date of Discharge: 3/29/2023  Primary Care Physician: Provider, No Known    Consults     Date and Time Order Name Status Description    3/20/2023  1:59 PM Inpatient Gastroenterology Consult Completed     3/6/2023 12:26 PM Inpatient Cardiology Consult      3/6/2023 10:30 AM Hospitalist (on-call MD unless specified)      3/6/2023 10:14 AM IP General Consult (Use specialty-specific consult if known)            Active and Resolved Hospital Problems:  Active Hospital Problems    Diagnosis POA   • Aspiration into airway [T17.908A] Yes   • Severe malnutrition (HCC) [E43] Yes      Resolved Hospital Problems    Diagnosis POA   • **Pneumonia of right lower lobe due to infectious organism [J18.9] Yes   • Sepsis with acute hypoxic respiratory failure without septic shock (HCC) [A41.9, R65.20, J96.01] Yes   - Sepsis, POA, due to right lower lobe pneumonia (Streptococcus Pneumo).  Resolved  - Acute hypoxic respiratory failure.  Resolved  - Questionable cardiac arrest episode  - NSTEMI/acute myocardial injury  - Syncope, likely secondary to either aortic stenosis or pneumonia/hypoxia  - Lactic acidosis, resolved  - Severe protein calorie malnutrition.  BMI of 17  - S/P Bronchoscopy 3/8/23 with mucous plugging removed  - Dysphagia requiring PEG tube placement  - Aspiration pneumonia  - Severe aortic stenosis   - Degenerative changes cervical spine / anterior osteophytes                    Diffuse anterior osteophyte formation throughout the cervical spine producing moderate mass   effect upon the cervical esophagus                    New diagnosis of type 2 diabetes mellitus hemoglobin A1c of 8.1%    Hospital Course     Hospital Course:  Buzz Lopez is a 77 y.o. male with no significant past medical history brought into the ED with concerns for confusion, possible  syncope, shortness of breath.  Patient states for the previous 1 to 2 weeks, having issues with difficulty breathing and subjective fevers.  The symptoms worsened therefore he called son to take him to the emergency department.  By the time son arrived at home, patient was confused and unable to get dressed. Possible syncopal episode where he became unresponsive.  Patient's son look for pulse, could not feel radial pulse and started chest compressions.  EMS was called.  By time EMS arrived patient was receiving compressions from family members, EMS also could not find a pulse.  Respiratory distress satting around 70% on room air.  Once in the emergency department patient was alert and oriented x3 and able to answer questions appropriately.  Patient with no recollection of the events preceding hospitalization.  Denied any chest pain nausea vomiting focal weakness numbness or tingling.  Patient with poor oral intake, thin and frail.  Patient's primary issue is aspiration, felt this is why he has been having significant weight loss and admitted for pneumonia.  Given his high functional status normally, recommendations for PEG tube placement.  PEG tube was placed on 3/22/2023.  Regional discussions with cardiology regarding patient's LAI for preoperative evaluation of aortic stenosis has been deferred to outpatient.  This was discussed by cardiologist with family.  CT soft tissue neck did reveal diffuse anterior osteophytes of the cervical spine which produce moderate mass effect on the cervical esophagus.  Unclear if this may be related to his aspiration.  Patient continues work with speech therapy and still unable to swallow, may consider neurosurgical referral in the future.  PT/OT consulted and recommend rehab.  Discharged in stable condition to rehab on 3/29/2023.  Recommend follow-up with PCP in 1 week, cardiology and pulmonology within 1 month.  Neurosurgery if needed.    DISCHARGE Follow Up Recommendations for  labs and diagnostics: Consider neurosurgical referral if unable to advance with speech therapy      Day of Discharge     Vital Signs:  Temp:  [97.3 °F (36.3 °C)-97.7 °F (36.5 °C)] 97.6 °F (36.4 °C)  Heart Rate:  [65-76] 71  Resp:  [16-18] 16  BP: (122-140)/(46-71) 122/46  Physical Exam:   Gen: Thin, frail male, NAD, temporal wasting noted  ENT: PERRL, EOMI   CV: RRR no MRG  Pulm: CTAB, no w/r/r  GI: Abd soft, PEG tube in place NTND, +bs  Neuro: Moving all extremities spontaneously, generalized weakness noted, CN II-XII grossly intact   Psych: A&O*3, normal mood and affect  Skin: No lesions or rashes noted      Discharge Details        Discharge Medications      New Medications      Instructions Start Date   aspirin 81 MG chewable tablet   81 mg, Per G Tube, Daily      atorvastatin 20 MG tablet  Commonly known as: LIPITOR   40 mg, Per G Tube, Daily      metFORMIN 850 MG tablet  Commonly known as: GLUCOPHAGE   850 mg, Per G Tube, 2 Times Daily With Meals      metoprolol tartrate 25 MG tablet  Commonly known as: LOPRESSOR   12.5 mg, Oral, 2 Times Daily      polyethylene glycol 17 g packet  Commonly known as: MIRALAX   17 g, Oral, 2 Times Daily PRN         Continue These Medications      Instructions Start Date   multivitamin with minerals tablet tablet   1 tablet, Oral, 2 Times Weekly             No Known Allergies    Discharge Disposition:  Skilled Nursing Facility (DC - External)    Diet:  Hospital:  Diet Order   Procedures   • NPO Diet NPO Type: Strict NPO       Discharge Activity:   Activity Instructions     Activity as Tolerated            CODE STATUS:  Code Status and Medical Interventions:   Ordered at: 03/11/23 1013     Medical Intervention Limits:    NO intubation (DNI)     Level Of Support Discussed With:    Patient     Code Status (Patient has no pulse and is not breathing):    No CPR (Do Not Attempt to Resuscitate)     Medical Interventions (Patient has pulse or is breathing):    Limited Support         No  future appointments.    Additional Instructions for the Follow-ups that You Need to Schedule     Discharge Follow-up with PCP   As directed       Currently Documented PCP:    Provider, No Known    PCP Phone Number:    None     Follow Up Details: 3-5 days         Discharge Follow-up with Specified Provider: Cardiology; 3 Weeks   As directed      To: Cardiology    Follow Up: 3 Weeks         Discharge Follow-up with Specified Provider: Pulmonology; 1 Month   As directed      To: Pulmonology    Follow Up: 1 Month               Pertinent  and/or Most Recent Results     PROCEDURES:   None    LAB RESULTS:      Lab 03/28/23  0554 03/27/23  0438 03/26/23  0520 03/25/23  0451 03/23/23  0614   WBC 5.15 6.09 5.57 6.84 10.85*   HEMOGLOBIN 11.0* 11.2* 11.2* 10.4* 11.1*   HEMATOCRIT 32.4* 32.8* 33.1* 30.6* 33.4*   PLATELETS 217 208 193 213 253   MCV 87.8 87.7 87.3 87.2 88.1         Lab 03/28/23  0554 03/27/23  0438 03/26/23  0827 03/25/23  0451 03/23/23  0614   SODIUM 134* 130* 131* 134* 133*   POTASSIUM 4.2 4.3 4.5 4.4 4.6   CHLORIDE 96* 91* 93* 97* 96*   CO2 30.0* 30.3* 30.5* 29.6* 28.4   ANION GAP 8.0 8.7 7.5 7.4 8.6   BUN 19 20 21 26* 25*   CREATININE 0.53* 0.50* 0.45* 0.50* 0.64*   EGFR 101.9 103.8 107.1 103.8 96.3   GLUCOSE 105* 181* 155* 157* 206*   CALCIUM 9.2 9.3 8.8 8.9 9.1   MAGNESIUM 1.8 1.7 1.7 1.8 1.9                         Brief Urine Lab Results  (Last result in the past 365 days)      Color   Clarity   Blood   Leuk Est   Nitrite   Protein   CREAT   Urine HCG        03/28/23 1756 Yellow   Clear   Negative   Trace   Negative   Negative               Microbiology Results (last 10 days)     ** No results found for the last 240 hours. **          CT Head Without Contrast    Result Date: 3/6/2023  Impression:   CT scan of the head without IV contrast demonstrating mild atrophy and white matter changes.  No acute intracranial abnormality is seen.     BRADLEY BROWN MD       Electronically Signed and Approved By: BRADLEY  MD KEVIN on 3/06/2023 at 18:05             CT Soft Tissue Neck Without Contrast    Result Date: 3/10/2023  Impression:   1. Diffuse anterior osteophyte formation throughout the cervical spine producing moderate mass effect upon the cervical esophagus 2. NG tube in place 3. Airspace opacities in the left lower lobe superior segment and right upper lobe posterior segment could represent pneumonia and/or aspiration.  Correlate clinically.     Johnathon Garces M.D.       Electronically Signed and Approved By: Johnathon aGrces M.D. on 3/10/2023 at 20:20             CT Chest With Contrast Diagnostic    Result Date: 3/6/2023  Impression:   CT scan of the chest with IV contrast demonstrating no findings of PE.  Extensive alveolar airspace disease, with areas of dense consolidation in the lower lobes consistent with bacterial pneumonia.  Coronary artery calcifications.  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     BRADLEY BROWN MD       Electronically Signed and Approved By: BRADLEY BROWN MD on 3/06/2023 at 20:41             FL Video Swallow With Speech Single Contrast    Result Date: 3/15/2023  Impression:    1. Rashid, silent tracheal aspiration of thin liquid barium  Please consult speech pathology report for further details regarding exam and dietary recommendations    SOCORRO REBOLLAR       Electronically Signed and Approved By: ORLANDO DAMON MD on 3/15/2023 at 14:58             XR Chest 1 View    Result Date: 3/10/2023  Impression:   1. Bibasilar airspace disease with significant improvement on the right compared with the last study.       Ben Samuel MD       Electronically Signed and Approved By: Ben Samuel MD on 3/10/2023 at 10:40             XR Chest 1 View    Result Date: 3/6/2023  Impression:   1. Interval development of increasing infiltrate in the right lung base suggestive of pneumonia.  An atypical pattern of pulmonary edema is in the differential.  2.  Right  pleural fluid versus right pleural thickening.        Ben Samuel MD       Electronically Signed and Approved By: Ben Samuel MD on 3/06/2023 at 9:10                Adult Transesophageal Echo (LAI) W/ Cont if Necessary Per Protocol    Result Date: 3/11/2023  Narrative: The patient came down to the cardiovascular lab where he was prepped for LAI and was sedated with IV Versed and Demerol.  NG tube was in place.  He had frequent cough and was agitation.  An attempt to introduce the LAI probe was unsuccessful.  Therefore, the procedure was aborted.  We will try to do it later on after he will undergo a PEG placement and the NG tube will be withdrawn.     Adult Transthoracic Echo Complete W/ Cont if Necessary Per Protocol    Result Date: 3/7/2023  Narrative: •  Left ventricular ejection fraction appears to be 56 - 60%. •  Left ventricular diastolic function is consistent with (grade I) impaired relaxation. •  Estimated right ventricular systolic pressure from tricuspid regurgitation is normal (<35 mmHg). There were no apparent intracardiac masses, vegetations or thrombi.     CT Head Without Contrast    Result Date: 3/6/2023  Narrative: PROCEDURE: CT HEAD WO CONTRAST  COMPARISON:  None  INDICATIONS: HIT LEFT SIDE OF HEAD POST FALL  PROTOCOL:   Standard imaging protocol performed    RADIATION:   DLP: 854 mGy*cm   MA and/or KV was adjusted to minimize radiation dose.    TECHNIQUE: CT images were obtained without non-ionic intravenous contrast material.  FINDINGS:  The ventricles, sulci, and cerebellar folia are mildly and diffusely prominent consistent with atrophy.  Ill-defined diminished density in cerebral white matter is consistent with mild gliosis and/or scattered lacunar infarcts.  There is no CT evidence of acute intracranial hemorrhage, mass, or mass effect.  The orbits have a normal appearance.  The paranasal sinuses, middle ears, and mastoid air cells are well aerated.  No fractures are seen on bone window  images.      Impression:   CT scan of the head without IV contrast demonstrating mild atrophy and white matter changes.  No acute intracranial abnormality is seen.     BRADLEY BROWN MD       Electronically Signed and Approved By: BRADLEY BROWN MD on 3/06/2023 at 18:05             CT Soft Tissue Neck Without Contrast    Result Date: 3/10/2023  Narrative: PROCEDURE: CT SOFT TISSUE NECK WO CONTRAST  COMPARISON: None  INDICATIONS: DYSPHAGIA  PROTOCOL:   Standard imaging protocol performed    RADIATION:   DLP: 184 mGy*cm   Automated exposure control was utilized to minimize radiation dose.  TECHNIQUE: After obtaining the patient's consent, CT images were created without non-ionic intravenous contrast material.   FINDINGS:  An NG tube has been placed, the distal extent which is not imaged on this exam.  There is prominent anterior osteophyte formation noted throughout the cervical spine.  This produces mass effect upon the cervical esophagus.  The visualized thoracic esophagus appears grossly unremarkable.  The larynx and pharynx appear normal.  Multiple hypodense nodules are noted in the left thyroid lobe measuring up to 1.7 cm.5  There is airspace opacity in the left lower lobe superior segment milder airspace opacities noted in the posterior segment of the right upper lobe.  No fracture or malalignment is seen.  No destructive osseous lesion is evident.       Impression:   1. Diffuse anterior osteophyte formation throughout the cervical spine producing moderate mass effect upon the cervical esophagus 2. NG tube in place 3. Airspace opacities in the left lower lobe superior segment and right upper lobe posterior segment could represent pneumonia and/or aspiration.  Correlate clinically.     Johnathon Garces M.D.       Electronically Signed and Approved By: Johnathon Garces M.D. on 3/10/2023 at 20:20             CT Chest With Contrast Diagnostic    Result Date: 3/6/2023  Narrative: PROCEDURE: CT CHEST W CONTRAST DIAGNOSTIC   COMPARISON:  [facility], CR, XR CHEST 1 VW, 3/06/2023, 8:38.  INDICATIONS: DYSPNEA ON EXERTION, WINDED, AND HYPOXIA  TECHNIQUE: CT images were obtained with non-ionic intravenous contrast material.   PROTOCOL:   Pulmonary embolism imaging protocol performed    RADIATION:   DLP: 192 mGy*cm   Automated exposure control was utilized to minimize radiation dose. CONTRAST: 63 cc Omnipaque 300 I.V. LABS:   eGFR: 87.5 ml/min/1.73m2  FINDINGS:  The pulmonary arteries are well opacified.  No filling defects are seen.  There are no findings of PE.  Lung window images reveal moderate centrilobular emphysema.  Alveolar airspace disease is seen in the lower lobes bilaterally, with areas of dense consolidation measuring up to 7 cm in greatest dimension.  Patchy alveolar airspace disease is seen in the upper lobes.  Mediastinal windows reveal no mediastinal, hilar, or axillary adenopathy.  Calcifications are seen in the aortic valve and in the coronary arteries.  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  CONTINUED ON NEXT PAGE...        Impression:   CT scan of the chest with IV contrast demonstrating no findings of PE.  Extensive alveolar airspace disease, with areas of dense consolidation in the lower lobes consistent with bacterial pneumonia.  Coronary artery calcifications.  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     BRADLEY BROWN MD       Electronically Signed and Approved By: BRADLEY BROWN MD on 3/06/2023 at 20:41             FL Video Swallow With Speech Single Contrast    Result Date: 3/15/2023  Narrative: PROCEDURE: FL VIDEO SWALLOW W SPEECH SINGLE-CONTRAST  COMPARISON: None  INDICATIONS: dysphagia, fluoro time 0.9 minutes, 0.7 mGy, 3 images  TECHNIQUE: Examination was performed in conjunction with the speech pathologist. The patient was given thin liquid barium. The patient's medication list was reviewed and documented in  the medical record.  FINDINGS:  Fluoroscopic examination was performed in conjunction with speech pathology department.  Thin liquid barium was given to assess the swallow mechanism.  Patient had annie, silent tracheal aspiration with thin liquid barium via spoon and cup trial.  The exam was discussed in real-time by myself and the speech pathologist. Please consult speech pathology report for further details regarding exam.      Impression:    1. Annie, silent tracheal aspiration of thin liquid barium  Please consult speech pathology report for further details regarding exam and dietary recommendations    SOCORRO REBOLLAR       Electronically Signed and Approved By: ORLANDO DAMON MD on 3/15/2023 at 14:58             XR Chest 1 View    Result Date: 3/10/2023  Narrative: PROCEDURE: XR CHEST 1 VW  COMPARISON: [facility], , XR CHEST 1 VW, 3/06/2023, 8:38.  INDICATIONS: F/U MULTIFOCAL PNEUMONIA  FINDINGS:  Heart size is within normal limits.  Nasogastric tube passes at least into the proximal jejunum.  There is airspace disease primarily at the left base.  The right basilar airspace disease has improved.      Impression:   1. Bibasilar airspace disease with significant improvement on the right compared with the last study.       Ben Samuel MD       Electronically Signed and Approved By: Ben Samuel MD on 3/10/2023 at 10:40             XR Chest 1 View    Result Date: 3/6/2023  Narrative: PROCEDURE: XR CHEST 1 VW  COMPARISON: [facility], , XR CHEST 1 VW, 6/09/2022, 2:16.  INDICATIONS: Shortness of breath  FINDINGS:  The heart size is normal.  The pulmonary vascular markings are normal.  There is increasing infiltrate present in the right lung with pleural thickening or pleural fluid on the right.  A transdermal pacing electrodes projected over the left lung base.      Impression:   1. Interval development of increasing infiltrate in the right lung base suggestive of pneumonia.  An  atypical pattern of pulmonary edema is in the differential.  2.  Right pleural fluid versus right pleural thickening.        Ben Samuel MD       Electronically Signed and Approved By: Ben Samuel MD on 3/06/2023 at 9:10                     Results for orders placed during the hospital encounter of 03/06/23    Adult Transesophageal Echo (LAI) W/ Cont if Necessary Per Protocol    Interpretation Summary  The patient came down to the cardiovascular lab where he was prepped for LAI and was sedated with IV Versed and Demerol.  NG tube was in place.  He had frequent cough and was agitation.  An attempt to introduce the LAI probe was unsuccessful.  Therefore, the procedure was aborted.  We will try to do it later on after he will undergo a PEG placement and the NG tube will be withdrawn.      Labs Pending at Discharge:  Pending Labs     Order Current Status    AFB Culture - Lavage, Lung, Right Lower Lobe Preliminary result    Fungus Culture - Lavage, Lung, Right Lower Lobe Preliminary result            Time spent on Discharge including face to face service:  34 minutes    Electronically signed by Kishore Mazariegos MD, 03/29/23, 8:15 AM EDT.

## 2023-03-29 NOTE — THERAPY RE-EVALUATION
Patient Name: Buzz Lopez  : 1945    MRN: [id number]                              Today's Date: 3/29/2023       Admit Date: 3/6/2023    Visit Dx:     ICD-10-CM ICD-9-CM   1. Pneumonia of right lower lobe due to infectious organism  J18.9 486   2. Sepsis with acute hypoxic respiratory failure without septic shock, due to unspecified organism (HCC)  A41.9 038.9    R65.20 995.91    J96.01 518.81   3. Acute respiratory failure with hypoxia (HCC)  J96.01 518.81   4. Difficulty walking  R26.2 719.7   5. Oropharyngeal dysphagia  R13.12 787.22   6. Decreased activities of daily living (ADL)  Z78.9 V49.89     Patient Active Problem List   Diagnosis   • Severe malnutrition (HCC)   • Aspiration into airway     Past Medical History:   Diagnosis Date   • Dysphagia      Past Surgical History:   Procedure Laterality Date   • BRONCHOSCOPY N/A 3/8/2023    Procedure: BRONCHOSCOPY WITH BRONCHOALVEOLAR LAVAGE, WASHINGS;  Surgeon: Jason Rowley MD;  Location: [facility] ENDOSCOPY;  Service: Pulmonary;  Laterality: N/A;  MUCOUS PLUGGING   • ENDOSCOPY W/ PEG TUBE PLACEMENT N/A 3/22/2023    Procedure: ESOPHAGOGASTRODUODENOSCOPY WITH PERCUTANEOUS ENDOSCOPIC GASTROSTOMY TUBE INSERTION;  Surgeon: Ruslan Guido MD;  Location: [facility] ENDOSCOPY;  Service: Gastroenterology;  Laterality: N/A;  PEG INSERTION   • EYE SURGERY        General Information     Row Name 23 1249          OT Time and Intention    Document Type re-evaluation  -PG     Mode of Treatment individual therapy;occupational therapy  -PG           User Key  (r) = Recorded By, (t) = Taken By, (c) = Cosigned By    Initials Name Provider Type    PG Janes Palacios OT Occupational Therapist                 Mobility/ADL's    No documentation.                Obj/Interventions     Row Name 23 1249          Shoulder (Therapeutic Exercise)    Shoulder (Therapeutic Exercise) strengthening exercise  -PG     Shoulder Strengthening (Therapeutic Exercise) 15  repititions;green;resistance band  -PG     Row Name 03/29/23 1249          Elbow/Forearm (Therapeutic Exercise)    Elbow/Forearm (Therapeutic Exercise) strengthening exercise  -PG     Elbow/Forearm Strengthening (Therapeutic Exercise) 15 repititions;green;resistance band  -PG     Row Name 03/29/23 1249          Motor Skills    Therapeutic Exercise shoulder;elbow/forearm  -PG           User Key  (r) = Recorded By, (t) = Taken By, (c) = Cosigned By    Initials Name Provider Type    Janes Light OT Occupational Therapist               Goals/Plan     Row Name 03/29/23 1250          Transfer Goal 1 (OT)    Progress/Outcome (Transfer Goal 1, OT) continuing progress toward goal  -PG     Row Name 03/29/23 1250          Bathing Goal 1 (OT)    Progress/Outcomes (Bathing Goal 1, OT) continuing progress toward goal  -PG     Row Name 03/29/23 1250          Dressing Goal 1 (OT)    Progress/Outcome (Dressing Goal 1, OT) continuing progress toward goal  -PG     Row Name 03/29/23 1250          Toileting Goal 1 (OT)    Progress/Outcome (Toileting Goal 1, OT) continuing progress toward goal  -PG     Row Name 03/29/23 1250          Grooming Goal 1 (OT)    Progress/Outcome (Grooming Goal 1, OT) continuing progress toward goal  -PG     Row Name 03/29/23 1250          Strength Goal 1 (OT)    Progress/Outcome (Strength Goal 1, OT) continuing progress toward goal  -PG     Row Name 03/29/23 1250          Problem Specific Goal 1 (OT)    Progress/Outcome (Problem Specific Goal 1, OT) continuing progress toward goal  -PG           User Key  (r) = Recorded By, (t) = Taken By, (c) = Cosigned By    Initials Name Provider Type    Janes Light OT Occupational Therapist               Clinical Impression     Row Name 03/29/23 1250          Plan of Care Review    Progress no change  -PG           User Key  (r) = Recorded By, (t) = Taken By, (c) = Cosigned By    Initials Name Provider Type    Janes Light OT Occupational Therapist                Outcome Measures     Row Name 03/29/23 1251          How much help from another is currently needed...    Putting on and taking off regular lower body clothing? 2  -PG     Bathing (including washing, rinsing, and drying) 2  -PG     Toileting (which includes using toilet bed pan or urinal) 2  -PG     Putting on and taking off regular upper body clothing 3  -PG     Taking care of personal grooming (such as brushing teeth) 3  -PG     Eating meals 4  -PG     AM-PAC 6 Clicks Score (OT) 16  -PG     Row Name 03/29/23 0950          How much help from another person do you currently need...    Turning from your back to your side while in flat bed without using bedrails? 4  -JW     Moving from lying on back to sitting on the side of a flat bed without bedrails? 3  -JW     Moving to and from a bed to a chair (including a wheelchair)? 3  -JW     Standing up from a chair using your arms (e.g., wheelchair, bedside chair)? 3  -JW     Climbing 3-5 steps with a railing? 2  -JW     To walk in hospital room? 3  -JW     AM-PAC 6 Clicks Score (PT) 18  -JW     Highest level of mobility 6 --> Walked 10 steps or more  -     Row Name 03/29/23 1251          Functional Assessment    Outcome Measure Options AM-PAC 6 Clicks Daily Activity (OT);Optimal Instrument  -PG     Row Name 03/29/23 1251          Optimal Instrument    Optimal Instrument Optimal - 3  -PG     Bending/Stooping 2  -PG     Standing 2  -PG     Reaching 1  -PG           User Key  (r) = Recorded By, (t) = Taken By, (c) = Cosigned By    Initials Name Provider Type    PG Janes Palacios OT Occupational Therapist    Camille Miles RN Registered Nurse                  OT Recommendation and Plan     Plan of Care Review  Progress: no change     Time Calculation:    Time Calculation- OT     Row Name 03/29/23 1252             Time Calculation- OT    OT Received On 03/29/23  -PG      OT Goal Re-Cert Due Date 04/07/23  -PG         Timed Charges    28612 - OT Therapeutic  Exercise Minutes 12  -PG         Total Minutes    Timed Charges Total Minutes 12  -PG       Total Minutes 12  -PG            User Key  (r) = Recorded By, (t) = Taken By, (c) = Cosigned By    Initials Name Provider Type    PG Janes Palacios OT Occupational Therapist              Therapy Charges for Today     Code Description Service Date Service Provider Modifiers Qty    [id number]  OT THER PROC EA 15 MIN 3/29/2023 Janes Palacios OT GO 1               Janes Palacios OT  3/29/2023

## 2023-03-30 ENCOUNTER — NURSING HOME (OUTPATIENT)
Dept: INTERNAL MEDICINE | Facility: CLINIC | Age: 78
End: 2023-03-30
Payer: COMMERCIAL

## 2023-03-30 DIAGNOSIS — I35.0 AORTIC VALVE STENOSIS, ETIOLOGY OF CARDIAC VALVE DISEASE UNSPECIFIED: ICD-10-CM

## 2023-03-30 DIAGNOSIS — J69.0 ASPIRATION PNEUMONIA OF RIGHT LUNG, UNSPECIFIED ASPIRATION PNEUMONIA TYPE, UNSPECIFIED PART OF LUNG: ICD-10-CM

## 2023-03-30 DIAGNOSIS — E43 SEVERE MALNUTRITION: ICD-10-CM

## 2023-03-30 DIAGNOSIS — K08.9 POOR DENTITION: ICD-10-CM

## 2023-03-30 DIAGNOSIS — Z93.1 S/P PERCUTANEOUS ENDOSCOPIC GASTROSTOMY (PEG) TUBE PLACEMENT: ICD-10-CM

## 2023-03-30 DIAGNOSIS — E11.9 TYPE 2 DIABETES MELLITUS WITHOUT COMPLICATION, WITHOUT LONG-TERM CURRENT USE OF INSULIN: Primary | ICD-10-CM

## 2023-03-30 DIAGNOSIS — E78.2 MIXED HYPERLIPIDEMIA: ICD-10-CM

## 2023-03-30 NOTE — PROGRESS NOTES
Nursing Home History and Physical Note      Christian Palacios MD  [x]  [address]  [place] [address]  Phone: [phone]  Fax: [phone]     PATIENT NAME: Buzz Lopez                                                                          YOB: 1945            DATE OF SERVICE: 03/30/2023  FACILITY:   [] [facility]   [x] [facility]  [] [facility]  [] Other      HISTORY OF PRESENT ILLNESS: Patient is a pleasant elderly gentleman who states he is here to get stronger in rehab so he can go home he says he does not want to have to live on a feeding tube or this kind of food the rest of his life, he says he knows he is weak, and needs to get stronger he says he cannot chew certain foods because of bad teeth and is hoping to get those removed and get implants put in, he tells me he has a heart murmur, he tells me he lives with his son, and he wants to do rehab he tells me he is also here because he had pneumonia in the hospital and they put this feeding tube in Me, he says I reviewed his outside records-----patient was admitted to our outside hospital [facility], from March 6 through March 29, 2023, his admitting diagnosis was aspiration pneumonia into the right lung with sepsis and acute hypoxic respiratory failure without shock, there was questionable cardiac arrest episode during his hospital stay with a non-ST elevation acute myocardial injury.  He also had a diagnosis of syncope, he had a mucous plug removed with bronchoscopy on March 8, 2023 he had dysphagia requiring PEG tube and he also has severe aortic stenosis and degenerative disc disease of his cervical spine    He had aortic stenosis but they deferred work-up on this until he got stronger according to the discharge summary,    I reviewed lab work from March 28, 2023 at the outside facility    He had multiple diagnostic studies at the  outside facility which were reviewed, he had no findings of pulmonary embolus on a CT scan from March 6, 2023 but he did have extensive alveolar airspace disease and dense consolidation in the lower lobes consistent with bacterial pneumonia he had coronary calcifications noted    A transesophageal echo was attempted but unsuccessful and the procedure was aborted on March 6, 2023    PAST MEDICAL & SURGICAL HISTORY:   Past Medical History:   Diagnosis Date   • Dysphagia       Past Surgical History:   Procedure Laterality Date   • BRONCHOSCOPY N/A 3/8/2023    Procedure: BRONCHOSCOPY WITH BRONCHOALVEOLAR LAVAGE, WASHINGS;  Surgeon: Jason Rowley MD;  Location: [facility] ENDOSCOPY;  Service: Pulmonary;  Laterality: N/A;  MUCOUS PLUGGING   • ENDOSCOPY W/ PEG TUBE PLACEMENT N/A 3/22/2023    Procedure: ESOPHAGOGASTRODUODENOSCOPY WITH PERCUTANEOUS ENDOSCOPIC GASTROSTOMY TUBE INSERTION;  Surgeon: Ruslan Guido MD;  Location: [facility] ENDOSCOPY;  Service: Gastroenterology;  Laterality: N/A;  PEG INSERTION   • EYE SURGERY            MEDICATIONS:  I have reviewed and reconciled the patients medication list in the patients chart at the [facility] today.      ALLERGIES:  No Known Allergies      SOCIAL HISTORY:  Social History     Socioeconomic History   • Marital status:    Tobacco Use   • Smoking status: Former     Passive exposure: Never   Vaping Use   • Vaping Use: Never used   Substance and Sexual Activity   • Alcohol use: Never   • Drug use: Never   • Sexual activity: Defer   He does have a son,    FAMILY HISTORY:  Noncontributory to this admission    PHYSICAL EXAMINATION:   VITAL SIGNS: 136/78 pulse of 78, weight 116.8 pounds sat 97% on room air pulse 74 temperature of 97.8    PHYSICAL EXAM: He is a cachectic appearing elderly gentleman pleasant talkative he can sit up he can move around he can lift his arms up he moves his legs well, he is got a feeding tube in place without any redness or  drainage or swelling at the site, his abdomen soft scaphoid his lungs are clear anteriorly and laterally cardiac exam reveals a regular rhythm with a 2/6 systolic murmur his lower legs showed no edema, he is pleasant talkative, no swelling of the groin scrotum or penile area, his teeth are broken and in poor shape, he has nonfocal upper extremity movement,      RECORDS REVIEW:   Orders Reviewed.  Labs Reviewed.      ICD-10-CM ICD-9-CM   1. Type 2 diabetes mellitus without complication, without long-term current use of insulin ([facility])  E11.9 250.00   2. Mixed hyperlipidemia  E78.2 272.2   3. Severe malnutrition ([facility])  E43 261   4. Poor dentition  K08.9 525.9   5. S/P percutaneous endoscopic gastrostomy (PEG) tube placement ([facility])  Z93.1 V44.1   6. Aspiration pneumonia of right lung, unspecified aspiration pneumonia type, unspecified part of lung ([facility])  J69.0 507.0   7. Aortic valve stenosis, etiology of cardiac valve disease unspecified  I35.0 424.1       ASSESSMENT/PLAN    Diagnoses and all orders for this visit:    1. Type 2 diabetes mellitus without complication, without long-term current use of insulin ([facility]) (Primary)    2. Mixed hyperlipidemia    3. Severe malnutrition ([facility])    4. Poor dentition    5. S/P percutaneous endoscopic gastrostomy (PEG) tube placement ([facility])    6. Aspiration pneumonia of right lung, unspecified aspiration pneumonia type, unspecified part of lung ([facility])    7. Aortic valve stenosis, etiology of cardiac valve disease unspecified       Moderate to severe protein calorie malnutrition currently getting PEG tube feedings we will continue and hopefully get stronger with rehab and nutritional support    Pneumonia, aspiration initially with sepsis, has finished treatment seems to be doing better clinically and respiratory status wise,    Hyperlipidemia currently on atorvastatin----- probably does not need this medication given his current malnourished state and functional status could hold this for  at least a couple months    Diabetes, unclear whether metformin is the best option---- will monitor blood sugars    Constipation continues on MiraLAX as needed    Aortic stenosis based on exam but extensive work-up was deferred until patient is stronger, will need cardiology follow-up once he is discharged from our facility    Generalized weakness disuse myopathy, continue rehab efforts    Discussed with nursing staff about getting some lab work, follow-up closely with his nutritional support blood sugar checks will have to be done and rehab evaluations completed, I suspect he will do well given his cognitive improvement and his current baseline cognitive status,        Christian Palacios MD

## 2023-04-04 ENCOUNTER — NURSING HOME (OUTPATIENT)
Dept: INTERNAL MEDICINE | Facility: CLINIC | Age: 78
End: 2023-04-04
Payer: COMMERCIAL

## 2023-04-04 DIAGNOSIS — I35.0 AORTIC VALVE STENOSIS, ETIOLOGY OF CARDIAC VALVE DISEASE UNSPECIFIED: ICD-10-CM

## 2023-04-04 DIAGNOSIS — Z93.1 S/P PERCUTANEOUS ENDOSCOPIC GASTROSTOMY (PEG) TUBE PLACEMENT: ICD-10-CM

## 2023-04-04 DIAGNOSIS — E43 SEVERE MALNUTRITION: ICD-10-CM

## 2023-04-04 DIAGNOSIS — J69.0 ASPIRATION PNEUMONIA OF RIGHT LUNG, UNSPECIFIED ASPIRATION PNEUMONIA TYPE, UNSPECIFIED PART OF LUNG: Primary | ICD-10-CM

## 2023-04-04 LAB — FUNGUS WND CULT: ABNORMAL

## 2023-04-04 NOTE — PROGRESS NOTES
Nursing Home Follow-Up Note      Christian Palacios MD  [x]  [address], [address]  [place] [address]  Phone: [phone]  Fax: [phone]     PATIENT NAME: Buzz Lopez                                                                          YOB: 1945            DATE OF SERVICE: 04/04/2023  FACILITY:   [] [facility]   [x] [facility]  [] [facility]  [] Other      HISTORY OF PRESENT ILLNESS: Patient was seen today, his son had come in and had some questions for me about his prognosis his nutrition his dysphagia, we had a discussion about his overall condition and his prognosis, son states that his dad is everything to him and he has helped him out throughout his life and he wants to see the best for him.  We discussed continued nutritional support and it will take several months for him to build up enough muscle mass and strength to get his swallowing back, the patient states that he is willing to continue working at it, he has been doing therapy each day and he is hoping to stay a couple more weeks here at our facility to get stronger, patient was pleasant no distress today in bed getting ready for therapy    PAST MEDICAL & SURGICAL HISTORY:   Past Medical History:   Diagnosis Date   • Dysphagia       Past Surgical History:   Procedure Laterality Date   • BRONCHOSCOPY N/A 3/8/2023    Procedure: BRONCHOSCOPY WITH BRONCHOALVEOLAR LAVAGE, WASHINGS;  Surgeon: Jason Rowley MD;  Location: [facility] ENDOSCOPY;  Service: Pulmonary;  Laterality: N/A;  MUCOUS PLUGGING   • ENDOSCOPY W/ PEG TUBE PLACEMENT N/A 3/22/2023    Procedure: ESOPHAGOGASTRODUODENOSCOPY WITH PERCUTANEOUS ENDOSCOPIC GASTROSTOMY TUBE INSERTION;  Surgeon: Ruslan Guido MD;  Location: [facility] ENDOSCOPY;  Service: Gastroenterology;  Laterality: N/A;  PEG INSERTION   • EYE SURGERY            MEDICATIONS:  I have reviewed and reconciled the patients  medication list in the patients chart at the skilled nursing facility today.        PHYSICAL EXAMINATION:   VITAL SIGNS: 128/60 sat 92% pulse of 81 respiratory rate of 18 temperature 97.8    PHYSICAL EXAM: He is a frail elderly gentleman who is moving all 4 extremities to command his abdomen soft scaphoid his lungs were clear anteriorly and laterally his cardiac exam reveals a regular rhythm with a 2/6 to 3/6 systolic murmur right upper left upper sternal border his lower legs showed no edema his PEG tube is intact he is pleasant he is tolerating his tube feeds well alert, talkative, coherent and appropriate      RECORDS REVIEW:   Orders Reviewed.  Labs Reviewed.      ICD-10-CM ICD-9-CM   1. Aspiration pneumonia of right lung, unspecified aspiration pneumonia type, unspecified part of lung  J69.0 507.0   2. S/P percutaneous endoscopic gastrostomy (PEG) tube placement  Z93.1 V44.1   3. Aortic valve stenosis, etiology of cardiac valve disease unspecified  I35.0 424.1   4. Severe malnutrition  E43 261       ASSESSMENT/PLAN    Diagnoses and all orders for this visit:    1. Aspiration pneumonia of right lung, unspecified aspiration pneumonia type, unspecified part of lung (Primary)    2. S/P percutaneous endoscopic gastrostomy (PEG) tube placement    3. Aortic valve stenosis, etiology of cardiac valve disease unspecified    4. Severe malnutrition      Moderate to severe protein calorie malnutrition-----continues PEG tube feedings, discussed case with patient's son, lab work April 3, 2023 reviewed sodium 135 potassium 4.7 BUN of 26 and a creatinine of 0.5, liver enzymes normal, he is mildly anemic with a hemoglobin of 11.3 normal platelets folic acid, vitamin B12 levels normal, vitamin D level normal, and thyroid levels normal, overall prognosis with continued nutritional support should be good, discussed with patient and patient's son April 4, 2023  at bedside    Pneumonia, aspiration, initially with sepsis, has finished  treatment seems to be doing better clinically and respiratory status wise, still has a somewhat wet congested cough at times possibly due to his oral pharyngeal dysphagia mucus production etc. discussed with patient's son at bedside April 4, 2023    Hyperlipidemia continues  atorvastatin----    Diabetes, unclear whether metformin is the best option---- will monitor blood sugars, will discuss could decrease his metformin dose?  April 4, 2023    Constipation continues  MiraLAX as needed    Aortic stenosis based on exam but extensive work-up was deferred until patient is stronger, will need cardiology follow-up once he is discharged from our facility, discussed with patient's son April 4, 2023    Generalized weakness disuse myopathy, continue rehab efforts      Christian Palacios MD

## 2023-04-09 ENCOUNTER — APPOINTMENT (OUTPATIENT)
Dept: GENERAL RADIOLOGY | Facility: HOSPITAL | Age: 78
DRG: 871 | End: 2023-04-09
Payer: COMMERCIAL

## 2023-04-09 ENCOUNTER — HOSPITAL ENCOUNTER (INPATIENT)
Facility: HOSPITAL | Age: 78
LOS: 1 days | Discharge: HOME | DRG: 871 | End: 2023-04-11
Attending: EMERGENCY MEDICINE | Admitting: FAMILY MEDICINE
Payer: COMMERCIAL

## 2023-04-09 DIAGNOSIS — R13.12 OROPHARYNGEAL DYSPHAGIA: ICD-10-CM

## 2023-04-09 DIAGNOSIS — R50.9 FEBRILE ILLNESS, ACUTE: Primary | ICD-10-CM

## 2023-04-09 DIAGNOSIS — J18.9 MULTIFOCAL PNEUMONIA: ICD-10-CM

## 2023-04-09 DIAGNOSIS — Z78.9 DECREASED ACTIVITIES OF DAILY LIVING (ADL): ICD-10-CM

## 2023-04-09 DIAGNOSIS — R26.2 DIFFICULTY WALKING: ICD-10-CM

## 2023-04-09 DIAGNOSIS — Z51.5 HOSPICE CARE: ICD-10-CM

## 2023-04-09 DIAGNOSIS — J69.0 ASPIRATION PNEUMONITIS: ICD-10-CM

## 2023-04-09 LAB
ALBUMIN SERPL-MCNC: 3.1 G/DL (ref 3.5–5.2)
ALBUMIN/GLOB SERPL: 0.6 G/DL
ALP SERPL-CCNC: 86 U/L (ref 39–117)
ALT SERPL W P-5'-P-CCNC: 21 U/L (ref 1–41)
ANION GAP SERPL CALCULATED.3IONS-SCNC: 9.4 MMOL/L (ref 5–15)
AST SERPL-CCNC: 34 U/L (ref 1–40)
BASOPHILS # BLD AUTO: 0.01 10*3/MM3 (ref 0–0.2)
BASOPHILS NFR BLD AUTO: 0.2 % (ref 0–1.5)
BILIRUB SERPL-MCNC: 0.5 MG/DL (ref 0–1.2)
BUN SERPL-MCNC: 13 MG/DL (ref 8–23)
BUN/CREAT SERPL: 23.2 (ref 7–25)
CALCIUM SPEC-SCNC: 8.5 MG/DL (ref 8.6–10.5)
CHLORIDE SERPL-SCNC: 93 MMOL/L (ref 98–107)
CO2 SERPL-SCNC: 29.6 MMOL/L (ref 22–29)
CREAT SERPL-MCNC: 0.56 MG/DL (ref 0.76–1.27)
DEPRECATED RDW RBC AUTO: 44.3 FL (ref 37–54)
EGFRCR SERPLBLD CKD-EPI 2021: 100.3 ML/MIN/1.73
EOSINOPHIL # BLD AUTO: 0 10*3/MM3 (ref 0–0.4)
EOSINOPHIL NFR BLD AUTO: 0 % (ref 0.3–6.2)
ERYTHROCYTE [DISTWIDTH] IN BLOOD BY AUTOMATED COUNT: 14.1 % (ref 12.3–15.4)
FLUAV AG NPH QL: NEGATIVE
FLUBV AG NPH QL IA: NEGATIVE
GLOBULIN UR ELPH-MCNC: 4.8 GM/DL
GLUCOSE SERPL-MCNC: 205 MG/DL (ref 65–99)
HCT VFR BLD AUTO: 34.7 % (ref 37.5–51)
HGB BLD-MCNC: 11.8 G/DL (ref 13–17.7)
HOLD SPECIMEN: NORMAL
HOLD SPECIMEN: NORMAL
HYPOCHROMIA BLD QL: NORMAL
IMM GRANULOCYTES # BLD AUTO: 0.01 10*3/MM3 (ref 0–0.05)
IMM GRANULOCYTES NFR BLD AUTO: 0.2 % (ref 0–0.5)
LYMPHOCYTES # BLD AUTO: 0.61 10*3/MM3 (ref 0.7–3.1)
LYMPHOCYTES NFR BLD AUTO: 10.7 % (ref 19.6–45.3)
MCH RBC QN AUTO: 29.3 PG (ref 26.6–33)
MCHC RBC AUTO-ENTMCNC: 34 G/DL (ref 31.5–35.7)
MCV RBC AUTO: 86.1 FL (ref 79–97)
MICROCYTES BLD QL: NORMAL
MONOCYTES # BLD AUTO: 0.31 10*3/MM3 (ref 0.1–0.9)
MONOCYTES NFR BLD AUTO: 5.4 % (ref 5–12)
NEUTROPHILS NFR BLD AUTO: 4.76 10*3/MM3 (ref 1.7–7)
NEUTROPHILS NFR BLD AUTO: 83.5 % (ref 42.7–76)
NRBC BLD AUTO-RTO: 0 /100 WBC (ref 0–0.2)
NT-PROBNP SERPL-MCNC: 7723 PG/ML (ref 0–1800)
PLAT MORPH BLD: NORMAL
PLATELET # BLD AUTO: 143 10*3/MM3 (ref 140–450)
PMV BLD AUTO: 12.1 FL (ref 6–12)
POTASSIUM SERPL-SCNC: 4.5 MMOL/L (ref 3.5–5.2)
PROT SERPL-MCNC: 7.9 G/DL (ref 6–8.5)
RBC # BLD AUTO: 4.03 10*6/MM3 (ref 4.14–5.8)
SODIUM SERPL-SCNC: 132 MMOL/L (ref 136–145)
TROPONIN T SERPL HS-MCNC: 36 NG/L
WBC MORPH BLD: NORMAL
WBC NRBC COR # BLD: 5.7 10*3/MM3 (ref 3.4–10.8)
WHOLE BLOOD HOLD COAG: NORMAL
WHOLE BLOOD HOLD SPECIMEN: NORMAL

## 2023-04-09 PROCEDURE — 84484 ASSAY OF TROPONIN QUANT: CPT | Performed by: NURSE PRACTITIONER

## 2023-04-09 PROCEDURE — 87804 INFLUENZA ASSAY W/OPTIC: CPT | Performed by: NURSE PRACTITIONER

## 2023-04-09 PROCEDURE — U0004 COV-19 TEST NON-CDC HGH THRU: HCPCS | Performed by: NURSE PRACTITIONER

## 2023-04-09 PROCEDURE — 71045 X-RAY EXAM CHEST 1 VIEW: CPT

## 2023-04-09 PROCEDURE — 93005 ELECTROCARDIOGRAM TRACING: CPT | Performed by: INTERNAL MEDICINE

## 2023-04-09 PROCEDURE — 93010 ELECTROCARDIOGRAM REPORT: CPT | Performed by: INTERNAL MEDICINE

## 2023-04-09 PROCEDURE — 83880 ASSAY OF NATRIURETIC PEPTIDE: CPT | Performed by: NURSE PRACTITIONER

## 2023-04-09 PROCEDURE — 93005 ELECTROCARDIOGRAM TRACING: CPT | Performed by: NURSE PRACTITIONER

## 2023-04-09 PROCEDURE — 85007 BL SMEAR W/DIFF WBC COUNT: CPT | Performed by: NURSE PRACTITIONER

## 2023-04-09 PROCEDURE — 99285 EMERGENCY DEPT VISIT HI MDM: CPT

## 2023-04-09 PROCEDURE — 36415 COLL VENOUS BLD VENIPUNCTURE: CPT

## 2023-04-09 PROCEDURE — 85025 COMPLETE CBC W/AUTO DIFF WBC: CPT | Performed by: NURSE PRACTITIONER

## 2023-04-09 PROCEDURE — 80053 COMPREHEN METABOLIC PANEL: CPT | Performed by: NURSE PRACTITIONER

## 2023-04-09 RX ORDER — SODIUM CHLORIDE 0.9 % (FLUSH) 0.9 %
10 SYRINGE (ML) INJECTION AS NEEDED
Status: DISCONTINUED | OUTPATIENT
Start: 2023-04-09 | End: 2023-04-11 | Stop reason: HOSPADM

## 2023-04-09 RX ORDER — ACETAMINOPHEN 325 MG/1
650 TABLET ORAL ONCE
Status: COMPLETED | OUTPATIENT
Start: 2023-04-10 | End: 2023-04-10

## 2023-04-10 ENCOUNTER — APPOINTMENT (OUTPATIENT)
Dept: GENERAL RADIOLOGY | Facility: HOSPITAL | Age: 78
DRG: 871 | End: 2023-04-10
Payer: COMMERCIAL

## 2023-04-10 PROBLEM — R50.9 FEBRILE ILLNESS, ACUTE: Status: ACTIVE | Noted: 2023-04-10

## 2023-04-10 LAB
ANION GAP SERPL CALCULATED.3IONS-SCNC: 9 MMOL/L (ref 5–15)
BASOPHILS # BLD AUTO: 0.02 10*3/MM3 (ref 0–0.2)
BASOPHILS NFR BLD AUTO: 0.4 % (ref 0–1.5)
BUN SERPL-MCNC: 16 MG/DL (ref 8–23)
BUN/CREAT SERPL: 26.7 (ref 7–25)
CALCIUM SPEC-SCNC: 8.6 MG/DL (ref 8.6–10.5)
CHLORIDE SERPL-SCNC: 93 MMOL/L (ref 98–107)
CO2 SERPL-SCNC: 29 MMOL/L (ref 22–29)
CREAT SERPL-MCNC: 0.6 MG/DL (ref 0.76–1.27)
D-LACTATE SERPL-SCNC: 1.3 MMOL/L (ref 0.5–2)
DEPRECATED RDW RBC AUTO: 45.3 FL (ref 37–54)
EGFRCR SERPLBLD CKD-EPI 2021: 98.2 ML/MIN/1.73
EOSINOPHIL # BLD AUTO: 0 10*3/MM3 (ref 0–0.4)
EOSINOPHIL NFR BLD AUTO: 0 % (ref 0.3–6.2)
ERYTHROCYTE [DISTWIDTH] IN BLOOD BY AUTOMATED COUNT: 14 % (ref 12.3–15.4)
GLUCOSE SERPL-MCNC: 149 MG/DL (ref 65–99)
HCT VFR BLD AUTO: 35.4 % (ref 37.5–51)
HGB BLD-MCNC: 11.7 G/DL (ref 13–17.7)
IMM GRANULOCYTES # BLD AUTO: 0.01 10*3/MM3 (ref 0–0.05)
IMM GRANULOCYTES NFR BLD AUTO: 0.2 % (ref 0–0.5)
LYMPHOCYTES # BLD AUTO: 0.85 10*3/MM3 (ref 0.7–3.1)
LYMPHOCYTES NFR BLD AUTO: 17.4 % (ref 19.6–45.3)
MCH RBC QN AUTO: 29.4 PG (ref 26.6–33)
MCHC RBC AUTO-ENTMCNC: 33.1 G/DL (ref 31.5–35.7)
MCV RBC AUTO: 88.9 FL (ref 79–97)
MONOCYTES # BLD AUTO: 0.4 10*3/MM3 (ref 0.1–0.9)
MONOCYTES NFR BLD AUTO: 8.2 % (ref 5–12)
MRSA DNA SPEC QL NAA+PROBE: NORMAL
NEUTROPHILS NFR BLD AUTO: 3.61 10*3/MM3 (ref 1.7–7)
NEUTROPHILS NFR BLD AUTO: 73.8 % (ref 42.7–76)
NRBC BLD AUTO-RTO: 0 /100 WBC (ref 0–0.2)
PLATELET # BLD AUTO: 146 10*3/MM3 (ref 140–450)
PMV BLD AUTO: 11.9 FL (ref 6–12)
POTASSIUM SERPL-SCNC: 4.2 MMOL/L (ref 3.5–5.2)
QT INTERVAL: 354 MS
QT INTERVAL: 355 MS
RBC # BLD AUTO: 3.98 10*6/MM3 (ref 4.14–5.8)
SARS-COV-2 RNA RESP QL NAA+PROBE: NOT DETECTED
SODIUM SERPL-SCNC: 131 MMOL/L (ref 136–145)
VANCOMYCIN SERPL-MCNC: 15.73 MCG/ML (ref 5–40)
WBC NRBC COR # BLD: 4.89 10*3/MM3 (ref 3.4–10.8)

## 2023-04-10 PROCEDURE — 94799 UNLISTED PULMONARY SVC/PX: CPT

## 2023-04-10 PROCEDURE — 97165 OT EVAL LOW COMPLEX 30 MIN: CPT

## 2023-04-10 PROCEDURE — 85025 COMPLETE CBC W/AUTO DIFF WBC: CPT | Performed by: FAMILY MEDICINE

## 2023-04-10 PROCEDURE — 0 DIATRIZOATE MEGLUMINE & SODIUM PER 1 ML: Performed by: INTERNAL MEDICINE

## 2023-04-10 PROCEDURE — 80202 ASSAY OF VANCOMYCIN: CPT | Performed by: FAMILY MEDICINE

## 2023-04-10 PROCEDURE — 87641 MR-STAPH DNA AMP PROBE: CPT | Performed by: FAMILY MEDICINE

## 2023-04-10 PROCEDURE — 25010000002 PIPERACILLIN SOD-TAZOBACTAM PER 1 G: Performed by: EMERGENCY MEDICINE

## 2023-04-10 PROCEDURE — 25010000002 HEPARIN (PORCINE) PER 1000 UNITS: Performed by: FAMILY MEDICINE

## 2023-04-10 PROCEDURE — 80048 BASIC METABOLIC PNL TOTAL CA: CPT | Performed by: FAMILY MEDICINE

## 2023-04-10 PROCEDURE — 0D20XUZ CHANGE FEEDING DEVICE IN UPPER INTESTINAL TRACT, EXTERNAL APPROACH: ICD-10-PCS | Performed by: INTERNAL MEDICINE

## 2023-04-10 PROCEDURE — 87040 BLOOD CULTURE FOR BACTERIA: CPT | Performed by: EMERGENCY MEDICINE

## 2023-04-10 PROCEDURE — 25010000002 PIPERACILLIN SOD-TAZOBACTAM PER 1 G: Performed by: FAMILY MEDICINE

## 2023-04-10 PROCEDURE — 83605 ASSAY OF LACTIC ACID: CPT | Performed by: EMERGENCY MEDICINE

## 2023-04-10 PROCEDURE — 25010000002 VANCOMYCIN 5 G RECONSTITUTED SOLUTION: Performed by: FAMILY MEDICINE

## 2023-04-10 PROCEDURE — 25010000002 VANCOMYCIN 5 G RECONSTITUTED SOLUTION: Performed by: EMERGENCY MEDICINE

## 2023-04-10 PROCEDURE — 49465 FLUORO EXAM OF G/COLON TUBE: CPT

## 2023-04-10 RX ORDER — SODIUM CHLORIDE 9 MG/ML
40 INJECTION, SOLUTION INTRAVENOUS AS NEEDED
Status: DISCONTINUED | OUTPATIENT
Start: 2023-04-10 | End: 2023-04-11 | Stop reason: HOSPADM

## 2023-04-10 RX ORDER — ASPIRIN 81 MG/1
81 TABLET, CHEWABLE ORAL DAILY
Status: DISCONTINUED | OUTPATIENT
Start: 2023-04-10 | End: 2023-04-11 | Stop reason: HOSPADM

## 2023-04-10 RX ORDER — ACETAMINOPHEN 325 MG/1
650 TABLET ORAL EVERY 4 HOURS PRN
Status: DISCONTINUED | OUTPATIENT
Start: 2023-04-10 | End: 2023-04-10

## 2023-04-10 RX ORDER — ATORVASTATIN CALCIUM 40 MG/1
40 TABLET, FILM COATED ORAL DAILY
Status: DISCONTINUED | OUTPATIENT
Start: 2023-04-10 | End: 2023-04-11 | Stop reason: HOSPADM

## 2023-04-10 RX ORDER — ACETAMINOPHEN 160 MG/5ML
650 SOLUTION ORAL EVERY 4 HOURS PRN
Status: DISCONTINUED | OUTPATIENT
Start: 2023-04-10 | End: 2023-04-11 | Stop reason: HOSPADM

## 2023-04-10 RX ORDER — NITROGLYCERIN 0.4 MG/1
0.4 TABLET SUBLINGUAL
Status: DISCONTINUED | OUTPATIENT
Start: 2023-04-10 | End: 2023-04-11 | Stop reason: HOSPADM

## 2023-04-10 RX ORDER — SODIUM CHLORIDE 0.9 % (FLUSH) 0.9 %
10 SYRINGE (ML) INJECTION EVERY 12 HOURS SCHEDULED
Status: DISCONTINUED | OUTPATIENT
Start: 2023-04-10 | End: 2023-04-11 | Stop reason: HOSPADM

## 2023-04-10 RX ORDER — ACETAMINOPHEN 650 MG/1
650 SUPPOSITORY RECTAL EVERY 4 HOURS PRN
Status: DISCONTINUED | OUTPATIENT
Start: 2023-04-10 | End: 2023-04-10

## 2023-04-10 RX ORDER — ACETAMINOPHEN 325 MG/1
650 TABLET ORAL EVERY 4 HOURS PRN
Status: DISCONTINUED | OUTPATIENT
Start: 2023-04-10 | End: 2023-04-11 | Stop reason: HOSPADM

## 2023-04-10 RX ORDER — HEPARIN SODIUM 5000 [USP'U]/ML
5000 INJECTION, SOLUTION INTRAVENOUS; SUBCUTANEOUS EVERY 8 HOURS SCHEDULED
Status: DISCONTINUED | OUTPATIENT
Start: 2023-04-10 | End: 2023-04-11 | Stop reason: HOSPADM

## 2023-04-10 RX ORDER — SODIUM CHLORIDE 0.9 % (FLUSH) 0.9 %
10 SYRINGE (ML) INJECTION AS NEEDED
Status: DISCONTINUED | OUTPATIENT
Start: 2023-04-10 | End: 2023-04-11 | Stop reason: HOSPADM

## 2023-04-10 RX ORDER — ACETAMINOPHEN 160 MG/5ML
650 SOLUTION ORAL EVERY 4 HOURS PRN
Status: DISCONTINUED | OUTPATIENT
Start: 2023-04-10 | End: 2023-04-10

## 2023-04-10 RX ORDER — PANTOPRAZOLE SODIUM 40 MG/10ML
40 INJECTION, POWDER, LYOPHILIZED, FOR SOLUTION INTRAVENOUS
Status: DISCONTINUED | OUTPATIENT
Start: 2023-04-10 | End: 2023-04-11 | Stop reason: HOSPADM

## 2023-04-10 RX ORDER — ACETAMINOPHEN 650 MG/1
650 SUPPOSITORY RECTAL EVERY 4 HOURS PRN
Status: DISCONTINUED | OUTPATIENT
Start: 2023-04-10 | End: 2023-04-11 | Stop reason: HOSPADM

## 2023-04-10 RX ORDER — POLYETHYLENE GLYCOL 3350 17 G/17G
17 POWDER, FOR SOLUTION ORAL 2 TIMES DAILY PRN
Status: DISCONTINUED | OUTPATIENT
Start: 2023-04-10 | End: 2023-04-11 | Stop reason: HOSPADM

## 2023-04-10 RX ADMIN — PIPERACILLIN SODIUM AND TAZOBACTAM SODIUM 3.38 G: 3; .375 INJECTION, POWDER, LYOPHILIZED, FOR SOLUTION INTRAVENOUS at 17:35

## 2023-04-10 RX ADMIN — ACETAMINOPHEN 650 MG: 325 TABLET ORAL at 00:39

## 2023-04-10 RX ADMIN — PANTOPRAZOLE SODIUM 40 MG: 40 INJECTION, POWDER, FOR SOLUTION INTRAVENOUS at 05:59

## 2023-04-10 RX ADMIN — HEPARIN SODIUM 5000 UNITS: 5000 INJECTION INTRAVENOUS; SUBCUTANEOUS at 05:59

## 2023-04-10 RX ADMIN — HEPARIN SODIUM 5000 UNITS: 5000 INJECTION INTRAVENOUS; SUBCUTANEOUS at 13:58

## 2023-04-10 RX ADMIN — Medication 10 ML: at 09:28

## 2023-04-10 RX ADMIN — VANCOMYCIN HYDROCHLORIDE 1250 MG: 5 INJECTION, POWDER, LYOPHILIZED, FOR SOLUTION INTRAVENOUS at 01:21

## 2023-04-10 RX ADMIN — PIPERACILLIN SODIUM AND TAZOBACTAM SODIUM 3.38 G: 3; .375 INJECTION, POWDER, LYOPHILIZED, FOR SOLUTION INTRAVENOUS at 09:28

## 2023-04-10 RX ADMIN — DIATRIZOATE MEGLUMINE AND DIATRIZOATE SODIUM 30 ML: 660; 100 LIQUID ORAL; RECTAL at 13:15

## 2023-04-10 RX ADMIN — ATORVASTATIN CALCIUM 40 MG: 40 TABLET, FILM COATED ORAL at 15:08

## 2023-04-10 RX ADMIN — ASPIRIN 81 MG 81 MG: 81 TABLET ORAL at 15:08

## 2023-04-10 RX ADMIN — VANCOMYCIN HYDROCHLORIDE 750 MG: 5 INJECTION, POWDER, LYOPHILIZED, FOR SOLUTION INTRAVENOUS at 15:08

## 2023-04-10 RX ADMIN — Medication 10 ML: at 22:00

## 2023-04-10 RX ADMIN — PIPERACILLIN SODIUM AND TAZOBACTAM SODIUM 3.38 G: 3; .375 INJECTION, POWDER, LYOPHILIZED, FOR SOLUTION INTRAVENOUS at 00:39

## 2023-04-10 NOTE — THERAPY EVALUATION
Patient Name: Buzz Lopez  : 1945    MRN: [id number]                              Today's Date: 4/10/2023       Admit Date: 2023    Visit Dx:     ICD-10-CM ICD-9-CM   1. Febrile illness, acute  R50.9 780.60   2. Multifocal pneumonia  J18.9 486   3. Aspiration pneumonitis  J69.0 507.0   4. Decreased activities of daily living (ADL)  Z78.9 V49.89     Patient Active Problem List   Diagnosis   • Severe malnutrition   • Aspiration into airway   • Poor dentition   • Mixed hyperlipidemia   • Type 2 diabetes mellitus without complication, without long-term current use of insulin   • S/P percutaneous endoscopic gastrostomy (PEG) tube placement   • Aspiration pneumonia of right lung   • Aortic valve stenosis   • Febrile illness, acute     Past Medical History:   Diagnosis Date   • Dysphagia      Past Surgical History:   Procedure Laterality Date   • BRONCHOSCOPY N/A 3/8/2023    Procedure: BRONCHOSCOPY WITH BRONCHOALVEOLAR LAVAGE, WASHINGS;  Surgeon: Jason Rowley MD;  Location: [facility] ENDOSCOPY;  Service: Pulmonary;  Laterality: N/A;  MUCOUS PLUGGING   • ENDOSCOPY W/ PEG TUBE PLACEMENT N/A 3/22/2023    Procedure: ESOPHAGOGASTRODUODENOSCOPY WITH PERCUTANEOUS ENDOSCOPIC GASTROSTOMY TUBE INSERTION;  Surgeon: Ruslan Guido MD;  Location: [facility] ENDOSCOPY;  Service: Gastroenterology;  Laterality: N/A;  PEG INSERTION   • EYE SURGERY        General Information     Row Name 04/10/23 1413          OT Time and Intention    Document Type evaluation  -LF     Mode of Treatment individual therapy;occupational therapy  -     Row Name 04/10/23 1413          General Information    Patient Profile Reviewed yes  -LF     Prior Level of Function --  Pt recently d/c'd to rehab where he reports requiring assist with ADLs & hardly ambulating. Typically he reports being (I) with ADLs, ambulating w/o a device, has a step over tub where he stands to shower, elevated commode, stands to groom, & no home O2  -LF     Existing  Precautions/Restrictions fall  -     Barriers to Rehab none identified  -     Row Name 04/10/23 1413          Occupational Profile    Reason for Services/Referral (Occupational Profile) Patient is a 77 year old male admitted to [facility] for shortness of breath on April 9th, 2023. Occupational therapy consulted due to recent decline in ADLs/functional transfers. No previous occupational therapy services for current condition.  -     Row Name 04/10/23 1413          Living Environment    People in Home child(efraín), adult  [facility]  -     Row Name 04/10/23 1413          Cognition    Orientation Status (Cognition) oriented x 4  -     Row Name 04/10/23 1413          Safety Issues, Functional Mobility    Impairments Affecting Function (Mobility) balance;endurance/activity tolerance  -           User Key  (r) = Recorded By, (t) = Taken By, (c) = Cosigned By    Initials Name Provider Type     Leah Resendez OT Occupational Therapist                 Mobility/ADL's     Row Name 04/10/23 1417          Bed Mobility    Bed Mobility supine-sit;sit-supine  -     Supine-Sit Strong (Bed Mobility) standby assist  -     Sit-Supine Strong (Bed Mobility) standby assist  -     Assistive Device (Bed Mobility) bed rails;head of bed elevated  -     Comment, (Bed Mobility) Prior to beginning bed mobility it was noted that patient had pulled his PEG tube unknowingly, nursing notified.  -     Row Name 04/10/23 1417          Transfers    Transfers sit-stand transfer;stand-sit transfer  -     Row Name 04/10/23 1417          Sit-Stand Transfer    Sit-Stand Strong (Transfers) contact guard  -     Assistive Device (Sit-Stand Transfers) walker, front-wheeled  -     Row Name 04/10/23 1417          Stand-Sit Transfer    Stand-Sit Strong (Transfers) contact guard  -     Assistive Device (Stand-Sit Transfers) walker, front-wheeled  -LF      Row Name 04/10/23 1417          Activities of Daily Living    BADL Assessment/Intervention bathing;upper body dressing;lower body dressing;grooming;feeding;toileting  -[facility] Name 04/10/23 1417          Bathing Assessment/Intervention    [place] Level (Bathing) bathing skills;upper body;standby assist;lower body;moderate assist (50% patient effort)  -     Row Name 04/10/23 1417          Upper Body Dressing Assessment/Training    [place] Level (Upper Body Dressing) upper body dressing skills;standby assist  -     Row Name 04/10/23 1417          Lower Body Dressing Assessment/Training    [place] Level (Lower Body Dressing) lower body dressing skills;moderate assist (50% patient effort)  -     Row Name 04/10/23 1417          Grooming Assessment/Training    [place] Level (Grooming) grooming skills;set up  -[facility] Name 04/10/23 1417          Self-Feeding Assessment/Training    [place] Level (Feeding) feeding skills;dependent (less than 25% patient effort)  -     Comment, (Feeding) PEG tube  -[facility] Name 04/10/23 1417          Toileting Assessment/Training    [place] Level (Toileting) toileting skills;dependent (less than 25% patient effort)  -     Comment, (Toileting) Incontinent of bowels upon OT's arrival, dependent for americo-care.  -           User Key  (r) = Recorded By, (t) = Taken By, (c) = Cosigned By    Initials Name Provider Type     Leah Resendez OT Occupational Therapist               Obj/Interventions     Row Name 04/10/23 1420          Sensory Assessment (Somatosensory)    Sensory Assessment (Somatosensory) UE sensation intact  -[facility] Name 04/10/23 1420          Vision Assessment/Intervention    Visual Impairment/Limitations WFL  -[facility] Name 04/10/23 1420          Range of Motion Comprehensive    General Range of Motion bilateral upper extremity ROM WFL  -     Row Name 04/10/23 1420          Strength Comprehensive (MMT)    Comment,  General Manual Muscle Testing (MMT) Assessment 4/5 BUEs  -LF     Row Name 04/10/23 1420          Motor Skills    Motor Skills coordination;functional endurance  -LF     Coordination WFL  -LF     Functional Endurance Fair-  -LF     Row Name 04/10/23 1420          Balance    Balance Assessment sitting dynamic balance;standing dynamic balance  -LF     Dynamic Sitting Balance supervision  -LF     Position, Sitting Balance unsupported;sitting edge of bed  -LF     Dynamic Standing Balance contact guard  -LF     Position/Device Used, Standing Balance supported;walker, front-wheeled  -LF           User Key  (r) = Recorded By, (t) = Taken By, (c) = Cosigned By    Initials Name Provider Type    LF Leah Resendez, OT Occupational Therapist               Goals/Plan     [facility] Name 04/10/23 1421          Bed Mobility Goal 1 (OT)    Activity/Assistive Device (Bed Mobility Goal 1, OT) bed mobility activities, all  -LF     [place] Level/Cues Needed (Bed Mobility Goal 1, OT) modified independence  -LF     Time Frame (Bed Mobility Goal 1, OT) long term goal (LTG);10 days  -[facility] Name 04/10/23 1421          Transfer Goal 1 (OT)    Activity/Assistive Device (Transfer Goal 1, OT) transfers, all;walker, rolling  -LF     [place] Level/Cues Needed (Transfer Goal 1, OT) modified independence  -LF     Time Frame (Transfer Goal 1, OT) long term goal (LTG);10 days  -[facility] Name 04/10/23 1421          Bathing Goal 1 (OT)    Activity/Device (Bathing Goal 1, OT) bathing skills, all  -LF     [place] Level/Cues Needed (Bathing Goal 1, OT) modified independence  -LF     Time Frame (Bathing Goal 1, OT) long term goal (LTG);10 days  -[facility] Name 04/10/23 1421          Dressing Goal 1 (OT)    Activity/Device (Dressing Goal 1, OT) dressing skills, all  -LF     [place]/Cues Needed (Dressing Goal 1, OT) modified independence  -LF     Time Frame (Dressing Goal 1, OT) long term goal (LTG);10 days  -[facility] Name 04/10/23  1421          Toileting Goal 1 (OT)    Activity/Device (Toileting Goal 1, OT) toileting skills, all  -LF     [place] Level/Cues Needed (Toileting Goal 1, OT) modified independence  -     Time Frame (Toileting Goal 1, OT) long term goal (LTG);10 days  -     Row Name 04/10/23 1421          Problem Specific Goal 1 (OT)    Problem Specific Goal 1 (OT) Patient will demonstrate fair+ endurance to support ADLs/functional transfers.  -LF     Time Frame (Problem Specific Goal 1, OT) long term goal (LTG);10 days  -     Row Name 04/10/23 1421          Therapy Assessment/Plan (OT)    Planned Therapy Interventions (OT) activity tolerance training;patient/caregiver education/training;BADL retraining;functional balance retraining;occupation/activity based interventions;strengthening exercise;transfer/mobility retraining  -           User Key  (r) = Recorded By, (t) = Taken By, (c) = Cosigned By    Initials Name Provider Type     Leah Resendez, OT Occupational Therapist               Clinical Impression     Row Name 04/10/23 1420          Pain Assessment    Additional Documentation Pain Scale: FACES Pre/Post-Treatment (Group)  -     Row Name 04/10/23 1420          Pain Scale: FACES Pre/Post-Treatment    Pain: FACES Scale, Pretreatment 0-->no hurt  -     Posttreatment Pain Rating 0-->no hurt  -[facility] Name 04/10/23 1420          Plan of Care Review    Plan of Care Reviewed With patient  -     Progress no change  -     Outcome Evaluation Patient presents with limitations in self-care, functional transfers, balance, and endurance. He would benefit from continued skilled occupational therapy services to maximize independence with ADLs/functional transfers. Subacute rehab recommended upon discharge from hospital.  -     Row Name 04/10/23 1420          Therapy Assessment/Plan (OT)    Patient/Family Therapy Goal Statement (OT) To maximize independence.  -     Rehab Potential (OT) good, to achieve stated  therapy goals  -     Criteria for Skilled Therapeutic Interventions Met (OT) yes;meets criteria;skilled treatment is necessary  -     Therapy Frequency (OT) 5 times/wk  -     Row Name 04/10/23 1420          Therapy Plan Review/Discharge Plan (OT)    Anticipated Discharge Disposition (OT) sub acute care setting  -     Row Name 04/10/23 1420          Vital Signs    O2 Delivery Pre Treatment room air  -LF     O2 Delivery Intra Treatment room air  -LF     O2 Delivery Post Treatment room air  -LF           User Key  (r) = Recorded By, (t) = Taken By, (c) = Cosigned By    Initials Name Provider Type     Leah Resendez, ZANA Occupational Therapist               Outcome Measures     Row Name 04/10/23 1422          How much help from another is currently needed...    Putting on and taking off regular lower body clothing? 2  -LF     Bathing (including washing, rinsing, and drying) 2  -LF     Toileting (which includes using toilet bed pan or urinal) 1  -LF     Putting on and taking off regular upper body clothing 3  -LF     Taking care of personal grooming (such as brushing teeth) 3  -LF     Eating meals 4  -LF     AM-PAC 6 Clicks Score (OT) 15  -LF     Row Name 04/10/23 0953 04/10/23 0341       How much help from another person do you currently need...    Turning from your back to your side while in flat bed without using bedrails? 4  -AT 4  -MF    Moving from lying on back to sitting on the side of a flat bed without bedrails? 3  -AT 3  -MF    Moving to and from a bed to a chair (including a wheelchair)? 2  -AT 2  -MF    Standing up from a chair using your arms (e.g., wheelchair, bedside chair)? 2  -AT 2  -MF    Climbing 3-5 steps with a railing? 1  -AT 1  -MF    To walk in hospital room? 1  -AT 1  -MF    AM-PAC 6 Clicks Score (PT) 13  -AT 13  -MF    Highest level of mobility 4 --> Transferred to chair/commode  -AT 4 --> Transferred to chair/commode  -MF    Row Name 04/10/23 0332 04/10/23 0330       How much help  from another person do you currently need...    Turning from your back to your side while in flat bed without using bedrails? 4  -MF 4  -MF    Moving from lying on back to sitting on the side of a flat bed without bedrails? 3  -MF 3  -MF    Moving to and from a bed to a chair (including a wheelchair)? 2  -MF 2  -MF    Standing up from a chair using your arms (e.g., wheelchair, bedside chair)? 2  -MF 2  -MF    Climbing 3-5 steps with a railing? 1  -MF 1  -MF    To walk in hospital room? 1  -MF 1  -MF    AM-PAC 6 Clicks Score (PT) 13  -MF 13  -MF    Highest level of mobility 4 --> Transferred to chair/commode  -MF 4 --> Transferred to chair/commode  -MF    Row Name 04/10/23 0304          How much help from another person do you currently need...    Turning from your back to your side while in flat bed without using bedrails? 4  -ET     Moving from lying on back to sitting on the side of a flat bed without bedrails? 3  -ET     Moving to and from a bed to a chair (including a wheelchair)? 2  -ET     Standing up from a chair using your arms (e.g., wheelchair, bedside chair)? 2  -ET     Climbing 3-5 steps with a railing? 1  -ET     To walk in hospital room? 1  -ET     AM-PAC 6 Clicks Score (PT) 13  -ET     Highest level of mobility 4 --> Transferred to chair/commode  -ET     Row Name 04/10/23 1422          Functional Assessment    Outcome Measure Options AM-PAC 6 Clicks Daily Activity (OT);Optimal Instrument  -LF     Row Name 04/10/23 1422          Optimal Instrument    Optimal Instrument Optimal - 3  -LF     Bending/Stooping 3  -LF     Standing 2  -LF     Reaching 1  -LF     From the list, choose the 3 activities you would most like to be able to do without any difficulty Bending/stooping;Standing;Reaching  -LF     Total Score Optimal - 3 6  -LF           User Key  (r) = Recorded By, (t) = Taken By, (c) = Cosigned By    Initials Name Provider Type    ET Thorn, Erinne, RN Registered Nurse    LF Leah Resendez, OT  Occupational Therapist    Krista Sherman LPN Licensed Nurse    AT CarrilloJennifer RN Registered Nurse                Occupational Therapy Education     Title: PT OT SLP Therapies (In Progress)     Topic: Occupational Therapy (In Progress)     Point: ADL training (In Progress)     Description:   Instruct learner(s) on proper safety adaptation and remediation techniques during self care or transfers.   Instruct in proper use of assistive devices.              Learning Progress Summary           Patient Acceptance, E,TB, NR by  at 4/10/2023 1424                   Point: Precautions (In Progress)     Description:   Instruct learner(s) on prescribed precautions during self-care and functional transfers.              Learning Progress Summary           Patient Acceptance, E,TB, NR by  at 4/10/2023 1424                   Point: Body mechanics (In Progress)     Description:   Instruct learner(s) on proper positioning and spine alignment during self-care, functional mobility activities and/or exercises.              Learning Progress Summary           Patient Acceptance, E,TB, NR by  at 4/10/2023 1424                               User Key     Initials Effective Dates Name Provider Type Discipline     06/16/21 -  Leah Resendez OT Occupational Therapist OT              OT Recommendation and Plan  Planned Therapy Interventions (OT): activity tolerance training, patient/caregiver education/training, BADL retraining, functional balance retraining, occupation/activity based interventions, strengthening exercise, transfer/mobility retraining  Therapy Frequency (OT): 5 times/wk  Plan of Care Review  Plan of Care Reviewed With: patient  Progress: no change  Outcome Evaluation: Patient presents with limitations in self-care, functional transfers, balance, and endurance. He would benefit from continued skilled occupational therapy services to maximize independence with ADLs/functional transfers. Subacute rehab  recommended upon discharge from hospital.     Time Calculation:    Time Calculation- OT     Row Name 04/10/23 1424             Time Calculation- OT    OT Received On 04/10/23  -LF      OT Goal Re-Cert Due Date 04/19/23  -LF         Untimed Charges    OT Eval/Re-eval Minutes 48  -LF         Total Minutes    Untimed Charges Total Minutes 48  -LF       Total Minutes 48  -LF            User Key  (r) = Recorded By, (t) = Taken By, (c) = Cosigned By    Initials Name Provider Type    LF Leah Resendez OT Occupational Therapist              Therapy Charges for Today     Code Description Service Date Service Provider Modifiers Qty    [id number] HC OT EVAL LOW COMPLEXITY 4 4/10/2023 Leah Resendez OT GO 1               Leah Resendez OT  4/10/2023

## 2023-04-10 NOTE — PLAN OF CARE
Goal Outcome Evaluation:  Plan of Care Reviewed With: patient        Progress: no change  Outcome Evaluation: Patient presents with limitations in self-care, functional transfers, balance, and endurance. He would benefit from continued skilled occupational therapy services to maximize independence with ADLs/functional transfers. Subacute rehab recommended upon discharge from hospital.

## 2023-04-10 NOTE — PROGRESS NOTES
[facility]   Hospitalist Progress Note  Date: 4/10/2023  Patient Name: Buzz Lopez  : 1945  MRN: [id number]  Date of admission: 2023  Consultants:   -Gastroenterology: Dr. Jesse Guzman    Subjective   Subjective     Chief Complaint: Cough and shortness of breath    Summary:   Buzz Lopez is a 77 y.o. male with history of dysphagia s/p PEG tube placement, is severe protein calorie malnutrition, severe aortic stenosis, type 2 diabetes mellitus who presented to the ED from nursing home due to cough and shortness of breath.  Eval in ED significant for patient being febrile and tachycardic.  CXR demonstrated bilateral infiltrates.  Hospitalist service contacted for further evaluation and management.  Antibiotics initiated.    Interval Followup:   Nursing noted that patient's PEG tube was removed.  Gastroenterology contacted and PEG tube replaced.  Patient lying in bed without any acute complaints on my exam.  Respiratory status stable.    Antibiotics:   -Vancomycin  -Zosyn    Review of Systems   All systems reviewed and negative unless stated otherwise under subjective.    Objective   Objective     Vitals:   Temp:  [98.4 °F (36.9 °C)-101.7 °F (38.7 °C)] 98.8 °F (37.1 °C)  Heart Rate:  [82-94] 82  Resp:  [18] 18  BP: ()/(51-87) 144/55  Physical Exam   Gen: No acute distress, lying in bed, pleasant, cachectic male lying in bed  Resp: Equal chest is bilaterally, normal respiratory effort, right basilar crackles noted  Card: RRR, No m/r/g  Abd: PEG tube present, soft, Nontender, Nondistended, + bowel sounds      Result Review    Result Review:  I have personally reviewed the results as below and agree with these findings:  []  Laboratory:   CMP    CMP 3/28/23 4/9/23 4/10/23   Glucose 105 (A) 205 (A) 149 (A)   BUN 19 13 16   Creatinine 0.53 (A) 0.56 (A) 0.60 (A)   eGFR 101.9 100.3 98.2   Sodium 134 (A) 132 (A) 131 (A)   Potassium 4.2 4.5 4.2   Chloride 96 (A) 93 (A) 93 (A)   Calcium 9.2 8.5  (A) 8.6   Total Protein  7.9    Albumin  3.1 (A)    Globulin  4.8    Total Bilirubin  0.5    Alkaline Phosphatase  86    AST (SGOT)  34    ALT (SGPT)  21    Albumin/Globulin Ratio  0.6    BUN/Creatinine Ratio 35.8 (A) 23.2 26.7 (A)   Anion Gap 8.0 9.4 9.0   (A) Abnormal value       Comments are available for some flowsheets but are not being displayed.           CBC    CBC 3/28/23 4/9/23 4/10/23   WBC 5.15 5.70 4.89   RBC 3.69 (A) 4.03 (A) 3.98 (A)   Hemoglobin 11.0 (A) 11.8 (A) 11.7 (A)   Hematocrit 32.4 (A) 34.7 (A) 35.4 (A)   MCV 87.8 86.1 88.9   MCH 29.8 29.3 29.4   MCHC 34.0 34.0 33.1   RDW 13.2 14.1 14.0   Platelets 217 143 146   (A) Abnormal value            [x]  Microbiology:   [x]  Radiology:   [x]  EKG/Telemetry:    []  Cardiology/Vascular:    []  Pathology:  []  Old records:  []  Other:    Assessment & Plan   Assessment / Plan     Assessment:  Right-sided pneumonia concern for aspiration  Sepsis secondary to above, present on admission (fever, tachycardia, source)  Therapeutic drug level monitoring, vancomycin  Current dysphagia s/p PEG tube placement  PEG tube malpositioned s/p repositioning by GI service  Severe protein calorie malnutrition with PEG tube and tube feeds    Plan:  -PEG tube reported to be outside on the floor per nursing, Gastroenterology contacted for replacement  -Confirm PEG tube in appropriate position with imaging  -Continue broad-spectrum antibiotics, tailor based on results of infectious work-up  -PT/OT/speech therapy consulted  -Start appropriate home medications  -Nutrition consulted  -Will monitor electrolytes and renal function with BMP and magnesium level in the AM  -Will monitor WBC and Hgb with CBC in the AM  -Clinical course will dictate further management     DVT Prophylaxis: Heparin  GI Prophylaxis: Pantoprazole  Diet: NPO  Dispo: PT/OT consulted  Code Status: Full code     Personally reviewed patients labs and imaging, discussed with patient and nurse at bedside.  Discussed case with the following consultants: Gastroenterology.     Part of this note may be an electronic transcription/translation of spoken language to printed text using the Dragon dictation system.    DVT prophylaxis:  Medical DVT prophylaxis orders are present.    CODE STATUS:   Level Of Support Discussed With: Health Care Surrogate  Code Status (Patient has no pulse and is not breathing): CPR (Attempt to Resuscitate)  Medical Interventions (Patient has pulse or is breathing): Full Support        Electronically signed by Kervin Carroll MD, 04/10/23, 2:05 PM EDT.

## 2023-04-10 NOTE — ED PROVIDER NOTES
Time: 11:43 PM EDT  Date of encounter:  4/9/2023  Independent Historian/Clinical History and Information was obtained by:   Family  Chief Complaint: Cough/Wheezing    History is limited by: N/A    History of Present Illness:  Patient is a 77 y.o. year old male who presents to the emergency department for evaluation of cough and wheezing onset 10 days ago. Pt's caregiver gives hx. Pt caregiver states that pt was discharged from the hospital on 3/28 after an admission on 3/6. PT as admitted for BL pneumonia and is currently at a rehabilitation facility. Pt has been having a cough and wheeze since his discharged and SOB worse with minimal exertion that is relieved by rest. PT denies any fever, chills, myalgias, or LE swelling.     HPI    Patient Care Team  Primary Care Provider: Provider, No Known    Past Medical History:     No Known Allergies  Past Medical History:   Diagnosis Date   • Dysphagia      Past Surgical History:   Procedure Laterality Date   • BRONCHOSCOPY N/A 3/8/2023    Procedure: BRONCHOSCOPY WITH BRONCHOALVEOLAR LAVAGE, WASHINGS;  Surgeon: Jason Rowley MD;  Location: [facility] ENDOSCOPY;  Service: Pulmonary;  Laterality: N/A;  MUCOUS PLUGGING   • ENDOSCOPY W/ PEG TUBE PLACEMENT N/A 3/22/2023    Procedure: ESOPHAGOGASTRODUODENOSCOPY WITH PERCUTANEOUS ENDOSCOPIC GASTROSTOMY TUBE INSERTION;  Surgeon: Ruslan Guido MD;  Location: [facility] ENDOSCOPY;  Service: Gastroenterology;  Laterality: N/A;  PEG INSERTION   • EYE SURGERY       History reviewed. No pertinent family history.    Home Medications:  Prior to Admission medications    Medication Sig Start Date End Date Taking? Authorizing Provider   aspirin 81 MG chewable tablet Administer 1 tablet per G tube Daily. 3/29/23   Kishore Cohen MD   atorvastatin (LIPITOR) 20 MG tablet Administer 2 tablets per G tube Daily. 3/29/23   Kishore Cohen MD   metFORMIN (GLUCOPHAGE) 850 MG tablet Administer 1 tablet per G tube 2 (Two) Times a Day With  "Meals. 3/29/23   Kishore Cohen MD   metoprolol tartrate (LOPRESSOR) 25 MG tablet Take 0.5 tablets by mouth 2 (Two) Times a Day. 3/29/23   Kishore Cohen MD   multivitamin with minerals tablet tablet Take 1 tablet by mouth 2 (Two) Times a Week.    Provider, MD Rene   polyethylene glycol (MIRALAX) 17 g packet Take 17 g by mouth 2 (Two) Times a Day As Needed (constipation). 3/29/23   Kishore Cohen MD        Social History:   Social History     Tobacco Use   • Smoking status: Former     Passive exposure: Never   Vaping Use   • Vaping Use: Never used   Substance Use Topics   • Alcohol use: Never   • Drug use: Never         Review of Systems:  Review of Systems   Constitutional: Negative for chills, diaphoresis and fever.   HENT: Negative for congestion, postnasal drip, rhinorrhea and sore throat.    Eyes: Negative for photophobia.   Respiratory: Positive for cough and shortness of breath. Negative for chest tightness.    Cardiovascular: Negative for chest pain, palpitations and leg swelling.   Gastrointestinal: Negative for abdominal pain, diarrhea, nausea and vomiting.   Genitourinary: Negative for difficulty urinating, dysuria, flank pain, frequency, hematuria and urgency.   Musculoskeletal: Negative for neck pain and neck stiffness.   Skin: Negative for pallor and rash.   Neurological: Negative for dizziness, syncope, weakness, numbness and headaches.   Hematological: Negative for adenopathy. Does not bruise/bleed easily.   Psychiatric/Behavioral: Negative.         Physical Exam:  /76 (BP Location: Left arm, Patient Position: Lying)   Pulse 101   Temp 98.1 °F (36.7 °C) (Oral)   Resp 18   Ht 172.7 cm (67.99\")   Wt 57.6 kg (126 lb 15.8 oz)   SpO2 92%   BMI 19.31 kg/m²     Physical Exam  Vitals and nursing note reviewed.   Constitutional:       General: He is not in acute distress.     Appearance: Normal appearance. He is not ill-appearing, toxic-appearing or diaphoretic.   HENT:      " Head: Normocephalic and atraumatic.      Mouth/Throat:      Mouth: Mucous membranes are moist.   Eyes:      Pupils: Pupils are equal, round, and reactive to light.   Cardiovascular:      Rate and Rhythm: Normal rate and regular rhythm.      Pulses: Normal pulses.           Carotid pulses are 2+ on the right side and 2+ on the left side.       Radial pulses are 2+ on the right side and 2+ on the left side.        Femoral pulses are 2+ on the right side and 2+ on the left side.       Popliteal pulses are 2+ on the right side and 2+ on the left side.        Dorsalis pedis pulses are 2+ on the right side and 2+ on the left side.        Posterior tibial pulses are 2+ on the right side and 2+ on the left side.      Heart sounds: Normal heart sounds. No murmur heard.  Pulmonary:      Effort: Pulmonary effort is normal. No accessory muscle usage, respiratory distress or retractions.      Breath sounds: No decreased air movement. Examination of the right-upper field reveals rhonchi. Examination of the left-upper field reveals rhonchi. Examination of the right-middle field reveals rhonchi. Examination of the left-middle field reveals rhonchi. Examination of the right-lower field reveals rhonchi. Examination of the left-lower field reveals rhonchi. Rhonchi present. No decreased breath sounds, wheezing or rales.   Abdominal:      General: Abdomen is flat. There is no distension.      Palpations: Abdomen is soft. There is no mass or pulsatile mass.      Tenderness: There is no abdominal tenderness. There is no right CVA tenderness, left CVA tenderness, guarding or rebound.      Comments: G tube located in L upper abdomen, only fed from G tube. No rigidity   Musculoskeletal:         General: No swelling, tenderness or deformity.      Cervical back: Neck supple. No tenderness.      Right lower leg: No edema.      Left lower leg: No edema.   Skin:     General: Skin is warm and dry.      Capillary Refill: Capillary refill takes less  than 2 seconds.      Coloration: Skin is not jaundiced or pale.      Findings: No erythema.   Neurological:      General: No focal deficit present.      Mental Status: He is alert and oriented to person, place, and time. Mental status is at baseline.      Cranial Nerves: Cranial nerves 2-12 are intact. No cranial nerve deficit.      Sensory: Sensation is intact. No sensory deficit.      Motor: Motor function is intact. No weakness or pronator drift.      Coordination: Coordination is intact. Coordination normal.   Psychiatric:         Mood and Affect: Mood normal.         Behavior: Behavior normal.                  Procedures:  Procedures      Medical Decision Making:      Comorbidities that affect care:    Aspiration pneumonia and chronic dysphagia    External Notes reviewed:    None      The following orders were placed and all results were independently analyzed by me:  Orders Placed This Encounter   Procedures   • COVID-19,APTIMA PANTHER(AHSAN), JEFFREY/ NICOLE, NP/OP SWAB IN UTM/VTM/SALINE TRANSPORT MEDIA,24 HR TAT - Swab, Nasal Cavity   • Influenza Antigen, Rapid - Swab, Nasopharynx   • Blood Culture - Blood,   • Blood Culture - Blood,   • MRSA Screen, PCR (Inpatient) - Swab, Nares   • XR Chest 1 View   • IR J or G Tube Contrast Eval   • [place] Draw   • Comprehensive Metabolic Panel   • BNP   • Single High Sensitivity Troponin T   • CBC Auto Differential   • Scan Slide   • Lactic Acid, Plasma   • Blood Gas, Arterial -With Co-Ox Panel: Yes   • Potassium   • Magnesium   • High Sensitivity Troponin T   • Blood Gas, Arterial -With Co-Ox Panel: Yes   • CBC Auto Differential   • Vancomycin, Random   • Vancomycin, Trough   • Basic Metabolic Panel   • CBC (No Diff)   • Magnesium   • Phosphorus   • NPO Diet NPO Type: Strict NPO   • Continuous Pulse Oximetry   • Vital Signs   • Please perform rectal temperature and notify physician with results  Nursing Communication   • Vital Signs   • Intake & Output   • Weigh Patient   •  Nursing Dysphagia Screening (Complete Prior to Giving Anything By Mouth)   • RN to Place Order SLP Consult - Eval & Treat Choosing Reason of RN Dysphagia Screen Failed   • Nurse to Call MD or Nutrition Services for Diet if Patient Passes Dysphagia Screen   • Telemetry - Maintain IV Access   • Continuous Cardiac Monitoring   • Telemetry - Pulse Oximetry   • Activity - Bed Rest With Exceptions (Specify)   • Otilio and Document Tube Depth (in cm)   • Verify Tube Placement Upon Insertion & As Needed   • Elevate Head Of Bed 30-45 Degrees   • Strict Intake & Output   • Write Hang Time on Enteral Feeding Bag   • Notify Provider - Abdominal Discomfort, Pain or Distention, No Bowel Movement in 3 Days   • Flush Feeding Tube With 30-50mL Water As Needed   • Code Status and Medical Interventions:   • Inpatient Hospitalist Consult   • Inpatient Case Management  Consult   • Inpatient Gastroenterology Consult   • Inpatient Nutrition Consult   • Inpatient Consult to Nutrition Services   • Diet, Tube Feeding Tube Feeding Formula: Diabetisource AC; Tube Feeding Type: Continuous; Continuous Tube Feeding Start Rate (mL/hr): 20; Then Advance Rate By (mL/hr): 20; Every __ Hours: 4; To Goal Rate of (mL/hr): 70   • OT Consult: Eval & Treat   • PT Consult: Eval & Treat Functional Mobility Below Baseline   • Oxygen Therapy- Nasal Cannula; 2 LPM; Titrate for SPO2: 90% - 95%   • SLP Consult: Eval & Treat RN Dysphagia Screen Failed   • POC Glucose Q6H   • ECG 12 Lead Dyspnea   • ECG 12 Lead Chest Pain   • ECG 12 Lead Dyspnea   • Insert Peripheral IV   • Insert Peripheral IV   • Inpatient Admission   • CBC & Differential   • Green Top (Gel)   • Lavender Top   • Gold Top - SST   • Light Blue Top       Medications Given in the Emergency Department:  Medications   sodium chloride 0.9 % flush 10 mL (has no administration in time range)   sodium chloride 0.9 % flush 10 mL (has no administration in time range)   sodium chloride 0.9 %  flush 10 mL (10 mL Intravenous Given 4/10/23 2200)   sodium chloride 0.9 % infusion 40 mL (has no administration in time range)   heparin (porcine) 5000 UNIT/ML injection 5,000 Units (5,000 Units Subcutaneous Not Given 4/11/23 0301)   nitroglycerin (NITROSTAT) SL tablet 0.4 mg (has no administration in time range)   piperacillin-tazobactam (ZOSYN) 3.375 g/100 mL 0.9% NS IVPB (mbp) (3.375 g Intravenous New Bag 4/10/23 1735)   Pharmacy to dose vancomycin (has no administration in time range)   pantoprazole (PROTONIX) injection 40 mg (40 mg Intravenous Given 4/10/23 0559)   vancomycin 750 mg/250 mL 0.9% NS IVPB ([facility]) (750 mg Intravenous New Bag 4/10/23 1508)   acetaminophen (TYLENOL) tablet 650 mg (has no administration in time range)     Or   acetaminophen (TYLENOL) 160 MG/5ML solution 650 mg (has no administration in time range)     Or   acetaminophen (TYLENOL) suppository 650 mg (has no administration in time range)   aspirin chewable tablet 81 mg (81 mg Per G Tube Given 4/10/23 1508)   atorvastatin (LIPITOR) tablet 40 mg (40 mg Per G Tube Given 4/10/23 1508)   metoprolol tartrate (LOPRESSOR) tablet 12.5 mg (12.5 mg Per PEG Tube Not Given 4/11/23 0301)   polyethylene glycol (MIRALAX) packet 17 g (has no administration in time range)   acetaminophen (TYLENOL) tablet 650 mg (650 mg Oral Given 4/10/23 0039)   piperacillin-tazobactam (ZOSYN) 3.375 g/100 mL 0.9% NS IVPB (mbp) (0 g Intravenous Stopped 4/10/23 0109)   vancomycin 1250 mg/250 mL 0.9% NS IVPB ([facility]) (1,250 mg Intravenous New Bag 4/10/23 0121)   diatrizoate meglumine-sodium (GASTROGRAFIN) 66-10 % oral solution 30 mL (30 mL Oral Given 4/10/23 1315)        ED Course:         Labs:    Lab Results (last 24 hours)     Procedure Component Value Units Date/Time    Basic Metabolic Panel [[id number]]  (Abnormal) Collected: 04/10/23 0515    Specimen: Blood Updated: 04/10/23 0620     Glucose 149 mg/dL      BUN 16 mg/dL      Creatinine 0.60 mg/dL      Sodium 131 mmol/L       Potassium 4.2 mmol/L      Chloride 93 mmol/L      CO2 29.0 mmol/L      Calcium 8.6 mg/dL      BUN/Creatinine Ratio 26.7     Anion Gap 9.0 mmol/L      eGFR 98.2 mL/min/1.73     Narrative:      GFR Normal >60  Chronic Kidney Disease <60  Kidney Failure <15    The GFR formula is only valid for adults with stable renal function between ages 18 and 70.    CBC & Differential [[id number]]  (Abnormal) Collected: 04/10/23 0515    Specimen: Blood Updated: 04/10/23 0722    Narrative:      The following orders were created for panel order CBC & Differential.  Procedure                               Abnormality         Status                     ---------                               -----------         ------                     CBC Auto Differential[[id number]]        Abnormal            Final result               Scan Slide[[id number]]                                                                    Please view results for these tests on the individual orders.    CBC Auto Differential [[id number]]  (Abnormal) Collected: 04/10/23 0515    Specimen: Blood Updated: 04/10/23 0722     WBC 4.89 10*3/mm3      RBC 3.98 10*6/mm3      Hemoglobin 11.7 g/dL      Hematocrit 35.4 %      MCV 88.9 fL      MCH 29.4 pg      MCHC 33.1 g/dL      RDW 14.0 %      RDW-SD 45.3 fl      MPV 11.9 fL      Platelets 146 10*3/mm3      Neutrophil % 73.8 %      Lymphocyte % 17.4 %      Monocyte % 8.2 %      Eosinophil % 0.0 %      Basophil % 0.4 %      Immature Grans % 0.2 %      Neutrophils, Absolute 3.61 10*3/mm3      Lymphocytes, Absolute 0.85 10*3/mm3      Monocytes, Absolute 0.40 10*3/mm3      Eosinophils, Absolute 0.00 10*3/mm3      Basophils, Absolute 0.02 10*3/mm3      Immature Grans, Absolute 0.01 10*3/mm3      nRBC 0.0 /100 WBC     Vancomycin, Random [[id number]]  (Normal) Collected: 04/10/23 0515    Specimen: Blood Updated: 04/10/23 0821     Vancomycin Random 15.73 mcg/mL     Narrative:      Therapeutic Ranges for Vancomycin    Vancomycin Random    5.0-40.0 mcg/mL  Vancomycin Trough   5.0-20.0 mcg/mL  Vancomycin Peak     20.0-40.0 mcg/mL    MRSA Screen, PCR (Inpatient) - Swab, Nares [[id number]]  (Normal) Collected: 04/10/23 0928    Specimen: Swab from Nares Updated: 04/10/23 1048     MRSA PCR No MRSA Detected    Narrative:      The negative predictive value of this diagnostic test is high and should only be used to consider de-escalating anti-MRSA therapy. A positive result may indicate colonization with MRSA and must be correlated clinically.           Imaging:    IR J or G Tube Contrast Eval    Result Date: 4/10/2023  PROCEDURE: IR J OR G TUBE CONTRAST EVAL  COMPARISON: None  INDICATIONS: Confirm PEG tube placement F.T. 1.1   mGy 238.7  FINDINGS:    Initial KUB obtained demonstrating G-tube in the left upper quadrant.  Water soluble contrast agent, Gastrografin, was injected into the patient's percutaneous gastrostomy (peg) tube.  The contrast agent opacifies the stomach and proximal duodenum.  It is thought to be in satisfactory position.  There is no evidence of leak of the contrast agent into the peritoneum or abdominal wall.      Percutaneous gastric tube in satisfactory position in the stomach with no evidence of leak.      SOCORRO REBOLLAR       Electronically Signed and Approved By: SIGRID DIAMOND MD on 4/10/2023 at 14:49                 Differential Diagnosis and Discussion:    Dyspnea: Differential diagnosis includes but is not limited to metabolic acidosis, neurological disorders, psychogenic, asthma, pneumothorax, upper airway obstruction, COPD, pneumonia, noncardiogenic pulmonary edema, interstitial lung disease, anemia, congestive heart failure, and pulmonary embolism    All labs were reviewed and interpreted by me.    MDM  Number of Diagnoses or Management Options  Aspiration pneumonitis  Febrile illness, acute  Multifocal pneumonia  Diagnosis management comments: Rectal temperature was performed which was 101    Sepsis was not present in the  emergency department or on arrival. This is supported as the patient does not either meet two out of the four SIRS criteria or has an obvious bacterial infection.      SIRS criteria considered:   1.                     Temperature > 100.4 or <98.6    2.                     Heart Rate > 90    3.                     Respiratory Rate > 22    4.                     WBC > 12K or <4K.     PSI/PORT Score: Pneumonia Severity Index for CAP - MDCalc  Calculated on Apr 10 2023 12:55 AM  89 points -> Risk Class III, 0.9-2.8% mortality. Outpatient or inpatient treatment, depending on clinical judgment.    Patient did have a temperature of 101.7 while in the emergency room.  The patient's vital signs were otherwise stable.  Patient's lactate was normal.  The patient's flu was negative.  The patient's COVID was negative.  Patient's CBC was unremarkable.  The patient had a normal white blood cell count.  The patient's chemistry was unremarkable with exception of mild elevated glucose of 205.  The patient's high-sensitivity troponin was 36.  The patient's BNP was 7700.  The patient did have a similar BNP of 6100 a month ago.  Patient's chest x-ray demonstrates new bilateral infiltrates with the greatest in the mid to right basilar lung consistent with multifocal pneumonia with possible pulmonary edema..  Due to the patient's fever I do feel that this most likely represents multifocal pneumonia..  Certainly there is concern for possible recurrent aspiration pneumonia..  The patient was treated with Zosyn and vancomycin in the emergency room for possible hospital acquired pneumonia.  The patient is resting comfortably at the time of admission.  Patient is in no acute respiratory distress       Amount and/or Complexity of Data Reviewed  Clinical lab tests: reviewed  Tests in the radiology section of CPT®: reviewed  Tests in the medicine section of CPT®: reviewed  Decide to obtain previous medical records or to obtain history from  someone other than the patient: yes (I reviewed the patient's echocardiogram from the patient's last admission.  The patient appears to have a normal ejection fraction.  I have enclosed the report    Interpretation Summary       •  Left ventricular ejection fraction appears to be 56 - 60%.  •  Left ventricular diastolic function is consistent with (grade I) impaired relaxation.  •  Estimated right ventricular systolic pressure from tricuspid regurgitation is normal (<35 mmHg).     There were no apparent intracardiac masses, vegetations or thrombi.     I reviewed the patient's BAL from the previous admission and have enclosed the report    Findings:  Bilateral significant mucus plugging, suctioned out in multiple attempts  Old food particles seen around back of vocal cord and a piece in left lower lobe  Friable mucosa, easy bleeding with suction  Scattered purulent secretions     Estimated Blood Loss: Insignificant     Specimens:  BAL right lower lobe  Bronchial washings tracheobronchial tree     Complications: None; patient tolerated the procedure well.)  Discuss the patient with other providers: yes (01:03 EDT    I discussed the case with the hospitalist.  We have discussed the patient's presenting symptoms, laboratory values, imaging and condition at the time of admission.  They will evaluate the patient in the emergency room and admit the patient to the hospital)           Social Determinants of Health:    Patient has presented with family members who are responsible, reliable and will ensure follow up care.      Disposition and Care Coordination:    Admit:   Through independent evaluation of the patient's history, physical, and imperical data, the patient meets criteria for observation/admission to the hospital.        Final diagnoses:   Febrile illness, acute   Multifocal pneumonia   Aspiration pneumonitis        ED Disposition     ED Disposition   Decision to Admit    Condition   --    Comment   Level of Care:  Progressive Care [20]   Diagnosis: Febrile illness, acute [055691]   Admitting Physician: TK ROGERS [[id number]]   Certification: I Certify That Inpatient Hospital Services Are Medically Necessary For Greater Than 2 Midnights               This medical record created using voice recognition software.           William Corbin Jr.  04/09/23 3349       Tim Espinal DO  04/11/23 9188

## 2023-04-10 NOTE — PROCEDURES
Preop diagnosis, displaced G-tube    Postop diagnosis, same    Anesthesia none    Patient was placed in the supine position the anterior abdominal wall was examined and there was a stoma of the G-tube which looked uninflamed.  Size 20 Greek replacement G-tube was passed through the same stoma and inner balloon was inflated with 20 cc of water.  Patient tolerated the procedure well.  An upper GI x-ray would be done to confirm the placement.

## 2023-04-10 NOTE — PLAN OF CARE
Goal Outcome Evaluation:      Pt is alert and oriented. Diabetic source was starting at 1817. Pt did pull out peg tube this morning, was reinserted by gastro. Xray showed it in place. No c/o pain. Will continue with care plan and monitoring.

## 2023-04-10 NOTE — PROGRESS NOTES
"[facility]: Vancomycin Pharmacokinetic Initial Consult Note    Buzz Lopez is a 77 y.o. male who is on day  of pharmacy to dose vancomycin for Pneumonia.    Consult Information  Consulting Provider: Paul  Planned Duration of Therapy: 7 days  Was Patient Receiving Prior to Admission/Consult?: No  Loading Dose  Given: 1250mg iv x 1 this am  PK/PD Target: -600 mg/L.hr   Other Antimicrobials: Piperacillin/Tazobactam    Imaging Reviewed?: Yes    Microbiology Data  MRSA PCR ordered   Blood cx ordered    Vitals/Labs  Ht: 172.7 cm (67.99\"); Wt: 57.6 kg (126 lb 15.8 oz)  Temp (24hrs), Av.7 °F (37.6 °C), Min:98.4 °F (36.9 °C), Max:101.7 °F (38.7 °C)   Estimated Creatinine Clearance: 81.3 mL/min (A) (by C-G formula based on SCr of 0.6 mg/dL (L)).       Results from last 7 days   Lab Units 04/10/23  0515 23  2224   VANCOMYCIN RM mcg/mL 15.73  --    CREATININE mg/dL 0.60* 0.56*   WBC 10*3/mm3 4.89 5.70     Assessment/Plan:    Vancomycin Dose:  750 mg IV every 12 hours; which provides the following predicted parameters:  AUC24,ss: 451 mg/L.hr  PAUC*: 68 %  Ctrough,ss: 13.9 mg/L  Pconc*: 13 %  Tox.: 9 %    Vanc Trough ordered for tomorrow.    Pharmacy will follow patient's kidney function and will adjust doses and obtain levels as necessary. Thank you for involving pharmacy in this patient's care. Please contact pharmacy with any questions or concerns.                           Kristine Yates  Clinical Pharmacist   "

## 2023-04-10 NOTE — H&P
[facility]   HISTORY AND PHYSICAL    Patient Name: Buzz Lopez  : 1945  MRN: [id number]  Primary Care Physician:  Provider, No Known  Date of admission: 2023    Subjective   Subjective     Chief Complaint:   Cough shortness of breath    History of Present Illness  Patient is a pleasant 77-year-old male with past medical history of dysphagia status post PEG tube placement presents to the emergency department from the nursing home because of cough and shortness of breath.  According to the son he has concerned about the possibility of the patient eating or drinking at the nursing home as the patient's new roommate has a regular diet.  Patient was admitted for 3 weeks here with pneumonia secondary to aspiration.  PEG tube was placed and is has been getting nourishment through that.  Here in the ER he was clinically septic with worsening right-sided pneumonia.  The patient currently has no complaints other than being weak.  We will admit to the hospitalist service  Review of Systems   Constitutional: Positive for fatigue.   Respiratory: Positive for cough and shortness of breath.    All other systems reviewed and are negative.       Personal History     Past Medical History:   Diagnosis Date   • Dysphagia        Past Surgical History:   Procedure Laterality Date   • BRONCHOSCOPY N/A 3/8/2023    Procedure: BRONCHOSCOPY WITH BRONCHOALVEOLAR LAVAGE, WASHINGS;  Surgeon: Jason Rowley MD;  Location: [facility] ENDOSCOPY;  Service: Pulmonary;  Laterality: N/A;  MUCOUS PLUGGING   • ENDOSCOPY W/ PEG TUBE PLACEMENT N/A 3/22/2023    Procedure: ESOPHAGOGASTRODUODENOSCOPY WITH PERCUTANEOUS ENDOSCOPIC GASTROSTOMY TUBE INSERTION;  Surgeon: Ruslan Guido MD;  Location: [facility] ENDOSCOPY;  Service: Gastroenterology;  Laterality: N/A;  PEG INSERTION   • EYE SURGERY         Family History: family history is not on file. Otherwise pertinent FHx was reviewed and not pertinent to current issue.    Social History:   reports that he has quit smoking. He has never been exposed to tobacco smoke. He does not have any smokeless tobacco history on file. He reports that he does not drink alcohol and does not use drugs.    Home Medications:  aspirin, atorvastatin, metFORMIN, metoprolol tartrate, multivitamin with minerals, and polyethylene glycol    Allergies:  No Known Allergies    Objective    Objective     Vitals:   Temp:  [99.9 °F (37.7 °C)-101.7 °F (38.7 °C)] 101.7 °F (38.7 °C)  Heart Rate:  [83-94] 83  Resp:  [18] 18  BP: (128-157)/(73-87) 157/73    Physical Exam  .Constitutional: Malnourished appearing.  No acute distress.      HENT:  Head:  Normocephalic and atraumatic.  Mouth:  Moist mucous membranes.    Eyes:  Conjunctivae and EOM are normal. No scleral icterus.    Neck:  Neck supple.  No JVD present.    Cardiovascular:  Normal rate, regular rhythm and normal heart sounds with no murmur.  Pulmonary/Chest:  No respiratory distress, no wheezes, but with a few crackles in his right lung base abdominal:  Soft. No distension and no tenderness.   Musculoskeletal:  No tenderness, and no deformity.  No red or swollen joints anywhere.    Neurological:  Alert and oriented to person, place, and time.  No cranial nerve deficit.    Skin:  Skin is warm and dry. No rash noted. No pallor.   Peripheral vascular:  No clubbing, no cyanosis, no edema.  Psychiatric: Appropriate mood and affect  :   Result Review    Result Review:  I have personally reviewed the results from the time of this admission to 4/10/2023 01:49 EDT and agree with these findings:  [x]  Laboratory list / accordion  []  Microbiology  [x]  Radiology  []  EKG/Telemetry   []  Cardiology/Vascular   []  Pathology  []  Old records  []  Other:  Most notable findings include: Elevated white blood cell count pneumonia    Assessment & Plan   Assessment / Plan     Brief Patient Summary:  Buzz Lopez is a 77 y.o. male who presented to the emergency department with weakness and  shortness of breath.  He was found to have new right-sided pneumonia.  This had cleared from his hospitalization after several weeks in the hospital.  He will be admitted for further evaluation and treatment    Active Hospital Problems:  1.  Clinical sepsis  2.  Right-sided pneumonia  3.  Malnutrition with PEG tube and tube feeds  4.  High probability of aspiration tube feeds versus p.o. at the nursing home    Plan:   Patient will be admitted to the hospital service.  The patient is going to be started on broad-spectrum antibiotics.  Start patient on IV fluids for hydration.  We will keep her monitored on telemetry.  Patient was somewhat hypotensive in the emergency department therefore I will place him in the PCU overnight for closer monitoring.    DVT prophylaxis:  No DVT prophylaxis order currently exists.    CODE STATUS:    Level Of Support Discussed With: Health Care Surrogate  Code Status (Patient has no pulse and is not breathing): CPR (Attempt to Resuscitate)  Medical Interventions (Patient has pulse or is breathing): Full Support    Admission Status:  I believe this patient meets inpatient status.    Ben Miller, DO

## 2023-04-10 NOTE — PLAN OF CARE
Problem: Adult Inpatient Plan of Care  Goal: Plan of Care Review  Outcome: Ongoing, Progressing  Flowsheets (Taken 4/10/2023 0605)  Progress: no change  Plan of Care Reviewed With: patient   Goal Outcome Evaluation:  Plan of Care Reviewed With: patient        Progress: no change   New admit tonight. Vitals stable. Pt resting comfortably. No complaints per pt at this time. Will continue to monitor.

## 2023-04-10 NOTE — CONSULTS
"Nutrition Services    Patient Name: Buzz Lopez  YOB: 1945  MRN: [id number]  Admission date: 4/9/2023      CLINICAL NUTRITION ASSESSMENT      Reason for Assessment  Physician consult, EN     H&P:    Past Medical History:   Diagnosis Date   • Dysphagia         Current Problems:   Active Hospital Problems    Diagnosis    • **Febrile illness, acute         Nutrition/Diet History         Narrative     RD assessed pt 2' to consult for EN. Pt is admitted d/t cough, SOB, and fever. Pt is s/p PEG replacement. Pt reports TF regimen to RD. Will order TF and will continue to monitor.     Pt meets criteria for severe malnutrition.      Anthropometrics        Current Height, Weight Height: 172.7 cm (67.99\")  Weight: 57.6 kg (126 lb 15.8 oz)   Current BMI Body mass index is 19.31 kg/m².       Weight Hx  Wt Readings from Last 30 Encounters:   04/10/23 0335 57.6 kg (126 lb 15.8 oz)   04/09/23 2113 57.6 kg (126 lb 15.8 oz)   03/28/23 1709 53.2 kg (117 lb 4.6 oz)   03/24/23 0737 53.1 kg (117 lb 1 oz)   03/14/23 1233 53.8 kg (118 lb 9.7 oz)   03/13/23 1506 51.7 kg (113 lb 15.7 oz)   03/06/23 0824 61.2 kg (135 lb)   06/09/22 0154 61.4 kg (135 lb 5.8 oz)            Wt Change Observation +7% x 2 wks     Estimated/Assessed Needs       Energy Requirements 25-30 kcal/kg IBW   EST Needs (kcal/day) 5878-5087       Protein Requirements 1.0-1.3 g/kg   EST Daily Needs (g/day) 68-88       Fluid Requirements 1 ml/kcal    Estimated Needs (mL/day) 7678-7723     Labs/Medications         Pertinent Labs Reviewed.   Results from last 7 days   Lab Units 04/10/23  0515 04/09/23  2224   SODIUM mmol/L 131* 132*   POTASSIUM mmol/L 4.2 4.5   CHLORIDE mmol/L 93* 93*   CO2 mmol/L 29.0 29.6*   BUN mg/dL 16 13   CREATININE mg/dL 0.60* 0.56*   CALCIUM mg/dL 8.6 8.5*   BILIRUBIN mg/dL  --  0.5   ALK PHOS U/L  --  86   ALT (SGPT) U/L  --  21   AST (SGOT) U/L  --  34   GLUCOSE mg/dL 149* 205*     Results from last 7 days   Lab Units 04/10/23  0515 "   HEMOGLOBIN g/dL 11.7*   HEMATOCRIT % 35.4*     COVID19   Date Value Ref Range Status   04/09/2023 Not Detected Not Detected - Ref. Range Final     Lab Results   Component Value Date    HGBA1C 8.10 (H) 03/17/2023         Pertinent Medications Reviewed.     Current Nutrition Orders & Evaluation of Intake       Oral Nutrition     Current PO Diet NPO Diet NPO Type: Strict NPO   Supplement No active supplement orders       Malnutrition Severity Assessment      Patient meets criteria for : Severe Malnutrition  Malnutrition Type (last 8 hours)     Malnutrition Severity Assessment     Row Name 04/10/23 1545       Malnutrition Severity Assessment    Malnutrition Type Chronic Disease - Related Malnutrition    Row Name 04/10/23 1545       Muscle Loss    Loss of Muscle Mass Findings Severe    Evangelical Region Severe - deep hollowing/scooping, lack of muscle to touch, facial bones well defined    Clavicle Bone Region Severe - protruding prominent bone    Acromion Bone Region Severe - squared shoulders, bones, and acromion process protrusion prominent    Scapular Bone Region Severe - prominent bones, depressions easily visible between ribs, scapula, spine, shoulders    Dorsal Hand Region Severe - prominent depression    Row Name 04/10/23 1545       Fat Loss    Subcutaneous Fat Loss Findings Severe    Orbital Region  Severe - pronounced hollowness/depression, dark circles, loose saggy skin    Upper Arm Region Severe - mostly skin, very little space between folds, fingers touch    Thoracic & Lumbar Region Severe - ribs visible with prominent depressions, iliac crest very prominent    Row Name 04/10/23 1545       Criteria Met (Must meet criteria for severity in at least 2 of these categories: M Wasting, Fat Loss, Fluid, Secondary Signs, Wt. Status, Intake)    Patient meets criteria for  Severe Malnutrition                   Nutrition Diagnosis         Nutrition Dx Problem 1 Severe malnutrition related to Inability to consume  sufficient energy as evidenced by dysphagia, PEG       Nutrition Intervention         Diabetisource AC @ 70 ml/hr w/ 25 ml FWF q4h.    This provides 2016 kcal, 101 g pro, 1528 ml fluid      Medical Nutrition Therapy/Nutrition Education          Learner     Readiness Patient  Acceptance     Method     Response Explanation  Verbalizes understanding     Monitor/Evaluation        Monitor I&O, Pertinent labs, EN delivery/tolerance, Weight, Symptoms       Nutrition Discharge Plan         TF regimen       Electronically signed by:  Nava Echeverria RD  04/10/23 15:47 EDT

## 2023-04-11 ENCOUNTER — READMISSION MANAGEMENT (OUTPATIENT)
Dept: CALL CENTER | Facility: HOSPITAL | Age: 78
End: 2023-04-11
Payer: COMMERCIAL

## 2023-04-11 VITALS
SYSTOLIC BLOOD PRESSURE: 141 MMHG | OXYGEN SATURATION: 92 % | WEIGHT: 126.98 LBS | BODY MASS INDEX: 19.25 KG/M2 | DIASTOLIC BLOOD PRESSURE: 58 MMHG | RESPIRATION RATE: 18 BRPM | HEIGHT: 68 IN | TEMPERATURE: 98.4 F | HEART RATE: 89 BPM

## 2023-04-11 LAB
ANION GAP SERPL CALCULATED.3IONS-SCNC: 11 MMOL/L (ref 5–15)
BUN SERPL-MCNC: 20 MG/DL (ref 8–23)
BUN/CREAT SERPL: 27.8 (ref 7–25)
CALCIUM SPEC-SCNC: 8.3 MG/DL (ref 8.6–10.5)
CHLORIDE SERPL-SCNC: 93 MMOL/L (ref 98–107)
CO2 SERPL-SCNC: 29 MMOL/L (ref 22–29)
CREAT SERPL-MCNC: 0.72 MG/DL (ref 0.76–1.27)
DEPRECATED RDW RBC AUTO: 44.7 FL (ref 37–54)
EGFRCR SERPLBLD CKD-EPI 2021: 92.9 ML/MIN/1.73
ERYTHROCYTE [DISTWIDTH] IN BLOOD BY AUTOMATED COUNT: 14.2 % (ref 12.3–15.4)
GLUCOSE SERPL-MCNC: 176 MG/DL (ref 65–99)
HCT VFR BLD AUTO: 33.2 % (ref 37.5–51)
HGB BLD-MCNC: 11 G/DL (ref 13–17.7)
MAGNESIUM SERPL-MCNC: 1.8 MG/DL (ref 1.6–2.4)
MCH RBC QN AUTO: 28.8 PG (ref 26.6–33)
MCHC RBC AUTO-ENTMCNC: 33.1 G/DL (ref 31.5–35.7)
MCV RBC AUTO: 86.9 FL (ref 79–97)
PHOSPHATE SERPL-MCNC: 3.4 MG/DL (ref 2.5–4.5)
PLATELET # BLD AUTO: 158 10*3/MM3 (ref 140–450)
PMV BLD AUTO: 11.8 FL (ref 6–12)
POTASSIUM SERPL-SCNC: 4.1 MMOL/L (ref 3.5–5.2)
RBC # BLD AUTO: 3.82 10*6/MM3 (ref 4.14–5.8)
SODIUM SERPL-SCNC: 133 MMOL/L (ref 136–145)
WBC NRBC COR # BLD: 4.19 10*3/MM3 (ref 3.4–10.8)

## 2023-04-11 PROCEDURE — 97110 THERAPEUTIC EXERCISES: CPT

## 2023-04-11 PROCEDURE — 83735 ASSAY OF MAGNESIUM: CPT | Performed by: INTERNAL MEDICINE

## 2023-04-11 PROCEDURE — 84100 ASSAY OF PHOSPHORUS: CPT | Performed by: INTERNAL MEDICINE

## 2023-04-11 PROCEDURE — 80048 BASIC METABOLIC PNL TOTAL CA: CPT | Performed by: INTERNAL MEDICINE

## 2023-04-11 PROCEDURE — 97161 PT EVAL LOW COMPLEX 20 MIN: CPT

## 2023-04-11 PROCEDURE — 94799 UNLISTED PULMONARY SVC/PX: CPT

## 2023-04-11 PROCEDURE — 92610 EVALUATE SWALLOWING FUNCTION: CPT

## 2023-04-11 PROCEDURE — 85027 COMPLETE CBC AUTOMATED: CPT | Performed by: INTERNAL MEDICINE

## 2023-04-11 RX ORDER — LORAZEPAM 2 MG/ML
1 CONCENTRATE ORAL EVERY 4 HOURS PRN
Qty: 30 ML | Refills: 0 | Status: SHIPPED | OUTPATIENT
Start: 2023-04-11

## 2023-04-11 RX ORDER — HYOSCYAMINE SULFATE 0.125 MG
TABLET,DISINTEGRATING ORAL EVERY 4 HOURS PRN
Qty: 24 TABLET | Refills: 0 | Status: SHIPPED | OUTPATIENT
Start: 2023-04-11

## 2023-04-11 RX ORDER — MORPHINE SULFATE 20 MG/ML
5 SOLUTION ORAL
Qty: 30 ML | Refills: 0 | Status: SHIPPED | OUTPATIENT
Start: 2023-04-11

## 2023-04-11 NOTE — CONSULTS
Purpose of the visit was to evaluate for: support for patient/family and hospice referral/discussion. Spoke with MD, RN, patient and family and discussed palliative care, goals of care, [facility] and discharge options.      Assessment: Pt sitting up in bed eating ice chips. PEG removed.   Son adament about discharging pt home today.   [facility] unable to do admission today, but son not concerned about having DME and wants pt discharged.   [facility] referral initiated and appointment scheduled to meet at son's home tomorrow at 0930.   Son would like medications sent to [facility] pharmacy and plans to transport pt home by personal vehicle.   Pt decisional and agreed with plans.   MD and RN updated.    Recommendations/Plan: [facility] referral.    Tasks Completed: [facility] admission scheduled for 930 tomorrow. Pt discharging today per his and family's request.    Other Comments: Son reported he set up a room for pt with air mattress. [facility] notified.     Palliative will continue to assist with discharge needs.     DONALD Duong

## 2023-04-11 NOTE — THERAPY EVALUATION
Acute Care - Physical Therapy Initial Evaluation   [facility]     Patient Name: Buzz Lopez  : 1945  MRN: [id number]  Today's Date: 2023      Visit Dx:     ICD-10-CM ICD-9-CM   1. Febrile illness, acute  R50.9 780.60   2. Multifocal pneumonia  J18.9 486   3. Aspiration pneumonitis  J69.0 507.0   4. Decreased activities of daily living (ADL)  Z78.9 V49.89   5. Oropharyngeal dysphagia  R13.12 787.22   6. Difficulty walking  R26.2 719.7     Patient Active Problem List   Diagnosis   • Severe malnutrition   • Aspiration into airway   • Poor dentition   • Mixed hyperlipidemia   • Type 2 diabetes mellitus without complication, without long-term current use of insulin   • S/P percutaneous endoscopic gastrostomy (PEG) tube placement   • Aspiration pneumonia of right lung   • Aortic valve stenosis   • Febrile illness, acute     Past Medical History:   Diagnosis Date   • Dysphagia      Past Surgical History:   Procedure Laterality Date   • BRONCHOSCOPY N/A 3/8/2023    Procedure: BRONCHOSCOPY WITH BRONCHOALVEOLAR LAVAGE, WASHINGS;  Surgeon: Jason Rowley MD;  Location: [facility] ENDOSCOPY;  Service: Pulmonary;  Laterality: N/A;  MUCOUS PLUGGING   • ENDOSCOPY W/ PEG TUBE PLACEMENT N/A 3/22/2023    Procedure: ESOPHAGOGASTRODUODENOSCOPY WITH PERCUTANEOUS ENDOSCOPIC GASTROSTOMY TUBE INSERTION;  Surgeon: Ruslan Guido MD;  Location: [facility] ENDOSCOPY;  Service: Gastroenterology;  Laterality: N/A;  PEG INSERTION   • EYE SURGERY       PT Assessment (last 12 hours)     PT Evaluation and Treatment     Row Name 23 1400          Physical Therapy Time and Intention    Document Type evaluation  -AV     Mode of Treatment individual therapy;physical therapy  -AV     Row Name 23 1400          General Information    Patient Profile Reviewed yes  -AV     Prior Level of Function --  At baseline patient reports (I) with ADLs. ambulated without an assitive device. Was recently discharged to rehab where he required  assist with ADLs and ambulated minimally. No home O2.  -AV     Equipment Currently Used at Home none  -AV     Existing Precautions/Restrictions fall  -AV     Row Name 04/11/23 1400          Living Environment    People in Home other (see comments)  Recently d/c'd to [facility] for rehab  -AV     Row Name 04/11/23 1400          Cognition    Orientation Status (Cognition) oriented x 3  -AV     Row Name 04/11/23 1400          Range of Motion (ROM)    Range of Motion bilateral lower extremities;ROM is WFL  -AV     [facility] Name 04/11/23 1400          Strength (Manual Muscle Testing)    Strength (Manual Muscle Testing) bilateral lower extremities  4-/5  -AV     Row Name 04/11/23 1400          Bed Mobility    Comment, (Bed Mobility) Patient declined out of bed activity this date. Therapist provided encouragement to sit in recliner, patient began getting agitated. Bed mobility and transfers deferred.  -AV     Row Name 04/11/23 1400          Safety Issues, Functional Mobility    Impairments Affecting Function (Mobility) balance;endurance/activity tolerance;strength  -AV     Row Name 04/11/23 1400          Balance    Comment, Balance Unable to assess this date.  -AV     Row Name 04/11/23 1400          Plan of Care Review    Plan of Care Reviewed With patient  -AV     Progress no change  -AV     Outcome Evaluation Patient presents with deficits in balance, strength, transfers, and ambulation. Patient will benefit from skilled PT services to address these mobility deficits and decrease risk of falls. Recommend sub acute rehab following hospital discharge.  -AV     Row Name 04/11/23 1400          Therapy Assessment/Plan (PT)    Rehab Potential (PT) good, to achieve stated therapy goals  -AV     Criteria for Skilled Interventions Met (PT) yes;meets criteria  -AV     Therapy Frequency (PT) daily  -AV     Predicted Duration of Therapy Intervention (PT) 10 days  -AV     Problem List (PT) problems related  to;balance;mobility;strength  -AV     Activity Limitations Related to Problem List (PT) unable to transfer safely;unable to ambulate safely  -AV     Row Name 04/11/23 1400          PT Evaluation Complexity    History, PT Evaluation Complexity no personal factors and/or comorbidities  -AV     Examination of Body Systems (PT Eval Complexity) total of 4 or more elements  -AV     Clinical Presentation (PT Evaluation Complexity) stable  -AV     Clinical Decision Making (PT Evaluation Complexity) low complexity  -AV     Overall Complexity (PT Evaluation Complexity) low complexity  -AV     Row Name 04/11/23 1400          Therapy Plan Review/Discharge Plan (PT)    Therapy Plan Review (PT) evaluation/treatment results reviewed;patient  -AV     Row Name 04/11/23 1400          Physical Therapy Goals    Transfer Goal Selection (PT) transfer, PT goal 1  -AV     Gait Training Goal Selection (PT) gait training, PT goal 1  -AV     Row Name 04/11/23 1400          Transfer Goal 1 (PT)    Activity/Assistive Device (Transfer Goal 1, PT) sit-to-stand/stand-to-sit;bed-to-chair/chair-to-bed;walker, rolling  -AV     [place] Level/Cues Needed (Transfer Goal 1, PT) supervision required  -AV     Time Frame (Transfer Goal 1, PT) 10 days  -AV     Row Name 04/11/23 1400          Gait Training Goal 1 (PT)    Activity/Assistive Device (Gait Training Goal 1, PT) gait (walking locomotion);assistive device use;walker, rolling  -AV     [place] Level (Gait Training Goal 1, PT) supervision required  -AV     Distance (Gait Training Goal 1, PT) 120  -AV     Time Frame (Gait Training Goal 1, PT) 10 days  -AV           User Key  (r) = Recorded By, (t) = Taken By, (c) = Cosigned By    Initials Name Provider Type    Alberto Calix, PT Physical Therapist                Physical Therapy Education     Title: PT OT SLP Therapies (In Progress)     Topic: Physical Therapy (In Progress)     Point: Mobility training (Done)     Learning Progress  Summary           Patient Acceptance, E,TB, VU by AV at 4/11/2023 1436                   Point: Home exercise program (Not Started)     Learner Progress:  Not documented in this visit.          Point: Body mechanics (Done)     Learning Progress Summary           Patient Acceptance, E,TB, VU by AV at 4/11/2023 1436                   Point: Precautions (Done)     Learning Progress Summary           Patient Acceptance, E,TB, VU by AV at 4/11/2023 1436                               User Key     Initials Effective Dates Name Provider Type Discipline    AV 06/11/21 -  Alberto Brown, PT Physical Therapist PT              PT Recommendation and Plan  Anticipated Discharge Disposition (PT): sub acute care setting  Planned Therapy Interventions (PT): balance training, bed mobility training, gait training, neuromuscular re-education, patient/family education, strengthening, transfer training  Therapy Frequency (PT): daily  Plan of Care Reviewed With: patient  Progress: no change  Outcome Evaluation: Patient presents with deficits in balance, strength, transfers, and ambulation. Patient will benefit from skilled PT services to address these mobility deficits and decrease risk of falls. Recommend sub acute rehab following hospital discharge.   Outcome Measures     Row Name 04/11/23 1400             How much help from another person do you currently need...    Turning from your back to your side while in flat bed without using bedrails? 3  -AV      Moving from lying on back to sitting on the side of a flat bed without bedrails? 3  -AV      Moving to and from a bed to a chair (including a wheelchair)? 3  -AV      Standing up from a chair using your arms (e.g., wheelchair, bedside chair)? 3  -AV      Climbing 3-5 steps with a railing? 2  -AV      To walk in hospital room? 2  -AV      AM-PAC 6 Clicks Score (PT) 16  -AV         Functional Assessment    Outcome Measure Options AM-PAC 6 Clicks Basic Mobility (PT)  -AV             User Key  (r) = Recorded By, (t) = Taken By, (c) = Cosigned By    Initials Name Provider Type    AV Alberto Brown, PT Physical Therapist                 Time Calculation:    PT Charges     Row Name 04/11/23 1435             Time Calculation    PT Received On 04/11/23  -AV      PT Goal Re-Cert Due Date 04/20/23  -AV         Untimed Charges    PT Eval/Re-eval Minutes 32  -AV         Total Minutes    Untimed Charges Total Minutes 32  -AV       Total Minutes 32  -AV            User Key  (r) = Recorded By, (t) = Taken By, (c) = Cosigned By    Initials Name Provider Type    AV Alberto Brown, PT Physical Therapist              Therapy Charges for Today     Code Description Service Date Service Provider Modifiers Qty    85313613848 HC PT EVAL LOW COMPLEXITY 3 4/11/2023 Alberto Brown, PT GP 1          PT G-Codes  Outcome Measure Options: AM-PAC 6 Clicks Basic Mobility (PT)  AM-PAC 6 Clicks Score (PT): 16  AM-PAC 6 Clicks Score (OT): 15    Alberto Brown PT  4/11/2023

## 2023-04-11 NOTE — PROGRESS NOTES
[facility]   Hospitalist Progress Note  Date: 2023  Patient Name: Buzz Lopez  : 1945  MRN: [id number]  Date of admission: 2023  Consultants:   -Gastroenterology: Dr. Jesse Guzman    Subjective   Subjective     Chief Complaint: Cough and shortness of breath    Summary:   Buzz Lopez is a 77 y.o. male with history of dysphagia s/p PEG tube placement, is severe protein calorie malnutrition, severe aortic stenosis, type 2 diabetes mellitus who presented to the ED from nursing home due to cough and shortness of breath.  Eval in ED significant for patient being febrile and tachycardic.  CXR demonstrated bilateral infiltrates.  Hospitalist service contacted for further evaluation and management.  Antibiotics initiated.  PEG tube was replaced by gastroenterology on 04/10/2023 and position confirmed.  Patient subsequently removed PEG tube again the evening of 04/10/2023/morning of 2023.  Family considering pursuing palliative/hospice care at home.    Interval Followup:   Nursing noted that patient removed PEG tube earlier this morning/late last night.  Family considering pursuing palliative/hospice care at home at this time.  No additional acute issues per nursing.    Antibiotics:   -Vancomycin  -Zosyn    Review of Systems   All systems reviewed and negative unless stated otherwise under subjective.    Objective   Objective     Vitals:   Temp:  [98.1 °F (36.7 °C)-100.6 °F (38.1 °C)] 98.8 °F (37.1 °C)  Heart Rate:  [] 89  Resp:  [18] 18  BP: (144-164)/(45-76) 147/76  Physical Exam   Gen: Chronically ill-appearing male, sitting up in bed, no acute distress, pleasant   Resp: Equal chest is bilaterally, normal respiratory effort, right basilar crackles noted  Card: RRR, No m/r/g  Abd: soft, Nontender, Nondistended, + bowel sounds      Result Review    Result Review:  I have personally reviewed the results as below and agree with these findings:  []  Laboratory:   CMP        2023     22:24 4/10/2023    05:15 4/11/2023    05:33   CMP   Glucose 205   149   176     BUN 13   16   20     Creatinine 0.56   0.60   0.72     EGFR 100.3   98.2   92.9     Sodium 132   131   133     Potassium 4.5   4.2   4.1     Chloride 93   93   93     Calcium 8.5   8.6   8.3     Total Protein 7.9       Albumin 3.1       Globulin 4.8       Total Bilirubin 0.5       Alkaline Phosphatase 86       AST (SGOT) 34       ALT (SGPT) 21       Albumin/Globulin Ratio 0.6       BUN/Creatinine Ratio 23.2   26.7   27.8     Anion Gap 9.4   9.0   11.0       CBC        4/9/2023    22:24 4/10/2023    05:15 4/11/2023    05:33   CBC   WBC 5.70   4.89   4.19     RBC 4.03   3.98   3.82     Hemoglobin 11.8   11.7   11.0     Hematocrit 34.7   35.4   33.2     MCV 86.1   88.9   86.9     MCH 29.3   29.4   28.8     MCHC 34.0   33.1   33.1     RDW 14.1   14.0   14.2     Platelets 143   146   158        Magnesium and phosphorus within normal limits    [x]  Microbiology: Blood culture (04/10/2023): No growth to date  [x]  Radiology:   [x]  EKG/Telemetry:    []  Cardiology/Vascular:    []  Pathology:  []  Old records:  []  Other:    Assessment & Plan   Assessment / Plan     Assessment:  Right-sided pneumonia concern for aspiration  Sepsis secondary to above, present on admission (fever, tachycardia, source)  Therapeutic drug level monitoring, vancomycin  Recurrent dysphagia s/p PEG tube placement  PEG tube malpositioned s/p repositioning by GI service and subsequently removed by patient  Severe protein calorie malnutrition with PEG tube and tube feeds    Plan:  -Patient removed PEG tube again last night.  Per report from nursing patient and family interested in pursuing palliative/hospice care.  Palliative care consulted  -Continue broad-spectrum antibiotics, tailor based on results of infectious work-up.  -PT/OT/speech therapy consulted  -Continue appropriate home medications  -Nutrition consulted  -Monitor electrolytes and renal function with BMP  and magnesium level in the AM  -Monitor WBC and Hgb with CBC in the AM  -Clinical course will dictate further management     DVT Prophylaxis: Heparin  GI Prophylaxis: Pantoprazole  Diet: NPO  Dispo: Wound care consulted.  Possibility of patient discharging home with hospice care.  Code Status: Full code     Personally reviewed patients labs and imaging, discussed with patient and nurse at bedside. Discussed case with the following consultants: Gastroenterology.     Part of this note may be an electronic transcription/translation of spoken language to printed text using the Dragon dictation system.    DVT prophylaxis:  Medical DVT prophylaxis orders are present.    CODE STATUS:   Level Of Support Discussed With: Health Care Surrogate  Code Status (Patient has no pulse and is not breathing): CPR (Attempt to Resuscitate)  Medical Interventions (Patient has pulse or is breathing): Full Support      Electronically signed by Kervin Carroll MD, 04/11/23, 11:10 AM EDT.

## 2023-04-11 NOTE — THERAPY TREATMENT NOTE
Patient Name: Buzz Lopez  : 1945    MRN: [id number]                              Today's Date: 2023       Admit Date: 2023    Visit Dx:     ICD-10-CM ICD-9-CM   1. Febrile illness, acute  R50.9 780.60   2. Multifocal pneumonia  J18.9 486   3. Aspiration pneumonitis  J69.0 507.0   4. Decreased activities of daily living (ADL)  Z78.9 V49.89   5. Oropharyngeal dysphagia  R13.12 787.22   6. Difficulty walking  R26.2 719.7     Patient Active Problem List   Diagnosis   • Severe malnutrition   • Aspiration into airway   • Poor dentition   • Mixed hyperlipidemia   • Type 2 diabetes mellitus without complication, without long-term current use of insulin   • S/P percutaneous endoscopic gastrostomy (PEG) tube placement   • Aspiration pneumonia of right lung   • Aortic valve stenosis   • Febrile illness, acute     Past Medical History:   Diagnosis Date   • Dysphagia      Past Surgical History:   Procedure Laterality Date   • BRONCHOSCOPY N/A 3/8/2023    Procedure: BRONCHOSCOPY WITH BRONCHOALVEOLAR LAVAGE, WASHINGS;  Surgeon: Jason Rowley MD;  Location: [facility] ENDOSCOPY;  Service: Pulmonary;  Laterality: N/A;  MUCOUS PLUGGING   • ENDOSCOPY W/ PEG TUBE PLACEMENT N/A 3/22/2023    Procedure: ESOPHAGOGASTRODUODENOSCOPY WITH PERCUTANEOUS ENDOSCOPIC GASTROSTOMY TUBE INSERTION;  Surgeon: Ruslan Guido MD;  Location: [facility] ENDOSCOPY;  Service: Gastroenterology;  Laterality: N/A;  PEG INSERTION   • EYE SURGERY        General Information     Row Name 23 1439          OT Time and Intention    Document Type therapy note (daily note)  -     Mode of Treatment individual therapy;occupational therapy  -           User Key  (r) = Recorded By, (t) = Taken By, (c) = Cosigned By    Initials Name Provider Type    LF Leah Resendez OT Occupational Therapist                 Mobility/ADL's    No documentation.                Obj/Interventions     Row Name 23 1439          Shoulder (Therapeutic  Exercise)    Shoulder (Therapeutic Exercise) strengthening exercise  -     Shoulder Strengthening (Therapeutic Exercise) bilateral;resistance band;yellow;10 repetitions;2 sets  Shoulder flexion/extension, forward reach, and horizontal abduction/adduction  -     Row Name 04/11/23 1439          Elbow/Forearm (Therapeutic Exercise)    Elbow/Forearm (Therapeutic Exercise) strengthening exercise  -     Elbow/Forearm Strengthening (Therapeutic Exercise) bilateral;flexion;extension;resistance band;yellow;10 repetitions;2 sets  -[facility] Name 04/11/23 1439          Motor Skills    Motor Skills functional endurance  -     Functional Endurance Fair-  -     Therapeutic Exercise elbow/forearm;shoulder  -[facility] Name 04/11/23 1439          Balance    Balance Assessment sitting dynamic balance  -     Dynamic Sitting Balance supervision  -     Position, Sitting Balance supported;long sitting  -     Balance Interventions sitting;supported;dynamic;UE activity with balance activity  -           User Key  (r) = Recorded By, (t) = Taken By, (c) = Cosigned By    Initials Name Provider Type     Leah Resendez, OT Occupational Therapist               Goals/Plan    No documentation.                Clinical Impression     [facility] Name 04/11/23 1440          Pain Assessment    Additional Documentation Pain Scale: FACES Pre/Post-Treatment (Group)  -LF     Row Name 04/11/23 1440          Pain Scale: FACES Pre/Post-Treatment    Pain: FACES Scale, Pretreatment 0-->no hurt  -     Posttreatment Pain Rating 0-->no hurt  -LF     Row Name 04/11/23 1440          Plan of Care Review    Plan of Care Reviewed With patient  -     Progress improving  -     Outcome Evaluation Patient agreeable only to BUE exercises in bed, demonstrating fair- endurance. He would benefit from continued OT services to maximize independence.  -[facility] Name 04/11/23 1440          Vital Signs    O2 Delivery Pre Treatment room air  -     O2  Delivery Intra Treatment room air  -LF     O2 Delivery Post Treatment room air  -LF           User Key  (r) = Recorded By, (t) = Taken By, (c) = Cosigned By    Initials Name Provider Type    Leah Velazquez OT Occupational Therapist               Outcome Measures     Row Name 04/11/23 1441          How much help from another is currently needed...    Putting on and taking off regular lower body clothing? 2  -LF     Bathing (including washing, rinsing, and drying) 2  -LF     Toileting (which includes using toilet bed pan or urinal) 1  -LF     Putting on and taking off regular upper body clothing 3  -LF     Taking care of personal grooming (such as brushing teeth) 3  -LF     Eating meals 4  -LF     AM-PAC 6 Clicks Score (OT) 15  -LF     Row Name 04/11/23 1400 04/11/23 0800       How much help from another person do you currently need...    Turning from your back to your side while in flat bed without using bedrails? 3  -AV 3  -AM    Moving from lying on back to sitting on the side of a flat bed without bedrails? 3  -AV 3  -AM    Moving to and from a bed to a chair (including a wheelchair)? 3  -AV 2  -AM    Standing up from a chair using your arms (e.g., wheelchair, bedside chair)? 3  -AV 2  -AM    Climbing 3-5 steps with a railing? 2  -AV 2  -AM    To walk in hospital room? 2  -AV 2  -AM    AM-PAC 6 Clicks Score (PT) 16  -AV 14  -AM    Highest level of mobility 5 --> Static standing  -AV 4 --> Transferred to chair/commode  -AM    Row Name 04/11/23 1441 04/11/23 1400       Functional Assessment    Outcome Measure Options AM-PAC 6 Clicks Daily Activity (OT);Optimal Instrument  -LF AM-PAC 6 Clicks Basic Mobility (PT)  -AV    Row Name 04/11/23 1441          Optimal Instrument    Optimal Instrument Optimal - 3  -LF     Bending/Stooping 3  -LF     Standing 2  -LF     Reaching 1  -LF     From the list, choose the 3 activities you would most like to be able to do without any difficulty Bending/stooping;Standing;Reaching   -LF     Total Score Optimal - 3 6  -LF           User Key  (r) = Recorded By, (t) = Taken By, (c) = Cosigned By    Initials Name Provider Type    Lisa Gallardo, RN Registered Nurse     Leah Resendez, OT Occupational Therapist    Alberto Calix, PT Physical Therapist                Occupational Therapy Education     Title: PT OT SLP Therapies (In Progress)     Topic: Occupational Therapy (In Progress)     Point: ADL training (In Progress)     Description:   Instruct learner(s) on proper safety adaptation and remediation techniques during self care or transfers.   Instruct in proper use of assistive devices.              Learning Progress Summary           Patient Acceptance, E,TB, NR by  at 4/10/2023 1424                   Point: Home exercise program (Not Started)     Description:   Instruct learner(s) on appropriate technique for monitoring, assisting and/or progressing therapeutic exercises/activities.              Learner Progress:  Not documented in this visit.          Point: Precautions (In Progress)     Description:   Instruct learner(s) on prescribed precautions during self-care and functional transfers.              Learning Progress Summary           Patient Acceptance, E,TB, NR by  at 4/10/2023 1424                   Point: Body mechanics (In Progress)     Description:   Instruct learner(s) on proper positioning and spine alignment during self-care, functional mobility activities and/or exercises.              Learning Progress Summary           Patient Acceptance, E,TB, NR by  at 4/10/2023 1424                               User Key     Initials Effective Dates Name Provider Type [facility] 06/16/21 -  Leah Resendez, OT Occupational Therapist OT              OT Recommendation and Plan  Planned Therapy Interventions (OT): activity tolerance training, patient/caregiver education/training, BADL retraining, functional balance retraining, occupation/activity based  interventions, strengthening exercise, transfer/mobility retraining  Therapy Frequency (OT): 5 times/wk  Plan of Care Review  Plan of Care Reviewed With: patient  Progress: improving  Outcome Evaluation: Patient agreeable only to BUE exercises in bed, demonstrating fair- endurance. He would benefit from continued OT services to maximize independence.     Time Calculation:    Time Calculation- OT     Row Name 04/11/23 1441             Time Calculation- OT    OT Received On 04/11/23  -LF      OT Goal Re-Cert Due Date 04/19/23  -LF         Timed Charges    06510 - OT Therapeutic Exercise Minutes 10  -LF         Total Minutes    Timed Charges Total Minutes 10  -LF       Total Minutes 10  -LF            User Key  (r) = Recorded By, (t) = Taken By, (c) = Cosigned By    Initials Name Provider Type    LF Leah Resendez OT Occupational Therapist              Therapy Charges for Today     Code Description Service Date Service Provider Modifiers Qty    90882668852  OT EVAL LOW COMPLEXITY 4 4/10/2023 Leah Resendez OT GO 1    [id number]  OT THER PROC EA 15 MIN 4/11/2023 Leah Resendez OT GO 1               Leah Resendez OT  4/11/2023

## 2023-04-11 NOTE — CONSULTS
"Nutrition Services    Patient Name: Buzz Lopez  YOB: 1945  MRN: [id number]  Admission date: 4/9/2023      CLINICAL NUTRITION ASSESSMENT      Reason for Assessment  Follow-up protocol     H&P:    Past Medical History:   Diagnosis Date   • Dysphagia         Current Problems:   Active Hospital Problems    Diagnosis    • **Febrile illness, acute         Nutrition/Diet History         Narrative     Nutrition follow up. Pt pulled PEG out. Family is wanting comfort measures and to d/c home today per RN. Palliative consulted. RD will sign off at this time. If further clinical nutrition services are desired, please re-consult.       Anthropometrics        Current Height, Weight Height: 172.7 cm (67.99\")  Weight: 57.6 kg (126 lb 15.8 oz)   Current BMI Body mass index is 19.31 kg/m².       Weight Hx  Wt Readings from Last 30 Encounters:   04/10/23 0335 57.6 kg (126 lb 15.8 oz)   04/09/23 2113 57.6 kg (126 lb 15.8 oz)   03/28/23 1709 53.2 kg (117 lb 4.6 oz)   03/24/23 0737 53.1 kg (117 lb 1 oz)   03/14/23 1233 53.8 kg (118 lb 9.7 oz)   03/13/23 1506 51.7 kg (113 lb 15.7 oz)   03/06/23 0824 61.2 kg (135 lb)   06/09/22 0154 61.4 kg (135 lb 5.8 oz)            Wt Change Observation +7% x 2 wks     Estimated/Assessed Needs       Energy Requirements 25-30 kcal/kg IBW   EST Needs (kcal/day) 4354-4382       Protein Requirements 1.0-1.3 g/kg   EST Daily Needs (g/day) 68-88       Fluid Requirements 1 ml/kcal    Estimated Needs (mL/day) 6988-3939     Labs/Medications         Pertinent Labs Reviewed.   Results from last 7 days   Lab Units 04/11/23  0533 04/10/23  0515 04/09/23  2224   SODIUM mmol/L 133* 131* 132*   POTASSIUM mmol/L 4.1 4.2 4.5   CHLORIDE mmol/L 93* 93* 93*   CO2 mmol/L 29.0 29.0 29.6*   BUN mg/dL 20 16 13   CREATININE mg/dL 0.72* 0.60* 0.56*   CALCIUM mg/dL 8.3* 8.6 8.5*   BILIRUBIN mg/dL  --   --  0.5   ALK PHOS U/L  --   --  86   ALT (SGPT) U/L  --   --  21   AST (SGOT) U/L  --   --  34   GLUCOSE mg/dL " 176* 149* 205*     Results from last 7 days   Lab Units 04/11/23  0533   MAGNESIUM mg/dL 1.8   PHOSPHORUS mg/dL 3.4   HEMOGLOBIN g/dL 11.0*   HEMATOCRIT % 33.2*     COVID19   Date Value Ref Range Status   04/09/2023 Not Detected Not Detected - Ref. Range Final     Lab Results   Component Value Date    HGBA1C 8.10 (H) 03/17/2023         Pertinent Medications Reviewed.     Current Nutrition Orders & Evaluation of Intake       Oral Nutrition     Current PO Diet NPO Diet NPO Type: Strict NPO   Supplement Orders Placed This Encounter      Diet, Tube Feeding Tube Feeding Formula: Diabetisource AC; Tube Feeding Type: Continuous; Continuous Tube Feeding Start Rate (mL/hr): 20; Then Advance Rate By (mL/hr): 20; Every __ Hours: 4; To Goal Rate of (mL/hr): 70       Malnutrition Severity Assessment      Patient meets criteria for : Severe Malnutrition         Nutrition Diagnosis         Nutrition Dx Problem 1 Severe malnutrition related to Inability to consume sufficient energy as evidenced by dysphagia, PEG       Nutrition Intervention         TF on hold 2' to PEG being removed.  Per SLP, sips of liquids prn    Pt to CMO     Medical Nutrition Therapy/Nutrition Education          Learner     Readiness N/A  N/A     Method     Response N/A  N/A     Monitor/Evaluation        Monitor Per protocol       Nutrition Discharge Plan         No nutrition discharge needs identified at this time       Electronically signed by:  Nava Echeverria RD  04/11/23 12:48 EDT

## 2023-04-11 NOTE — THERAPY EVALUATION
Acute Care - Speech Language Pathology   Swallow Initial Evaluation  [place]     Patient Name: Buzz Lopez  : 1945  MRN: [id number]  Today's Date: 2023               Admit Date: 2023    Visit Dx:     ICD-10-CM ICD-9-CM   1. Febrile illness, acute  R50.9 780.60   2. Multifocal pneumonia  J18.9 486   3. Aspiration pneumonitis  J69.0 507.0   4. Decreased activities of daily living (ADL)  Z78.9 V49.89   5. Oropharyngeal dysphagia  R13.12 787.22     Patient Active Problem List   Diagnosis   • Severe malnutrition   • Aspiration into airway   • Poor dentition   • Mixed hyperlipidemia   • Type 2 diabetes mellitus without complication, without long-term current use of insulin   • S/P percutaneous endoscopic gastrostomy (PEG) tube placement   • Aspiration pneumonia of right lung   • Aortic valve stenosis   • Febrile illness, acute     Past Medical History:   Diagnosis Date   • Dysphagia      Past Surgical History:   Procedure Laterality Date   • BRONCHOSCOPY N/A 3/8/2023    Procedure: BRONCHOSCOPY WITH BRONCHOALVEOLAR LAVAGE, WASHINGS;  Surgeon: Jason Rowley MD;  [facility] END[facility];  Service: Pulmonary;  Laterality: N/A;  MUCOUS PLUGGING   • ENDOSCOPY W/ PEG TUBE PLACEMENT N/A 3/22/2023    Procedure: ESOPHAGOGASTRODUODENOSCOPY WITH PERCUTANEOUS ENDOSCOPIC GASTROSTOMY TUBE INSERTION;  Surgeon: Ruslan Guido MD;  Location: [facility] END[facility];  Service: Gastroenterology;  Laterality: N/A;  PEG INSERTION   • EYE SURGERY         Inpatient Speech Pathology Dysphagia Evaluation           PAIN SCALE: None indicated.     PRECAUTIONS/CONTRAINDICATIONS: Standard     SUSPECTED ABUSE/NEGLECT/EXPLOITATION: None indicated     SOCIAL/PSYCHOLOGICAL NEEDS/BARRIERS: None indicated.     PAST SOCIAL HISTORY: 77-year-old male lives at home with family, recently attending rehab.     PRIOR FUNCTION: NPO     PATIENT GOALS/EXPECTATIONS:  To eat orally.     HISTORY: 77-year-old male with the above diagnosis  referred for speech therapy evaluation to assess for swallow.  Modified barium swallow study was completed 3/15/2023 with silent aspiration noted.  Patient had been receiving speech pathology services for dysphagia.  Patient reports continuing n.p.o. while in rehab.  Patient wanting to drink water.  Patient noted to have recently pulled own PEG.     CURRENT DIET LEVEL: NPO     OBJECTIVE:    TEST ADMINISTERED: Clinical dysphagia evaluation     COGNITION/SAFETY AWARENESS: Patient followed basic directions and responded to simple questions.   Some confusion noted.    BEHAVIORAL OBSERVATIONS: Alert and cooperative     ORAL MOTOR EXAM: Poor dentition, general decreased oral motor strength/range of motion.      VOICE QUALITY: Wet at times, patient did clear.     REFLEX EXAM: Patient demonstrated throat clear     POSTURE: Assisted sitting upright in bed     FEEDING/SWALLOWING FUNCTION:  Ice chips, sips of water, nectar thickened liquid.     CLINICAL OBSERVATIONS:  Trials of ice chips x3with delayed swallow, laryngeal elevation is irregular at times.  Patient producing multiple swallow each trial.  Occasional throat clearing noted.  Patient maintaining clear vocal quality.   Controlled sips of thin liquid x5 with multiple swallow, throat clearing noted..    Sips of nectar liquid by spoon with delayed swallow, multiple swallow produced, vocal quality remained clear.     DYSPHAGIA CRITERIA: decreased tongue base strength, decreased pharyngeal strength, decreased laryngeal elevation.  History of annie aspiration noted with no immediate cough, further aspiration with residues during recent modified barium swallow study.     FUNCTIONAL ASSESSMENT INSTRUMENT: Patient currently scored a level 2 of 7 on Functional Communication Measures for swallowing indicating a 80-99 % limitation in function.     ASSESSMENT/ PLAN OF CARE:  No direct speech therapy is recommended at this time for dysphagia.  Patient is currently without PEG and  plans to return home with hospice.                 RECOMMENDATIONS:   1.   DIET:  Cannot recommend safe p.o. diet.  Recommend single small sips of liquids PRN.  Allow time for multiple swallow.                                Anticipated Discharge Disposition (SLP): home with assist (04/11/23 1148)                                                            EDUCATION  The patient has been educated in the following areas:   NPO rationale.              Time Calculation:    Time Calculation- SLP     Row Name 04/11/23 1148             Time Calculation- SLP    SLP Start Time 1045  -TB      SLP Stop Time 1145  -TB      SLP Time Calculation (min) 60 min  -TB      SLP Received On 04/11/23  -TB         Untimed Charges    SLP Eval/Re-eval  ST Eval Oral Pharyng Swallow - 83051  -TB      64874-HG Eval Oral Pharyng Swallow Minutes 60  -TB         Total Minutes    Untimed Charges Total Minutes 60  -TB       Total Minutes 60  -TB            User Key  (r) = Recorded By, (t) = Taken By, (c) = Cosigned By    Initials Name Provider Type    TB Terri Gomez SLP Speech and Language Pathologist                Therapy Charges for Today     Code Description Service Date Service Provider Modifiers Qty    46390785256  ST EVAL ORAL PHARYNG SWALLOW 4 4/11/2023 Terri Gomez SLP GN 1               JANAY Julio  4/11/2023

## 2023-04-11 NOTE — PLAN OF CARE
Goal Outcome Evaluation:  Plan of Care Reviewed With: patient         VSS stable this shift, patient and family to discuss palliative home care.

## 2023-04-11 NOTE — PLAN OF CARE
Goal Outcome Evaluation:  Plan of Care Reviewed With: patient      ASSESSMENT/ PLAN OF CARE:  No direct speech therapy is recommended at this time for dysphagia.  Patient is currently without PEG and plans to return home with hospice.                 RECOMMENDATIONS:   1.   DIET:  Cannot recommend safe p.o. diet.  Recommend single small sips of liquids PRN.  Allow time for multiple swallow.

## 2023-04-11 NOTE — DISCHARGE SUMMARY
[facility]         HOSPITALIST  DISCHARGE SUMMARY    Patient Name: Buzz Lopez  : 1945  MRN: [id number]    Date of Admission: 2023  Date of Discharge:  23  Primary Care Physician: Provider, No Known    Consultants:  -Gastroenterology: Dr. Jesse Erickson [facility] Problems:  Right-sided pneumonia concern for aspiration pneumonia  Sepsis secondary to above, present on admission (fever, tachycardia, source)  Therapeutic drug level monitoring, vancomycin  Recurrent dysphagia s/p PEG tube placement  PEG tube malpositioned s/p repositioning by GI service and subsequently removed by patient  Severe protein calorie malnutrition with PEG tube and tube feeds    Hospital Course     Hospital Course:  Buzz Lopez is a 77 y.o. male with history of dysphagia s/p PEG tube placement, is severe protein calorie malnutrition, severe aortic stenosis, type 2 diabetes mellitus who presented to the ED from nursing home due to cough and shortness of breath.  Eval in ED significant for patient being febrile and tachycardic.  CXR demonstrated bilateral infiltrates.  Hospitalist service contacted for further evaluation and management.  Antibiotics initiated.  PEG tube was replaced by gastroenterology on 04/10/2023 and position confirmed.  Patient subsequently removed PEG tube again the evening of 04/10/2023 morning of 2023.  After discussion with palliative care, family wishes to pursue hospice care/comfort measures at home, patient will discharge home with plan for hospice meeting morning of 2023.    Day of Discharge     Vital Signs:  Temp:  [98.1 °F (36.7 °C)-100.6 °F (38.1 °C)] 98.4 °F (36.9 °C)  Heart Rate:  [] 89  Resp:  [18] 18  BP: (141-164)/(45-76) 141/58  Physical Exam:   Gen: Chronically ill-appearing male, sitting up in bed, no acute distress, pleasant   Resp: Equal chest is bilaterally, normal respiratory effort, right basilar crackles noted  Card: RRR, No  m/r/g  Abd: soft, Nontender, Nondistended, + bowel sounds    Discharge Details        Discharge Medications      New Medications      Instructions Start Date   hyoscyamine 0.125 MG SL tablet  Commonly known as: LEVSIN   0.125 mg, Sublingual, Every 4 Hours PRN      LORazepam 1 MG/0.5ML concentrated solution  Commonly known as: ATIVAN   1 mg, Oral, Every 4 Hours PRN      morphine 20 mg/mL solution concentrated solution   5 mg, Oral, Every 3 Hours PRN         Stop These Medications    aspirin 81 MG chewable tablet     atorvastatin 20 MG tablet  Commonly known as: LIPITOR     metFORMIN 850 MG tablet  Commonly known as: GLUCOPHAGE     metoprolol tartrate 25 MG tablet  Commonly known as: LOPRESSOR     multivitamin with minerals tablet tablet     polyethylene glycol 17 g packet  Commonly known as: MIRALAX            No Known Allergies    Discharge Disposition:  Home or Self Care    Diet:  Hospital:  Diet Order   Procedures   • NPO Diet NPO Type: Strict NPO       Discharge Activity:   Activity Instructions     Activity as Tolerated            CODE STATUS:  Code Status and Medical Interventions:   Ordered at: 04/10/23 0149     Level Of Support Discussed With:    Health Care Surrogate     Code Status (Patient has no pulse and is not breathing):    CPR (Attempt to Resuscitate)     Medical Interventions (Patient has pulse or is breathing):    Full Support       Future Appointments   Date Time Provider Department Center   4/20/2023  9:00 AM Gayathri Lo APRN [facility]         Pertinent  and/or Most Recent Results     RADIOLOGY:  IR J or G Tube Contrast Eval [[id number]] Otilio as Reviewed   Order Status: Completed Collected: 04/10/23 1355    Updated: 04/10/23 1452   Narrative:     PROCEDURE: IR J OR G TUBE CONTRAST EVAL       COMPARISON: None       INDICATIONS: Confirm PEG tube placement F.T. 1.1   mGy 238.7       FINDINGS:       Initial KUB obtained demonstrating G-tube in the left upper quadrant.  Water soluble contrast    agent, Gastrografin, was injected into the patient's percutaneous gastrostomy (peg) tube.  The   contrast agent opacifies the stomach and proximal duodenum.  It is thought to be in satisfactory   position.  There is no evidence of leak of the contrast agent into the peritoneum or abdominal   wall.       Impression:     Percutaneous gastric tube in satisfactory position in the stomach with no evidence of leak.                  SOCORRO REBOLLAR         Electronically Signed and Approved By: SIGRID DIAMOND MD on 4/10/2023 at 14:49                       XR Chest 1 View [[id number]] Otilio as Reviewed   Order Status: Completed Collected: 04/09/23 2319    Updated: 04/09/23 2322   Narrative:     PROCEDURE: XR CHEST 1 VW       COMPARISON: 3/10/2023.       INDICATIONS: CHF/COPD.       FINDINGS: Two AP upright portable views of the chest are provided for review.  Bilateral   infiltrates are seen.  The findings may represent infectious multifocal pneumonia.  Pulmonary edema   would be in the differential diagnosis.  No cardiomediastinal enlargement.  No pneumothorax.  No   pneumomediastinum.  There is slight pulmonary hypoinflation.  External artifacts obscure detail.   The thoracic aorta is atherosclerotic.  Degenerative changes involve the imaged spine and the right   shoulder.  The left shoulder is not as well seen; it is not included in the field of view to the   same extent as the right shoulder.  Minimal blunting of the right lateral costophrenic sulcus is   seen, similar to the prior study.  Probably no pleural effusion.       Impression:     New bilateral infiltrates are suspected, greatest in the mid to basilar right lung.     The findings may represent infectious multifocal pneumonia.  Pulmonary edema is possible.                       Please note that portions of this note were completed with a voice recognition program.       DEBI KING JR, MD         Electronically Signed and Approved By: DEBI KING JR, MD on  4/09/2023 at 23:19            LAB RESULTS:      Lab 04/11/23  0533 04/10/23  0515 04/10/23  0017 04/09/23  2224   WBC 4.19 4.89  --  5.70   HEMOGLOBIN 11.0* 11.7*  --  11.8*   HEMATOCRIT 33.2* 35.4*  --  34.7*   PLATELETS 158 146  --  143   NEUTROS ABS  --  3.61  --  4.76   IMMATURE GRANS (ABS)  --  0.01  --  0.01   LYMPHS ABS  --  0.85  --  0.61*   MONOS ABS  --  0.40  --  0.31   EOS ABS  --  0.00  --  0.00   MCV 86.9 88.9  --  86.1   LACTATE  --   --  1.3  --          Lab 04/11/23  0533 04/10/23  0515 04/09/23  2224   SODIUM 133* 131* 132*   POTASSIUM 4.1 4.2 4.5   CHLORIDE 93* 93* 93*   CO2 29.0 29.0 29.6*   ANION GAP 11.0 9.0 9.4   BUN 20 16 13   CREATININE 0.72* 0.60* 0.56*   EGFR 92.9 98.2 100.3   GLUCOSE 176* 149* 205*   CALCIUM 8.3* 8.6 8.5*   MAGNESIUM 1.8  --   --    PHOSPHORUS 3.4  --   --          Lab 04/09/23  2224   TOTAL PROTEIN 7.9   ALBUMIN 3.1*   GLOBULIN 4.8   ALT (SGPT) 21   AST (SGOT) 34   BILIRUBIN 0.5   ALK PHOS 86         Lab 04/09/23  2224   PROBNP 7,723.0*   HSTROP T 36*                 Brief Urine Lab Results  (Last result in the past 365 days)      Color   Clarity   Blood   Leuk Est   Nitrite   Protein   CREAT   Urine HCG        03/28/23 1756 Yellow   Clear   Negative   Trace   Negative   Negative               Microbiology Results (last 10 days)     Procedure Component Value - Date/Time    MRSA Screen, PCR (Inpatient) - Swab, Nares [[id number]]  (Normal) Collected: 04/10/23 0928    Lab Status: Final result Specimen: Swab from Nares Updated: 04/10/23 1048     MRSA PCR No MRSA Detected    Narrative:      The negative predictive value of this diagnostic test is high and should only be used to consider de-escalating anti-MRSA therapy. A positive result may indicate colonization with MRSA and must be correlated clinically.    Blood Culture - Blood, Arm, Left [[id number]]  (Normal) Collected: 04/10/23 0017    Lab Status: Preliminary result Specimen: Blood from Arm, Left Updated: 04/11/23 0030      Blood Culture No growth at 24 hours    Blood Culture - Blood, Arm, Left [[id number]]  (Normal) Collected: 04/10/23 0017    Lab Status: Preliminary result Specimen: Blood from Arm, Left Updated: 04/11/23 0030     Blood Culture No growth at 24 hours    COVID-19,APTIMA PANTHER(AHSAN),[facility], NP/OP SWAB IN UTM/VTM/SALINE TRANSPORT MEDIA,24 HR TAT - Swab, Nasal Cavity [[id number]]  (Normal) Collected: 04/09/23 2227    Lab Status: Final result Specimen: Swab from Nasal Cavity Updated: 04/10/23 0621     COVID19 Not Detected    Narrative:      Fact sheet for providers: https://www.fda.gov/media/450777/download     Fact sheet for patients: https://www.fda.gov/media/609797/download    Test performed by RT PCR.    Influenza Antigen, Rapid - Swab, Nasopharynx [[id number]]  (Normal) Collected: 04/09/23 2227    Lab Status: Final result Specimen: Swab from Nasopharynx Updated: 04/09/23 2253     Influenza A Ag, EIA Negative     Influenza B Ag, EIA Negative          Results for orders placed during the hospital encounter of 03/06/23    Adult Transesophageal Echo (LAI) W/ Cont if Necessary Per Protocol    Interpretation Summary  The patient came down to the cardiovascular lab where he was prepped for LAI and was sedated with IV Versed and Demerol.  NG tube was in place.  He had frequent cough and was agitation.  An attempt to introduce the LAI probe was unsuccessful.  Therefore, the procedure was aborted.  We will try to do it later on after he will undergo a PEG placement and the NG tube will be withdrawn.      Labs Pending at Discharge:  Pending Labs     Order Current Status    Blood Culture - Blood, Arm, Left Preliminary result    Blood Culture - Blood, Arm, Left Preliminary result          Time spent on Discharge including face to face service: Greater than 30 minutes    Electronically signed by Kervin Carroll MD, 04/11/23, 2:55 PM EDT.

## 2023-04-12 NOTE — OUTREACH NOTE
Prep Survey    Flowsheet Row Responses   [facility] patient discharged from? [facility]   Is LACE score < 7 ? No   Eligibility Readm Mgmt   Discharge diagnosis sepsis - aspiration pneumonia   Does the patient have one of the following disease processes/diagnoses(primary or secondary)? Sepsis   Does the patient have Home health ordered? Yes   What is the Home health agency?  hospice    Is there a DME ordered? No   Prep survey completed? Yes          Palma CARIAS - Registered Nurse

## 2023-04-14 ENCOUNTER — READMISSION MANAGEMENT (OUTPATIENT)
Dept: CALL CENTER | Facility: HOSPITAL | Age: 78
End: 2023-04-14
Payer: COMMERCIAL

## 2023-04-14 NOTE — OUTREACH NOTE
Sepsis Week 1 Survey    Flowsheet Row Responses   [facility] patient discharged from? [facility]   Does the patient have one of the following disease processes/diagnoses(primary or secondary)? Sepsis   Week 1 attempt successful? Yes   Call start time 09   Revoke Patient    Discharge diagnosis sepsis - aspiration pneumonia   Person spoke with today (if not patient) and relationship nitin Dotson - Registered Nurse

## 2023-04-15 LAB
BACTERIA SPEC AEROBE CULT: NORMAL
BACTERIA SPEC AEROBE CULT: NORMAL

## 2023-04-19 LAB
MYCOBACTERIUM SPEC CULT: NORMAL
NIGHT BLUE STAIN TISS: NORMAL
NIGHT BLUE STAIN TISS: NORMAL

## 2023-07-24 NOTE — THERAPY TREATMENT NOTE
Acute Care - Physical Therapy Treatment Note  [facility]     Patient Name: Buzz Lopez  : 1945  MRN: [id number]  Today's Date: 3/27/2023      Visit Dx:     ICD-10-CM ICD-9-CM   1. Pneumonia of right lower lobe due to infectious organism  J18.9 486   2. Sepsis with acute hypoxic respiratory failure without septic shock, due to unspecified organism (HCC)  A41.9 038.9    R65.20 995.91    J96.01 518.81   3. Acute respiratory failure with hypoxia (HCC)  J96.01 518.81   4. Difficulty walking  R26.2 719.7   5. Oropharyngeal dysphagia  R13.12 787.22   6. Decreased activities of daily living (ADL)  Z78.9 V49.89     Patient Active Problem List   Diagnosis   • Severe malnutrition (HCC)   • Aspiration into airway     Past Medical History:   Diagnosis Date   • Dysphagia      Past Surgical History:   Procedure Laterality Date   • BRONCHOSCOPY N/A 3/8/2023    Procedure: BRONCHOSCOPY WITH BRONCHOALVEOLAR LAVAGE, WASHINGS;  Surgeon: Jason Rowley MD;  Location: [facility] ENDOSCOPY;  Service: Pulmonary;  Laterality: N/A;  MUCOUS PLUGGING   • ENDOSCOPY W/ PEG TUBE PLACEMENT N/A 3/22/2023    Procedure: ESOPHAGOGASTRODUODENOSCOPY WITH PERCUTANEOUS ENDOSCOPIC GASTROSTOMY TUBE INSERTION;  Surgeon: Ruslan Guido MD;  Location: [facility] ENDOSCOPY;  Service: Gastroenterology;  Laterality: N/A;  PEG INSERTION   • EYE SURGERY       PT Assessment (last 12 hours)     PT Evaluation and Treatment     Row Name 23 1400          Physical Therapy Time and Intention    Subjective Information no complaints  -CS     Document Type therapy note (daily note)  -CS     Mode of Treatment individual therapy;physical therapy  -CS     Patient Effort good  -CS     Symptoms Noted During/After Treatment fatigue  -CS     Row Name 23 1400          Bed Mobility    Bed Mobility supine-sit-supine  -CS     Supine-Sit-Supine [place] (Bed Mobility) verbal cues;standby assist;contact guard  -CS     Bed Mobility, Safety Issues decreased use of  arms for pushing/pulling;decreased use of legs for bridging/pushing  -CS     Assistive Device (Bed Mobility) bed rails;head of bed elevated  -CS     Comment, (Bed Mobility) patient reported he had recently returned from walking and sitting in the chair and was not up for further transfers or ambulation at time of session.  -CS     Row Name 03/27/23 1400          Motor Skills    Therapeutic Exercise hip;knee;ankle  -CS     Row Name             Wound 03/16/23 1641 Bilateral medial gluteal MASD (Moisture associated skin damage)    Wound - Properties Group Placement Date: 03/16/23  - Placement Time: 1641  -MC Side: Bilateral  -MC Orientation: medial  -MC Location: gluteal  -MC Primary Wound Type: MASD  -MC    Retired Wound - Properties Group Placement Date: 03/16/23  - Placement Time: 1641  -MC Side: Bilateral  -MC Orientation: medial  -MC Location: gluteal  -MC Primary Wound Type: MASD  -MC    Retired Wound - Properties Group Date first assessed: 03/16/23  - Time first assessed: 1641  -MC Side: Bilateral  -MC Location: gluteal  -MC Primary Wound Type: MASD  -MC    Row Name             Wound 03/16/23 1641 Left anterior leg Abrasion    Wound - Properties Group Placement Date: 03/16/23  - Placement Time: 1641  -MC Side: Left  -MC Orientation: anterior  -MC Location: leg  -MC Primary Wound Type: Abrasion  -MC    Retired Wound - Properties Group Placement Date: 03/16/23  - Placement Time: 1641  -MC Side: Left  -MC Orientation: anterior  -MC Location: leg  -MC Primary Wound Type: Abrasion  -MC    Retired Wound - Properties Group Date first assessed: 03/16/23  - Time first assessed: 1641  -MC Side: Left  -MC Location: leg  -MC Primary Wound Type: Abrasion  -MC    Row Name 03/27/23 1400          Plan of Care Review    Plan of Care Reviewed With patient  -CS     Progress improving  -CS     Outcome Evaluation Continue plan of care  -CS     Row Name 03/27/23 1400          Progress Summary (PT)    Progress Toward  Functional Goals (PT) progress toward functional goals is good  -CS           User Key  (r) = Recorded By, (t) = Taken By, (c) = Cosigned By    Initials Name Provider Type    Lilly Maciel, RN Registered Nurse    Aga Junior, PT Physical Therapist              Bilateral LE Ther-ex   Exercise  Reps  Sets    Hip abd/add 15 2   Short arc Quads  15 2   Heel Slides  15 2   Ankle Pumps  15 2   Quad sets  15 2   Glut sets  15 2   Straight leg raise  15 2          Physical Therapy Education     Title: PT OT SLP Therapies (Done)     Topic: Physical Therapy (Done)     Point: Mobility training (Done)     Learning Progress Summary           Patient Acceptance, E, VU by  at 3/24/2023 1031    Acceptance, E, VU by  at 3/8/2023 1531    Acceptance, E, VU by  at 3/7/2023 0909   Family Acceptance, E, VU by  at 3/24/2023 1031                               User Key     Initials Effective Dates Name Provider Type Discipline     06/16/21 -  Urszula Barros, RN Registered Nurse Nurse    ALO 09/21/21 -  Heavenly Quach, RN Registered Nurse Nurse    POONAM 01/11/23 -  Ephraim Juares PT Student PT Student PT              PT Recommendation and Plan     Progress Summary (PT)  Progress Toward Functional Goals (PT): progress toward functional goals is good  Plan of Care Reviewed With: patient  Progress: improving  Outcome Evaluation: Continue plan of care       Time Calculation:    PT Charges     Row Name 03/27/23 1429             Time Calculation    PT Received On 03/27/23  -      PT Goal Re-Cert Due Date 03/31/23  -CS         Timed Charges    71933 - PT Therapeutic Exercise Minutes 15  -CS      78327 - PT Therapeutic Activity Minutes 8  -CS         Untimed Charges    PT Eval/Re-eval Minutes --  -CS         Total Minutes    Timed Charges Total Minutes 23  -CS      Untimed Charges Total Minutes --  -CS       Total Minutes 23  -CS            User Key  (r) = Recorded By, (t) = Taken By, (c) = Cosigned By    Initials Name Provider  Type    CS Aga Austin, PT Physical Therapist              Therapy Charges for Today     Code Description Service Date Service Provider Modifiers Qty    87027192649 HC PT THER PROC EA 15 MIN 3/27/2023 Aga Austin, PT GP 1    77390186511 HC PT THERAPEUTIC ACT EA 15 MIN 3/27/2023 Aga Austin, PT GP 1          PT G-Codes  Outcome Measure Options: Optimal Instrument, AM-PAC 6 Clicks Daily Activity (OT)  AM-PAC 6 Clicks Score (PT): 18  AM-PAC 6 Clicks Score (OT): 20    Aga Austin, NORMAN  3/27/2023     Peng Advancement Flap Text: The defect edges were debeveled with a #15 scalpel blade.  Given the location of the defect, shape of the defect and the proximity to free margins a Peng advancement flap was deemed most appropriate.  Using a sterile surgical marker, an appropriate advancement flap was drawn incorporating the defect and placing the expected incisions within the relaxed skin tension lines where possible. The area thus outlined was incised deep to adipose tissue with a #15 scalpel blade.  The skin margins were undermined to an appropriate distance in all directions utilizing iris scissors and carried over to close the primary defect  and carried over to close the primary defect.

## (undated) DEVICE — LINER SURG CANSTR SXN S/RIGD 1500CC

## (undated) DEVICE — SINGLE USE SUCTION VALVE MAJ-209: Brand: SINGLE USE SUCTION VALVE (STERILE)

## (undated) DEVICE — Device

## (undated) DEVICE — Device: Brand: DEFENDO AIR/WATER/SUCTION AND BIOPSY VALVE

## (undated) DEVICE — SOLIDIFIER LIQLOC PLS 1500CC BT

## (undated) DEVICE — BLCK/BITE BLOX WO/DENTL/RIM W/STRAP 54F

## (undated) DEVICE — CONN JET HYDRA H20 AUXILIARY DISP

## (undated) DEVICE — SOL IRRG H2O PL/BG 1000ML STRL

## (undated) DEVICE — SINGLE USE BIOPSY VALVE MAJ-210: Brand: SINGLE USE BIOPSY VALVE (STERILE)

## (undated) DEVICE — KT PEG ENDOVIVE ENFIT SFTY PULL 20F 1P/U

## (undated) DEVICE — DEV ATOMIZATION MUCOSAL/NASALTRACH

## (undated) DEVICE — CUP SPECI 4OZ LF STRL